# Patient Record
Sex: MALE | Race: WHITE | NOT HISPANIC OR LATINO | Employment: OTHER | URBAN - NONMETROPOLITAN AREA
[De-identification: names, ages, dates, MRNs, and addresses within clinical notes are randomized per-mention and may not be internally consistent; named-entity substitution may affect disease eponyms.]

---

## 2017-01-06 ENCOUNTER — APPOINTMENT (OUTPATIENT)
Dept: WOUND CARE | Facility: HOSPITAL | Age: 62
End: 2017-01-06
Attending: PODIATRIST

## 2017-01-09 ENCOUNTER — APPOINTMENT (OUTPATIENT)
Dept: WOUND CARE | Facility: HOSPITAL | Age: 62
End: 2017-01-09
Attending: PODIATRIST

## 2017-01-16 ENCOUNTER — APPOINTMENT (OUTPATIENT)
Dept: WOUND CARE | Facility: HOSPITAL | Age: 62
End: 2017-01-16
Attending: PODIATRIST

## 2017-01-23 ENCOUNTER — LAB REQUISITION (OUTPATIENT)
Dept: LAB | Facility: HOSPITAL | Age: 62
End: 2017-01-23

## 2017-01-23 ENCOUNTER — APPOINTMENT (OUTPATIENT)
Dept: WOUND CARE | Facility: HOSPITAL | Age: 62
End: 2017-01-23
Attending: PODIATRIST

## 2017-01-23 DIAGNOSIS — E11.621 TYPE 2 DIABETES MELLITUS WITH FOOT ULCER (CODE) (HCC): ICD-10-CM

## 2017-01-23 PROCEDURE — 87070 CULTURE OTHR SPECIMN AEROBIC: CPT | Performed by: SURGERY

## 2017-01-23 PROCEDURE — 87147 CULTURE TYPE IMMUNOLOGIC: CPT | Performed by: SURGERY

## 2017-01-23 PROCEDURE — 87176 TISSUE HOMOGENIZATION CULTR: CPT | Performed by: SURGERY

## 2017-01-23 PROCEDURE — 87076 CULTURE ANAEROBE IDENT EACH: CPT | Performed by: SURGERY

## 2017-01-23 PROCEDURE — 87075 CULTR BACTERIA EXCEPT BLOOD: CPT | Performed by: SURGERY

## 2017-01-23 PROCEDURE — 87205 SMEAR GRAM STAIN: CPT | Performed by: SURGERY

## 2017-01-27 LAB
BACTERIA SPEC AEROBE CULT: ABNORMAL
GRAM STN SPEC: ABNORMAL
GRAM STN SPEC: ABNORMAL

## 2017-01-29 LAB — BACTERIA SPEC ANAEROBE CULT: ABNORMAL

## 2017-01-30 ENCOUNTER — APPOINTMENT (OUTPATIENT)
Dept: WOUND CARE | Facility: HOSPITAL | Age: 62
End: 2017-01-30
Attending: PODIATRIST

## 2017-02-06 ENCOUNTER — APPOINTMENT (OUTPATIENT)
Dept: WOUND CARE | Facility: HOSPITAL | Age: 62
End: 2017-02-06
Attending: PODIATRIST

## 2017-02-07 ENCOUNTER — APPOINTMENT (OUTPATIENT)
Dept: WOUND CARE | Facility: HOSPITAL | Age: 62
End: 2017-02-07
Attending: PODIATRIST

## 2017-02-14 ENCOUNTER — APPOINTMENT (OUTPATIENT)
Dept: WOUND CARE | Facility: HOSPITAL | Age: 62
End: 2017-02-14
Attending: PODIATRIST

## 2017-02-17 ENCOUNTER — OFFICE VISIT (OUTPATIENT)
Dept: PODIATRY | Facility: CLINIC | Age: 62
End: 2017-02-17

## 2017-02-17 VITALS
SYSTOLIC BLOOD PRESSURE: 138 MMHG | HEART RATE: 58 BPM | WEIGHT: 241 LBS | DIASTOLIC BLOOD PRESSURE: 64 MMHG | BODY MASS INDEX: 32.64 KG/M2 | HEIGHT: 72 IN

## 2017-02-17 DIAGNOSIS — B35.1 ONYCHOMYCOSIS: ICD-10-CM

## 2017-02-17 DIAGNOSIS — L97.509 FOOT ULCER, UNSPECIFIED LATERALITY, WITH UNSPECIFIED SEVERITY (HCC): ICD-10-CM

## 2017-02-17 DIAGNOSIS — E11.49 DIABETES MELLITUS TYPE 2 WITH NEUROLOGICAL MANIFESTATIONS (HCC): Primary | ICD-10-CM

## 2017-02-17 DIAGNOSIS — I87.2 VENOUS INSUFFICIENCY: ICD-10-CM

## 2017-02-17 PROCEDURE — 99213 OFFICE O/P EST LOW 20 MIN: CPT | Performed by: PODIATRIST

## 2017-02-17 PROCEDURE — 11721 DEBRIDE NAIL 6 OR MORE: CPT | Performed by: PODIATRIST

## 2017-02-17 NOTE — PROGRESS NOTES
"    [facility] - PODIATRY    Today's Date: 02/17/2017    Patient Name: Leonora Shi  MRN: [id number]  CSN: [id number]  PCP: Jose Angel Mullen MD  Referring Provider: Theo Mercado DO    SUBJECTIVE     Chief Complaint   Patient presents with   • Follow-up     3 month f/u/diabetic foot care     HPI: Leonora Shi, a 61 y.o.male, comes to clinic as a(n) established patient complaining of painful toenails. Patient is NIDDM with last stated BG level of 105mg/dl. Denies pain and admits numbness and tingling. Relates previous treatment(s) including nail debridements. Wounds are being treatment at [facility] [place] [facility]. Denies any constitutional symptoms. No other pedal complaints at this time.    Past Medical History   Diagnosis Date   • CAD (coronary artery disease)    • History of endocarditis    • Myocardial infarction    • Neuropathy    • Osteomyelitis      bilat lower legs \"ulcers\" and bottom of left foot   • Spinal stenosis    • Venous stasis    • Wound, open      sole of left foot, and left side of left lower calf     Past Surgical History   Procedure Laterality Date   • Knee surgery       bilat knees   • Gastric bypass     • Cardiac catheterization       x 2   • Cataract extraction Right    • Varicose vein surgery Left 11/14/2016     Procedure: SAPHENOUS VEIN RADIO FREQUENCY ABLATION of Left Lower extremity;  Surgeon: Theo Mercado DO;  Location:  [facility] OR;  Service:    • Varicose vein surgery Right 12/5/2016     Procedure: RIGHT-SAPHENOUS VEIN RADIO FREQUENCY ABLATION;  Surgeon: Theo Mercado DO;  Location:  [facility] OR;  Service:      Family History   Problem Relation Age of Onset   • Diabetes Other    • Hypertension Other    • Heart disease Other    • Heart disease Mother    • Diabetes Mother    • Heart disease Father    • Lung disease Father    • No Known Problems Brother    • No Known Problems Brother      Social History     Social History   • Marital status: "      Spouse name: N/A   • Number of children: N/A   • Years of education: N/A     Occupational History   • Not on file.     Social History Main Topics   • Smoking status: Never Smoker   • Smokeless tobacco: Never Used   • Alcohol use Yes      Comment: rare    • Drug use: No   • Sexual activity: Defer     Other Topics Concern   • Not on file     Social History Narrative     Allergies   Allergen Reactions   • Adhesive Tape      Tears skin     Current Outpatient Prescriptions   Medication Sig Dispense Refill   • HYDROcodone-acetaminophen (NORCO)  MG per tablet Take  tablets by mouth Every 8 (Eight) Hours.     • temazepam (RESTORIL) 30 MG capsule Take 30 mg by mouth.     • tiZANidine (ZANAFLEX) 4 MG tablet Take 4 mg by mouth Every 8 (Eight) Hours As Needed.       No current facility-administered medications for this visit.      Review of Systems   Constitutional: Negative for chills and fever.   Respiratory: Negative for shortness of breath.    Cardiovascular: Positive for leg swelling. Negative for chest pain.   Gastrointestinal: Negative for constipation, diarrhea, nausea and vomiting.   Skin: Positive for wound.   Neurological: Positive for numbness.       OBJECTIVE     Vitals:    02/17/17 0914   BP: 138/64   Pulse: 58       PHYSICAL EXAM  GEN:   A&Ox3, NAD. Pt presents to clinic ambulating without assistance and wearing Diabetic Shoes.      NEURO:   Protective sensation intact to 0/10 sites Right foot, 0/10 site Left foot using [place]-Alena monofilament  Light touch sensation diminished  No Tinel's or Villeux sign.    VASC:  Skin temperature Warm to Warm proximal to distal janelle  DP pulses 2/4 Right, 2/4 Left  PT pulses 2/4 Right, 2/4 Left  CFT <3 sec janelle  1+ edema noted janelle  Varicosities absent janelle    MUSC/SKEL:  Muscle Strength Right foot Dorsiflexors 5/5, Plantarflexors 5/5, Evertors 5/5, Invertors 5/5  Muscle Strength Left foot Dorsiflexors 5/5, Plantarflexors 5/5, Evertors 5/5, Invertors  5/5  ROM of the 1st MTP is full without pain or crepitus  ROM of the MTJ is full without pain or crepitus    ROM of the STJ is full without pain or crepitus    ROM of the ankle joint is full without pain or crepitus    No POP with exam   Planus foot type   Gait pattern: Normal    DERM:  Pedal nails x10 are thickened, elongated and discolored with presence of subunugual debris  Webspaces are Clean, Dry, and Intact  Ulcerations noted to bilateral feet and legs (bandaged)       RADIOLOGY/NUCLEAR:  No results found.    LABORATORY/CULTURE RESULTS:      PATHOLOGY RESULTS:       ASSESSMENT/PLAN     Leonora was seen today for follow-up.    Diagnoses and all orders for this visit:    Diabetes mellitus type 2 with neurological manifestations    Onychomycosis    Venous insufficiency    Foot ulcer, unspecified laterality, with unspecified severity      Comprehensive lower extremity examination and evaluation was performed.  Discussed findings and treatment plan including risks, benefits, and treatment options with patient in detail. Patient agreed with treatment plan.  After verbal consent obtained, nail(s) x10 debrided of length and thickness with nail nipper without incidence, Patient may maintain nails and calluses at home utilizing [place] board of pumice stone between visits as needed, Reviewed at home diabetic foot care   An After Visit Summary was printed and given to the patient at discharge, including (if requested) any available informative/educational handouts regarding diagnosis, treatment, or medications. All questions were answered to patient/family satisfaction. Should symptoms fail to improve or worsen they agree to call or return to clinic or to go to the Emergency Department. Discussed the importance of following up with any needed screening tests/labs/specialist appointments and any requested follow-up recommended by me today. Importance of maintaining follow-up discussed and patient accepts that missed  appointments can delay diagnosis and potentially lead to worsening of conditions.  Return in about 3 months (around 5/17/2017)., or sooner if acute issues arise.        This document has been electronically signed by Arnel Carroll DPM on February 17, 2017 9:52 AM     Please note that portions of this note may have been completed with a voice recognition program. Efforts were made to edit the dictations, but occasionally words are mistranscribed.

## 2017-02-17 NOTE — PATIENT INSTRUCTIONS
Diabetes and Foot Care  Diabetes may cause you to have problems because of poor blood supply (circulation) to your feet and legs. This may cause the skin on your feet to become thinner, break easier, and heal more slowly. Your skin may become dry, and the skin may peel and crack. You may also have nerve damage in your legs and feet causing decreased feeling in them. You may not notice minor injuries to your feet that could lead to infections or more serious problems. Taking care of your feet is one of the most important things you can do for yourself.  HOME CARE INSTRUCTIONS  · Wear shoes at all times, even in the house. Do not go barefoot. Bare feet are easily injured.  · Check your feet daily for blisters, cuts, and redness. If you cannot see the bottom of your feet, use a mirror or ask someone for help.  · Wash your feet with warm water (do not use hot water) and mild soap. Then pat your feet and the areas between your toes until they are completely dry. Do not soak your feet as this can dry your skin.  · Apply a moisturizing lotion or petroleum jelly (that does not contain alcohol and is unscented) to the skin on your feet and to dry, brittle toenails. Do not apply lotion between your toes.  · Trim your toenails straight across. Do not dig under them or around the cuticle. File the edges of your nails with an emery board or nail file.  · Do not cut corns or calluses or try to remove them with medicine.  · Wear clean socks or stockings every day. Make sure they are not too tight. Do not wear knee-high stockings since they may decrease blood flow to your legs.  · Wear shoes that fit properly and have enough cushioning. To break in new shoes, wear them for just a few hours a day. This prevents you from injuring your feet. Always look in your shoes before you put them on to be sure there are no objects inside.  · Do not cross your legs. This may decrease the blood flow to your feet.  · If you find a minor scrape,  cut, or break in the skin on your feet, keep it and the skin around it clean and dry. These areas may be cleansed with mild soap and water. Do not cleanse the area with peroxide, alcohol, or iodine.  · When you remove an adhesive bandage, be sure not to damage the skin around it.  · If you have a wound, look at it several times a day to make sure it is healing.  · Do not use heating pads or hot water bottles. They may burn your skin. If you have lost feeling in your feet or legs, you may not know it is happening until it is too late.  · Make sure your health care provider performs a complete foot exam at least annually or more often if you have foot problems. Report any cuts, sores, or bruises to your health care provider immediately.  SEEK MEDICAL CARE IF:   · You have an injury that is not healing.  · You have cuts or breaks in the skin.  · You have an ingrown nail.  · You notice redness on your legs or feet.  · You feel burning or tingling in your legs or feet.  · You have pain or cramps in your legs and feet.  · Your legs or feet are numb.  · Your feet always feel cold.  SEEK IMMEDIATE MEDICAL CARE IF:   · There is increasing redness, swelling, or pain in or around a wound.  · There is a red line that goes up your leg.  · Pus is coming from a wound.  · You develop a fever or as directed by your health care provider.  · You notice a bad smell coming from an ulcer or wound.     This information is not intended to replace advice given to you by your health care provider. Make sure you discuss any questions you have with your health care provider.     Document Released: 12/15/2001 Document Revised: 11/28/2016 Document Reviewed: 05/27/2014  [employer] Interactive Patient Education ©2016 [employer]

## 2017-02-21 ENCOUNTER — APPOINTMENT (OUTPATIENT)
Dept: WOUND CARE | Facility: HOSPITAL | Age: 62
End: 2017-02-21
Attending: PODIATRIST

## 2017-02-21 ENCOUNTER — LAB REQUISITION (OUTPATIENT)
Dept: LAB | Facility: HOSPITAL | Age: 62
End: 2017-02-21

## 2017-02-21 ENCOUNTER — HOSPITAL ENCOUNTER (OUTPATIENT)
Dept: GENERAL RADIOLOGY | Facility: HOSPITAL | Age: 62
Discharge: HOME | End: 2017-02-21
Admitting: SURGERY

## 2017-02-21 ENCOUNTER — TRANSCRIBE ORDERS (OUTPATIENT)
Dept: ADMINISTRATIVE | Facility: HOSPITAL | Age: 62
End: 2017-02-21

## 2017-02-21 DIAGNOSIS — S81.802A OPEN WOUND, LOWER LEG, LEFT, INITIAL ENCOUNTER: Primary | ICD-10-CM

## 2017-02-21 DIAGNOSIS — L97.812 NON-PRESSURE CHRONIC ULCER OF OTHER PART OF RIGHT LOWER LEG WITH FAT LAYER EXPOSED (HCC): ICD-10-CM

## 2017-02-21 PROCEDURE — 87070 CULTURE OTHR SPECIMN AEROBIC: CPT | Performed by: SURGERY

## 2017-02-21 PROCEDURE — 87205 SMEAR GRAM STAIN: CPT | Performed by: SURGERY

## 2017-02-21 PROCEDURE — 87075 CULTR BACTERIA EXCEPT BLOOD: CPT | Performed by: SURGERY

## 2017-02-21 PROCEDURE — 87176 TISSUE HOMOGENIZATION CULTR: CPT | Performed by: SURGERY

## 2017-02-21 PROCEDURE — 73590 X-RAY EXAM OF LOWER LEG: CPT

## 2017-02-21 PROCEDURE — 88304 TISSUE EXAM BY PATHOLOGIST: CPT | Performed by: SURGERY

## 2017-02-22 ENCOUNTER — LAB REQUISITION (OUTPATIENT)
Dept: LAB | Facility: HOSPITAL | Age: 62
End: 2017-02-22

## 2017-02-22 DIAGNOSIS — L97.812 NON-PRESSURE CHRONIC ULCER OF OTHER PART OF RIGHT LOWER LEG WITH FAT LAYER EXPOSED (HCC): ICD-10-CM

## 2017-02-23 LAB
CYTO UR: NORMAL
LAB AP CASE REPORT: NORMAL
LAB AP CLINICAL INFORMATION: NORMAL
Lab: NORMAL
PATH REPORT.FINAL DX SPEC: NORMAL
PATH REPORT.GROSS SPEC: NORMAL

## 2017-02-26 LAB
BACTERIA SPEC AEROBE CULT: NORMAL
BACTERIA SPEC ANAEROBE CULT: NORMAL
GRAM STN SPEC: NORMAL
GRAM STN SPEC: NORMAL

## 2017-02-28 ENCOUNTER — APPOINTMENT (OUTPATIENT)
Dept: WOUND CARE | Facility: HOSPITAL | Age: 62
End: 2017-02-28
Attending: PODIATRIST

## 2017-03-07 ENCOUNTER — APPOINTMENT (OUTPATIENT)
Dept: WOUND CARE | Facility: HOSPITAL | Age: 62
End: 2017-03-07
Attending: PODIATRIST

## 2017-03-14 ENCOUNTER — APPOINTMENT (OUTPATIENT)
Dept: WOUND CARE | Facility: HOSPITAL | Age: 62
End: 2017-03-14
Attending: PODIATRIST

## 2017-03-21 ENCOUNTER — APPOINTMENT (OUTPATIENT)
Dept: WOUND CARE | Facility: HOSPITAL | Age: 62
End: 2017-03-21
Attending: PODIATRIST

## 2017-03-27 ENCOUNTER — HOSPITAL ENCOUNTER (OUTPATIENT)
Dept: WOUND CARE | Age: 62
Discharge: HOME | End: 2017-03-27
Payer: COMMERCIAL

## 2017-03-27 VITALS
TEMPERATURE: 98.8 F | DIASTOLIC BLOOD PRESSURE: 73 MMHG | HEART RATE: 61 BPM | BODY MASS INDEX: 32.1 KG/M2 | RESPIRATION RATE: 16 BRPM | HEIGHT: 72 IN | WEIGHT: 237 LBS | SYSTOLIC BLOOD PRESSURE: 140 MMHG

## 2017-03-27 DIAGNOSIS — L97.529 DIABETIC ULCER OF LEFT FOOT ASSOCIATED WITH TYPE 2 DIABETES MELLITUS (HCC): ICD-10-CM

## 2017-03-27 DIAGNOSIS — E11.621 DIABETIC ULCER OF LEFT FOOT ASSOCIATED WITH TYPE 2 DIABETES MELLITUS (HCC): ICD-10-CM

## 2017-03-27 PROCEDURE — 99213 OFFICE O/P EST LOW 20 MIN: CPT

## 2017-03-27 PROCEDURE — 99213 OFFICE O/P EST LOW 20 MIN: CPT | Performed by: NURSE PRACTITIONER

## 2017-04-04 ENCOUNTER — HOSPITAL ENCOUNTER (OUTPATIENT)
Dept: WOUND CARE | Age: 62
Discharge: HOME | End: 2017-04-04
Payer: COMMERCIAL

## 2017-04-04 ENCOUNTER — HOSPITAL ENCOUNTER (OUTPATIENT)
Dept: NON INVASIVE DIAGNOSTICS | Age: 62
Discharge: HOME | End: 2017-04-04
Payer: COMMERCIAL

## 2017-04-04 ENCOUNTER — HOSPITAL ENCOUNTER (OUTPATIENT)
Dept: GENERAL RADIOLOGY | Age: 62
Discharge: HOME | End: 2017-04-04
Payer: COMMERCIAL

## 2017-04-04 VITALS
DIASTOLIC BLOOD PRESSURE: 65 MMHG | RESPIRATION RATE: 18 BRPM | WEIGHT: 237 LBS | SYSTOLIC BLOOD PRESSURE: 138 MMHG | TEMPERATURE: 98 F | HEART RATE: 66 BPM | HEIGHT: 72 IN | BODY MASS INDEX: 32.1 KG/M2

## 2017-04-04 DIAGNOSIS — L97.522 SKIN ULCER OF TOE OF LEFT FOOT WITH FAT LAYER EXPOSED (HCC): ICD-10-CM

## 2017-04-04 DIAGNOSIS — L97.529 DIABETIC ULCER OF LEFT FOOT ASSOCIATED WITH TYPE 2 DIABETES MELLITUS (HCC): Primary | Chronic | ICD-10-CM

## 2017-04-04 DIAGNOSIS — L97.529 DIABETIC ULCER OF LEFT FOOT ASSOCIATED WITH TYPE 2 DIABETES MELLITUS (HCC): ICD-10-CM

## 2017-04-04 DIAGNOSIS — E11.621 DIABETIC ULCER OF LEFT FOOT ASSOCIATED WITH TYPE 2 DIABETES MELLITUS (HCC): Primary | Chronic | ICD-10-CM

## 2017-04-04 DIAGNOSIS — E11.621 DIABETIC ULCER OF LEFT FOOT ASSOCIATED WITH TYPE 2 DIABETES MELLITUS (HCC): ICD-10-CM

## 2017-04-04 PROCEDURE — 93923 UPR/LXTR ART STDY 3+ LVLS: CPT

## 2017-04-04 PROCEDURE — 73630 X-RAY EXAM OF FOOT: CPT

## 2017-04-04 PROCEDURE — 97597 DBRDMT OPN WND 1ST 20 CM/<: CPT

## 2017-04-04 PROCEDURE — 97597 DBRDMT OPN WND 1ST 20 CM/<: CPT | Performed by: NURSE PRACTITIONER

## 2017-04-11 ENCOUNTER — HOSPITAL ENCOUNTER (OUTPATIENT)
Dept: WOUND CARE | Age: 62
Discharge: HOME | End: 2017-04-11
Payer: COMMERCIAL

## 2017-04-11 VITALS
RESPIRATION RATE: 16 BRPM | HEIGHT: 72 IN | WEIGHT: 237 LBS | DIASTOLIC BLOOD PRESSURE: 69 MMHG | SYSTOLIC BLOOD PRESSURE: 152 MMHG | BODY MASS INDEX: 32.1 KG/M2 | TEMPERATURE: 97.7 F | HEART RATE: 71 BPM

## 2017-04-11 DIAGNOSIS — L97.529 DIABETIC ULCER OF LEFT FOOT ASSOCIATED WITH TYPE 2 DIABETES MELLITUS (HCC): ICD-10-CM

## 2017-04-11 DIAGNOSIS — E11.621 DIABETIC ULCER OF LEFT FOOT ASSOCIATED WITH TYPE 2 DIABETES MELLITUS (HCC): ICD-10-CM

## 2017-04-11 DIAGNOSIS — L97.522 SKIN ULCER OF TOE OF LEFT FOOT WITH FAT LAYER EXPOSED (HCC): ICD-10-CM

## 2017-04-11 PROCEDURE — 97597 DBRDMT OPN WND 1ST 20 CM/<: CPT

## 2017-04-11 PROCEDURE — 97597 DBRDMT OPN WND 1ST 20 CM/<: CPT | Performed by: SURGERY

## 2017-04-18 ENCOUNTER — HOSPITAL ENCOUNTER (OUTPATIENT)
Dept: WOUND CARE | Age: 62
Discharge: HOME | End: 2017-04-18
Payer: COMMERCIAL

## 2017-04-18 VITALS
HEART RATE: 74 BPM | WEIGHT: 237 LBS | SYSTOLIC BLOOD PRESSURE: 143 MMHG | BODY MASS INDEX: 32.1 KG/M2 | HEIGHT: 72 IN | DIASTOLIC BLOOD PRESSURE: 66 MMHG | TEMPERATURE: 98.6 F | RESPIRATION RATE: 16 BRPM

## 2017-04-18 DIAGNOSIS — L97.522 SKIN ULCER OF TOE OF LEFT FOOT WITH FAT LAYER EXPOSED (HCC): ICD-10-CM

## 2017-04-18 DIAGNOSIS — L97.529 DIABETIC ULCER OF LEFT FOOT ASSOCIATED WITH TYPE 2 DIABETES MELLITUS (HCC): ICD-10-CM

## 2017-04-18 DIAGNOSIS — E11.621 DIABETIC ULCER OF LEFT FOOT ASSOCIATED WITH TYPE 2 DIABETES MELLITUS (HCC): ICD-10-CM

## 2017-04-18 PROCEDURE — 11042 DBRDMT SUBQ TIS 1ST 20SQCM/<: CPT

## 2017-04-18 PROCEDURE — 11042 DBRDMT SUBQ TIS 1ST 20SQCM/<: CPT | Performed by: SURGERY

## 2017-04-25 ENCOUNTER — HOSPITAL ENCOUNTER (OUTPATIENT)
Dept: WOUND CARE | Age: 62
Discharge: HOME | End: 2017-04-25
Payer: COMMERCIAL

## 2017-04-25 VITALS
RESPIRATION RATE: 18 BRPM | BODY MASS INDEX: 32.1 KG/M2 | HEART RATE: 73 BPM | SYSTOLIC BLOOD PRESSURE: 151 MMHG | DIASTOLIC BLOOD PRESSURE: 68 MMHG | HEIGHT: 72 IN | WEIGHT: 237 LBS | TEMPERATURE: 97.8 F

## 2017-04-25 DIAGNOSIS — L97.522 SKIN ULCER OF TOE OF LEFT FOOT WITH FAT LAYER EXPOSED (HCC): ICD-10-CM

## 2017-04-25 DIAGNOSIS — L97.529 DIABETIC ULCER OF LEFT FOOT ASSOCIATED WITH TYPE 2 DIABETES MELLITUS (HCC): ICD-10-CM

## 2017-04-25 DIAGNOSIS — E11.621 DIABETIC ULCER OF LEFT FOOT ASSOCIATED WITH TYPE 2 DIABETES MELLITUS (HCC): ICD-10-CM

## 2017-04-25 PROCEDURE — 97597 DBRDMT OPN WND 1ST 20 CM/<: CPT

## 2017-04-25 PROCEDURE — 11042 DBRDMT SUBQ TIS 1ST 20SQCM/<: CPT

## 2017-04-25 PROCEDURE — 11042 DBRDMT SUBQ TIS 1ST 20SQCM/<: CPT | Performed by: SURGERY

## 2017-04-28 VITALS — WEIGHT: 315 LBS

## 2017-05-02 ENCOUNTER — HOSPITAL ENCOUNTER (OUTPATIENT)
Dept: WOUND CARE | Age: 62
Discharge: HOME | End: 2017-05-02
Payer: COMMERCIAL

## 2017-05-02 VITALS
HEIGHT: 72 IN | HEART RATE: 91 BPM | TEMPERATURE: 98.8 F | RESPIRATION RATE: 16 BRPM | DIASTOLIC BLOOD PRESSURE: 77 MMHG | BODY MASS INDEX: 32.1 KG/M2 | WEIGHT: 237 LBS | SYSTOLIC BLOOD PRESSURE: 145 MMHG

## 2017-05-02 DIAGNOSIS — L97.522 SKIN ULCER OF TOE OF LEFT FOOT WITH FAT LAYER EXPOSED (HCC): ICD-10-CM

## 2017-05-02 DIAGNOSIS — E11.621 DIABETIC ULCER OF LEFT FOOT ASSOCIATED WITH TYPE 2 DIABETES MELLITUS (HCC): ICD-10-CM

## 2017-05-02 DIAGNOSIS — L97.529 DIABETIC ULCER OF LEFT FOOT ASSOCIATED WITH TYPE 2 DIABETES MELLITUS (HCC): ICD-10-CM

## 2017-05-02 PROCEDURE — 11042 DBRDMT SUBQ TIS 1ST 20SQCM/<: CPT

## 2017-05-02 PROCEDURE — 97597 DBRDMT OPN WND 1ST 20 CM/<: CPT | Performed by: SURGERY

## 2017-05-02 PROCEDURE — 97597 DBRDMT OPN WND 1ST 20 CM/<: CPT

## 2017-05-02 PROCEDURE — 11042 DBRDMT SUBQ TIS 1ST 20SQCM/<: CPT | Performed by: SURGERY

## 2017-05-09 ENCOUNTER — HOSPITAL ENCOUNTER (OUTPATIENT)
Dept: WOUND CARE | Age: 62
Discharge: HOME | End: 2017-05-09
Payer: COMMERCIAL

## 2017-05-09 DIAGNOSIS — E11.621 DIABETIC ULCER OF LEFT FOOT ASSOCIATED WITH TYPE 2 DIABETES MELLITUS (HCC): ICD-10-CM

## 2017-05-09 DIAGNOSIS — L97.522 SKIN ULCER OF TOE OF LEFT FOOT WITH FAT LAYER EXPOSED (HCC): ICD-10-CM

## 2017-05-09 DIAGNOSIS — L97.529 DIABETIC ULCER OF LEFT FOOT ASSOCIATED WITH TYPE 2 DIABETES MELLITUS (HCC): ICD-10-CM

## 2017-05-09 PROCEDURE — 11042 DBRDMT SUBQ TIS 1ST 20SQCM/<: CPT

## 2017-05-09 PROCEDURE — 97597 DBRDMT OPN WND 1ST 20 CM/<: CPT | Performed by: SURGERY

## 2017-05-09 PROCEDURE — 97597 DBRDMT OPN WND 1ST 20 CM/<: CPT

## 2017-05-09 PROCEDURE — 11042 DBRDMT SUBQ TIS 1ST 20SQCM/<: CPT | Performed by: SURGERY

## 2017-06-02 ENCOUNTER — HOSPITAL ENCOUNTER (OUTPATIENT)
Dept: WOUND CARE | Age: 62
Discharge: HOME | End: 2017-06-02
Payer: MEDICARE

## 2017-06-02 VITALS
SYSTOLIC BLOOD PRESSURE: 154 MMHG | DIASTOLIC BLOOD PRESSURE: 74 MMHG | TEMPERATURE: 98.3 F | RESPIRATION RATE: 18 BRPM | HEART RATE: 59 BPM | WEIGHT: 237 LBS | BODY MASS INDEX: 32.1 KG/M2 | HEIGHT: 72 IN

## 2017-06-02 DIAGNOSIS — L97.522 SKIN ULCER OF TOE OF LEFT FOOT WITH FAT LAYER EXPOSED (HCC): ICD-10-CM

## 2017-06-02 DIAGNOSIS — E11.621 DIABETIC ULCER OF LEFT FOOT ASSOCIATED WITH TYPE 2 DIABETES MELLITUS (HCC): ICD-10-CM

## 2017-06-02 DIAGNOSIS — L97.529 DIABETIC ULCER OF LEFT FOOT ASSOCIATED WITH TYPE 2 DIABETES MELLITUS (HCC): ICD-10-CM

## 2017-06-02 PROCEDURE — 11042 DBRDMT SUBQ TIS 1ST 20SQCM/<: CPT

## 2017-06-02 PROCEDURE — 11042 DBRDMT SUBQ TIS 1ST 20SQCM/<: CPT | Performed by: SURGERY

## 2017-06-02 PROCEDURE — 97597 DBRDMT OPN WND 1ST 20 CM/<: CPT

## 2017-06-02 PROCEDURE — 97597 DBRDMT OPN WND 1ST 20 CM/<: CPT | Performed by: SURGERY

## 2017-06-02 RX ORDER — TEMAZEPAM 30 MG/1
30 CAPSULE ORAL
COMMUNITY
Start: 2016-09-22 | End: 2017-12-30 | Stop reason: SDUPTHER

## 2017-06-02 RX ORDER — TIZANIDINE 4 MG/1
4 TABLET ORAL EVERY 8 HOURS PRN
COMMUNITY
Start: 2016-10-21 | End: 2017-10-23 | Stop reason: ALTCHOICE

## 2017-06-07 ENCOUNTER — HOSPITAL ENCOUNTER (OUTPATIENT)
Dept: WOUND CARE | Age: 62
Discharge: HOME | End: 2017-06-07
Payer: MEDICARE

## 2017-06-07 VITALS
HEIGHT: 72 IN | BODY MASS INDEX: 32.1 KG/M2 | SYSTOLIC BLOOD PRESSURE: 130 MMHG | TEMPERATURE: 98.6 F | WEIGHT: 237 LBS | DIASTOLIC BLOOD PRESSURE: 69 MMHG | HEART RATE: 59 BPM | RESPIRATION RATE: 16 BRPM

## 2017-06-07 DIAGNOSIS — L97.529 DIABETIC ULCER OF LEFT FOOT ASSOCIATED WITH TYPE 2 DIABETES MELLITUS (HCC): ICD-10-CM

## 2017-06-07 DIAGNOSIS — L97.522 SKIN ULCER OF TOE OF LEFT FOOT WITH FAT LAYER EXPOSED (HCC): ICD-10-CM

## 2017-06-07 DIAGNOSIS — E11.621 DIABETIC ULCER OF LEFT FOOT ASSOCIATED WITH TYPE 2 DIABETES MELLITUS (HCC): ICD-10-CM

## 2017-06-07 PROCEDURE — 11042 DBRDMT SUBQ TIS 1ST 20SQCM/<: CPT | Performed by: SURGERY

## 2017-06-07 PROCEDURE — 97597 DBRDMT OPN WND 1ST 20 CM/<: CPT

## 2017-06-07 PROCEDURE — 11042 DBRDMT SUBQ TIS 1ST 20SQCM/<: CPT

## 2017-06-07 PROCEDURE — 97597 DBRDMT OPN WND 1ST 20 CM/<: CPT | Performed by: SURGERY

## 2017-06-14 ENCOUNTER — HOSPITAL ENCOUNTER (OUTPATIENT)
Dept: WOUND CARE | Age: 62
Discharge: HOME | End: 2017-06-14
Payer: MEDICARE

## 2017-06-14 VITALS
BODY MASS INDEX: 32.1 KG/M2 | TEMPERATURE: 97.8 F | WEIGHT: 237 LBS | HEIGHT: 72 IN | SYSTOLIC BLOOD PRESSURE: 162 MMHG | RESPIRATION RATE: 16 BRPM | HEART RATE: 66 BPM | DIASTOLIC BLOOD PRESSURE: 75 MMHG

## 2017-06-14 DIAGNOSIS — L97.522 SKIN ULCER OF TOE OF LEFT FOOT WITH FAT LAYER EXPOSED (HCC): ICD-10-CM

## 2017-06-14 DIAGNOSIS — E11.621 DIABETIC ULCER OF LEFT FOOT ASSOCIATED WITH TYPE 2 DIABETES MELLITUS (HCC): ICD-10-CM

## 2017-06-14 DIAGNOSIS — L97.529 DIABETIC ULCER OF LEFT FOOT ASSOCIATED WITH TYPE 2 DIABETES MELLITUS (HCC): ICD-10-CM

## 2017-06-14 PROCEDURE — 97597 DBRDMT OPN WND 1ST 20 CM/<: CPT

## 2017-06-14 PROCEDURE — 11042 DBRDMT SUBQ TIS 1ST 20SQCM/<: CPT | Performed by: SURGERY

## 2017-06-14 PROCEDURE — 97597 DBRDMT OPN WND 1ST 20 CM/<: CPT | Performed by: SURGERY

## 2017-06-14 PROCEDURE — 11042 DBRDMT SUBQ TIS 1ST 20SQCM/<: CPT

## 2017-06-21 ENCOUNTER — HOSPITAL ENCOUNTER (OUTPATIENT)
Dept: WOUND CARE | Age: 62
Discharge: HOME | End: 2017-06-21
Payer: MEDICARE

## 2017-06-21 VITALS
WEIGHT: 237 LBS | TEMPERATURE: 98.6 F | SYSTOLIC BLOOD PRESSURE: 135 MMHG | BODY MASS INDEX: 32.1 KG/M2 | HEART RATE: 74 BPM | RESPIRATION RATE: 16 BRPM | DIASTOLIC BLOOD PRESSURE: 60 MMHG | HEIGHT: 72 IN

## 2017-06-21 DIAGNOSIS — L97.522 SKIN ULCER OF TOE OF LEFT FOOT WITH FAT LAYER EXPOSED (HCC): ICD-10-CM

## 2017-06-21 DIAGNOSIS — E11.621 DIABETIC ULCER OF LEFT FOOT ASSOCIATED WITH TYPE 2 DIABETES MELLITUS (HCC): ICD-10-CM

## 2017-06-21 DIAGNOSIS — L97.529 DIABETIC ULCER OF LEFT FOOT ASSOCIATED WITH TYPE 2 DIABETES MELLITUS (HCC): ICD-10-CM

## 2017-06-21 PROCEDURE — 97597 DBRDMT OPN WND 1ST 20 CM/<: CPT | Performed by: SURGERY

## 2017-06-21 PROCEDURE — 97597 DBRDMT OPN WND 1ST 20 CM/<: CPT

## 2017-06-21 PROCEDURE — 11042 DBRDMT SUBQ TIS 1ST 20SQCM/<: CPT | Performed by: SURGERY

## 2017-06-21 PROCEDURE — 11042 DBRDMT SUBQ TIS 1ST 20SQCM/<: CPT

## 2017-06-28 ENCOUNTER — HOSPITAL ENCOUNTER (OUTPATIENT)
Dept: WOUND CARE | Age: 62
Discharge: HOME | End: 2017-06-28
Payer: MEDICARE

## 2017-06-28 VITALS
SYSTOLIC BLOOD PRESSURE: 135 MMHG | RESPIRATION RATE: 18 BRPM | HEART RATE: 66 BPM | TEMPERATURE: 98.5 F | WEIGHT: 237 LBS | DIASTOLIC BLOOD PRESSURE: 69 MMHG | HEIGHT: 72 IN | BODY MASS INDEX: 32.1 KG/M2

## 2017-06-28 DIAGNOSIS — E11.621 DIABETIC ULCER OF LEFT FOOT ASSOCIATED WITH TYPE 2 DIABETES MELLITUS (HCC): ICD-10-CM

## 2017-06-28 DIAGNOSIS — L97.529 DIABETIC ULCER OF LEFT FOOT ASSOCIATED WITH TYPE 2 DIABETES MELLITUS (HCC): ICD-10-CM

## 2017-06-28 DIAGNOSIS — L97.522 SKIN ULCER OF TOE OF LEFT FOOT WITH FAT LAYER EXPOSED (HCC): ICD-10-CM

## 2017-06-28 PROCEDURE — 97597 DBRDMT OPN WND 1ST 20 CM/<: CPT | Performed by: SURGERY

## 2017-06-28 PROCEDURE — 11042 DBRDMT SUBQ TIS 1ST 20SQCM/<: CPT

## 2017-06-28 PROCEDURE — 97597 DBRDMT OPN WND 1ST 20 CM/<: CPT

## 2017-06-28 PROCEDURE — 11042 DBRDMT SUBQ TIS 1ST 20SQCM/<: CPT | Performed by: SURGERY

## 2017-07-05 ENCOUNTER — HOSPITAL ENCOUNTER (OUTPATIENT)
Dept: WOUND CARE | Age: 62
Discharge: HOME | End: 2017-07-05
Payer: MEDICARE

## 2017-07-05 VITALS
TEMPERATURE: 98.4 F | HEART RATE: 67 BPM | BODY MASS INDEX: 32.1 KG/M2 | HEIGHT: 72 IN | WEIGHT: 237 LBS | RESPIRATION RATE: 16 BRPM | DIASTOLIC BLOOD PRESSURE: 70 MMHG | SYSTOLIC BLOOD PRESSURE: 151 MMHG

## 2017-07-05 DIAGNOSIS — L97.522 SKIN ULCER OF TOE OF LEFT FOOT WITH FAT LAYER EXPOSED (HCC): ICD-10-CM

## 2017-07-05 DIAGNOSIS — E11.621 DIABETIC ULCER OF LEFT FOOT ASSOCIATED WITH TYPE 2 DIABETES MELLITUS (HCC): ICD-10-CM

## 2017-07-05 DIAGNOSIS — L97.529 DIABETIC ULCER OF LEFT FOOT ASSOCIATED WITH TYPE 2 DIABETES MELLITUS (HCC): ICD-10-CM

## 2017-07-05 PROCEDURE — 97597 DBRDMT OPN WND 1ST 20 CM/<: CPT

## 2017-07-05 PROCEDURE — 11042 DBRDMT SUBQ TIS 1ST 20SQCM/<: CPT | Performed by: SURGERY

## 2017-07-05 PROCEDURE — 11042 DBRDMT SUBQ TIS 1ST 20SQCM/<: CPT

## 2017-07-05 PROCEDURE — 97597 DBRDMT OPN WND 1ST 20 CM/<: CPT | Performed by: SURGERY

## 2017-07-07 ENCOUNTER — OFFICE VISIT (OUTPATIENT)
Dept: PODIATRY | Facility: CLINIC | Age: 62
End: 2017-07-07

## 2017-07-07 VITALS
SYSTOLIC BLOOD PRESSURE: 132 MMHG | HEIGHT: 72 IN | DIASTOLIC BLOOD PRESSURE: 94 MMHG | WEIGHT: 250 LBS | BODY MASS INDEX: 33.86 KG/M2 | HEART RATE: 68 BPM

## 2017-07-07 DIAGNOSIS — I87.2 VENOUS INSUFFICIENCY: ICD-10-CM

## 2017-07-07 DIAGNOSIS — E11.49 DIABETES MELLITUS TYPE 2 WITH NEUROLOGICAL MANIFESTATIONS (HCC): ICD-10-CM

## 2017-07-07 DIAGNOSIS — B35.1 ONYCHOMYCOSIS: Primary | ICD-10-CM

## 2017-07-07 PROCEDURE — 11721 DEBRIDE NAIL 6 OR MORE: CPT | Performed by: PODIATRIST

## 2017-07-07 PROCEDURE — 99213 OFFICE O/P EST LOW 20 MIN: CPT | Performed by: PODIATRIST

## 2017-07-07 RX ORDER — LISINOPRIL 20 MG/1
20 TABLET ORAL DAILY
COMMUNITY

## 2017-07-07 NOTE — PROGRESS NOTES
"    [facility] - PODIATRY    Today's Date: 07/07/2017    Patient Name: Leonora Shi  MRN: [id number]  CSN: [id number]  PCP: Jose Angel Mullen MD  Referring Provider: No ref. provider found    SUBJECTIVE     Chief Complaint   Patient presents with   • Follow-up     Patient states he is here for a diabetic foot exam. He denies any pain.      HPI: Leonora Shi, a 62 y.o.male, comes to clinic as a(n) established patient presenting for diabetic foot exam and for follow-up treatment of thick, long toenails. Denies change in medical hx since last appt. Relates that he is no longer going to [facility] and now is going to [facility] and being treated by Dr. Newton for wounds on his legs that were wrapped yesterday. Patient is NIDDM with last stated BG level of 91mg/dl. Denies pain. Denies any constitutional symptoms. No other pedal complaints at this time.    Past Medical History:   Diagnosis Date   • CAD (coronary artery disease)    • Diabetes mellitus    • History of endocarditis    • Myocardial infarction    • Neuropathy    • Osteomyelitis     bilat lower legs \"ulcers\" and bottom of left foot   • Spinal stenosis    • Venous stasis    • Wound, open     sole of left foot, and left side of left lower calf     Past Surgical History:   Procedure Laterality Date   • CARDIAC CATHETERIZATION      x 2   • CATARACT EXTRACTION Right    • GASTRIC BYPASS     • KNEE SURGERY      bilat knees   • VARICOSE VEIN SURGERY Left 11/14/2016    Procedure: SAPHENOUS VEIN RADIO FREQUENCY ABLATION of Left Lower extremity;  Surgeon: Theo Mercado DO;  Location:  [facility] OR;  Service:    • VARICOSE VEIN SURGERY Right 12/5/2016    Procedure: RIGHT-SAPHENOUS VEIN RADIO FREQUENCY ABLATION;  Surgeon: Theo Mercado DO;  Location:  [facility] OR;  Service:      Family History   Problem Relation Age of Onset   • Diabetes Other    • Hypertension Other    • Heart disease Other    • Heart disease Mother    • Diabetes " Mother    • Heart disease Father    • Lung disease Father    • No Known Problems Brother    • No Known Problems Brother      Social History     Social History   • Marital status:      Spouse name: N/A   • Number of children: N/A   • Years of education: N/A     Occupational History   • Not on file.     Social History Main Topics   • Smoking status: Never Smoker   • Smokeless tobacco: Never Used   • Alcohol use Yes      Comment: rare    • Drug use: No   • Sexual activity: Defer     Other Topics Concern   • Not on file     Social History Narrative     Allergies   Allergen Reactions   • Adhesive Tape      Tears skin     Current Outpatient Prescriptions   Medication Sig Dispense Refill   • HYDROcodone-acetaminophen (NORCO)  MG per tablet Take  tablets by mouth Every 8 (Eight) Hours.     • lisinopril (PRINIVIL,ZESTRIL) 20 MG tablet Take 20 mg by mouth Daily.     • temazepam (RESTORIL) 30 MG capsule Take 30 mg by mouth.     • tiZANidine (ZANAFLEX) 4 MG tablet Take 4 mg by mouth Every 8 (Eight) Hours As Needed.       No current facility-administered medications for this visit.      Review of Systems   Constitutional: Negative for chills and fever.   Respiratory: Negative for shortness of breath.    Cardiovascular: Positive for leg swelling. Negative for chest pain.   Gastrointestinal: Negative for constipation, diarrhea, nausea and vomiting.   Skin: Positive for wound.   Neurological: Positive for numbness.       OBJECTIVE     Vitals:    07/07/17 1129   BP: 132/94   Pulse: 68       PHYSICAL EXAM  GEN:   A&Ox3, NAD. Pt presents to clinic ambulating without assistance and wearing Diabetic Shoes.       NEURO:   Protective sensation intact to 0/10 sites Right foot, 0/10 site Left foot using [place]-Alena monofilament  Light touch sensation diminished  No Tinel's or Villeux sign.     VASC:  Skin temperature Warm to Warm proximal to distal janelle  DP pulses 2/4 Right, 2/4 Left  PT pulses 2/4 Right, 2/4  Left  CFT <3 sec janelle  1+ edema noted janelle  Varicosities absent janelle     MUSC/SKEL:  Muscle Strength Right foot Dorsiflexors 5/5, Plantarflexors 5/5, Evertors 5/5, Invertors 5/5  Muscle Strength Left foot Dorsiflexors 5/5, Plantarflexors 5/5, Evertors 5/5, Invertors 5/5  ROM of the 1st MTP is full without pain or crepitus  ROM of the MTJ is full without pain or crepitus   ROM of the STJ is full without pain or crepitus   ROM of the ankle joint is full without pain or crepitus   No POP with exam   Planus foot type   Gait pattern: Normal     DERM:  Pedal nails x10 are thickened, elongated and discolored with presence of subunugual debris  Webspaces are Clean, Dry, and Intact  Ulcerations noted to bilateral feet and legs (bandaged)     RADIOLOGY/NUCLEAR:  No results found.    LABORATORY/CULTURE RESULTS:      PATHOLOGY RESULTS:       ASSESSMENT/PLAN     Leonora was seen today for follow-up.    Diagnoses and all orders for this visit:    Onychomycosis    Diabetes mellitus type 2 with neurological manifestations    Venous insufficiency      Comprehensive lower extremity examination and evaluation was performed.  Discussed findings and treatment plan including risks, benefits, and treatment options with patient in detail. Patient agreed with treatment plan.  After verbal consent obtained, nail(s) x10 debrided of length and thickness with nail nipper without incidence, Patient may maintain nails and calluses at home utilizing [place] board of pumice stone between visits as needed, Reviewed at home diabetic foot care   Continue treatment for wounds at [facility]  An After Visit Summary was printed and given to the patient at discharge, including (if requested) any available informative/educational handouts regarding diagnosis, treatment, or medications. All questions were answered to patient/family satisfaction. Should symptoms fail to improve or worsen they agree to call or return to clinic or to go to the Emergency Department.  Discussed the importance of following up with any needed screening tests/labs/specialist appointments and any requested follow-up recommended by me today. Importance of maintaining follow-up discussed and patient accepts that missed appointments can delay diagnosis and potentially lead to worsening of conditions.  Return in about 3 months (around 10/7/2017)., or sooner if acute issues arise.        This document has been electronically signed by Arnel Carroll DPM on July 7, 2017 5:13 PM

## 2017-07-07 NOTE — PATIENT INSTRUCTIONS
Diabetes and Foot Care  Diabetes may cause you to have problems because of poor blood supply (circulation) to your feet and legs. This may cause the skin on your feet to become thinner, break easier, and heal more slowly. Your skin may become dry, and the skin may peel and crack. You may also have nerve damage in your legs and feet causing decreased feeling in them. You may not notice minor injuries to your feet that could lead to infections or more serious problems. Taking care of your feet is one of the most important things you can do for yourself.  HOME CARE INSTRUCTIONS  · Wear shoes at all times, even in the house. Do not go barefoot. Bare feet are easily injured.  · Check your feet daily for blisters, cuts, and redness. If you cannot see the bottom of your feet, use a mirror or ask someone for help.  · Wash your feet with warm water (do not use hot water) and mild soap. Then pat your feet and the areas between your toes until they are completely dry. Do not soak your feet as this can dry your skin.  · Apply a moisturizing lotion or petroleum jelly (that does not contain alcohol and is unscented) to the skin on your feet and to dry, brittle toenails. Do not apply lotion between your toes.  · Trim your toenails straight across. Do not dig under them or around the cuticle. File the edges of your nails with an emery board or nail file.  · Do not cut corns or calluses or try to remove them with medicine.  · Wear clean socks or stockings every day. Make sure they are not too tight. Do not wear knee-high stockings since they may decrease blood flow to your legs.  · Wear shoes that fit properly and have enough cushioning. To break in new shoes, wear them for just a few hours a day. This prevents you from injuring your feet. Always look in your shoes before you put them on to be sure there are no objects inside.  · Do not cross your legs. This may decrease the blood flow to your feet.  · If you find a minor scrape,  cut, or break in the skin on your feet, keep it and the skin around it clean and dry. These areas may be cleansed with mild soap and water. Do not cleanse the area with peroxide, alcohol, or iodine.  · When you remove an adhesive bandage, be sure not to damage the skin around it.  · If you have a wound, look at it several times a day to make sure it is healing.  · Do not use heating pads or hot water bottles. They may burn your skin. If you have lost feeling in your feet or legs, you may not know it is happening until it is too late.  · Make sure your health care provider performs a complete foot exam at least annually or more often if you have foot problems. Report any cuts, sores, or bruises to your health care provider immediately.  SEEK MEDICAL CARE IF:   · You have an injury that is not healing.  · You have cuts or breaks in the skin.  · You have an ingrown nail.  · You notice redness on your legs or feet.  · You feel burning or tingling in your legs or feet.  · You have pain or cramps in your legs and feet.  · Your legs or feet are numb.  · Your feet always feel cold.  SEEK IMMEDIATE MEDICAL CARE IF:   · There is increasing redness, swelling, or pain in or around a wound.  · There is a red line that goes up your leg.  · Pus is coming from a wound.  · You develop a fever or as directed by your health care provider.  · You notice a bad smell coming from an ulcer or wound.     This information is not intended to replace advice given to you by your health care provider. Make sure you discuss any questions you have with your health care provider.     Document Released: 12/15/2001 Document Revised: 04/10/2017 Document Reviewed: 05/27/2014  [employer] Interactive Patient Education ©2017 [employer]

## 2017-07-12 ENCOUNTER — HOSPITAL ENCOUNTER (OUTPATIENT)
Dept: WOUND CARE | Age: 62
Discharge: HOME | End: 2017-07-12
Payer: MEDICARE

## 2017-07-12 VITALS
HEIGHT: 72 IN | RESPIRATION RATE: 16 BRPM | HEART RATE: 79 BPM | SYSTOLIC BLOOD PRESSURE: 133 MMHG | DIASTOLIC BLOOD PRESSURE: 66 MMHG | TEMPERATURE: 98.6 F | BODY MASS INDEX: 32.1 KG/M2 | WEIGHT: 237 LBS

## 2017-07-12 DIAGNOSIS — L97.522 SKIN ULCER OF TOE OF LEFT FOOT WITH FAT LAYER EXPOSED (HCC): ICD-10-CM

## 2017-07-12 DIAGNOSIS — L97.529 DIABETIC ULCER OF LEFT FOOT ASSOCIATED WITH TYPE 2 DIABETES MELLITUS (HCC): ICD-10-CM

## 2017-07-12 DIAGNOSIS — E11.621 DIABETIC ULCER OF LEFT FOOT ASSOCIATED WITH TYPE 2 DIABETES MELLITUS (HCC): ICD-10-CM

## 2017-07-12 PROCEDURE — 11042 DBRDMT SUBQ TIS 1ST 20SQCM/<: CPT

## 2017-07-12 PROCEDURE — 11042 DBRDMT SUBQ TIS 1ST 20SQCM/<: CPT | Performed by: SURGERY

## 2017-07-19 ENCOUNTER — HOSPITAL ENCOUNTER (OUTPATIENT)
Dept: WOUND CARE | Age: 62
Discharge: HOME | End: 2017-07-19
Payer: MEDICARE

## 2017-07-19 VITALS
HEART RATE: 60 BPM | HEIGHT: 72 IN | SYSTOLIC BLOOD PRESSURE: 135 MMHG | RESPIRATION RATE: 16 BRPM | TEMPERATURE: 98 F | DIASTOLIC BLOOD PRESSURE: 78 MMHG | BODY MASS INDEX: 32.1 KG/M2 | WEIGHT: 237 LBS

## 2017-07-19 DIAGNOSIS — L97.529 DIABETIC ULCER OF LEFT FOOT ASSOCIATED WITH TYPE 2 DIABETES MELLITUS (HCC): ICD-10-CM

## 2017-07-19 DIAGNOSIS — E11.621 DIABETIC ULCER OF LEFT FOOT ASSOCIATED WITH TYPE 2 DIABETES MELLITUS (HCC): ICD-10-CM

## 2017-07-19 DIAGNOSIS — L97.522 SKIN ULCER OF TOE OF LEFT FOOT WITH FAT LAYER EXPOSED (HCC): ICD-10-CM

## 2017-07-19 PROCEDURE — 11042 DBRDMT SUBQ TIS 1ST 20SQCM/<: CPT | Performed by: SURGERY

## 2017-07-19 PROCEDURE — 11042 DBRDMT SUBQ TIS 1ST 20SQCM/<: CPT

## 2017-07-25 ENCOUNTER — HOSPITAL ENCOUNTER (OUTPATIENT)
Dept: WOUND CARE | Age: 62
Discharge: HOME | End: 2017-07-25
Payer: MEDICARE

## 2017-07-25 ENCOUNTER — TELEPHONE (OUTPATIENT)
Dept: FAMILY MEDICINE CLINIC | Age: 62
End: 2017-07-25

## 2017-07-25 VITALS
HEART RATE: 56 BPM | WEIGHT: 237 LBS | SYSTOLIC BLOOD PRESSURE: 131 MMHG | HEIGHT: 72 IN | DIASTOLIC BLOOD PRESSURE: 62 MMHG | TEMPERATURE: 97.7 F | RESPIRATION RATE: 16 BRPM | BODY MASS INDEX: 32.1 KG/M2

## 2017-07-25 DIAGNOSIS — E11.621 DIABETIC ULCER OF LEFT FOOT ASSOCIATED WITH TYPE 2 DIABETES MELLITUS (HCC): ICD-10-CM

## 2017-07-25 DIAGNOSIS — L97.529 DIABETIC ULCER OF LEFT FOOT ASSOCIATED WITH TYPE 2 DIABETES MELLITUS (HCC): ICD-10-CM

## 2017-07-25 DIAGNOSIS — L97.522 SKIN ULCER OF TOE OF LEFT FOOT WITH FAT LAYER EXPOSED (HCC): ICD-10-CM

## 2017-07-25 PROCEDURE — 11042 DBRDMT SUBQ TIS 1ST 20SQCM/<: CPT

## 2017-07-25 PROCEDURE — 11042 DBRDMT SUBQ TIS 1ST 20SQCM/<: CPT | Performed by: SURGERY

## 2017-07-25 RX ORDER — HYDROCODONE BITARTRATE AND ACETAMINOPHEN 10; 325 MG/1; MG/1
1 TABLET ORAL EVERY 6 HOURS PRN
Qty: 120 TABLET | Refills: 0 | Status: CANCELLED | OUTPATIENT
Start: 2017-07-25

## 2017-08-01 ENCOUNTER — HOSPITAL ENCOUNTER (OUTPATIENT)
Dept: WOUND CARE | Age: 62
Discharge: HOME | End: 2017-08-01
Payer: MEDICARE

## 2017-08-01 VITALS
BODY MASS INDEX: 32.1 KG/M2 | SYSTOLIC BLOOD PRESSURE: 139 MMHG | TEMPERATURE: 97.9 F | HEIGHT: 72 IN | RESPIRATION RATE: 16 BRPM | WEIGHT: 237 LBS | DIASTOLIC BLOOD PRESSURE: 73 MMHG | HEART RATE: 60 BPM

## 2017-08-01 DIAGNOSIS — L97.529 DIABETIC ULCER OF OTHER PART OF LEFT FOOT ASSOCIATED WITH TYPE 2 DIABETES MELLITUS, UNSPECIFIED ULCER STAGE (HCC): ICD-10-CM

## 2017-08-01 DIAGNOSIS — E11.621 DIABETIC ULCER OF OTHER PART OF LEFT FOOT ASSOCIATED WITH TYPE 2 DIABETES MELLITUS, UNSPECIFIED ULCER STAGE (HCC): ICD-10-CM

## 2017-08-01 DIAGNOSIS — L97.522 SKIN ULCER OF TOE OF LEFT FOOT WITH FAT LAYER EXPOSED (HCC): ICD-10-CM

## 2017-08-01 PROCEDURE — 97597 DBRDMT OPN WND 1ST 20 CM/<: CPT

## 2017-08-01 RX ORDER — HYDROCODONE BITARTRATE AND ACETAMINOPHEN 10; 325 MG/1; MG/1
1 TABLET ORAL EVERY 8 HOURS PRN
Qty: 120 TABLET | Refills: 0 | Status: SHIPPED | OUTPATIENT
Start: 2017-08-01 | End: 2017-08-28 | Stop reason: SDUPTHER

## 2017-08-08 ENCOUNTER — HOSPITAL ENCOUNTER (OUTPATIENT)
Dept: WOUND CARE | Age: 62
Discharge: HOME | End: 2017-08-08
Payer: MEDICARE

## 2017-08-08 ENCOUNTER — HOSPITAL ENCOUNTER (OUTPATIENT)
Dept: GENERAL RADIOLOGY | Age: 62
Discharge: HOME | End: 2017-08-08
Payer: MEDICARE

## 2017-08-08 VITALS
RESPIRATION RATE: 16 BRPM | BODY MASS INDEX: 32.1 KG/M2 | TEMPERATURE: 98 F | HEART RATE: 66 BPM | WEIGHT: 237 LBS | SYSTOLIC BLOOD PRESSURE: 132 MMHG | DIASTOLIC BLOOD PRESSURE: 61 MMHG | HEIGHT: 72 IN

## 2017-08-08 DIAGNOSIS — E11.621 DIABETIC ULCER OF OTHER PART OF LEFT FOOT ASSOCIATED WITH TYPE 2 DIABETES MELLITUS, UNSPECIFIED ULCER STAGE (HCC): ICD-10-CM

## 2017-08-08 DIAGNOSIS — L97.529 DIABETIC ULCER OF OTHER PART OF LEFT FOOT ASSOCIATED WITH TYPE 2 DIABETES MELLITUS, UNSPECIFIED ULCER STAGE (HCC): ICD-10-CM

## 2017-08-08 DIAGNOSIS — L97.522 SKIN ULCER OF TOE OF LEFT FOOT WITH FAT LAYER EXPOSED (HCC): ICD-10-CM

## 2017-08-08 DIAGNOSIS — L97.504: ICD-10-CM

## 2017-08-08 PROCEDURE — 73630 X-RAY EXAM OF FOOT: CPT

## 2017-08-08 PROCEDURE — 97597 DBRDMT OPN WND 1ST 20 CM/<: CPT | Performed by: SURGERY

## 2017-08-08 PROCEDURE — 97597 DBRDMT OPN WND 1ST 20 CM/<: CPT

## 2017-08-15 ENCOUNTER — HOSPITAL ENCOUNTER (OUTPATIENT)
Dept: WOUND CARE | Age: 62
Discharge: HOME | End: 2017-08-15
Payer: MEDICARE

## 2017-08-15 DIAGNOSIS — L97.422 DIABETIC ULCER OF LEFT MIDFOOT ASSOCIATED WITH TYPE 2 DIABETES MELLITUS, WITH FAT LAYER EXPOSED (HCC): ICD-10-CM

## 2017-08-15 DIAGNOSIS — L97.522 SKIN ULCER OF TOE OF LEFT FOOT WITH FAT LAYER EXPOSED (HCC): ICD-10-CM

## 2017-08-15 DIAGNOSIS — E11.621 DIABETIC ULCER OF LEFT MIDFOOT ASSOCIATED WITH TYPE 2 DIABETES MELLITUS, WITH FAT LAYER EXPOSED (HCC): ICD-10-CM

## 2017-08-15 PROCEDURE — 11042 DBRDMT SUBQ TIS 1ST 20SQCM/<: CPT | Performed by: SURGERY

## 2017-08-15 PROCEDURE — 11042 DBRDMT SUBQ TIS 1ST 20SQCM/<: CPT

## 2017-08-23 ENCOUNTER — HOSPITAL ENCOUNTER (OUTPATIENT)
Dept: WOUND CARE | Age: 62
Discharge: HOME | End: 2017-08-23
Payer: MEDICARE

## 2017-08-23 VITALS
RESPIRATION RATE: 16 BRPM | HEART RATE: 56 BPM | BODY MASS INDEX: 32.1 KG/M2 | HEIGHT: 72 IN | TEMPERATURE: 98.3 F | DIASTOLIC BLOOD PRESSURE: 61 MMHG | WEIGHT: 237 LBS | SYSTOLIC BLOOD PRESSURE: 132 MMHG

## 2017-08-23 DIAGNOSIS — L97.522 SKIN ULCER OF TOE OF LEFT FOOT WITH FAT LAYER EXPOSED (HCC): ICD-10-CM

## 2017-08-23 DIAGNOSIS — L97.422 DIABETIC ULCER OF LEFT MIDFOOT ASSOCIATED WITH TYPE 2 DIABETES MELLITUS, WITH FAT LAYER EXPOSED (HCC): ICD-10-CM

## 2017-08-23 DIAGNOSIS — E11.621 DIABETIC ULCER OF LEFT MIDFOOT ASSOCIATED WITH TYPE 2 DIABETES MELLITUS, WITH FAT LAYER EXPOSED (HCC): ICD-10-CM

## 2017-08-23 PROCEDURE — 11042 DBRDMT SUBQ TIS 1ST 20SQCM/<: CPT

## 2017-08-23 PROCEDURE — 11042 DBRDMT SUBQ TIS 1ST 20SQCM/<: CPT | Performed by: SURGERY

## 2017-08-28 RX ORDER — HYDROCODONE BITARTRATE AND ACETAMINOPHEN 10; 325 MG/1; MG/1
1 TABLET ORAL EVERY 8 HOURS PRN
Qty: 120 TABLET | Refills: 0 | Status: SHIPPED | OUTPATIENT
Start: 2017-08-28 | End: 2017-09-05 | Stop reason: SDUPTHER

## 2017-08-30 ENCOUNTER — HOSPITAL ENCOUNTER (OUTPATIENT)
Dept: WOUND CARE | Age: 62
Discharge: HOME | End: 2017-08-30
Payer: MEDICARE

## 2017-08-30 VITALS — WEIGHT: 237 LBS | BODY MASS INDEX: 32.1 KG/M2 | HEIGHT: 72 IN

## 2017-08-30 DIAGNOSIS — L97.522 SKIN ULCER OF TOE OF LEFT FOOT WITH FAT LAYER EXPOSED (HCC): ICD-10-CM

## 2017-08-30 DIAGNOSIS — E11.621 DIABETIC ULCER OF LEFT MIDFOOT ASSOCIATED WITH TYPE 2 DIABETES MELLITUS, WITH FAT LAYER EXPOSED (HCC): ICD-10-CM

## 2017-08-30 DIAGNOSIS — L97.422 DIABETIC ULCER OF LEFT MIDFOOT ASSOCIATED WITH TYPE 2 DIABETES MELLITUS, WITH FAT LAYER EXPOSED (HCC): ICD-10-CM

## 2017-08-30 PROCEDURE — 97597 DBRDMT OPN WND 1ST 20 CM/<: CPT

## 2017-08-30 PROCEDURE — 97597 DBRDMT OPN WND 1ST 20 CM/<: CPT | Performed by: SURGERY

## 2017-09-05 ENCOUNTER — HOSPITAL ENCOUNTER (OUTPATIENT)
Dept: WOUND CARE | Age: 62
Discharge: HOME | End: 2017-09-05
Payer: MEDICARE

## 2017-09-05 VITALS
HEART RATE: 62 BPM | WEIGHT: 237 LBS | RESPIRATION RATE: 16 BRPM | SYSTOLIC BLOOD PRESSURE: 119 MMHG | BODY MASS INDEX: 32.1 KG/M2 | TEMPERATURE: 98.6 F | DIASTOLIC BLOOD PRESSURE: 72 MMHG | HEIGHT: 72 IN

## 2017-09-05 DIAGNOSIS — E11.621 DIABETIC ULCER OF LEFT MIDFOOT ASSOCIATED WITH TYPE 2 DIABETES MELLITUS, WITH FAT LAYER EXPOSED (HCC): ICD-10-CM

## 2017-09-05 DIAGNOSIS — L97.422 DIABETIC ULCER OF LEFT MIDFOOT ASSOCIATED WITH TYPE 2 DIABETES MELLITUS, WITH FAT LAYER EXPOSED (HCC): ICD-10-CM

## 2017-09-05 DIAGNOSIS — E11.620 TYPE 2 DIABETES MELLITUS WITH DIABETIC DERMATITIS, WITHOUT LONG-TERM CURRENT USE OF INSULIN (HCC): Chronic | ICD-10-CM

## 2017-09-05 DIAGNOSIS — L97.522 SKIN ULCER OF TOE OF LEFT FOOT WITH FAT LAYER EXPOSED (HCC): ICD-10-CM

## 2017-09-05 DIAGNOSIS — I87.2 VENOUS INSUFFICIENCY OF BOTH LOWER EXTREMITIES: Primary | Chronic | ICD-10-CM

## 2017-09-05 DIAGNOSIS — L97.929 IDIOPATHIC CHRONIC VENOUS HYPERTENSION OF LEFT LEG WITH ULCER (HCC): Chronic | ICD-10-CM

## 2017-09-05 DIAGNOSIS — I87.312 IDIOPATHIC CHRONIC VENOUS HYPERTENSION OF LEFT LEG WITH ULCER (HCC): Chronic | ICD-10-CM

## 2017-09-05 PROCEDURE — 87205 SMEAR GRAM STAIN: CPT

## 2017-09-05 PROCEDURE — 11042 DBRDMT SUBQ TIS 1ST 20SQCM/<: CPT | Performed by: SURGERY

## 2017-09-05 PROCEDURE — 87075 CULTR BACTERIA EXCEPT BLOOD: CPT

## 2017-09-05 PROCEDURE — 87185 SC STD ENZYME DETCJ PER NZM: CPT

## 2017-09-05 PROCEDURE — 11042 DBRDMT SUBQ TIS 1ST 20SQCM/<: CPT

## 2017-09-05 PROCEDURE — 86403 PARTICLE AGGLUT ANTBDY SCRN: CPT

## 2017-09-05 PROCEDURE — 87070 CULTURE OTHR SPECIMN AEROBIC: CPT

## 2017-09-05 RX ORDER — SODIUM HYPOCHLORITE 1.25 MG/ML
SOLUTION TOPICAL
Qty: 1 BOTTLE | Refills: 2 | Status: SHIPPED | OUTPATIENT
Start: 2017-09-05 | End: 2017-09-29 | Stop reason: ALTCHOICE

## 2017-09-05 RX ORDER — LISINOPRIL 20 MG/1
20 TABLET ORAL NIGHTLY
Status: ON HOLD | COMMUNITY
End: 2018-06-13 | Stop reason: HOSPADM

## 2017-09-05 RX ORDER — AMOXICILLIN AND CLAVULANATE POTASSIUM 875; 125 MG/1; MG/1
1 TABLET, FILM COATED ORAL 2 TIMES DAILY
Qty: 20 TABLET | Refills: 0 | Status: SHIPPED | OUTPATIENT
Start: 2017-09-05 | End: 2017-09-15

## 2017-09-05 RX ORDER — HYDROCODONE BITARTRATE AND ACETAMINOPHEN 10; 325 MG/1; MG/1
1 TABLET ORAL EVERY 6 HOURS PRN
Qty: 120 TABLET | Refills: 0 | Status: SHIPPED | OUTPATIENT
Start: 2017-09-05 | End: 2017-10-02 | Stop reason: SDUPTHER

## 2017-09-10 LAB
ANAEROBIC CULTURE: ABNORMAL
GRAM STAIN RESULT: ABNORMAL
ORGANISM: ABNORMAL
WOUND/ABSCESS: ABNORMAL
WOUND/ABSCESS: ABNORMAL

## 2017-09-12 ENCOUNTER — TELEPHONE (OUTPATIENT)
Dept: FAMILY MEDICINE CLINIC | Age: 62
End: 2017-09-12

## 2017-09-12 DIAGNOSIS — Z12.5 SPECIAL SCREENING FOR MALIGNANT NEOPLASM OF PROSTATE: ICD-10-CM

## 2017-09-12 DIAGNOSIS — Z86.39 HISTORY OF TYPE 2 DIABETES MELLITUS: Primary | ICD-10-CM

## 2017-09-13 ENCOUNTER — HOSPITAL ENCOUNTER (OUTPATIENT)
Dept: GENERAL RADIOLOGY | Age: 62
Discharge: HOME | End: 2017-09-13
Payer: MEDICARE

## 2017-09-13 ENCOUNTER — HOSPITAL ENCOUNTER (OUTPATIENT)
Dept: WOUND CARE | Age: 62
Discharge: HOME | End: 2017-09-13
Payer: MEDICARE

## 2017-09-13 VITALS
DIASTOLIC BLOOD PRESSURE: 84 MMHG | BODY MASS INDEX: 32.1 KG/M2 | HEART RATE: 73 BPM | HEIGHT: 72 IN | SYSTOLIC BLOOD PRESSURE: 139 MMHG | RESPIRATION RATE: 18 BRPM | TEMPERATURE: 99.2 F | WEIGHT: 237 LBS

## 2017-09-13 DIAGNOSIS — L97.922 CHRONIC ULCER OF LEFT LEG, WITH FAT LAYER EXPOSED (HCC): ICD-10-CM

## 2017-09-13 DIAGNOSIS — L97.421 DIABETIC ULCER OF LEFT MIDFOOT ASSOCIATED WITH TYPE 2 DIABETES MELLITUS, LIMITED TO BREAKDOWN OF SKIN (HCC): Chronic | ICD-10-CM

## 2017-09-13 DIAGNOSIS — E11.621 DIABETIC ULCER OF LEFT MIDFOOT ASSOCIATED WITH TYPE 2 DIABETES MELLITUS, LIMITED TO BREAKDOWN OF SKIN (HCC): Chronic | ICD-10-CM

## 2017-09-13 DIAGNOSIS — E11.621 DIABETIC ULCER OF LEFT MIDFOOT ASSOCIATED WITH TYPE 2 DIABETES MELLITUS, WITH FAT LAYER EXPOSED (HCC): ICD-10-CM

## 2017-09-13 DIAGNOSIS — L97.422 DIABETIC ULCER OF LEFT MIDFOOT ASSOCIATED WITH TYPE 2 DIABETES MELLITUS, WITH FAT LAYER EXPOSED (HCC): ICD-10-CM

## 2017-09-13 DIAGNOSIS — L97.922 CHRONIC ULCER OF LEFT LEG, WITH FAT LAYER EXPOSED (HCC): Primary | ICD-10-CM

## 2017-09-13 DIAGNOSIS — L97.522 SKIN ULCER OF TOE OF LEFT FOOT WITH FAT LAYER EXPOSED (HCC): ICD-10-CM

## 2017-09-13 PROCEDURE — 97597 DBRDMT OPN WND 1ST 20 CM/<: CPT | Performed by: SURGERY

## 2017-09-13 PROCEDURE — 73590 X-RAY EXAM OF LOWER LEG: CPT

## 2017-09-13 PROCEDURE — 97597 DBRDMT OPN WND 1ST 20 CM/<: CPT

## 2017-09-13 PROCEDURE — 99213 OFFICE O/P EST LOW 20 MIN: CPT | Performed by: SURGERY

## 2017-09-20 ENCOUNTER — HOSPITAL ENCOUNTER (OUTPATIENT)
Dept: WOUND CARE | Age: 62
Discharge: HOME | End: 2017-09-20
Payer: MEDICARE

## 2017-09-20 ENCOUNTER — HOSPITAL ENCOUNTER (EMERGENCY)
Age: 62
Discharge: HOME | End: 2017-09-20
Payer: MEDICARE

## 2017-09-20 ENCOUNTER — APPOINTMENT (OUTPATIENT)
Dept: GENERAL RADIOLOGY | Age: 62
End: 2017-09-20
Payer: MEDICARE

## 2017-09-20 VITALS
BODY MASS INDEX: 32.1 KG/M2 | DIASTOLIC BLOOD PRESSURE: 69 MMHG | HEART RATE: 80 BPM | TEMPERATURE: 97.8 F | RESPIRATION RATE: 20 BRPM | SYSTOLIC BLOOD PRESSURE: 133 MMHG | WEIGHT: 237 LBS | HEIGHT: 72 IN

## 2017-09-20 VITALS
HEART RATE: 64 BPM | RESPIRATION RATE: 12 BRPM | DIASTOLIC BLOOD PRESSURE: 61 MMHG | HEIGHT: 72 IN | WEIGHT: 250 LBS | OXYGEN SATURATION: 94 % | BODY MASS INDEX: 33.86 KG/M2 | TEMPERATURE: 98.2 F | SYSTOLIC BLOOD PRESSURE: 97 MMHG

## 2017-09-20 DIAGNOSIS — L97.522 SKIN ULCER OF TOE OF LEFT FOOT WITH FAT LAYER EXPOSED (HCC): ICD-10-CM

## 2017-09-20 DIAGNOSIS — S22.41XA CLOSED FRACTURE OF MULTIPLE RIBS OF RIGHT SIDE, INITIAL ENCOUNTER: Primary | ICD-10-CM

## 2017-09-20 LAB
BILIRUBIN URINE: NEGATIVE
BLOOD, URINE: NEGATIVE
CLARITY: CLEAR
COLOR: YELLOW
GLUCOSE URINE: >=1000 MG/DL
KETONES, URINE: NEGATIVE MG/DL
LEUKOCYTE ESTERASE, URINE: NEGATIVE
NITRITE, URINE: NEGATIVE
PH UA: 6
PROTEIN UA: ABNORMAL MG/DL
SPECIFIC GRAVITY UA: 1.02
UROBILINOGEN, URINE: 1 E.U./DL

## 2017-09-20 PROCEDURE — 99284 EMERGENCY DEPT VISIT MOD MDM: CPT

## 2017-09-20 PROCEDURE — 97597 DBRDMT OPN WND 1ST 20 CM/<: CPT | Performed by: SURGERY

## 2017-09-20 PROCEDURE — 99282 EMERGENCY DEPT VISIT SF MDM: CPT | Performed by: NURSE PRACTITIONER

## 2017-09-20 PROCEDURE — 71101 X-RAY EXAM UNILAT RIBS/CHEST: CPT

## 2017-09-20 PROCEDURE — 97597 DBRDMT OPN WND 1ST 20 CM/<: CPT

## 2017-09-20 PROCEDURE — 81003 URINALYSIS AUTO W/O SCOPE: CPT

## 2017-09-20 ASSESSMENT — ENCOUNTER SYMPTOMS
VOMITING: 0
ABDOMINAL PAIN: 0
DIARRHEA: 0
SORE THROAT: 0
TROUBLE SWALLOWING: 0
NAUSEA: 0
COUGH: 0
SHORTNESS OF BREATH: 0
RHINORRHEA: 0
CONSTIPATION: 0

## 2017-10-02 ENCOUNTER — OFFICE VISIT (OUTPATIENT)
Dept: FAMILY MEDICINE CLINIC | Age: 62
End: 2017-10-02
Payer: MEDICARE

## 2017-10-02 VITALS
WEIGHT: 246 LBS | HEIGHT: 72 IN | DIASTOLIC BLOOD PRESSURE: 72 MMHG | HEART RATE: 73 BPM | SYSTOLIC BLOOD PRESSURE: 130 MMHG | BODY MASS INDEX: 33.32 KG/M2 | TEMPERATURE: 97.8 F | OXYGEN SATURATION: 97 % | RESPIRATION RATE: 18 BRPM

## 2017-10-02 DIAGNOSIS — G47.33 OBSTRUCTIVE SLEEP APNEA SYNDROME: ICD-10-CM

## 2017-10-02 DIAGNOSIS — S22.41XD CLOSED FRACTURE OF MULTIPLE RIBS OF RIGHT SIDE WITH ROUTINE HEALING, SUBSEQUENT ENCOUNTER: Primary | ICD-10-CM

## 2017-10-02 PROCEDURE — 1036F TOBACCO NON-USER: CPT | Performed by: FAMILY MEDICINE

## 2017-10-02 PROCEDURE — G8484 FLU IMMUNIZE NO ADMIN: HCPCS | Performed by: FAMILY MEDICINE

## 2017-10-02 PROCEDURE — G8417 CALC BMI ABV UP PARAM F/U: HCPCS | Performed by: FAMILY MEDICINE

## 2017-10-02 PROCEDURE — 99213 OFFICE O/P EST LOW 20 MIN: CPT | Performed by: FAMILY MEDICINE

## 2017-10-02 PROCEDURE — G8427 DOCREV CUR MEDS BY ELIG CLIN: HCPCS | Performed by: FAMILY MEDICINE

## 2017-10-02 PROCEDURE — 3017F COLORECTAL CA SCREEN DOC REV: CPT | Performed by: FAMILY MEDICINE

## 2017-10-02 RX ORDER — HYDROCODONE BITARTRATE AND ACETAMINOPHEN 10; 325 MG/1; MG/1
1 TABLET ORAL EVERY 6 HOURS PRN
Qty: 120 TABLET | Refills: 0 | Status: SHIPPED | OUTPATIENT
Start: 2017-10-02 | End: 2017-10-23 | Stop reason: SDUPTHER

## 2017-10-02 NOTE — PROGRESS NOTES
Delmy Lopez [address]  [facility] [address] [address]  Dept: [phone]  Dept Fax: [phone]  Loc: [phone]    Abhishek Law is a 63 y.o. male who presents today for his medical conditions/complaints as noted below. Abhishek Law is here for 3 Month Follow-Up and Follow-Up from Hospital ([facility] 9/20)        HPI:   CC: Here today to discuss the following:    Last saw me on June 22 for routine follow-up. Since then he was in the emergency department on September 20 after sustaining a fall. He was brought to the emergency department on a spine board. He states he accidentally tripped at home landing on the TV console. He was found to have a mildly displaced fracture of the posterior 8th and 9th ribs with a suspected fracture of the 7th rib as well. No evidence of pneumothorax. Continues to have discomfort along the side of the rib fractures. Is having no shortness of breath fever or chills. No coughing. Continues to take hydrocodone for the discomfort. Pain is adequately controlled. Still unable to tolerate CPAP for obstructive sleep apnea.         HPI    Past Medical History:   Diagnosis Date    Asthma     Broken ribs     CAD (coronary artery disease)     CHF (congestive heart failure) (HCC)     Diabetes mellitus ([address]     Hypertension     Morbid obesity ([address] 6/22/15    Skin ulcer of toe of left foot with fat layer exposed ([address] 4/4/2017    Sleep apnea in adult 6/22/15    Venous insufficiency of both lower extremities 6/22/15      Past Surgical History:   Procedure Laterality Date    CARDIAC CATHETERIZATION      DILATATION, ESOPHAGUS      GASTRIC BYPASS SURGERY N/A 12/2015    TOTAL KNEE ARTHROPLASTY Bilateral        Family History   Problem Relation Age of Onset    Diabetes Mother     Heart Disease Mother     High Blood Pressure Mother     Heart Disease Father     Other Father        Social History   Substance Use Topics    Smoking status: Never Smoker    Smokeless tobacco: Never Used    Alcohol use Yes      Comment: Pt states he had \"a few drinks tonight\"      Current Outpatient Prescriptions   Medication Sig Dispense Refill    HYDROcodone-acetaminophen (NORCO)  MG per tablet Take 1 tablet by mouth every 6 hours as needed for Pain . 120 tablet 0    lisinopril (PRINIVIL;ZESTRIL) 20 MG tablet Take 20 mg by mouth daily      temazepam (RESTORIL) 30 MG capsule Take 30 mg by mouth      tiZANidine (ZANAFLEX) 4 MG tablet Take 4 mg by mouth every 8 hours as needed        No current facility-administered medications for this visit. Allergies   Allergen Reactions    Adhesive Tape      Tears skin       Health Maintenance   Topic Date Due    Hepatitis C screen  1955    Diabetic foot exam  05/07/1967    Diabetic retinal exam  05/07/1967    HIV screen  05/07/1972    Diabetic microalbuminuria test  05/07/1975    DTaP/Tdap/Td vaccine (1 - Tdap) 05/07/1976    Pneumococcal med risk (1 of 1 - PPSV23) 05/07/1976    Colon cancer screen colonoscopy  05/07/2007    Lipid screen  03/08/2012    Diabetic hemoglobin A1C test  10/14/2015    Zostavax vaccine  05/07/2017    Flu vaccine (1) 09/01/2017       Subjective:      Review of Systems    Review of Systems   Constitutional: Negative for chills and fever. HENT: Negative for congestion. Respiratory: Negative for cough, chest tightness and shortness of breath. Cardiovascular: Negative for chest pain, palpitations and leg swelling. Gastrointestinal: Negative for abdominal pain, anal bleeding, constipation, diarrhea and nausea. Genitourinary: Negative for difficulty urinating. Psychiatric/Behavioral: Negative. See HPI for visit specific review of symptoms.   All others negative      Objective:   /72  Pulse 73  Temp 97.8 °F (36.6 °C)  Resp 18  Ht 6' (1.829 m)  Wt 246 lb (111.6 kg)  SpO2 97%  BMI 33.36 kg/m2  Physical Exam  Physical Exam Constitutional: appears well-developed. does not appear ill. Eyes: Pupils are equal, round, and reactive to light. Neck: Normal range of motion. Neck supple. Cardiovascular: Normal rate and regular rhythm. Exam reveals no friction rub. No murmur heard. Pulmonary/Chest: Effort normal and breath sounds normal. No respiratory distress. He has no wheezes. no rales. Abdominal: Soft. Bowel sounds are normal. He exhibits no distension. There is no tenderness. There is no rebound and no guarding. Musculoskeletal: exhibits no edema. Neurological: alert. Psychiatric: normal mood and affect. behavior is normal.     Results   LC - Comp.  Metabolic Panel (14)   15 Mar 2017 12:00 AM  -   Glucose, Serum: 96   Reference Range: 65-99  -   BUN: 21   Reference Range: 6-24  -   Creatinine, Serum: 1.06   Reference Range: 0.76-1.27  -   BUN/Creatinine Ratio: 20   Reference Range: 9-20  -   Sodium, Serum: 142   Reference Range: 134-144  -   Potassium, Serum: 4.4   Reference Range: 3.5-5.2  -   Chloride, Serum: 101   Reference Range:   -   Carbon Dioxide, Total: 26   Reference Range: 18-29  -   Calcium, Serum: 9.1   Reference Range: 8.7-10.2  -   Protein, Total, Serum: 6.6   Reference Range: 6.0-8.5  -   Albumin, Serum: 3.8   Reference Range: 3.5-5.5  -   Globulin, Total: 2.8   Reference Range: 1.5-4.5  -   A/G Ratio: 1.4   Reference Range: 1.2-2.2  -   Bilirubin, Total: 0.9   Reference Range: 0.0-1.2  -   Alkaline Phosphatase, Serum: 55   Reference Range:   -   AST (SGOT): 19   Reference Range: 0-40  -   ALT (SGPT): 12   Reference Range: 0-44  -   eGFR If NonAfrican Am: 76  Reference Range: >59  -   eGFR If Africn Am: 88  Reference Range: >59  LC - LIPID PANEL   15 Mar 2017 12:00 AM  -   Cholesterol, Total: 162   Reference Range: 100-199  -   Triglycerides: 70   Reference Range: 0-149  -   HDL Cholesterol: 80   Reference Range: >39  -   VLDL Cholesterol Leon: 14   Reference Range: 5-40  -   LDL Cholesterol Ca   Reference Range: 0-99  -   Comment[de-identified]   LC - Hemoglobin A1C   15 Mar 2017 12:00 AM  -   Hemoglobin A1c: 4.6   Reference Range: 4.8-5.6  Flag: L  LC - VENIPUNCTURE   15 Mar 2017 12:00 AM  -   Drawing Fee: Performed. Adult Depression Screening   22 Mar 2017 10:56 AM  -   PHQ-2: Negative Depression  -   PHQ-9: n/a. Recent Results (from the past 672 hour(s))   Urinalysis    Collection Time: 17 10:08 PM   Result Value Ref Range    Color, UA YELLOW Straw/Yellow    Clarity, UA Clear Clear    Glucose, Ur >=1000 (H) Negative mg/dL    Bilirubin Urine Negative Negative    Ketones, Urine Negative Negative mg/dL    Specific Gravity, UA 1.021 1.005 - 1.030    Blood, Urine Negative Negative    pH, UA 6.0 5.0 - 8.0    Protein, UA TRACE (A) Negative mg/dL    Urobilinogen, Urine 1.0 <2.0 E.U./dL    Nitrite, Urine Negative Negative    Leukocyte Esterase, Urine Negative Negative       Xr Ribs Right Include Chest (min 3 Views)    Result Date: 2017  XR RIBS RIGHT STANDARD EXTENDED VW INCLUDE CHEST 2017 8:15 PM HISTORY: Fall with right-sided chest pain COMPARISON: None FINDINGS: 4 views of the right ribs are obtained. Mildly displaced fractures of the posterior eighth and ninth ribs are identified. A mildly displaced posterior lateral fracture of the seventh rib is also suspected. No visualized pneumothorax. 1. Mildly displaced fractures of the posterior eighth and ninth ribs with suspected fracture of the seventh rib as well. No visualized pneumothorax. . Signed by Dr Vinny Moreira on 2017 9:51 PM    Xr Tibia Fibula Left Standard    Result Date: 2017  Examination. XR TIBIA FIBULA LEFT STANDARD History: Chronic ulcer of the left leg with bilaterally are exposed. The patient has a type 2 diabetes mellitus. The frontal and atrophy of the tibia and fibula are obtained. There is no previous study for comparison. There is no evidence of an acute bony abnormality.  There is an area of skin defect/laceration along the lateral border of the mid lower leg. There is extensive cutaneous/subcutaneous calcification involving the lower half of the left leg. The ankle mortise appear normal. A moderate size osteophytes seen arising from the plantar aspect of the calcaneus. Moderate chronic osteoarthritis of the knee joint, more severe to the patellofemoral joint is noted. No acute bony abnormality. Calcinosis cutis which may be due to previous inflammatory process? . This may be clinically correlated. Signed by Dr Elzbieta Richardson on 9/13/2017 12:40 PM            Assessment & Plan: The following diagnoses and conditions are stable with no further orders unless indicated:  1. Closed fracture of multiple ribs of right side with routine healing, subsequent encounter  Pain is adequately controlled. Advised to return to clinic if he develops worsening shortness of breath coughing or fever.  - Home Sleep Study; Future  - HYDROcodone-acetaminophen (NORCO)  MG per tablet; Take 1 tablet by mouth every 6 hours as needed for Pain . Dispense: 120 tablet; Refill: 0    2. Obstructive sleep apnea syndrome  History of obstructive sleep apnea. We'll repeat home sleep study as he has lost significant weight since his last study.  - Home Sleep Study; Future          Return in about 2 weeks (around 10/16/2017) for AWV. Discussed use, benefit, and side effects of prescribed medications. All patient questions answered. Pt voiced understanding. Reviewed health maintenance. Instructed to continue current medications, diet and exercise. Patient agreed with treatment plan. Follow up as directed.

## 2017-10-04 ENCOUNTER — HOSPITAL ENCOUNTER (OUTPATIENT)
Dept: WOUND CARE | Age: 62
Discharge: HOME | End: 2017-10-04
Payer: MEDICARE

## 2017-10-04 VITALS
HEART RATE: 70 BPM | RESPIRATION RATE: 18 BRPM | BODY MASS INDEX: 33.32 KG/M2 | WEIGHT: 246 LBS | TEMPERATURE: 98.8 F | SYSTOLIC BLOOD PRESSURE: 129 MMHG | HEIGHT: 72 IN | DIASTOLIC BLOOD PRESSURE: 71 MMHG

## 2017-10-04 DIAGNOSIS — L97.522 SKIN ULCER OF TOE OF LEFT FOOT WITH FAT LAYER EXPOSED (HCC): ICD-10-CM

## 2017-10-04 PROCEDURE — 97597 DBRDMT OPN WND 1ST 20 CM/<: CPT

## 2017-10-04 PROCEDURE — 97597 DBRDMT OPN WND 1ST 20 CM/<: CPT | Performed by: SURGERY

## 2017-10-04 ASSESSMENT — PAIN DESCRIPTION - DESCRIPTORS: DESCRIPTORS: CONSTANT;ACHING

## 2017-10-04 ASSESSMENT — PAIN DESCRIPTION - PROGRESSION: CLINICAL_PROGRESSION: NOT CHANGED

## 2017-10-04 ASSESSMENT — PAIN DESCRIPTION - LOCATION: LOCATION: ABDOMEN

## 2017-10-04 ASSESSMENT — PAIN DESCRIPTION - FREQUENCY: FREQUENCY: CONTINUOUS

## 2017-10-04 ASSESSMENT — PAIN DESCRIPTION - ORIENTATION: ORIENTATION: LEFT

## 2017-10-04 ASSESSMENT — PAIN SCALES - GENERAL: PAINLEVEL_OUTOF10: 7

## 2017-10-04 ASSESSMENT — PAIN DESCRIPTION - ONSET: ONSET: ON-GOING

## 2017-10-04 ASSESSMENT — PAIN DESCRIPTION - PAIN TYPE: TYPE: ACUTE PAIN

## 2017-10-04 NOTE — PLAN OF CARE
10/04/17 1345   Right Leg Edema Point of Measurement   Great toe to forefoot 10 cm   Heel to ankle 10 cm   Heel to calf 30   Leg circumference 41.2 cm   Ankle circumference 27.9 cm   Foot circumference 27.9 cm   Left Leg Edema Point of Measurement   Great toe to forefoot 10 cm   Heel to ankle 10 cm   Heel to calf 30   Leg circumference 41.1 cm   Ankle circumference 26.9 cm   Foot circumference 26.9 cm   Peripheral Vascular   Peripheral Vascular (WDL) X   Sensation RLE No sensation   Sensation LLE No sensation   Edema None   RLE Edema None   LLE Edema None   RLE Neurovascular Assessment   Capillary Refill Less than/equal to 3 seconds   Color (hyperpigmented)   Temperature Warm   R Popliteal Pulse Doppler   R Post Tibial Pulse Doppler   R Pedal Pulse Doppler   R Calf Tenderness  Negative   Claudication Assessment None   LLE Neurovascular Assessment   Capillary Refill Less than/equal to 3 seconds   Color (hyperpigmented)   Temperature Warm   L Popliteal Pulse Doppler   L Post Tibial Pulse Doppler   L Pedal Pulse Doppler   L Calf Tenderness Negative   Claudication Assessment None   Wound 03/27/17 Venous ulcer Leg Left;Lateral;Proximal VENOUS LEG ULCER #31 LATERAL PROXIMAL LEFT   Date First Assessed/Time First Assessed: 03/27/17 1546   Wound Type: Venous ulcer  Wound Event: Gradually Appeared  Location: Leg  Wound Location Orientation: Left;Lateral;Proximal  Wound Description (Comments): VENOUS LEG ULCER #31 LATERAL PROXIMAL L. .. Wound Type Wound   Wound Venous   Dressing Status Old drainage   Wound Cleansed Rinsed/Irrigated with saline   Wound Length (cm) 3.2 cm   Wound Width (cm) 1.8 cm   Wound Depth (cm)  0.6   Calculated Wound Size (cm^2) (l*w) 5.76 cm^2   Change in Wound Size % (l*w) -238.82   Wound Assessment Red;Slough; Yellow   Margins Attached edges   Lurdes-wound Assessment Dry; Intact   Non-staged Wound Description Full thickness   Pink%Wound Bed 25   Red%Wound Bed 25   Yellow%Wound Bed 50   Drainage Amount Moderate   Drainage Description Serosanguinous   Odor None

## 2017-10-09 ENCOUNTER — OFFICE VISIT (OUTPATIENT)
Dept: PODIATRY | Facility: CLINIC | Age: 62
End: 2017-10-09

## 2017-10-09 VITALS
HEART RATE: 75 BPM | OXYGEN SATURATION: 98 % | WEIGHT: 247 LBS | SYSTOLIC BLOOD PRESSURE: 118 MMHG | BODY MASS INDEX: 33.46 KG/M2 | DIASTOLIC BLOOD PRESSURE: 78 MMHG | HEIGHT: 72 IN

## 2017-10-09 DIAGNOSIS — I87.2 VENOUS INSUFFICIENCY: ICD-10-CM

## 2017-10-09 DIAGNOSIS — L97.512 NON-PRESSURE CHRONIC ULCER OF OTHER PART OF RIGHT FOOT WITH FAT LAYER EXPOSED (HCC): ICD-10-CM

## 2017-10-09 DIAGNOSIS — B35.1 ONYCHOMYCOSIS: Primary | ICD-10-CM

## 2017-10-09 DIAGNOSIS — E11.49 DIABETES MELLITUS TYPE 2 WITH NEUROLOGICAL MANIFESTATIONS (HCC): ICD-10-CM

## 2017-10-09 DIAGNOSIS — L84 FOOT CALLUS: ICD-10-CM

## 2017-10-09 PROCEDURE — 11721 DEBRIDE NAIL 6 OR MORE: CPT | Performed by: PODIATRIST

## 2017-10-09 PROCEDURE — 11042 DBRDMT SUBQ TIS 1ST 20SQCM/<: CPT | Performed by: PODIATRIST

## 2017-10-09 PROCEDURE — 99213 OFFICE O/P EST LOW 20 MIN: CPT | Performed by: PODIATRIST

## 2017-10-09 PROCEDURE — 11055 PARING/CUTG B9 HYPRKER LES 1: CPT | Performed by: PODIATRIST

## 2017-10-09 NOTE — PROGRESS NOTES
"    [facility] - PODIATRY    Today's Date: 10/09/17    Patient Name: Leonora Shi  MRN: [id number]  CSN: [id number]  PCP: Jose Angel Mullen MD  Referring Provider: No ref. provider found    SUBJECTIVE     Chief Complaint   Patient presents with   • Right Foot - Pain, Numbness     Patient is complaining of mild foot pain. 2/10 on a pain scale. He describes the pain as dull when he puts his shoes on.    • Left Foot - Numbness   • Follow-up     Patient states he is here for nail care and diabetic foot exam. PCP 10/02/2017   • Diabetes     He states he does not check his BG.      HPI: Leonora Shi, a 62 y.o.male, comes to clinic as a(n) established patient presenting for diabetic foot exam and for follow-up treatment of thick, long toenails. 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 Relates that he recently broke 3 ribs. Relates that he is no longer going to [facility] and now is going to [facility] and being treated by Dr. Newton and Dr. Sosa for wounds on his legs. Dr. Sosa is considering met head resection of left 2nd met. Relates that he needs his nails trimmed because they are thick, long, and he is unable to trim them. Patient is NIDDM and unsure of last BG level. Denies pain. Admits numbness of his feet. Denies any constitutional symptoms. No other pedal complaints at this time.    Past Medical History:   Diagnosis Date   • CAD (coronary artery disease)    • Diabetes mellitus    • History of endocarditis    • Myocardial infarction    • Neuropathy    • Osteomyelitis     bilat lower legs \"ulcers\" and bottom of left foot   • Right rib fracture 09/20/2017    x3   • Spinal stenosis    • Venous stasis    • Wound, open     sole of left foot, and left side of left lower calf     Past Surgical History:   Procedure Laterality Date   • CARDIAC CATHETERIZATION      x 2   • CATARACT EXTRACTION Right    • GASTRIC BYPASS     • KNEE SURGERY "      bilat knees   • VARICOSE VEIN SURGERY Left 11/14/2016    Procedure: SAPHENOUS VEIN RADIO FREQUENCY ABLATION of Left Lower extremity;  Surgeon: Theo Mercado DO;  Location:  [facility] OR;  Service:    • VARICOSE VEIN SURGERY Right 12/5/2016    Procedure: RIGHT-SAPHENOUS VEIN RADIO FREQUENCY ABLATION;  Surgeon: Theo Mercado DO;  Location:  [facility] OR;  Service:      Family History   Problem Relation Age of Onset   • Diabetes Other    • Hypertension Other    • Heart disease Other    • Heart disease Mother    • Diabetes Mother    • Heart disease Father    • Lung disease Father    • No Known Problems Brother    • No Known Problems Brother      Social History     Social History   • Marital status:      Spouse name: N/A   • Number of children: N/A   • Years of education: N/A     Occupational History   • Not on file.     Social History Main Topics   • Smoking status: Never Smoker   • Smokeless tobacco: Never Used   • Alcohol use Yes      Comment: rare    • Drug use: No   • Sexual activity: Defer     Other Topics Concern   • Not on file     Social History Narrative     Allergies   Allergen Reactions   • Adhesive Tape      Tears skin     Current Outpatient Prescriptions   Medication Sig Dispense Refill   • HYDROcodone-acetaminophen (NORCO)  MG per tablet Take  tablets by mouth Every 6 (Six) Hours.     • lisinopril (PRINIVIL,ZESTRIL) 20 MG tablet Take 20 mg by mouth Daily.     • temazepam (RESTORIL) 30 MG capsule Take 30 mg by mouth.     • tiZANidine (ZANAFLEX) 4 MG tablet Take 4 mg by mouth Every 8 (Eight) Hours As Needed.       No current facility-administered medications for this visit.      Review of Systems   Constitutional: Negative for chills and fever.   Respiratory: Negative for shortness of breath.    Cardiovascular: Positive for leg swelling. Negative for chest pain.   Gastrointestinal: Negative for constipation, diarrhea, nausea and vomiting.   Skin: Negative for wound.   Neurological:  Positive for numbness.       OBJECTIVE     Vitals:    10/09/17 1258   BP: 118/78   Pulse: 75   SpO2: 98%       PHYSICAL EXAM  GEN:   A&Ox3, NAD. Pt presents to clinic ambulating without assistance and wearing Diabetic Shoes.       NEURO:   Protective sensation intact to 0/10 sites Right foot, 0/10 site Left foot using [place] monofilament  Light touch sensation diminished  No Tinel's or Villeux sign.     VASC:  Skin temperature Warm to Warm proximal to distal janelle  DP pulses 2/4 Right, 2/4 Left  PT pulses 2/4 Right, 2/4 Left  CFT <3 sec janelle  1+ edema noted janelle  Varicosities absent janelle     MUSC/SKEL:  Muscle Strength Right foot Dorsiflexors 5/5, Plantarflexors 5/5, Evertors 5/5, Invertors 5/5  Muscle Strength Left foot Dorsiflexors 5/5, Plantarflexors 5/5, Evertors 5/5, Invertors 5/5  ROM of the 1st MTP is full without pain or crepitus  ROM of the MTJ is full without pain or crepitus   ROM of the STJ is full without pain or crepitus   ROM of the ankle joint is full without pain or crepitus   No POP with exam   Planus foot type   Gait pattern: Normal  Large calcium deposits within LE dermal tissue     DERM:  Pedal nails x10 are thickened, elongated and discolored with presence of subunugual debris  Webspaces are Clean, Dry, and Intact  Ulcerations noted to lateral proximal lateral leg, bandaged  Large HPK noted to Right foot sub 5th met head. Upon debridement of lesion, ulceration noted measuring 0.8cm x 0.5cm x 0.1cm. Macerated margins and fully granular base. No probing, tracking or purulence noted.  Large HPK noted to Right foot sub 1st Met head. Upon debridement of lesion, ulceration noted measuring0.5cm x 0.3cm x 0.1cm. Macerated margins and fully granular base. No probing, tracking or purulence noted.  HPK left foot sub 2nd met head. upon debridement, no break in integument      RADIOLOGY/NUCLEAR:  No results found.    LABORATORY/CULTURE RESULTS:      PATHOLOGY RESULTS:       ASSESSMENT/PLAN     Malckom  was seen today for follow-up, diabetes, pain, numbness and numbness.    Diagnoses and all orders for this visit:    Onychomycosis    Diabetes mellitus type 2 with neurological manifestations    Venous insufficiency    Non-pressure chronic ulcer of other part of right foot with fat layer exposed    Foot callus      Comprehensive lower extremity examination and evaluation was performed.  Discussed findings and treatment plan including risks, benefits, and treatment options with patient in detail. Patient agreed with treatment plan.  After verbal consent obtained, nail(s) x10 debrided of length and thickness with nail nipper without incidence  After verbal consent obtained, calluses x1 pared utilizing dermal curette and/or scalpel without incidence  Patient may maintain nails and calluses at home utilizing emery board or pumice stone between visits as needed  Reviewed at home diabetic foot care including daily foot checks  After verbal consent obtained, excisional debridement performed to remove skin, slough, non-viable, and subQ tissue down to level of healthy bleeding tissue with dermal curette and/or sharp instrumentation. Hemostasis achieved with compression. Applied bandage of adaptic, medihoney, gauze, coban.  Continue treatment for wounds at [facility]  An After Visit Summary was printed and given to the patient at discharge, including (if requested) any available informative/educational handouts regarding diagnosis, treatment, or medications. All questions were answered to patient/family satisfaction. Should symptoms fail to improve or worsen they agree to call or return to clinic or to go to the Emergency Department. Discussed the importance of following up with any needed screening tests/labs/specialist appointments and any requested follow-up recommended by me today. Importance of maintaining follow-up discussed and patient accepts that missed appointments can delay diagnosis and potentially lead to worsening of  conditions.  Return in about 3 months (around 1/9/2018)., or sooner if acute issues arise.        This document has been electronically signed by Arnel Carroll DPM on October 9, 2017 1:38 PM

## 2017-10-11 NOTE — PROGRESS NOTES
[facility]   Progress Note and Procedure Note      Gil TANIA Guillen  AGE: 63 y.o. GENDER: male  : 1955  TODAY'S DATE:  10/4/2017    Subjective:   CC- foot wounds, calf wounds     HISTORY of PRESENT ILLNESS HPI   Jane Nguyen is a 63 y.o. male who presents today for wound evaluation.     Wound Type:venous, diabetic and pressure  Wound Location:left leg(s): lateral  Modifying factors:edema, venous stasis, diabetes, chronic pressure, decreased mobility, shear force and calcinosis    Patient Active Problem List   Diagnosis Code    Venous insufficiency of both lower extremities I87.2    Morbid obesity ([facility] E66.01    Type 2 diabetes mellitus with diabetic dermatitis ([facility]) E11.620    Venous ulcer of right leg ([facility]) I83.019    Idiopathic chronic venous hypertension of left leg with ulcer ([facility] I87.312    Diabetic ulcer of left foot associated with type 2 diabetes mellitus ([facility]) E11.621, L97.529    Skin ulcer of toe of left foot with fat layer exposed ([facility]) L97.522       ALLERGIES    Allergies   Allergen Reactions    Adhesive Tape      Tears skin           Objective:      /71   Pulse 70   Temp 98.8 °F (37.1 °C) (Temporal)   Resp 18   Ht 6' (1.829 m)   Wt 246 lb (111.6 kg)   BMI 33.36 kg/m²     Post Debridement Measurements and Assessment:  Wound 17 Venous ulcer Leg Left;Lateral;Proximal VENOUS LEG ULCER #31 LATERAL PROXIMAL LEFT (Active)   Wound Type Wound 10/4/2017  1:45 PM   Wound Venous 10/4/2017  1:45 PM   Dressing Status Old drainage 10/4/2017  1:45 PM   Dressing Changed Changed/New 10/4/2017  2:32 PM   Dressing/Treatment Moist to dry 10/4/2017  2:32 PM   Wound Cleansed Rinsed/Irrigated with saline 10/4/2017  1:45 PM   Wound Length (cm) 3.2 cm 10/4/2017  2:32 PM   Wound Width (cm) 1.8 cm 10/4/2017  2:32 PM   Wound Depth (cm)  0.6 10/4/2017  2:32 PM   Calculated Wound Size (cm^2) (l*w) 5.76 cm^2 10/4/2017  2:32 PM   Change in Wound Size % (l*w) -238.82 10/4/2017  2:32 PM Distance Tunneling (cm) 0 cm 9/20/2017  1:52 PM   Tunneling Position ___ O'Clock 0 9/20/2017  1:52 PM   Undermining Starts ___ O'Clock 0 9/20/2017  1:52 PM   Undermining Ends___ O'Clock 0 9/20/2017  1:52 PM   Undermining Maxium Distance (cm) 0 9/20/2017  1:52 PM   Wound Assessment Red;Slough; Yellow 10/4/2017  1:45 PM   Margins Attached edges 10/4/2017  1:45 PM   Lurdes-wound Assessment Dry; Intact 10/4/2017  1:45 PM   Non-staged Wound Description Full thickness 10/4/2017  1:45 PM   [place]%Wound Bed 25 10/4/2017  1:45 PM   Red%Wound Bed 25 10/4/2017  1:45 PM   Yellow%Wound Bed 50 10/4/2017  1:45 PM   Black%Wound Bed 0 9/20/2017  1:52 PM   Purple%Wound Bed 0 9/20/2017  1:52 PM   Other%Wound Bed 0 9/20/2017  1:52 PM   Drainage Amount Moderate 10/4/2017  1:45 PM   Drainage Description Serosanguinous 10/4/2017  1:45 PM   Odor None 10/4/2017  1:45 PM   Culture Taken No 9/13/2017  9:56 AM   Debridement per physician Partial thickness 10/4/2017  2:32 PM   Time out Yes 10/4/2017  2:32 PM   Procedural Pain 0 10/4/2017  2:32 PM   Post procedural Pain 0 10/4/2017  2:32 PM   Number of days: 197       Diabetic Ulcer 03/27/17 Foot Left;Distal DIABETIC FOOT ULCER # 33R LEFT/ OLVERA 1-wound reopened 5-9, previously wound 33 (Active)   Lurdes-wound Assessment Calloused 7/12/2017  8:15 AM   Lurdes-Wound Texture Callus 7/12/2017  8:15 AM   Lurdes-Wound Moisture Dry 7/12/2017  8:15 AM   Lurdes-Wound Color Pink 7/12/2017  8:15 AM   Diabetic Wound - Francetta Quale 1 7/12/2017  8:15 AM   Non-staged Wound Description Full thickness 6/28/2017  8:14 AM   Wound Assessment Intact 7/12/2017  8:15 AM   Shape Oval 6/7/2017  8:39 AM   Wound Length (cm) 0 cm 7/12/2017  8:15 AM   Wound Width (cm) 0 cm 7/12/2017  8:15 AM   Wound Depth (cm)  0 7/12/2017  8:15 AM   Calculated Wound Size (cm^2) (l*w) 0 cm^2 7/12/2017  8:15 AM   Change in Wound Size % (l*w) 100 7/12/2017  8:15 AM   Dressing Status Old drainage 7/12/2017  8:15 AM   Dressing Changed Changed/New the off loading. I brought up the possiblity of corrective foot surgery, (if Dr. Jana Negron felt there were options). He is considering this. I think there is little chance of healing left calf wound do to exposed calcium deposition. Assessment:      Patient Active Problem List   Diagnosis    Venous insufficiency of both lower extremities    Morbid obesity ([address]    Type 2 diabetes mellitus with diabetic dermatitis ([address]    Venous ulcer of right leg ([address]    Idiopathic chronic venous hypertension of left leg with ulcer ([address]    Diabetic ulcer of left foot associated with type 2 diabetes mellitus ([address]    Skin ulcer of toe of left foot with fat layer exposed ([address]          Plan:          Plan for wound - Dress per physician order  Treatment:     Compression : No   Offloading : Yes   Dressing : see avs   Additional Therapy :      1. Discussed appropriate home care of this wound. Wound redressed. 2. Patient instructions were given. 3. Follow up: 3 week(s). Discharge Instructions       Visit Discharge/Physician Orders    Discharge condition: Stable    Discharge to: Home    Left via:Private automobile    Accompanied by:  self    ECF/HHA: none      Dressing Orders:  Left Lower Ext and Foot Ulcer  Soap and water wash, (no soaking in a tub)  Dakins Moist Gauze tucked Loosely into Wound, Dry AMD Gauze, Roll Gauze, Medipore Tape, Twice Daily      Treatment Orders: Protein rich diet (unless restricted by your physician); Multivitamin daily; Elevate legs when sitting, avoid standing for long periods of time. Limit time on foot as much as possible  Off load diabetic foot ulcer with horse shoe shaped metatarsal pad     [facility] followup visit: ______3 weeks_____________________________________      (Please note your next appointment above and if you are unable to keep, kindly give a 24 hour notice.  Thank you.)          If you experience any of the following, please call the [facility] [address] during

## 2017-10-18 ENCOUNTER — HOSPITAL ENCOUNTER (OUTPATIENT)
Dept: WOUND CARE | Age: 62
Discharge: HOME | End: 2017-10-18
Payer: MEDICARE

## 2017-10-18 VITALS
HEIGHT: 72 IN | DIASTOLIC BLOOD PRESSURE: 72 MMHG | RESPIRATION RATE: 16 BRPM | TEMPERATURE: 97 F | HEART RATE: 80 BPM | WEIGHT: 246 LBS | BODY MASS INDEX: 33.32 KG/M2 | SYSTOLIC BLOOD PRESSURE: 136 MMHG

## 2017-10-18 DIAGNOSIS — L97.512 RIGHT FOOT ULCER, WITH FAT LAYER EXPOSED (HCC): Primary | ICD-10-CM

## 2017-10-18 DIAGNOSIS — L97.919 VENOUS ULCER OF RIGHT LEG (HCC): Chronic | ICD-10-CM

## 2017-10-18 DIAGNOSIS — I87.2 VENOUS INSUFFICIENCY OF BOTH LOWER EXTREMITIES: Chronic | ICD-10-CM

## 2017-10-18 DIAGNOSIS — L97.522 SKIN ULCER OF TOE OF LEFT FOOT WITH FAT LAYER EXPOSED (HCC): ICD-10-CM

## 2017-10-18 DIAGNOSIS — I83.019 VENOUS ULCER OF RIGHT LEG (HCC): Chronic | ICD-10-CM

## 2017-10-18 PROCEDURE — 97597 DBRDMT OPN WND 1ST 20 CM/<: CPT

## 2017-10-18 PROCEDURE — 97597 DBRDMT OPN WND 1ST 20 CM/<: CPT | Performed by: SURGERY

## 2017-10-18 ASSESSMENT — PAIN SCALES - GENERAL: PAINLEVEL_OUTOF10: 0

## 2017-10-18 NOTE — PLAN OF CARE
Problem: Wound:  Goal: Will show signs of wound healing; wound closure and no evidence of infection  Will show signs of wound healing; wound closure and no evidence of infection   Outcome: Ongoing      Problem: Venous:  Goal: Signs of wound healing will improve  Signs of wound healing will improve   Outcome: Ongoing      Problem: Compression therapy:  Goal: Will be free from complications associated with compression therapy  Will be free from complications associated with compression therapy   Outcome: Ongoing      Problem: Blood Glucose:  Goal: Ability to maintain appropriate glucose levels will improve  Ability to maintain appropriate glucose levels will improve  Outcome: Ongoing

## 2017-10-23 ENCOUNTER — OFFICE VISIT (OUTPATIENT)
Dept: FAMILY MEDICINE CLINIC | Age: 62
End: 2017-10-23
Payer: MEDICARE

## 2017-10-23 VITALS
DIASTOLIC BLOOD PRESSURE: 76 MMHG | WEIGHT: 253.5 LBS | SYSTOLIC BLOOD PRESSURE: 134 MMHG | BODY MASS INDEX: 34.34 KG/M2 | HEART RATE: 73 BPM | OXYGEN SATURATION: 94 % | RESPIRATION RATE: 18 BRPM | HEIGHT: 72 IN | TEMPERATURE: 99.4 F

## 2017-10-23 DIAGNOSIS — Z00.00 ROUTINE GENERAL MEDICAL EXAMINATION AT A HEALTH CARE FACILITY: Primary | ICD-10-CM

## 2017-10-23 DIAGNOSIS — E11.620 TYPE 2 DIABETES MELLITUS WITH DIABETIC DERMATITIS, WITHOUT LONG-TERM CURRENT USE OF INSULIN (HCC): Chronic | ICD-10-CM

## 2017-10-23 DIAGNOSIS — S22.41XD CLOSED FRACTURE OF MULTIPLE RIBS OF RIGHT SIDE WITH ROUTINE HEALING, SUBSEQUENT ENCOUNTER: ICD-10-CM

## 2017-10-23 PROCEDURE — G8427 DOCREV CUR MEDS BY ELIG CLIN: HCPCS | Performed by: FAMILY MEDICINE

## 2017-10-23 PROCEDURE — 3046F HEMOGLOBIN A1C LEVEL >9.0%: CPT | Performed by: FAMILY MEDICINE

## 2017-10-23 PROCEDURE — G8417 CALC BMI ABV UP PARAM F/U: HCPCS | Performed by: FAMILY MEDICINE

## 2017-10-23 PROCEDURE — G8484 FLU IMMUNIZE NO ADMIN: HCPCS | Performed by: FAMILY MEDICINE

## 2017-10-23 PROCEDURE — 99213 OFFICE O/P EST LOW 20 MIN: CPT | Performed by: FAMILY MEDICINE

## 2017-10-23 PROCEDURE — 3017F COLORECTAL CA SCREEN DOC REV: CPT | Performed by: FAMILY MEDICINE

## 2017-10-23 PROCEDURE — 1036F TOBACCO NON-USER: CPT | Performed by: FAMILY MEDICINE

## 2017-10-23 RX ORDER — TIZANIDINE 4 MG/1
4 TABLET ORAL
COMMUNITY
Start: 2016-10-21 | End: 2017-11-01 | Stop reason: SDUPTHER

## 2017-10-23 RX ORDER — HYDROCODONE BITARTRATE AND ACETAMINOPHEN 10; 325 MG/1; MG/1
1 TABLET ORAL EVERY 6 HOURS PRN
Qty: 120 TABLET | Refills: 0 | Status: SHIPPED | OUTPATIENT
Start: 2017-10-23 | End: 2017-11-28 | Stop reason: SDUPTHER

## 2017-10-23 RX ORDER — HYDROCODONE BITARTRATE AND ACETAMINOPHEN 10; 325 MG/1; MG/1
10-325 TABLET ORAL
COMMUNITY
End: 2017-10-23 | Stop reason: ALTCHOICE

## 2017-10-23 RX ORDER — LISINOPRIL 20 MG/1
20 TABLET ORAL
COMMUNITY
End: 2017-10-23 | Stop reason: ALTCHOICE

## 2017-10-23 ASSESSMENT — ENCOUNTER SYMPTOMS
ANAL BLEEDING: 0
BACK PAIN: 1
COUGH: 0
CONSTIPATION: 0
DIARRHEA: 0
ABDOMINAL PAIN: 0
NAUSEA: 0
SHORTNESS OF BREATH: 0
CHEST TIGHTNESS: 0

## 2017-10-23 ASSESSMENT — LIFESTYLE VARIABLES: HOW OFTEN DO YOU HAVE A DRINK CONTAINING ALCOHOL: 0

## 2017-10-23 ASSESSMENT — PATIENT HEALTH QUESTIONNAIRE - PHQ9: SUM OF ALL RESPONSES TO PHQ QUESTIONS 1-9: 0

## 2017-10-23 ASSESSMENT — ANXIETY QUESTIONNAIRES: GAD7 TOTAL SCORE: 0

## 2017-10-23 NOTE — PROGRESS NOTES
Medicare Annual Wellness Visit  Name: Devante Garrett Date: 10/23/2017   MRN: [id number] Sex: Male   Age: 63 y.o. Ethnicity: Non-/Non    : 1955 Race: White      Gil Argueta is here for Medicare AWV and Other (broke ribs )    Screenings for behavioral, psychosocial and functional/safety risks, and cognitive dysfunction are all negative except as indicated below. These results, as well as other patient data from the [address] form, are documented in Flowsheets linked to this Encounter. Allergies   Allergen Reactions    Adhesive Tape      Tears skin     Prior to Visit Medications    Medication Sig Taking? Authorizing Provider   tiZANidine (ZANAFLEX) 4 MG tablet Take 4 mg by mouth Yes Historical Provider, MD   HYDROcodone-acetaminophen (NORCO)  MG per tablet Take 1 tablet by mouth every 6 hours as needed for Pain .  Yes Nic Granger MD   lisinopril (PRINIVIL;ZESTRIL) 20 MG tablet Take 20 mg by mouth daily Yes Historical Provider, MD   temazepam (RESTORIL) 30 MG capsule Take 30 mg by mouth Yes Historical Provider, MD     Past Medical History:   Diagnosis Date    Asthma     Broken ribs     CAD (coronary artery disease)     CHF (congestive heart failure) ([address]     Diabetes mellitus ([address]     Hypertension     Morbid obesity ([address] 6/22/15    Skin ulcer of toe of left foot with fat layer exposed ([facility] 2017    Sleep apnea in adult 6/22/15    Venous insufficiency of both lower extremities 6/22/15     Past Surgical History:   Procedure Laterality Date    CARDIAC CATHETERIZATION      DILATATION, ESOPHAGUS      GASTRIC BYPASS SURGERY N/A 2015    TOTAL KNEE ARTHROPLASTY Bilateral      Family History   Problem Relation Age of Onset    Diabetes Mother     Heart Disease Mother     High Blood Pressure Mother     Heart Disease Father     Other Father        CareTeam (Including outside providers/suppliers regularly involved in providing care): Patient Care Team:  Marina Traylor MD as PCP - General (Family Medicine)    Wt Readings from Last 3 Encounters:   10/23/17 253 lb 8 oz (115 kg)   10/18/17 246 lb (111.6 kg)   10/04/17 246 lb (111.6 kg)     Vitals:    10/23/17 1453   BP: 134/76   Pulse: 73   Resp: 18   Temp: 99.4 °F (37.4 °C)   TempSrc: Temporal   SpO2: 94%   Weight: 253 lb 8 oz (115 kg)   Height: 6' (1.829 m)           The following problems were reviewed today and where indicated follow up appointments were made and/or referrals ordered. Risk Factor Screenings with Interventions:   Fall Risk:  2 or more falls in past year?: no  Fall with injury in past year?: no  Fall Risk Interventions:    · None indicated    Depression:  PHQ-2 Score: 0  Depression Interventions:  · None indicated    Anxiety:  Anxiety Score: 0  Anxiety Interventions:  · None indicated    Cognitive:   Words recalled: 3  Clock Drawing Test (CDT) Score: Normal  Cognitive Impairment Interventions:  · None indicated    Substance Abuse:  Social History     Social History Main Topics    Smoking status: Never Smoker    Smokeless tobacco: Never Used    Alcohol use Yes      Comment: Drinks a beer occ     Drug use: No    Sexual activity: Not on file     Audit Questionnaire: Screen for Alcohol Misuse  How often do you have a drink containing alcohol?: Never  Substance Abuse Interventions:  · None indicated    Health Risk Assessment:   General  In general, how would you say your health is?: Good  In the past 7 days, have you experienced any of the following?: None of These  Do you get the social and emotional support that you need?: Yes  Do you have a Living Will?: (!) No  General Health Risk Interventions:  · None indicated  · No Living Will: additional information provided    Health Habits/Nutrition  Do you exercise for at least 20 minutes 2-3 times per week?: (!) No  Have you lost any weight without trying in the past 3 months?: No  Do you eat fewer than 2 meals per day?: (!) Yes  Have you seen a dentist within the past year?: (!) No  Body mass index is 34.38 kg/m². Health Habits/Nutrition Interventions:  · Inadequate physical activity:  patient agrees to exercise for at least 150 minutes/week  · Nutritional issues:  educational materials for healthy, well-balanced diet provided  · Dental exam overdue:  patient encouraged to make appointment with his/her dentist    Hearing/Vision  Do you or your family notice any trouble with your hearing?: No  Do you have difficulty driving, watching TV, or doing any of your daily activities because of your eyesight?: No  Have you had an eye exam within the past year?: (!) No  Hearing/Vision Interventions:  · Vision concerns:  patient encouraged to make appointment with his/her eye specialist    Safety  Do you have working smoke detectors?: Yes  Have all throw rugs been removed or fastened?: (!) No  Do you have non-slip mats in all bathtubs?: Yes  Do all of your stairways have a railing or banister?: Yes  Are your doorways, halls and stairs free of clutter?: Yes  Do you always fasten your seatbelt when you are in a car?: Yes  Safety Interventions:  · None indicated    ADLs  In the past 7 days, did you need help from others to perform any of the following everyday activities?: None  In the past 7 days, did you need help from others to take care of any of the following?: None  ADL Interventions:  · None indicated    Personalized Preventive Plan   Current Health Maintenance Status    There is no immunization history on file for this patient.      Health Maintenance   Topic Date Due    Hepatitis C screen  1955    Diabetic foot exam  05/07/1967    Diabetic retinal exam  05/07/1967    HIV screen  05/07/1972    Diabetic microalbuminuria test  05/07/1975    DTaP/Tdap/Td vaccine (1 - Tdap) 05/07/1976    Pneumococcal med risk (1 of 1 - PPSV23) 05/07/1976    Colon cancer screen colonoscopy  05/07/2007    Lipid screen  03/08/2012    Diabetic

## 2017-10-23 NOTE — PATIENT INSTRUCTIONS
Personalized Preventive Plan for Merl Rather - 10/23/2017  Medicare offers a range of preventive health benefits. Some of the tests and screenings are paid in full while other may be subject to a deductible, co-insurance, and/or copay. Some of these benefits include a comprehensive review of your medical history including lifestyle, illnesses that may run in your family, and various assessments and screenings as appropriate. After reviewing your medical record and screening and assessments performed today your provider may have ordered immunizations, labs, imaging, and/or referrals for you. A list of these orders (if applicable) as well as your Preventive Care list are included within your After Visit Summary for your review. Other Preventive Recommendations:    · A preventive eye exam performed by an eye specialist is recommended every 1-2 years to screen for glaucoma; cataracts, macular degeneration, and other eye disorders. · A preventive dental visit is recommended every 6 months. · Try to get at least 150 minutes of exercise per week or 10,000 steps per day on a pedometer . · Order or download the FREE \"Exercise & Physical Activity: Your Everyday Guide\" from [employer]. Call 1-300.308.3590 or search [employer] online. · You need 6391-8515 mg of calcium and 3564-7531 IU of vitamin D per day. It is possible to meet your calcium requirement with diet alone, but a vitamin D supplement is usually necessary to meet this goal.  · When exposed to the sun, use a sunscreen that protects against both UVA and UVB radiation with an SPF of 30 or greater. Reapply every 2 to 3 hours or after sweating, drying off with a towel, or swimming. · Always wear a seat belt when traveling in a car. Always wear a helmet when riding a bicycle or motorcycle.        Advance Care Planning: Care Instructions  Your Care Instructions  It can be hard to live with an illness that cannot be cured. But if your health is getting worse, you may want to make decisions about end-of-life care. Planning for the end of your life does not mean that you are giving up. It is a way to make sure that your wishes are met. Clearly stating your wishes can make it easier for your loved ones. Making plans while you are still able may also ease your mind and make your final days less stressful and more meaningful. Follow-up care is a key part of your treatment and safety. Be sure to make and go to all appointments, and call your doctor if you are having problems. It's also a good idea to know your test results and keep a list of the medicines you take. What can you do to plan for the end of life? You can bring these issues up with your doctor. You do not need to wait until your doctor starts the conversation. You might start with \"I would not be willing to live with . Arvell Gregorio Arvell Gregorio Arvell Gregorio \" When you complete this sentence it helps your doctor understand your wishes. Talk openly and honestly with your doctor. This is the best way to understand the decisions you will need to make as your health changes. Know that you can always change your mind. Ask your doctor about commonly used life-support measures. These include tube feedings, breathing machines, and fluids given through a vein (IV). Understanding these treatments will help you decide whether you want them. You may choose to have these life-supporting treatments for a limited time. This allows a trial period to see whether they will help you. You may also decide that you want your doctor to take only certain measures to keep you alive. It is important to spell out these conditions so that your doctor and family understand them. Talk to your doctor about how long you are likely to live. He or she may be able to give you an idea of what usually happens with your specific illness. Think about preparing papers that state your wishes.  This way there will not be any confusion about what you want. You can change your instructions at any time. Which papers should you prepare? Advance directives are legal papers that tell doctors how you want to be cared for at the end of your life. You do not need a  to write these papers. Ask your doctor or your state health department for information on how to write your advance directives. They may have the forms for each of these types of papers. Make sure your doctor has a copy of these on file, and give a copy to a family member or close friend. Consider a do-not-resuscitate order (DNR). This order asks that no extra treatments be done if your heart stops or you stop breathing. Extra treatments may include cardiopulmonary resuscitation (CPR), electrical shock to restart your heart, or a machine to breathe for you. If you decide to have a DNR order, ask your doctor to explain and write it. Place the order in your home where everyone can easily see it. Consider a living will. A living will explains your wishes about life support and other treatments at the end of your life if you become unable to speak for yourself. Living fregoso tell doctors to use or not use treatments that would keep you alive. You must have one or two witnesses or a notary present when you sign this form. Consider a durable power of  for health care. This allows you to name a person to make decisions about your care if you are not able to. Most people ask a close friend or family member. Talk to this person about the kinds of treatments you want and those that you do not want. Make sure this person understands your wishes. These legal papers are simple to change. Tell your doctor what you want to change, and ask him or her to make a note in your medical file. Give your family updated copies of the papers. Where can you learn more? Go to [email or web address] and sign in to your [employer] account.  Enter [id number] in the [address]

## 2017-10-23 NOTE — PROGRESS NOTES
Diabetes Mother     Heart Disease Mother     High Blood Pressure Mother     Heart Disease Father     Other Father        Social History   Substance Use Topics    Smoking status: Never Smoker    Smokeless tobacco: Never Used    Alcohol use Yes      Comment: Drinks a beer occ       Current Outpatient Prescriptions   Medication Sig Dispense Refill    tiZANidine (ZANAFLEX) 4 MG tablet Take 4 mg by mouth      HYDROcodone-acetaminophen (NORCO)  MG per tablet Take 1 tablet by mouth every 6 hours as needed for Pain  To be filled on or after 11/2/2017. 120 tablet 0    lisinopril (PRINIVIL;ZESTRIL) 20 MG tablet Take 20 mg by mouth daily      temazepam (RESTORIL) 30 MG capsule Take 30 mg by mouth       No current facility-administered medications for this visit. Allergies   Allergen Reactions    Adhesive Tape      Tears skin       Health Maintenance   Topic Date Due    Hepatitis C screen  1955    Diabetic foot exam  05/07/1967    Diabetic retinal exam  05/07/1967    HIV screen  05/07/1972    Diabetic microalbuminuria test  05/07/1975    DTaP/Tdap/Td vaccine (1 - Tdap) 05/07/1976    Pneumococcal med risk (1 of 1 - PPSV23) 05/07/1976    Colon cancer screen colonoscopy  05/07/2007    Lipid screen  03/08/2012    Diabetic hemoglobin A1C test  10/14/2015    Zostavax vaccine  05/07/2017    Flu vaccine (1) 10/23/2018 (Originally 9/1/2017)       Subjective:      Review of Systems   Constitutional: Negative for chills and fever. HENT: Negative for congestion. Respiratory: Negative for cough, chest tightness and shortness of breath. Cardiovascular: Negative for chest pain, palpitations and leg swelling. Gastrointestinal: Negative for abdominal pain, anal bleeding, constipation, diarrhea and nausea. Genitourinary: Negative for difficulty urinating. Musculoskeletal: Positive for back pain. Psychiatric/Behavioral: Negative. See HPI for visit specific review of symptoms.   All

## 2017-10-25 ENCOUNTER — HOSPITAL ENCOUNTER (OUTPATIENT)
Dept: WOUND CARE | Age: 62
Discharge: HOME | End: 2017-10-25
Payer: MEDICARE

## 2017-10-25 DIAGNOSIS — L97.522 SKIN ULCER OF TOE OF LEFT FOOT WITH FAT LAYER EXPOSED (HCC): ICD-10-CM

## 2017-10-25 PROCEDURE — 99212 OFFICE O/P EST SF 10 MIN: CPT

## 2017-10-25 PROCEDURE — 99213 OFFICE O/P EST LOW 20 MIN: CPT

## 2017-11-01 ENCOUNTER — HOSPITAL ENCOUNTER (OUTPATIENT)
Dept: WOUND CARE | Age: 62
Discharge: HOME | End: 2017-11-01
Payer: MEDICARE

## 2017-11-01 VITALS
DIASTOLIC BLOOD PRESSURE: 70 MMHG | BODY MASS INDEX: 34.27 KG/M2 | HEART RATE: 71 BPM | WEIGHT: 253 LBS | HEIGHT: 72 IN | SYSTOLIC BLOOD PRESSURE: 117 MMHG | RESPIRATION RATE: 18 BRPM | TEMPERATURE: 98.9 F

## 2017-11-01 DIAGNOSIS — I87.2 VENOUS INSUFFICIENCY OF BOTH LOWER EXTREMITIES: Chronic | ICD-10-CM

## 2017-11-01 DIAGNOSIS — L97.522 SKIN ULCER OF TOE OF LEFT FOOT WITH FAT LAYER EXPOSED (HCC): ICD-10-CM

## 2017-11-01 DIAGNOSIS — I83.019 VENOUS ULCER OF RIGHT LEG (HCC): Primary | Chronic | ICD-10-CM

## 2017-11-01 DIAGNOSIS — L97.919 VENOUS ULCER OF RIGHT LEG (HCC): Primary | Chronic | ICD-10-CM

## 2017-11-01 PROCEDURE — 11042 DBRDMT SUBQ TIS 1ST 20SQCM/<: CPT

## 2017-11-01 PROCEDURE — 11042 DBRDMT SUBQ TIS 1ST 20SQCM/<: CPT | Performed by: SURGERY

## 2017-11-01 RX ORDER — M-VIT,TX,IRON,MINS/CALC/FOLIC 27MG-0.4MG
1 TABLET ORAL NIGHTLY
COMMUNITY
End: 2018-02-11 | Stop reason: ALTCHOICE

## 2017-11-01 ASSESSMENT — PAIN DESCRIPTION - PAIN TYPE: TYPE: ACUTE PAIN

## 2017-11-01 ASSESSMENT — PAIN SCALES - GENERAL: PAINLEVEL_OUTOF10: 0

## 2017-11-02 RX ORDER — TIZANIDINE 4 MG/1
TABLET ORAL
Qty: 90 TABLET | Refills: 2 | Status: SHIPPED | OUTPATIENT
Start: 2017-11-02 | End: 2018-02-11 | Stop reason: ALTCHOICE

## 2017-11-08 ENCOUNTER — HOSPITAL ENCOUNTER (OUTPATIENT)
Dept: WOUND CARE | Age: 62
Discharge: HOME | End: 2017-11-08
Payer: MEDICARE

## 2017-11-08 VITALS
WEIGHT: 253 LBS | HEART RATE: 64 BPM | BODY MASS INDEX: 34.27 KG/M2 | HEIGHT: 72 IN | SYSTOLIC BLOOD PRESSURE: 140 MMHG | DIASTOLIC BLOOD PRESSURE: 67 MMHG | TEMPERATURE: 97.9 F | RESPIRATION RATE: 20 BRPM

## 2017-11-08 DIAGNOSIS — L97.522 SKIN ULCER OF TOE OF LEFT FOOT WITH FAT LAYER EXPOSED (HCC): ICD-10-CM

## 2017-11-08 PROCEDURE — 99212 OFFICE O/P EST SF 10 MIN: CPT

## 2017-11-15 ENCOUNTER — HOSPITAL ENCOUNTER (OUTPATIENT)
Dept: WOUND CARE | Age: 62
Discharge: HOME | End: 2017-11-15
Payer: MEDICARE

## 2017-11-15 VITALS
DIASTOLIC BLOOD PRESSURE: 72 MMHG | HEIGHT: 72 IN | SYSTOLIC BLOOD PRESSURE: 138 MMHG | RESPIRATION RATE: 20 BRPM | WEIGHT: 253 LBS | TEMPERATURE: 97.5 F | BODY MASS INDEX: 34.27 KG/M2

## 2017-11-15 DIAGNOSIS — E11.69 TYPE 2 DIABETES MELLITUS WITH OTHER SPECIFIED COMPLICATION, WITH LONG-TERM CURRENT USE OF INSULIN (HCC): Chronic | ICD-10-CM

## 2017-11-15 DIAGNOSIS — L97.522 SKIN ULCER OF TOE OF LEFT FOOT WITH FAT LAYER EXPOSED (HCC): ICD-10-CM

## 2017-11-15 DIAGNOSIS — Z79.4 TYPE 2 DIABETES MELLITUS WITH OTHER SPECIFIED COMPLICATION, WITH LONG-TERM CURRENT USE OF INSULIN (HCC): Chronic | ICD-10-CM

## 2017-11-15 PROCEDURE — 97597 DBRDMT OPN WND 1ST 20 CM/<: CPT

## 2017-11-15 PROCEDURE — 97597 DBRDMT OPN WND 1ST 20 CM/<: CPT | Performed by: SURGERY

## 2017-11-15 ASSESSMENT — PAIN SCALES - GENERAL: PAINLEVEL_OUTOF10: 0

## 2017-11-15 ASSESSMENT — PAIN DESCRIPTION - PROGRESSION: CLINICAL_PROGRESSION: NOT CHANGED

## 2017-11-15 NOTE — PROGRESS NOTES
Calculated Wound Size (cm^2) (l*w) 1.95 cm^2 11/15/2017 10:46 AM   Change in Wound Size % (l*w) -14.71 11/15/2017 10:46 AM   Distance Tunneling (cm) 0 cm 11/15/2017 10:11 AM   Tunneling Position ___ O'Clock 0 11/15/2017 10:11 AM   Undermining Starts ___ O'Clock 0 11/15/2017 10:11 AM   Undermining Ends___ O'Clock 0 11/15/2017 10:11 AM   Undermining Maxium Distance (cm) 0 10/18/2017 12:47 PM   Wound Assessment Granulation tissue;Pink;Slough; Yellow 11/15/2017 10:11 AM   Drainage Amount Moderate 11/15/2017 10:11 AM   Drainage Description Serosanguinous 11/15/2017 10:11 AM   Odor None 11/15/2017 10:11 AM   Margins Attached edges 11/15/2017 10:11 AM   Lurdes-wound Assessment Dry; Intact 11/15/2017 10:11 AM   Non-staged Wound Description Full thickness 11/15/2017 10:11 AM   [place]%Wound Bed 25 11/15/2017 10:11 AM   Red%Wound Bed 25 11/15/2017 10:11 AM   Yellow%Wound Bed 50 11/15/2017 10:11 AM   Black%Wound Bed 0 11/15/2017 10:11 AM   Purple%Wound Bed 0 11/15/2017 10:11 AM   Other%Wound Bed 0 11/15/2017 10:11 AM   Culture Taken No 11/15/2017 10:11 AM   Debridement per physician Partial thickness 11/15/2017 10:46 AM   Time out Yes 11/15/2017 10:46 AM   Procedural Pain 0 11/15/2017 10:46 AM   Post procedural Pain 0 11/15/2017 10:46 AM   Number of days: 232       Wound 10/18/17 Foot Plantar;Right;Medial WOUND 34; RIGHT FOOT PLANTAR MEDIAL ( UNDER GREAT TOE) (Active)   Wound Type Wound 11/1/2017 11:17 AM   Wound Diabetic Lemon 1 11/1/2017 11:17 AM   Dressing Status Intact;Dry 11/1/2017 11:17 AM   Dressing Changed Changed/New 11/1/2017 11:17 AM   Wound Cleansed Rinsed/Irrigated with saline 11/1/2017 11:17 AM   Wound Length (cm) 0 cm 11/1/2017 11:17 AM   Wound Width (cm) 0 cm 11/1/2017 11:17 AM   Wound Depth (cm)  0 11/1/2017 11:17 AM   Calculated Wound Size (cm^2) (l*w) 0 cm^2 11/1/2017 11:17 AM   Change in Wound Size % (l*w) 100 11/1/2017 11:17 AM   Distance Tunneling (cm) 0 cm 10/18/2017 12:47 PM   Tunneling Position ___ O'Clock 0 10/18/2017 12:47 PM   Undermining Starts ___ O'Clock 0 10/18/2017 12:47 PM   Undermining Ends___ O'Clock 0 10/18/2017 12:47 PM   Undermining Maxium Distance (cm) 0 10/18/2017 12:47 PM   Wound Assessment Pink 11/1/2017 11:17 AM   Drainage Amount None 11/1/2017 11:17 AM   Drainage Description Sanguinous 10/18/2017 12:47 PM   Odor None 11/1/2017 11:17 AM   Margins Attached edges 10/25/2017  4:57 PM   Lurdes-wound Assessment Calloused 11/1/2017 11:17 AM   Non-staged Wound Description Not applicable 25/7/7188 17:37 AM   [place]%Wound Bed 100 11/1/2017 11:17 AM   Red%Wound Bed 0 11/1/2017 11:17 AM   Yellow%Wound Bed 0 11/1/2017 11:17 AM   Black%Wound Bed 0 10/18/2017 12:47 PM   Purple%Wound Bed 0 10/18/2017 12:47 PM   Other%Wound Bed 0 10/18/2017 12:47 PM   Culture Taken No 10/18/2017 12:59 PM   Debridement per physician None 11/1/2017 12:56 PM   Time out N/A 11/1/2017 12:56 PM   Procedural Pain 0 10/18/2017 12:59 PM   Post procedural Pain 0 10/18/2017 12:59 PM   Number of days: 27       Diabetic Ulcer 03/27/17 Foot Left;Distal DIABETIC FOOT ULCER # 33R LEFT/ OLVERA 1-wound reopened 5-9, previously wound 33 (Active)   Lurdes-wound Assessment Calloused 7/12/2017  8:15 AM   Lurdes-Wound Texture Callus 7/12/2017  8:15 AM   Lurdes-Wound Moisture Dry 7/12/2017  8:15 AM   Lurdes-Wound Color Pink 7/12/2017  8:15 AM   Diabetic Wound - Brandy Cee 1 7/12/2017  8:15 AM   Non-staged Wound Description Full thickness 6/28/2017  8:14 AM   Wound Assessment Intact 7/12/2017  8:15 AM   Shape Oval 6/7/2017  8:39 AM   Wound Length (cm) 0 cm 7/12/2017  8:15 AM   Wound Width (cm) 0 cm 7/12/2017  8:15 AM   Wound Depth (cm)  0 7/12/2017  8:15 AM   Calculated Wound Size (cm^2) (l*w) 0 cm^2 7/12/2017  8:15 AM   Change in Wound Size % (l*w) 100 7/12/2017  8:15 AM   Dressing Status Old drainage 7/12/2017  8:15 AM   Dressing Changed Changed/New 7/5/2017  9:01 AM   Dressing/Treatment Open to air 7/12/2017  8:43 AM   Wound Cleansed Other (Comment) 6/28/2017 8:14 AM   Necrotic Type Black Eschar 6/2/2017  8:50 AM   Necrotic Amount Large: % 6/2/2017  8:50 AM   Granulation Quality Pink 7/12/2017  8:15 AM   Drainage Amount Small 7/12/2017  8:15 AM   Drainage Description Serosanguinous 7/12/2017  8:15 AM   Odor None 7/12/2017  8:15 AM   Epithelialization None present 6/2/2017  8:50 AM   Distance Tunneling (cm) 0 cm 7/12/2017  8:15 AM   Tunneling Position ___ O'Clock 0 7/12/2017  8:15 AM   Tunneling Maxium Distance (cm) 0 7/12/2017  8:15 AM   Undermining Starts ___ O'Clock 0 7/12/2017  8:15 AM   Undermining Ends___ O'Clock 0 7/12/2017  8:15 AM   Undermining Maxium Distance (cm) 0 7/12/2017  8:15 AM   [place]%Wound Bed 0 7/12/2017  8:15 AM   Red%Wound Bed 0 7/12/2017  8:15 AM   Yellow%Wound Bed 0 7/12/2017  8:15 AM   Black%Wound Bed 0 7/12/2017  8:15 AM   Margins Attached edges 6/21/2017  8:29 AM   Debridement per physician Partial thickness 7/5/2017  9:01 AM   Time out Yes 7/5/2017  9:01 AM   Procedural Pain 0 7/5/2017  9:01 AM   Post procedural Pain 0 7/5/2017  9:01 AM   Number of days: 232       Diabetic Ulcer 10/18/17 Foot Right;Plantar;Lateral WOUND 35; rIGHT FOOT PLANTAR LATERAL ( UNDER 5TH TOE) (Active)   Lurdes-wound Assessment Calloused 11/1/2017 11:17 AM   Lurdes-Wound Texture Callus 11/1/2017 11:17 AM   Lurdes-Wound Moisture Dry; Intact 11/1/2017 11:17 AM   Lurdes-Wound Color Pink 11/1/2017 11:17 AM   Diabetic Wound - Juan Joanna 1 11/1/2017 11:17 AM   Non-staged Wound Description Not applicable 15/2/3351 90:85 AM   Wound Assessment Pink 11/1/2017 11:17 AM   Shape round 11/1/2017 11:17 AM   Wound Length (cm) 0 cm 11/1/2017 11:17 AM   Wound Width (cm) 0 cm 11/1/2017 11:17 AM   Wound Depth (cm)  0 11/1/2017 11:17 AM   Calculated Wound Size (cm^2) (l*w) 0 cm^2 11/1/2017 11:17 AM   Change in Wound Size % (l*w) 100 11/1/2017 11:17 AM   Culture Taken No 10/25/2017  4:57 PM   Dressing Status Intact;Dry 11/1/2017 11:17 AM   Dressing Changed Changed/New 11/1/2017 11:17 AM Wound Cleansed Rinsed/Irrigated with saline 11/1/2017 11:17 AM   Necrotic Type Yellow Fibrin/Slough 10/25/2017  4:57 PM   Necrotic Amount None present 11/1/2017 11:17 AM   Granulation Quality N/A 11/1/2017 11:17 AM   Drainage Amount None 11/1/2017 11:17 AM   Drainage Description Serosanguinous 10/25/2017  4:57 PM   Odor None 11/1/2017 11:17 AM   Distance Tunneling (cm) 0 cm 10/18/2017 12:47 PM   Tunneling Position ___ O'Clock 0 10/18/2017 12:47 PM   Tunneling Maxium Distance (cm) 0 10/18/2017 12:47 PM   Undermining Starts ___ O'Clock 0 10/18/2017 12:47 PM   Undermining Ends___ O'Clock 0 10/18/2017 12:47 PM   Undermining Maxium Distance (cm) 0 10/18/2017 12:47 PM   [place]%Wound Bed 100 11/1/2017 11:17 AM   Red%Wound Bed 2 10/18/2017 12:47 PM   Yellow%Wound Bed 3 10/18/2017 12:47 PM   Black%Wound Bed 0 10/18/2017 12:47 PM   Purple%Wound Bed 0 10/18/2017 12:47 PM   Margins Other (Comment) 11/1/2017 11:17 AM   Debridement per physician None 11/1/2017 12:56 PM   Time out N/A 11/1/2017 12:56 PM   Procedural Pain 0 10/18/2017 12:59 PM   Post procedural Pain 0 10/18/2017 12:59 PM   Number of days: 27      The patients pain isPain Level: 0  . Wound is has slightly improved. Please refer to nursing measurements and assessment regarding wound pre and post debridement. Procedure Note:    Wound #: 31     Debridement: Non-excisional Debridement    Anesthetic: Anesthetic: 2% Lidocaine Gel Topical  Using curette the wound was sharply debrided    down through and including the removal of epidermis, dermis and subcutaneous tissue. Devitalized Tissue Debrided:  fibrin, biofilm and slough    Percent of Wound Debrided: 100%      Bleeding: Minimal    Hemostasis:   by pressure      Response to treatment:  Well tolerated by patient.       Assessment:      Patient Active Problem List   Diagnosis    Venous insufficiency of both lower extremities    Morbid obesity ([address]    Type 2 diabetes mellitus with diabetic dermatitis ([address]    Venous ulcer of right leg ([address]    Idiopathic chronic venous hypertension of left leg with ulcer ([address]    Diabetic ulcer of left foot associated with type 2 diabetes mellitus ([address]    Skin ulcer of toe of left foot with fat layer exposed ([address]    Type 2 diabetes mellitus with other specified complication ([address]      All of his foot wounds have healed. I have referred him to Dr. Kenyatta Pimentel in [address] [place] [address] to see if anything can be done in the way of \"corrective/prevenative\" surgery of his feet. His arterial supply to his feet seems good, he has non-compressible vessels, however, waveforms and toe pressures are good. He does have SEVERE calcinosis of both of his calfs. Plan:          Plan for wound - Dress per physician order  Treatment:     Compression : No   Offloading : Yes   Dressing : see avs   Additional Therapy : referral to Dr. Kenyatta Pimentel, Will need new off loading shoes after any surgery to correct his \"deformities\"     1. Discussed appropriate home care of this wound. Wound redressed. 2. Patient instructions were given. 3. Follow up: 2 week(s). Discharge Instructions       Visit Discharge/Physician Orders    Discharge condition: Stable    Discharge to: Home    Left via:Private automobile    Accompanied by:  self    ECF/HHA: none  Dressing Orders: Left Lower Ext and Foot UlcerSoap and water wash, (no soaking in a tub), rinse well, Dakins Solution Moist Gauze tucked Loosely into Wound, Dry AMD Gauze, Roll Gauze, Medipore Tape, Twice Daily. Moisturize feet with Eucerin Cream, Aquaphor or Cocoa Butter and protect wounds of right foot that healed. Wear your diabetic shoes. Watch your feet closely for any rubbing or redness. Inspect them twice daily with a mirror. Treatment Orders: Protein rich diet (unless restricted by your physician); Multivitamin daily; Elevate legs when sitting, avoid standing for long periods of time.     See Dr. Kenyatta Pimentel in [address] [place] [address] for possible corrective surgery, possible toe straightening right foot at 9:00 am 11/22/17.  ; [facility], medical office entrance . ... between main hospital and ER for x-ray, this is on the first floor,  then proceed to Dr. Author Rubinstein office on the 4th floor for 10:00 am appointment, same building.     Jaskaran Chairez DPM  Address: [address] [place] [address]  Hours: Open today · 8:30AM-5PM  Phone: [phone]        Diabetic Foot Care Instructions  1) Wash and dry well between your toes. Do not soak your feet unless instructed to do so by a physician. Moisturize your feet with a good thick cream like Eucerin Cream, Aquaphor or Cocoa Butter (in a jar) at least twice daily. Do not apply moisturizer between toes. 2)  Protect your feet and never go barefoot. Wear slippers around the house. Treat   even minor wounds and skin cracks as an urgent matter when you have diabetes. Remember early treatment is essential to avoid more advanced problems. 3) Check your feet daily (and in between your toes) or have someone else check them if your eyesight is limited. If you live alone try using a handheld mirror. Watch for a red spot that persists or callus formation. 4)  Look and feel inside your shoes before putting them on. Inspect the soles for nails or screws. 5)  If you form callus or thickened skin on your feet use Flexitol Heel Balm once daily alternating with your regular moisturizer. Luz Mulliganing is available at [employer] ($12) and other pharmacies. 6) If you form callus this is  a sign that this area is getting too much pressure or rubbing in your shoe. You may use a pumice stone gently to any callus after a shower 3 times weekly. Be very careful as you will not feel it if you rub too hard. 7) Try wearing two pairs of white cotton socks. Wear a thin pair inside out so the seam is not on your toes. Wear the other pair over the thin pair.   This will sometimes stop the friction and rubbing. 8) If you get a callus you will need your inserts or your shoe adjusted by the orthotist where your shoes were made. Take them back immediately, do not wait. It is best to get your shoes from a company that specializes in [address]. These companies have an Orthotist on staff who can work on your shoes when needed. Just because they are Diabetic shoes does not mean they will be perfect when you get them. They may need to be stretched or need the inserts reworked and you need a place that offers this after the sale of your shoe. 9) Callus is never a good thing on a diabetic or neuropathic foot. This is where wounds come from. 10) Diabetics should have new shoes yearly. (This prescription can be obtained from your primary care physician who does your foot exams)  You will also get 4 pairs of inserts with your shoes. As your feet change through the course of the year you may need to get your insoles or even your shoe adjusted by the orthotist.  Please take advantage of their expertise and keep your footwear in good shape. Break in new shoes slowly, just a few hours a day. Watch for red spots, areas that rub or callus. 11) As a diabetic you also need to see a Podiatrist every two months for a toenail trim. Your insurance should pay for this. Avoid trimming your toenails yourself. If you must, they should be trimmed straight across. 12)  Keeping your blood sugar in control will help prevent future ulcerations. If you smoke, STOP. Diabetes and smoking are a bad combination that increases your risk of complications from Diabetes. [facility] followup visit ______2 weeks_______________________  (Please note your next appointment above and if you are unable to keep, kindly give a 24 hour notice.  Thank you.)          If you experience any of the following, please call the [address] during business hours:    * Increase in Pain  * Temperature over 101  * Increase in drainage from your wound  * Drainage with a foul odor  * Bleeding  * Increase in swelling  * Need for compression bandage changes due to slippage, breakthrough drainage. If you need medical attention outside of the business hours of the [address] [place] [address] Road please contact your PCP or go to the nearest emergency room.              Electronically signed by Armando Martin MD on 11/15/2017 at 11:14 AM

## 2017-11-26 PROBLEM — E11.49 TYPE 2 DIABETES MELLITUS WITH NEUROLOGIC COMPLICATION (HCC): Chronic | Status: ACTIVE | Noted: 2017-11-15

## 2017-11-26 PROBLEM — L97.512 ULCERATED, FOOT, RIGHT, WITH FAT LAYER EXPOSED (HCC): Chronic | Status: ACTIVE | Noted: 2017-04-04

## 2017-11-26 NOTE — PROGRESS NOTES
Wound Care Center   Progress Note and Procedure Note      Gil Guillen  AGE: 63 y.o. GENDER: male  : 1955  TODAY'S DATE:  10/18/2017    Subjective:      CC- left calf wound, bilateral plantar foot wounds. HISTORY of PRESENT ILLNESS SUE Rivas is a 63 y.o. male who presents today for wound evaluation. Wound Type:venous (calf), Diabetic (foot)  Wound Location:left calf, bilateral plantar feet  Modifying factors:venous stasis, lymphedema, diabetes, chronic pressure, decreased mobility, shear force and obesity    Patient Active Problem List   Diagnosis Code    Venous insufficiency of both lower extremities I87.2    Morbid obesity ([facility] E66.01    Type 2 diabetes mellitus with diabetic dermatitis ([facility]) E11.620    Venous ulcer of right leg ([facility]) I83.019    Idiopathic chronic venous hypertension of left leg with ulcer ([facility] I87.312    Diabetic ulcer of left foot associated with type 2 diabetes mellitus ([facility]) E11.621, L97.529    Skin ulcer of toe of left foot with fat layer exposed ([facility] L97.522    Type 2 diabetes mellitus with other specified complication ([facility]) D89.19    Venous hypertension, chronic, with ulcer, left ([facility]) I87.312    Chronic ulcer of left leg, with fat layer exposed ([facility]) Q78.261       ALLERGIES    Allergies   Allergen Reactions    Adhesive Tape      Tears skin           Objective:      /72   Pulse 80   Temp 97 °F (36.1 °C) (Temporal)   Resp 16   Ht 6' (1.829 m)   Wt 246 lb (111.6 kg)   BMI 33.36 kg/m²     The patients pain isPain Level: 0  . Wound is left lateral calf-no significant change  Right foot under first and fifth metatarsal head-new, full thickness surrounded by callus. .    Please refer to nursing measurements and assessment regarding wound pre and post debridement.     Procedure Note:    Wound #: 31     Debridement: Non-excisional Debridement    Anesthetic: Anesthetic: 2% Xylocaine Gel  Using curette the wound was sharply debrided    down

## 2017-11-26 NOTE — PROGRESS NOTES
Wound Care Center   Progress Note and Procedure Note      Gil Guillen  AGE: 63 y.o. GENDER: male  : 1955  TODAY'S DATE:  2017    Subjective:      CC- Left calf wound, right foot wounds    HISTORY of PRESENT ILLNESS SUE Gustafson is a 63 y.o. male who presents today for wound evaluation. Wound Type:venous and diabetic  Wound Location:left lateral calf, right plantar foot  Modifying factors:edema, venous stasis, lymphedema, diabetes, poor glucose control, chronic pressure, decreased mobility, shear force, obesity and severe calcinosis bilateral calves    Patient Active Problem List   Diagnosis Code    Venous insufficiency of both lower extremities I87.2    Morbid obesity ([facility] E66.01    Type 2 diabetes mellitus with diabetic dermatitis ([facility]) E11.620    Venous ulcer of right leg ([facility]) I83.019    Idiopathic chronic venous hypertension of left leg with ulcer ([facility] I87.312    Diabetic ulcer of left foot associated with type 2 diabetes mellitus ([facility]) E11.621, L97.529    Ulcerated, foot, right, with fat layer exposed ([facility] L97.512    Type 2 diabetes mellitus with neurologic complication ([facility]) C64.25    Venous hypertension, chronic, with ulcer, left ([facility]) I87.312    Chronic ulcer of left leg, with fat layer exposed ([facility]) M11.956       ALLERGIES    Allergies   Allergen Reactions    Adhesive Tape      Tears skin           Objective:      /70   Pulse 71   Temp 98.9 °F (37.2 °C) (Temporal)   Resp 18   Ht 6' (1.829 m)   Wt 253 lb (114.8 kg)   BMI 34.31 kg/m²     The patients pain isPain Level: 0 Pain Type: Acute pain. Wound is right foot wounds have healed. Left calf wound seems improved. .    Please refer to nursing measurements and assessment regarding wound pre and post debridement.     Procedure Note:    Wound #: 31     Debridement: Excisional Debridement    Anesthetic: Anesthetic: 2% Xylocaine Gel  Using curette, #15 blade scalpel and forceps the wound was sharply Orders: Protein rich diet (unless restricted by your physician); Multivitamin daily; Elevate legs when sitting, avoid standing for long periods of time. We will contact you with an appointment to see Dr. [facility] for possible corrective Right foot surgery. [address] [place] [address],[address] followup visit __1 week nurse visit for wound assessment and dressing change,_2 weeks with Dr. Vora_________________________  (Please note your next appointment above and if you are unable to keep, kindly give a 24 hour notice. Thank you.)          If you experience any of the following, please call the [facility] during business hours:    * Increase in Pain  * Temperature over 101  * Increase in drainage from your wound  * Drainage with a foul odor  * Bleeding  * Increase in swelling  * Need for compression bandage changes due to slippage, breakthrough drainage. If you need medical attention outside of the business hours of the [address] please contact your PCP or go to the nearest emergency room.              Electronically signed by Angel Andujar MD on 11/26/2017 at 3:00 PM

## 2017-11-28 DIAGNOSIS — S22.41XD CLOSED FRACTURE OF MULTIPLE RIBS OF RIGHT SIDE WITH ROUTINE HEALING, SUBSEQUENT ENCOUNTER: ICD-10-CM

## 2017-11-28 RX ORDER — HYDROCODONE BITARTRATE AND ACETAMINOPHEN 10; 325 MG/1; MG/1
1 TABLET ORAL EVERY 6 HOURS PRN
Qty: 120 TABLET | Refills: 0 | Status: SHIPPED | OUTPATIENT
Start: 2017-11-28 | End: 2017-12-29 | Stop reason: SDUPTHER

## 2017-11-28 NOTE — TELEPHONE ENCOUNTER
Refilled medication and e-scribed to pharmacy. Confirm prescription was due. Make sure PABLO and urine drug screen is up to date.

## 2017-11-29 ENCOUNTER — HOSPITAL ENCOUNTER (OUTPATIENT)
Dept: WOUND CARE | Age: 62
Discharge: HOME | End: 2017-11-29
Payer: MEDICARE

## 2017-11-29 VITALS
HEART RATE: 64 BPM | BODY MASS INDEX: 34.27 KG/M2 | WEIGHT: 253 LBS | SYSTOLIC BLOOD PRESSURE: 138 MMHG | DIASTOLIC BLOOD PRESSURE: 78 MMHG | TEMPERATURE: 98.9 F | HEIGHT: 72 IN | RESPIRATION RATE: 18 BRPM

## 2017-11-29 DIAGNOSIS — L97.512 ULCERATED, FOOT, RIGHT, WITH FAT LAYER EXPOSED (HCC): ICD-10-CM

## 2017-11-29 PROCEDURE — 97597 DBRDMT OPN WND 1ST 20 CM/<: CPT

## 2017-11-29 PROCEDURE — 97597 DBRDMT OPN WND 1ST 20 CM/<: CPT | Performed by: SURGERY

## 2017-11-29 ASSESSMENT — PAIN DESCRIPTION - FREQUENCY: FREQUENCY: INTERMITTENT

## 2017-11-29 ASSESSMENT — PAIN DESCRIPTION - ONSET: ONSET: ON-GOING

## 2017-11-29 ASSESSMENT — PAIN DESCRIPTION - PAIN TYPE: TYPE: ACUTE PAIN

## 2017-11-29 ASSESSMENT — PAIN DESCRIPTION - DESCRIPTORS: DESCRIPTORS: ACHING

## 2017-11-29 ASSESSMENT — PAIN DESCRIPTION - PROGRESSION: CLINICAL_PROGRESSION: NOT CHANGED

## 2017-11-29 ASSESSMENT — PAIN DESCRIPTION - LOCATION: LOCATION: ABDOMEN;LEG

## 2017-11-29 ASSESSMENT — PAIN DESCRIPTION - ORIENTATION: ORIENTATION: LEFT

## 2017-11-29 ASSESSMENT — PAIN SCALES - GENERAL: PAINLEVEL_OUTOF10: 0

## 2017-11-29 NOTE — PLAN OF CARE
Problem: Wound:  Goal: Will show signs of wound healing; wound closure and no evidence of infection  Will show signs of wound healing; wound closure and no evidence of infection   Outcome: Ongoing      Problem: Venous:  Goal: Signs of wound healing will improve  Signs of wound healing will improve   Outcome: Ongoing      Problem: Blood Glucose:  Goal: Ability to maintain appropriate glucose levels will improve  Ability to maintain appropriate glucose levels will improve  Outcome: Ongoing

## 2017-11-30 ENCOUNTER — TELEPHONE (OUTPATIENT)
Dept: FAMILY MEDICINE CLINIC | Age: 62
End: 2017-11-30

## 2017-11-30 NOTE — TELEPHONE ENCOUNTER
Dr Luz Elena Chairez's office calling. Pt is going to be having surgery next week. They just wanted to inform us that Dr Jana Negron will be giving the patient a 1 time prescription of percocet. 60 tablets. Pt will resume his regular pain regimen after the one time script.  They just wanted to let us know

## 2017-12-10 NOTE — PROGRESS NOTES
(cm) 1.3 cm 11/29/2017 12:25 PM   Wound Depth (cm)  0.4 11/29/2017 12:25 PM   Calculated Wound Size (cm^2) (l*w) 1.3 cm^2 11/29/2017 12:25 PM   Change in Wound Size % (l*w) 23.53 11/29/2017 12:25 PM   Distance Tunneling (cm) 0 cm 11/29/2017 10:46 AM   Tunneling Position ___ O'Clock 0 11/29/2017 10:46 AM   Undermining Starts ___ O'Clock 0 11/29/2017 10:46 AM   Undermining Ends___ O'Clock 0 11/29/2017 10:46 AM   Undermining Maxium Distance (cm) 0 11/29/2017 10:46 AM   Wound Assessment Granulation tissue;Pink;Slough; Yellow 11/29/2017 10:46 AM   Drainage Amount Moderate 11/29/2017 10:46 AM   Drainage Description Serosanguinous 11/29/2017 10:46 AM   Odor None 11/29/2017 10:46 AM   Margins Attached edges 11/29/2017 10:46 AM   Lurdes-wound Assessment Dry; Intact 11/29/2017 10:46 AM   Non-staged Wound Description Full thickness 11/29/2017 10:46 AM   [place]%Wound Bed 25 11/29/2017 10:46 AM   Red%Wound Bed 25 11/29/2017 10:46 AM   Yellow%Wound Bed 50 11/29/2017 10:46 AM   Black%Wound Bed 0 11/29/2017 10:46 AM   Purple%Wound Bed 0 11/29/2017 10:46 AM   Other%Wound Bed 0 11/29/2017 10:46 AM   Culture Taken No 11/29/2017 10:46 AM   Debridement per physician Partial thickness 11/29/2017 12:25 PM   Time out Yes 11/29/2017 12:25 PM   Procedural Pain 0 11/29/2017 12:25 PM   Post procedural Pain 0 11/29/2017 12:25 PM   Number of days: 257       Wound 10/18/17 Foot Plantar;Right;Medial WOUND 34; RIGHT FOOT PLANTAR MEDIAL ( UNDER GREAT TOE) (Active)   Wound Type Wound 11/1/2017 11:17 AM   Wound Diabetic Lemon 1 11/1/2017 11:17 AM   Dressing Status Intact;Dry 11/1/2017 11:17 AM   Dressing Changed Changed/New 11/1/2017 11:17 AM   Wound Cleansed Rinsed/Irrigated with saline 11/1/2017 11:17 AM   Wound Length (cm) 0 cm 11/1/2017 11:17 AM   Wound Width (cm) 0 cm 11/1/2017 11:17 AM   Wound Depth (cm)  0 11/1/2017 11:17 AM   Calculated Wound Size (cm^2) (l*w) 0 cm^2 11/1/2017 11:17 AM   Change in Wound Size % (l*w) 100 11/1/2017 11:17 AM Distance Tunneling (cm) 0 cm 10/18/2017 12:47 PM   Tunneling Position ___ O'Clock 0 10/18/2017 12:47 PM   Undermining Starts ___ O'Clock 0 10/18/2017 12:47 PM   Undermining Ends___ O'Clock 0 10/18/2017 12:47 PM   Undermining Maxium Distance (cm) 0 10/18/2017 12:47 PM   Wound Assessment Pink 11/1/2017 11:17 AM   Drainage Amount None 11/1/2017 11:17 AM   Drainage Description Sanguinous 10/18/2017 12:47 PM   Odor None 11/1/2017 11:17 AM   Margins Attached edges 10/25/2017  4:57 PM   Lurdes-wound Assessment Calloused 11/1/2017 11:17 AM   Non-staged Wound Description Not applicable 59/0/1782 66:43 AM   [place]%Wound Bed 100 11/1/2017 11:17 AM   Red%Wound Bed 0 11/1/2017 11:17 AM   Yellow%Wound Bed 0 11/1/2017 11:17 AM   Black%Wound Bed 0 10/18/2017 12:47 PM   Purple%Wound Bed 0 10/18/2017 12:47 PM   Other%Wound Bed 0 10/18/2017 12:47 PM   Culture Taken No 10/18/2017 12:59 PM   Debridement per physician None 11/1/2017 12:56 PM   Time out N/A 11/1/2017 12:56 PM   Procedural Pain 0 10/18/2017 12:59 PM   Post procedural Pain 0 10/18/2017 12:59 PM   Number of days: 52       Diabetic Ulcer 03/27/17 Foot Left;Distal DIABETIC FOOT ULCER # 33R LEFT/ OLVERA 1-wound reopened 5-9, previously wound 33 (Active)   Lurdes-wound Assessment Calloused 7/12/2017  8:15 AM   Lurdes-Wound Texture Callus 7/12/2017  8:15 AM   Lurdes-Wound Moisture Dry 7/12/2017  8:15 AM   Lurdes-Wound Color Pink 7/12/2017  8:15 AM   Diabetic Wound - Jenni Cohen 1 7/12/2017  8:15 AM   Non-staged Wound Description Full thickness 6/28/2017  8:14 AM   Wound Assessment Intact 7/12/2017  8:15 AM   Shape Oval 6/7/2017  8:39 AM   Wound Length (cm) 0 cm 7/12/2017  8:15 AM   Wound Width (cm) 0 cm 7/12/2017  8:15 AM   Wound Depth (cm)  0 7/12/2017  8:15 AM   Calculated Wound Size (cm^2) (l*w) 0 cm^2 7/12/2017  8:15 AM   Change in Wound Size % (l*w) 100 7/12/2017  8:15 AM   Dressing Status Old drainage 7/12/2017  8:15 AM   Dressing Changed Changed/New 7/5/2017  9:01 AM Dressing/Treatment Open to air 7/12/2017  8:43 AM   Wound Cleansed Other (Comment) 6/28/2017  8:14 AM   Necrotic Type Black Eschar 6/2/2017  8:50 AM   Necrotic Amount Large: % 6/2/2017  8:50 AM   Granulation Quality Pink 7/12/2017  8:15 AM   Drainage Amount Small 7/12/2017  8:15 AM   Drainage Description Serosanguinous 7/12/2017  8:15 AM   Odor None 7/12/2017  8:15 AM   Epithelialization None present 6/2/2017  8:50 AM   Distance Tunneling (cm) 0 cm 7/12/2017  8:15 AM   Tunneling Position ___ O'Clock 0 7/12/2017  8:15 AM   Tunneling Maxium Distance (cm) 0 7/12/2017  8:15 AM   Undermining Starts ___ O'Clock 0 7/12/2017  8:15 AM   Undermining Ends___ O'Clock 0 7/12/2017  8:15 AM   Undermining Maxium Distance (cm) 0 7/12/2017  8:15 AM   [place]%Wound Bed 0 7/12/2017  8:15 AM   Red%Wound Bed 0 7/12/2017  8:15 AM   Yellow%Wound Bed 0 7/12/2017  8:15 AM   Black%Wound Bed 0 7/12/2017  8:15 AM   Margins Attached edges 6/21/2017  8:29 AM   Debridement per physician Partial thickness 7/5/2017  9:01 AM   Time out Yes 7/5/2017  9:01 AM   Procedural Pain 0 7/5/2017  9:01 AM   Post procedural Pain 0 7/5/2017  9:01 AM   Number of days: 534       Diabetic Ulcer 10/18/17 Foot Right;Plantar;Lateral WOUND 35; rIGHT FOOT PLANTAR LATERAL ( UNDER 5TH TOE) (Active)   Lurdes-wound Assessment Calloused 11/1/2017 11:17 AM   Lurdes-Wound Texture Callus 11/1/2017 11:17 AM   Lurdes-Wound Moisture Dry; Intact 11/1/2017 11:17 AM   Lurdes-Wound Color Pink 11/1/2017 11:17 AM   Diabetic Wound - Lorren Moros 1 11/1/2017 11:17 AM   Non-staged Wound Description Not applicable 96/2/0642 40:26 AM   Wound Assessment Pink 11/1/2017 11:17 AM   Shape round 11/1/2017 11:17 AM   Wound Length (cm) 0 cm 11/1/2017 11:17 AM   Wound Width (cm) 0 cm 11/1/2017 11:17 AM   Wound Depth (cm)  0 11/1/2017 11:17 AM   Calculated Wound Size (cm^2) (l*w) 0 cm^2 11/1/2017 11:17 AM   Change in Wound Size % (l*w) 100 11/1/2017 11:17 AM   Culture Taken No 10/25/2017  4:57 PM appointment above and if you are unable to keep, kindly give a 24 hour notice. Thank you.)          If you experience any of the following, please call the [facility] during business hours:    * Increase in Pain  * Temperature over 101  * Increase in drainage from your wound  * Drainage with a foul odor  * Bleeding  * Increase in swelling  * Need for compression bandage changes due to slippage, breakthrough drainage. If you need medical attention outside of the business hours of the [facility] please contact your PCP or go to the nearest emergency room.              Electronically signed by Abe Benavides MD on 12/10/2017 at 11:31 AM

## 2017-12-13 ENCOUNTER — TELEPHONE (OUTPATIENT)
Dept: FAMILY MEDICINE CLINIC | Age: 62
End: 2017-12-13

## 2017-12-15 ENCOUNTER — OFFICE VISIT (OUTPATIENT)
Dept: NEUROLOGY | Age: 62
End: 2017-12-15
Payer: MEDICARE

## 2017-12-15 VITALS
OXYGEN SATURATION: 98 % | WEIGHT: 267 LBS | DIASTOLIC BLOOD PRESSURE: 59 MMHG | HEART RATE: 48 BPM | BODY MASS INDEX: 36.16 KG/M2 | HEIGHT: 72 IN | SYSTOLIC BLOOD PRESSURE: 130 MMHG

## 2017-12-15 DIAGNOSIS — G47.09 OTHER INSOMNIA: ICD-10-CM

## 2017-12-15 DIAGNOSIS — R40.0 SOMNOLENCE, DAYTIME: ICD-10-CM

## 2017-12-15 DIAGNOSIS — R06.83 SNORING: ICD-10-CM

## 2017-12-15 DIAGNOSIS — R06.81 WITNESSED APNEIC SPELLS: ICD-10-CM

## 2017-12-15 DIAGNOSIS — G47.33 OBSTRUCTIVE SLEEP APNEA: Primary | ICD-10-CM

## 2017-12-15 PROCEDURE — 99203 OFFICE O/P NEW LOW 30 MIN: CPT | Performed by: PHYSICIAN ASSISTANT

## 2017-12-15 PROCEDURE — G8484 FLU IMMUNIZE NO ADMIN: HCPCS | Performed by: PHYSICIAN ASSISTANT

## 2017-12-15 PROCEDURE — 3017F COLORECTAL CA SCREEN DOC REV: CPT | Performed by: PHYSICIAN ASSISTANT

## 2017-12-15 PROCEDURE — G8427 DOCREV CUR MEDS BY ELIG CLIN: HCPCS | Performed by: PHYSICIAN ASSISTANT

## 2017-12-15 PROCEDURE — G8417 CALC BMI ABV UP PARAM F/U: HCPCS | Performed by: PHYSICIAN ASSISTANT

## 2017-12-15 PROCEDURE — 1036F TOBACCO NON-USER: CPT | Performed by: PHYSICIAN ASSISTANT

## 2017-12-15 RX ORDER — OXYCODONE HYDROCHLORIDE AND ACETAMINOPHEN 5; 325 MG/1; MG/1
1 TABLET ORAL EVERY 6 HOURS
COMMUNITY
Start: 2017-12-07 | End: 2018-01-22 | Stop reason: ALTCHOICE

## 2017-12-15 NOTE — PROGRESS NOTES
REVIEW OF SYSTEMS    Constitutional: []Fever []Sweats []Chills [x] Recent Injury   [] Denies all unless marked  HENT:[]Headache  [] Head Injury  [] Sore Throat  [] Ear Pain  [] Dizziness [] Hearing Loss   [x] Denies all unless marked  Spine:  [] Neck pain  [x] Back pain  [] Sciaticia  [] Denies all unless marked  Cardiovascular:[]Chest Pain []Palpitations [] Heart Disease  [x] Denies all unless marked  Pulmonary: []Shortness of Breath []Cough   [x] Denies all unless marked  Gastrointestinal:  []Abdominal Pain  []Blood in Stool  []Diarrhea []Constipation []Nausea  []Vomiting  [x] Denies all unless marked  Genitourinary:  [] Dysuria [] Frequency  [] Incontinence [] Urgency   [x] Denies all unless marked  Musculoskeletal: [x] Arthralgia  [x] Myalgias [] Muscle cramps  [] Muscle twitches   [] Denies all unless marked   Extremities:   [x] Pain   [x] Swelling   [] Denies all unless marked  Skin:[] Rash  [x] Color Change  [] Denies all unless marked  Neurological:[] Visual Disturbance [] Double Vision [] Slurred Speech [] Trouble swallowing  [] Vertigo [] Tingling [] Numbness [] Weakness [] Loss of Balance   [] Loss of Consciousness [] Memory Loss  [x] Denies all unless marked  Psychiatric/Behavioral:[] Depression [] Anxiety  [x] Denies all unless marked  Sleep: []  Insomnia [] Sleep Disturbance [] Snoring [] Restless Legs [] Daytime Sleepiness [x] Sleep Apnea  [x] Denies all unless marked

## 2017-12-15 NOTE — PATIENT INSTRUCTIONS
or F20 (Full face mask). They conform to your face, thus decreasing the potential for mask leakage. You might like the [employer] (full face mask). It has a \"memory foam\" like cushion. You might also like to try a nasal mask called a Dreamwear nasal mask or the Dreamwear nasal pillow. One other suggestion is the [facility], it is a minimal contact full face mask. The Cole Rist incredible under the nose design makes it the only full face mask that won't cause red marks on the bridge of your nose when compared to other Full Face Masks        Organizations  American Sleep Apnea Association  Provides information about sleep apnea to the public, publishes a newsletter, and serves as an advocate for people with the disorder. Louise, [address]   Donell@[employer] org   AdminParking.ScoreStreak. org   Tel: [phone]   Fax: [facility] organization that works to [employer] and safety by promoting public understanding of sleep and sleep disorders. Supports sleep-related education, research, and advocacy; produces and distributes educational materials to the public and healthcare professionals; and offers postdoctoral fellowships and grants for sleep researchers. [address]   Aleah@[email or web address]. org   SurferLive.Vanatec. org   Tel: [phone]   Fax: [phone]       Sleep Studies: About This Test  What is it? Sleep studies are tests that watch what happens to your body during sleep. These studies usually are done in a sleep lab. Sleep labs are often located in hospitals. But sleep studies also can be done with portable equipment that you use at home. Why is this test done? Sleep studies are done to find sleep problems, including:  · Sleep apnea or excessive snoring. · Narcolepsy. · Nocturnal seizures. · REM behavior disorder (RBD).   · Repeated muscle home. This may be a choice for people who have problems sleeping in a sleep lab. But home sleep studies may not give the same results as a sleep lab. How long does the test take? · You will stay in the sleep lab overnight. For some tests, you will also stay part of the next day. What happens after the test?  · You will be able to go home right away. · You can go back to your usual activities right away. Follow-up care is a key part of your treatment and safety. Be sure to make and go to all appointments, and call your doctor if you are having problems. It's also a good idea to keep a list of the medicines you take. Ask your doctor when you can expect to have your test results. Where can you learn more? Go to https://intelloCut.Getting-in. org and sign in to your RenovoRx account. Enter F477 in the MamboCar box to learn more about \"Sleep Studies: About This Test.\"     If you do not have an account, please click on the \"Sign Up Now\" link. Current as of: May 12, 2017  Content Version: 11.4  © 0557-8766 [employer] Care instructions adapted under license by [facility] ([facility]). If you have questions about a medical condition or this instruction, always ask your healthcare professional. [address] any warranty or liability for your use of this information. Learning About CPAP for Sleep Apnea  What is CPAP? CPAP is a small machine that you use at home every night while you sleep. It increases air pressure in your throat to keep your airway open. When you have sleep apnea, this can help you sleep better so you feel much better. CPAP stands for \"continuous positive airway pressure. \"  The CPAP machine will have one of the following:  · A mask that covers your nose and mouth  · Prongs that fit into your nose  · A mask that covers your nose only, the most common type. This type is called NCPAP. The N stands for \"nasal.\"  Why is it done?   CPAP is usually the best treatment for obstructive sleep apnea. It is the first treatment choice and the most widely used. Your doctor may suggest CPAP if you have:  · Moderate to severe sleep apnea. · Sleep apnea and coronary artery disease (CAD) or heart failure. How does it help? · CPAP can help you have more normal sleep, so you feel less sleepy and more alert during the daytime. · CPAP may help keep heart failure or other heart problems from getting worse. · CPAP may help lower your blood pressure. · If you use CPAP, your bed partner may also sleep better because you are not snoring or restless. What are the side effects? Some people who use CPAP have:  · A dry or stuffy nose and a sore throat. · Irritated skin on the face. · Sore eyes. · Bloating. If you have any of these problems, work with your doctor to fix them. Here are some things you can try:  · Be sure the mask or nasal prongs fit well. · See if your doctor can adjust the pressure of your CPAP. · If your nose is dry, try a humidifier. · If your nose is runny or stuffy, try decongestant medicine or a steroid nasal spray. Be safe with medicines. Read and follow all instructions on the label. Do not use the medicine longer than the label says. If these things do not help, you might try a different type of machine. Some machines have air pressure that adjusts on its own. Others have air pressures that are different when you breathe in than when you breathe out. This may reduce discomfort caused by too much pressure in your nose. Where can you learn more? Go to https://International [email or web address].BarBird. org and sign in to your Twigmore account. Enter V462 in the Giggem box to learn more about \"Learning About CPAP for Sleep Apnea. \"     If you do not have an account, please click on the \"Sign Up Now\" link. Current as of: May 12, 2017  Content Version: 11.4  © 8987-0563 Healthwise[employer] Incorporated.  Care instructions adapted under license by [facility]

## 2017-12-15 NOTE — PROGRESS NOTES
[facility] SLEEP    Patient: Gianluca Gustafson  :  1955  Age:  63 y.o. MRN:  [id number]  Today:    12/15/17    Provider:  Cristal Leal PA-C    Chief Complaint:  Chief Complaint   Patient presents with    New Patient     HST scheduled for 17    Sleep Apnea     Patient diagnosed in  has not used CPAP since  when he moved        History Source: History obtained from chart review and the patient. PCP: Bernadette Haywood MD     Referring Provider: Bernadette Haywood MD    HISTORY OF PRESENT ILLNESS:   Gianluca Gustafson is a 63y.o. year old male with a history of ROMEO, CAD, CHF, diabetes, bradycardia, and hypertension who was referred for a pre-HST evaluation. He is scheduled for an HST, 2017. Today he reports that he was diagnosed with ROMEO,  in [place]. He was prescribed PAP therapy but stopped using it in . He had to return it because he moved to [place] and his device had not been purchased in that time frame. He reports that he used supplemental O2 through the PAP. He used it nightly but did not have resolution in the daytime somnolence. He didn't have a routine sleep schedule due to his job as a  at that time. Location or symptom:  ROMEO  Onset:  PS in [place]  Timing:  q hs  Severity:  ?  Associated:  Snoring, witnessed apneas, and excessive daytime somnolence  Alleviated:  PAP therapy    Patient complains of snoring, complains of excessive daytime somnolence, denies restless legs, denies sleep disturbance, complains of witnessed apneas, denies gasping, complains of insomnia. He does have difficulty initiating sleep. He does not have difficulty maintaining sleep. He sleeps 5 hours per night. The sleep is not restorative. He does use sleep aides. He does fall asleep when sedentary. He does awaken with a dry mouth. He does not awaken with a morning headache. He  does not have nocturia.     PAST MEDICAL HITORY:    Medical History:  Past Medical History:   Diagnosis Date    Asthma     Broken ribs     CAD (coronary artery disease)     [facility]     Diabetes mellitus [facility]     Hypertension     Morbid obesity ([facility] 6/22/15    Skin ulcer of toe of left foot with fat layer exposed ([facility] 4/4/2017    Sleep apnea in adult 6/22/15    Venous insufficiency of both lower extremities 6/22/15       Surgical History:  Past Surgical History:   Procedure Laterality Date    CARDIAC CATHETERIZATION      DILATATION, ESOPHAGUS      GASTRIC BYPASS SURGERY N/A 12/2015    TOTAL KNEE ARTHROPLASTY Bilateral        Current Medications:  Current Outpatient Prescriptions   Medication Sig Dispense Refill    oxyCODONE-acetaminophen (PERCOCET) 5-325 MG per tablet Take 1 tablet by mouth every 6 hours .  Sodium Hypochlorite 0.0125 % SOLN Apply topically      HYDROcodone-acetaminophen (NORCO)  MG per tablet Take 1 tablet by mouth every 6 hours as needed for Pain  To be filled on or after 11/2/2017. 120 tablet 0    tiZANidine (ZANAFLEX) 4 MG tablet TAKE ONE TABLET BY MOUTH EVERY 8 HOURS AS NEEDED 90 tablet 2    Multiple Vitamins-Minerals (THERAPEUTIC MULTIVITAMIN-MINERALS) tablet Take 1 tablet by mouth daily      lisinopril (PRINIVIL;ZESTRIL) 20 MG tablet Take 20 mg by mouth daily      temazepam (RESTORIL) 30 MG capsule Take 30 mg by mouth       No current facility-administered medications for this visit.         Allergies:  Adhesive tape    SOCIAL HISTORY:   History   Alcohol Use    Yes     Comment: Drinks a beer occ      History   Drug Use No     History   Smoking Status    Never Smoker   Smokeless Tobacco    Never Used       FAMILY HISTORY:   Family History   Problem Relation Age of Onset    Diabetes Mother     Heart Disease Mother     High Blood Pressure Mother     Heart Disease Father     Other Father        REVIEW OF SYSTEMS     Constitutional: []Fever []Sweats []Chills [x] Recent Injury   [] Denies all unless marked  HENT:[]Headache  [] Head Injury  [] Sore Throat  [] Ear Pain  [] Dizziness [] Hearing Loss   [x] Denies all unless marked  Spine:  [] Neck pain  [x] Back pain  [] Sciaticia  [] Denies all unless marked  Cardiovascular:[]Chest Pain []Palpitations [] Heart Disease  [x] Denies all unless marked  Pulmonary: []Shortness of Breath []Cough   [x] Denies all unless marked  Gastrointestinal:  []Abdominal Pain  []Blood in Stool  []Diarrhea []Constipation []Nausea  []Vomiting  [x] Denies all unless marked  Genitourinary:  [] Dysuria [] Frequency  [] Incontinence [] Urgency   [x] Denies all unless marked  Musculoskeletal: [x] Arthralgia  [x] Myalgias [] Muscle cramps  [] Muscle twitches   [] Denies all unless marked   Extremities:   [x] Pain   [x] Swelling   [] Denies all unless marked  Skin:[] Rash  [x] Color Change  [] Denies all unless marked  Neurological:[] Visual Disturbance [] Double Vision [] Slurred Speech [] Trouble swallowing  [] Vertigo [] Tingling [] Numbness [] Weakness [] Loss of Balance   [] Loss of Consciousness [] Memory Loss  [x] Denies all unless marked  Psychiatric/Behavioral:[] Depression [] Anxiety  [x] Denies all unless marked  Sleep: [x]  Insomnia [] Sleep Disturbance [x] Snoring [] Restless Legs [x] Daytime Sleepiness [x] Sleep Apnea  [x] Denies all unless marked    PHYSICAL EXAMINATION:  Vitals: BP (!) 130/59   Pulse (!) 48 Comment: irregular  Ht 6' (1.829 m)   Wt 267 lb (121.1 kg)   SpO2 98%   BMI 36.21 kg/m²     Neck circumference:  17 inches  Mallampati score: Type 2  General appearance: alert, appears stated age and cooperative  Skin: Skin color, texture, turgor normal. No rashes or lesions  HEENT: Head: Normal, normocephalic, atraumatic.   Neck: no adenopathy, no carotid bruit, no JVD, supple, symmetrical, trachea midline and thyroid not enlarged, symmetric, no tenderness/mass/nodules  Lungs: clear to auscultation bilaterally  Heart: regular rate and rhythm, S1, S2 normal, no murmur, click, rub or gallop  Extremities: extremities normal, atraumatic, no cyanosis or edema  Neurologic: Mental status: Alert, oriented, thought content appropriate    NEUROLOGIC EXAMINATION:  Extraocular movements are intact without nystagmus. Visual fields are full to confrontation. Facial movements are symmetrical and normal.  Speech is precise. Extremity strength is normal in both uppers and lowers. Deep tendon reflexes are intact and symmetrical.  Rapid alternating movements are unimpaired. Finger-to-nose testing is performed well, without dysmetria. Gait is normal.    I reviewed the following studies:    []  :  Clinical laboratory test results    []  :  Radiology reports    []  :  Review and summarization of medical records and/or obtain medical records     []  :  Previous/recent polysomnogram report(s)    [x]  :  [place] Sleepiness Scale:  11    IMPRESSION:    ICD-10-CM ICD-9-CM    1. Obstructive sleep apnea G47.33 327.23    2. Somnolence, daytime R40.0 780.54    3. Snoring R06.83 786.09    4. Witnessed apneic spells R06.81 786.03    5. Other insomnia G47.09 780.52         PLAN:  1. No orders of the defined types were placed in this encounter. 2.  Patient advised of the etiology,  pathophysiology, diagnosis, treatment options, and risks of untreated ROMEO. Risks may include, but are not limited to  hypertension, coronary artery disease, diabetes, stroke, weight gain, impaired cognition, daytime somnolence,  and motor vehicle accidents. Advised to abstain from driving or operating heavy machinery when drowsy and the use of respiratory suppressants. 3.  We had a discussion about the clinical issues and went over the various important aspects to consider. Treatment plan discussed. All questions were answered. Pt voiced understanding and agrees with treatment plan.   4. The following educational material has been included in this visit after visit summary for your review:  ROMEO/PAP guidelines/sleep studies/CPAP-discussed with pt.  5.  Proceed

## 2017-12-26 ENCOUNTER — HOSPITAL ENCOUNTER (OUTPATIENT)
Dept: SLEEP CENTER | Age: 62
Discharge: HOME | End: 2017-12-26
Payer: MEDICARE

## 2017-12-26 PROCEDURE — G0399 HOME SLEEP TEST/TYPE 3 PORTA: HCPCS

## 2017-12-29 DIAGNOSIS — S22.41XD CLOSED FRACTURE OF MULTIPLE RIBS OF RIGHT SIDE WITH ROUTINE HEALING, SUBSEQUENT ENCOUNTER: ICD-10-CM

## 2017-12-30 RX ORDER — HYDROCODONE BITARTRATE AND ACETAMINOPHEN 10; 325 MG/1; MG/1
1 TABLET ORAL EVERY 6 HOURS PRN
Qty: 120 TABLET | Refills: 0 | Status: SHIPPED | OUTPATIENT
Start: 2017-12-30 | End: 2018-01-29

## 2018-01-03 ENCOUNTER — HOSPITAL ENCOUNTER (OUTPATIENT)
Dept: WOUND CARE | Age: 63
Discharge: HOME | End: 2018-01-03
Payer: MEDICARE

## 2018-01-03 VITALS
WEIGHT: 267 LBS | DIASTOLIC BLOOD PRESSURE: 82 MMHG | RESPIRATION RATE: 16 BRPM | HEIGHT: 72 IN | TEMPERATURE: 98.7 F | SYSTOLIC BLOOD PRESSURE: 157 MMHG | BODY MASS INDEX: 36.16 KG/M2 | HEART RATE: 68 BPM

## 2018-01-03 DIAGNOSIS — L97.521 DIABETIC ULCER OF OTHER PART OF LEFT FOOT ASSOCIATED WITH TYPE 2 DIABETES MELLITUS, LIMITED TO BREAKDOWN OF SKIN (HCC): ICD-10-CM

## 2018-01-03 DIAGNOSIS — L97.512 ULCERATED, FOOT, RIGHT, WITH FAT LAYER EXPOSED (HCC): ICD-10-CM

## 2018-01-03 DIAGNOSIS — E11.621 DIABETIC ULCER OF OTHER PART OF LEFT FOOT ASSOCIATED WITH TYPE 2 DIABETES MELLITUS, LIMITED TO BREAKDOWN OF SKIN (HCC): ICD-10-CM

## 2018-01-03 PROBLEM — E11.49 TYPE 2 DIABETES MELLITUS WITH NEUROLOGIC COMPLICATION (HCC): Status: ACTIVE | Noted: 2017-11-15

## 2018-01-03 PROCEDURE — 11042 DBRDMT SUBQ TIS 1ST 20SQCM/<: CPT | Performed by: SURGERY

## 2018-01-03 PROCEDURE — 11042 DBRDMT SUBQ TIS 1ST 20SQCM/<: CPT

## 2018-01-03 ASSESSMENT — PAIN DESCRIPTION - FREQUENCY: FREQUENCY: INTERMITTENT

## 2018-01-03 ASSESSMENT — PAIN DESCRIPTION - PAIN TYPE: TYPE: CHRONIC PAIN

## 2018-01-03 ASSESSMENT — PAIN DESCRIPTION - DESCRIPTORS: DESCRIPTORS: BURNING

## 2018-01-03 ASSESSMENT — PAIN DESCRIPTION - ORIENTATION: ORIENTATION: LEFT

## 2018-01-03 ASSESSMENT — PAIN SCALES - GENERAL: PAINLEVEL_OUTOF10: 0

## 2018-01-03 ASSESSMENT — PAIN DESCRIPTION - ONSET: ONSET: ON-GOING

## 2018-01-03 ASSESSMENT — PAIN DESCRIPTION - LOCATION: LOCATION: LEG

## 2018-01-03 ASSESSMENT — PAIN DESCRIPTION - PROGRESSION: CLINICAL_PROGRESSION: NOT CHANGED

## 2018-01-03 NOTE — PROGRESS NOTES
[address]   Progress Note and Procedure Note      [address] RECORD NUMBER:  [id number]  AGE: 63 y.o. GENDER: male  : 1955  EPISODE DATE:  1/3/2018    Subjective:     Chief Complaint   Patient presents with    Wound Check     Lower Ext Wound         HISTORY of PRESENT ILLNESS SUE Higgins is a 63 y.o. male who presents today for wound/ulcer evaluation.    History of Wound Context: Pt with LLE calcinosis with wound here for eval/treat  Wound/Ulcer Pain Timing/Severity: none  Quality of pain: N/A  Severity:  0 / 10   Modifying Factors: None  Associated Signs/Symptoms: edema    Ulcer Identification:  Ulcer Type: venous  Contributing Factors: edema and venous stasis    Wound: venous        PAST MEDICAL HISTORY        Diagnosis Date    Asthma     Broken ribs     CAD (coronary artery disease)     CHF (congestive heart failure) (HCC)     Diabetes mellitus ([address]     Hypertension     Morbid obesity ([address] 6/22/15    Skin ulcer of toe of left foot with fat layer exposed [facility] 2017    Sleep apnea in adult 6/22/15    Venous insufficiency of both lower extremities 6/22/15       PAST SURGICAL HISTORY    Past Surgical History:   Procedure Laterality Date    CARDIAC CATHETERIZATION      DILATATION, ESOPHAGUS      GASTRIC BYPASS SURGERY N/A 2015    TOTAL KNEE ARTHROPLASTY Bilateral        FAMILY HISTORY    Family History   Problem Relation Age of Onset    Diabetes Mother     Heart Disease Mother     High Blood Pressure Mother     Heart Disease Father     Other Father        SOCIAL HISTORY    Social History   Substance Use Topics    Smoking status: Never Smoker    Smokeless tobacco: Never Used    Alcohol use Yes      Comment: Drinks a beer occ        ALLERGIES    Allergies   Allergen Reactions    Adhesive Tape      Tears skin       MEDICATIONS    Current Outpatient Prescriptions on File Prior to Encounter   Medication Sig Dispense Refill    AM   Lurdes-Wound Moisture Dry 7/12/2017  8:15 AM   Lurdes-Wound Color Pink 7/12/2017  8:15 AM   Diabetic Wound - Roanna Durna 1 7/12/2017  8:15 AM   Non-staged Wound Description Full thickness 6/28/2017  8:14 AM   Wound Assessment Intact 7/12/2017  8:15 AM   Shape Oval 6/7/2017  8:39 AM   Wound Length (cm) 0 cm 7/12/2017  8:15 AM   Wound Width (cm) 0 cm 7/12/2017  8:15 AM   Wound Depth (cm)  0 7/12/2017  8:15 AM   Calculated Wound Size (cm^2) (l*w) 0 cm^2 7/12/2017  8:15 AM   Change in Wound Size % (l*w) 100 7/12/2017  8:15 AM   Dressing Status Old drainage 7/12/2017  8:15 AM   Dressing Changed Changed/New 7/5/2017  9:01 AM   Dressing/Treatment Open to air 7/12/2017  8:43 AM   Wound Cleansed Other (Comment) 6/28/2017  8:14 AM   Necrotic Type Black Eschar 6/2/2017  8:50 AM   Necrotic Amount Large: % 6/2/2017  8:50 AM   Granulation Quality Pink 7/12/2017  8:15 AM   Drainage Amount Small 7/12/2017  8:15 AM   Drainage Description Serosanguinous 7/12/2017  8:15 AM   Odor None 7/12/2017  8:15 AM   Epithelialization None present 6/2/2017  8:50 AM   Distance Tunneling (cm) 0 cm 7/12/2017  8:15 AM   Tunneling Position ___ O'Clock 0 7/12/2017  8:15 AM   Tunneling Maxium Distance (cm) 0 7/12/2017  8:15 AM   Undermining Starts ___ O'Clock 0 7/12/2017  8:15 AM   Undermining Ends___ O'Clock 0 7/12/2017  8:15 AM   Undermining Maxium Distance (cm) 0 7/12/2017  8:15 AM   [place]%Wound Bed 0 7/12/2017  8:15 AM   Red%Wound Bed 0 7/12/2017  8:15 AM   Yellow%Wound Bed 0 7/12/2017  8:15 AM   Black%Wound Bed 0 7/12/2017  8:15 AM   Margins Attached edges 6/21/2017  8:29 AM   Debridement per physician Partial thickness 7/5/2017  9:01 AM   Time out Yes 7/5/2017  9:01 AM   Procedural Pain 0 7/5/2017  9:01 AM   Post procedural Pain 0 7/5/2017  9:01 AM   Number of days: 281       Diabetic Ulcer 10/18/17 Foot Right;Plantar;Lateral WOUND 35; rIGHT FOOT PLANTAR LATERAL ( UNDER 5TH TOE) (Active)   Lurdes-wound Assessment Calloused 11/1/2017 11:17 AM

## 2018-01-09 ENCOUNTER — HOSPITAL ENCOUNTER (OUTPATIENT)
Dept: WOUND CARE | Age: 63
Discharge: HOME | End: 2018-01-09
Payer: MEDICARE

## 2018-01-09 VITALS
WEIGHT: 267 LBS | BODY MASS INDEX: 36.16 KG/M2 | RESPIRATION RATE: 16 BRPM | HEIGHT: 72 IN | TEMPERATURE: 98 F | DIASTOLIC BLOOD PRESSURE: 70 MMHG | SYSTOLIC BLOOD PRESSURE: 140 MMHG | HEART RATE: 71 BPM

## 2018-01-09 DIAGNOSIS — L97.512 ULCERATED, FOOT, RIGHT, WITH FAT LAYER EXPOSED (HCC): ICD-10-CM

## 2018-01-09 DIAGNOSIS — L97.421 DIABETIC ULCER OF LEFT MIDFOOT ASSOCIATED WITH TYPE 2 DIABETES MELLITUS, LIMITED TO BREAKDOWN OF SKIN (HCC): ICD-10-CM

## 2018-01-09 DIAGNOSIS — E11.621 DIABETIC ULCER OF LEFT MIDFOOT ASSOCIATED WITH TYPE 2 DIABETES MELLITUS, LIMITED TO BREAKDOWN OF SKIN (HCC): ICD-10-CM

## 2018-01-09 PROCEDURE — 11042 DBRDMT SUBQ TIS 1ST 20SQCM/<: CPT

## 2018-01-09 PROCEDURE — 11042 DBRDMT SUBQ TIS 1ST 20SQCM/<: CPT | Performed by: SURGERY

## 2018-01-09 ASSESSMENT — PAIN SCALES - GENERAL: PAINLEVEL_OUTOF10: 0

## 2018-01-09 NOTE — PROGRESS NOTES
Calloused 7/12/2017  8:15 AM   Lurdes-Wound Texture Callus 7/12/2017  8:15 AM   Lurdes-Wound Moisture Dry 7/12/2017  8:15 AM   Lurdes-Wound Color Pink 7/12/2017  8:15 AM   Diabetic Wound - Patti Ditch 1 7/12/2017  8:15 AM   Non-staged Wound Description Full thickness 6/28/2017  8:14 AM   Wound Assessment Intact 7/12/2017  8:15 AM   Shape Oval 6/7/2017  8:39 AM   Wound Length (cm) 0 cm 7/12/2017  8:15 AM   Wound Width (cm) 0 cm 7/12/2017  8:15 AM   Wound Depth (cm)  0 7/12/2017  8:15 AM   Calculated Wound Size (cm^2) (l*w) 0 cm^2 7/12/2017  8:15 AM   Change in Wound Size % (l*w) 100 7/12/2017  8:15 AM   Dressing Status Old drainage 7/12/2017  8:15 AM   Dressing Changed Changed/New 7/5/2017  9:01 AM   Dressing/Treatment Open to air 7/12/2017  8:43 AM   Wound Cleansed Other (Comment) 6/28/2017  8:14 AM   Necrotic Type Black Eschar 6/2/2017  8:50 AM   Necrotic Amount Large: % 6/2/2017  8:50 AM   Granulation Quality Pink 7/12/2017  8:15 AM   Drainage Amount Small 7/12/2017  8:15 AM   Drainage Description Serosanguinous 7/12/2017  8:15 AM   Odor None 7/12/2017  8:15 AM   Epithelialization None present 6/2/2017  8:50 AM   Distance Tunneling (cm) 0 cm 7/12/2017  8:15 AM   Tunneling Position ___ O'Clock 0 7/12/2017  8:15 AM   Tunneling Maxium Distance (cm) 0 7/12/2017  8:15 AM   Undermining Starts ___ O'Clock 0 7/12/2017  8:15 AM   Undermining Ends___ O'Clock 0 7/12/2017  8:15 AM   Undermining Maxium Distance (cm) 0 7/12/2017  8:15 AM   [place]%Wound Bed 0 7/12/2017  8:15 AM   Red%Wound Bed 0 7/12/2017  8:15 AM   Yellow%Wound Bed 0 7/12/2017  8:15 AM   Black%Wound Bed 0 7/12/2017  8:15 AM   Margins Attached edges 6/21/2017  8:29 AM   Debridement per physician Partial thickness 7/5/2017  9:01 AM   Time out Yes 7/5/2017  9:01 AM   Procedural Pain 0 7/5/2017  9:01 AM   Post procedural Pain 0 7/5/2017  9:01 AM   Number of days: 287       Diabetic Ulcer 10/18/17 Foot Right;Plantar;Lateral WOUND 35; rIGHT FOOT PLANTAR LATERAL (

## 2018-01-12 PROCEDURE — 95806 SLEEP STUDY UNATT&RESP EFFT: CPT | Performed by: PSYCHIATRY & NEUROLOGY

## 2018-01-16 DIAGNOSIS — G47.33 OBSTRUCTIVE SLEEP APNEA: Primary | ICD-10-CM

## 2018-01-22 ENCOUNTER — HOSPITAL ENCOUNTER (OUTPATIENT)
Dept: WOUND CARE | Age: 63
Discharge: HOME | End: 2018-01-22
Payer: MEDICARE

## 2018-01-22 VITALS
SYSTOLIC BLOOD PRESSURE: 130 MMHG | HEIGHT: 72 IN | DIASTOLIC BLOOD PRESSURE: 64 MMHG | TEMPERATURE: 98.7 F | WEIGHT: 271 LBS | BODY MASS INDEX: 36.7 KG/M2 | HEART RATE: 71 BPM

## 2018-01-22 DIAGNOSIS — E11.621 DIABETIC ULCER OF LEFT MIDFOOT ASSOCIATED WITH TYPE 2 DIABETES MELLITUS, LIMITED TO BREAKDOWN OF SKIN (HCC): ICD-10-CM

## 2018-01-22 DIAGNOSIS — L97.512 ULCERATED, FOOT, RIGHT, WITH FAT LAYER EXPOSED (HCC): ICD-10-CM

## 2018-01-22 DIAGNOSIS — L97.421 DIABETIC ULCER OF LEFT MIDFOOT ASSOCIATED WITH TYPE 2 DIABETES MELLITUS, LIMITED TO BREAKDOWN OF SKIN (HCC): ICD-10-CM

## 2018-01-22 PROCEDURE — 97597 DBRDMT OPN WND 1ST 20 CM/<: CPT | Performed by: SURGERY

## 2018-01-22 PROCEDURE — 11042 DBRDMT SUBQ TIS 1ST 20SQCM/<: CPT

## 2018-01-22 PROCEDURE — 97597 DBRDMT OPN WND 1ST 20 CM/<: CPT

## 2018-01-22 PROCEDURE — 11042 DBRDMT SUBQ TIS 1ST 20SQCM/<: CPT | Performed by: SURGERY

## 2018-01-22 ASSESSMENT — PAIN SCALES - GENERAL: PAINLEVEL_OUTOF10: 4

## 2018-01-22 ASSESSMENT — PAIN DESCRIPTION - ONSET: ONSET: ON-GOING

## 2018-01-22 ASSESSMENT — PAIN DESCRIPTION - FREQUENCY: FREQUENCY: INTERMITTENT

## 2018-01-22 ASSESSMENT — PAIN DESCRIPTION - LOCATION: LOCATION: LEG

## 2018-01-22 ASSESSMENT — PAIN DESCRIPTION - PROGRESSION: CLINICAL_PROGRESSION: NOT CHANGED

## 2018-01-22 ASSESSMENT — PAIN DESCRIPTION - PAIN TYPE: TYPE: ACUTE PAIN

## 2018-01-22 ASSESSMENT — PAIN DESCRIPTION - ORIENTATION: ORIENTATION: LEFT

## 2018-01-22 ASSESSMENT — PAIN DESCRIPTION - DESCRIPTORS: DESCRIPTORS: SHARP

## 2018-01-22 NOTE — PROGRESS NOTES
Encounter   Medication Sig Dispense Refill    temazepam (RESTORIL) 30 MG capsule TAKE ONE CAPSULE BY MOUTH AT BEDTIME 30 capsule 5    HYDROcodone-acetaminophen (NORCO)  MG per tablet Take 1 tablet by mouth every 6 hours as needed for Pain for up to 30 days To be filled on or after 11/2/2017. 120 tablet 0    tiZANidine (ZANAFLEX) 4 MG tablet TAKE ONE TABLET BY MOUTH EVERY 8 HOURS AS NEEDED 90 tablet 2    Multiple Vitamins-Minerals (THERAPEUTIC MULTIVITAMIN-MINERALS) tablet Take 1 tablet by mouth nightly       lisinopril (PRINIVIL;ZESTRIL) 20 MG tablet Take 20 mg by mouth nightly       Sodium Hypochlorite 0.0125 % SOLN Apply topically       No current facility-administered medications on file prior to encounter. REVIEW OF SYSTEMS    Pertinent items are noted in HPI.     Objective:      /64   Pulse 71   Temp 98.7 °F (37.1 °C) (Temporal)   Ht 6' (1.829 m)   Wt 271 lb (122.9 kg)   BMI 36.75 kg/m²     Wt Readings from Last 3 Encounters:   01/22/18 271 lb (122.9 kg)   01/09/18 267 lb (121.1 kg)   01/03/18 267 lb (121.1 kg)       PHYSICAL EXAM    General Appearance: alert and oriented to person, place and time, well developed and well- nourished, in no acute distress  Skin: warm and dry, no rash or erythema  Head: normocephalic and atraumatic  Eyes: pupils equal, round, and reactive to light, extraocular eye movements intact, conjunctivae normal  ENT: tympanic membrane, external ear and ear canal normal bilaterally, nose without deformity, nasal mucosa and turbinates normal without polyps  Neck: supple and non-tender without mass, no thyromegaly or thyroid nodules, no cervical lymphadenopathy  Pulmonary/Chest: clear to auscultation bilaterally- no wheezes, rales or rhonchi, normal air movement, no respiratory distress  Cardiovascular: normal rate, regular rhythm, normal S1 and S2, no murmurs, rubs, clicks, or gallops, distal pulses intact, no carotid bruits  Abdomen: soft, non-tender, non-distended, normal bowel sounds, no masses or organomegaly  Extremities: no cyanosis, clubbing or edema  Musculoskeletal: normal range of motion, no joint swelling, deformity or tenderness  Neurologic: reflexes normal and symmetric, no cranial nerve deficit, gait, coordination and speech normal      Assessment:      Patient Active Problem List   Diagnosis Code    Venous insufficiency of both lower extremities I87.2    Morbid obesity ([facility]) E66.01    Type 2 diabetes mellitus with diabetic dermatitis ([facility]) E11.620    Venous ulcer of right leg ([facility]) I83.019    Idiopathic chronic venous hypertension of left leg with ulcer ([address] I87.312    Diabetic ulcer of left foot associated with type 2 diabetes mellitus ([facility]) E11.621, L97.529    Ulcerated, foot, right, with fat layer exposed ([address] L97.512    Type 2 diabetes mellitus with neurologic complication ([facility]) N98.67    Venous hypertension, chronic, with ulcer, left ([facility]) I87.312    Chronic ulcer of left leg, with fat layer exposed ([address] L97.922    Obstructive sleep apnea G47.33    Somnolence, daytime R40.0    Snoring R06.83    Witnessed apneic spells R06.81    Other insomnia G47.09        Procedure Note  Indications:  Based on my examination of this patient's wound(s)/ulcer(s) today, debridement is required to promote healing and evaluate the wound base. Performed by: Osito Jones MD    Consent obtained:  Yes    Time out taken:  Yes    Pain Control: Anesthetic  Anesthetic: 2% Lidocaine Gel Topical       Debridement:Excisional Debridement    Using curette the wound(s)/ulcer(s) was/were sharply debrided down through and including the removal of epidermis, dermis and subcutaneous tissue.         Devitalized Tissue Debrided:  fibrin, biofilm, slough and exudate      Pre Debridement Measurements:  Are located in the Wound/Ulcer Documentation Flow Sheet    Wound/Ulcer #: 31    Percent of Wound(s)/Ulcer(s) Debrided: 100%    Total Surface Area Debrided:  4.4 sq cm       Diabetic/Pressure/Non Pressure Ulcers only:  Ulcer: Diabetic ulcer, fat layer exposed      Debridement:Non-excisional Debridement    Using curette the wound(s)/ulcer(s) was/were sharply debrided down through and including the removal of epidermis and dermis.         Devitalized Tissue Debrided:  fibrin, biofilm, slough and exudate    Pre Debridement Measurements:  Are located in the [place]  Documentation Flow Sheet    Wound/Ulcer #: 36,37    Percent of Wound(s)/Ulcer(s) Debrided: 100%    Total Surface Area Debrided:  12.7 sq cm       Diabetic/Pressure/Non Pressure Ulcers only:  Ulcer: Diabetic ulcer, fat layer exposed           Post Debridement Measurements:    Wound/Ulcer Descriptions are Pre Debridement --EXCEPT MEASUREMENTS    Wound 03/27/17 Venous ulcer Leg Left;Lateral;Proximal VENOUS LEG ULCER #31 LATERAL PROXIMAL LEFT (Active)   Wound Image    1/22/2018  2:41 PM   Wound Type Wound 1/22/2018  2:41 PM   Wound Venous 1/22/2018  2:41 PM   Dressing Status Old drainage 1/22/2018  2:41 PM   Dressing Changed Changed/New 1/9/2018  9:27 AM   Dressing/Treatment Moist to dry 1/3/2018  8:11 AM   Wound Cleansed Other (Comment) 1/22/2018  2:41 PM   Wound Length (cm) 2 cm 1/22/2018  3:33 PM   Wound Width (cm) 2.2 cm 1/22/2018  3:33 PM   Wound Depth (cm)  .8 1/22/2018  3:33 PM   Calculated Wound Size (cm^2) (l*w) 4.4 cm^2 1/22/2018  3:33 PM   Change in Wound Size % (l*w) -158.82 1/22/2018  3:33 PM   Distance Tunneling (cm) 0 cm 1/22/2018  2:41 PM   Tunneling Position ___ O'Clock 0 1/22/2018  2:41 PM   Undermining Starts ___ O'Clock 0 1/22/2018  2:41 PM   Undermining Ends___ O'Clock 0 1/22/2018  2:41 PM   Undermining Maxium Distance (cm) 0 1/22/2018  2:41 PM   Wound Assessment White;Red;Pink;Drainage;Bleeding;Granulation tissue;Slough 1/22/2018  2:41 PM   Drainage Amount Large 1/22/2018  2:41 PM   Drainage Description Serosanguinous 1/22/2018  2:41 PM   Odor None 1/22/2018  2:41 PM   Margins Attached edges 1/22/2018  2:41 PM   Exposed structure Bone 1/22/2018  2:41 PM   Lurdes-wound Assessment Red;Hyperpigmented 1/22/2018  2:41 PM   Non-staged Wound Description Full thickness 1/22/2018  2:41 PM   [place]%Wound Bed 10 1/22/2018  2:41 PM   Red%Wound Bed 30 1/22/2018  2:41 PM   Yellow%Wound Bed 0 1/22/2018  2:41 PM   Black%Wound Bed 0 1/22/2018  2:41 PM   Purple%Wound Bed 0 1/22/2018  2:41 PM   Other%Wound Bed 60 1/22/2018  2:41 PM   Culture Taken No 1/9/2018  8:45 AM   Debridement per physician Subcutaneous 1/22/2018  3:33 PM   Time out Yes 1/22/2018  3:33 PM   Procedural Pain 0 1/22/2018  3:33 PM   Post procedural Pain 0 1/22/2018  3:33 PM   Number of days: 301       Wound 10/18/17 Foot Plantar;Right;Medial WOUND 34; RIGHT FOOT PLANTAR MEDIAL ( UNDER GREAT TOE) (Active)   Wound Type Wound 11/1/2017 11:17 AM   Wound Diabetic [place] 1 11/1/2017 11:17 AM   Dressing Status Intact;Dry 11/1/2017 11:17 AM   Dressing Changed Changed/New 11/1/2017 11:17 AM   Wound Cleansed Rinsed/Irrigated with saline 11/1/2017 11:17 AM   Wound Length (cm) 0 cm 11/1/2017 11:17 AM   Wound Width (cm) 0 cm 11/1/2017 11:17 AM   Wound Depth (cm)  0 11/1/2017 11:17 AM   Calculated Wound Size (cm^2) (l*w) 0 cm^2 11/1/2017 11:17 AM   Change in Wound Size % (l*w) 100 11/1/2017 11:17 AM   Distance Tunneling (cm) 0 cm 10/18/2017 12:47 PM   Tunneling Position ___ O'Clock 0 10/18/2017 12:47 PM   Undermining Starts ___ O'Clock 0 10/18/2017 12:47 PM   Undermining Ends___ O'Clock 0 10/18/2017 12:47 PM   Undermining Maxium Distance (cm) 0 10/18/2017 12:47 PM   Wound Assessment Pink 11/1/2017 11:17 AM   Drainage Amount None 11/1/2017 11:17 AM   Drainage Description Sanguinous 10/18/2017 12:47 PM   Odor None 11/1/2017 11:17 AM   Margins Attached edges 10/25/2017  4:57 PM   Lurdes-wound Assessment Calloused 11/1/2017 11:17 AM   Non-staged Wound Description Not applicable 56/4/4771 21:20 AM   [place]%Wound Bed 100 11/1/2017 11:17 AM   Red%Wound Bed 0 11/1/2017 11:17 AM 1/22/2018  3:33 PM   Number of days: 0       Wound 01/22/18 Venous ulcer Leg Left; Lower; Lateral Wound 37, Venous, L. lower most lateral leg (Active)   Wound Image   1/22/2018  2:41 PM   Wound Type Wound 1/22/2018  2:41 PM   Wound Venous 1/22/2018  2:41 PM   Dressing Status Old drainage 1/22/2018  2:41 PM   Wound Length (cm) 0.9 cm 1/22/2018  3:33 PM   Wound Width (cm) 0.7 cm 1/22/2018  3:33 PM   Wound Depth (cm)  . 1 1/22/2018  3:33 PM   Calculated Wound Size (cm^2) (l*w) 0.63 cm^2 1/22/2018  3:33 PM   Change in Wound Size % (l*w) 0 1/22/2018  3:33 PM   Distance Tunneling (cm) 0 cm 1/22/2018  2:41 PM   Tunneling Position ___ O'Clock 0 1/22/2018  2:41 PM   Undermining Starts ___ O'Clock 0 1/22/2018  2:41 PM   Undermining Ends___ O'Clock 0 1/22/2018  2:41 PM   Undermining Maxium Distance (cm) 0 1/22/2018  2:41 PM   Wound Assessment Slough; Yellow;Drainage;Pink 1/22/2018  2:41 PM   Drainage Amount Moderate 1/22/2018  2:41 PM   Drainage Description Serosanguinous 1/22/2018  2:41 PM   Odor None 1/22/2018  2:41 PM   Margins Attached edges 1/22/2018  2:41 PM   Lurdes-wound Assessment Red 1/22/2018  2:41 PM   Non-staged Wound Description Full thickness 1/22/2018  2:41 PM   [place]%Wound Bed 15 1/22/2018  2:41 PM   Red%Wound Bed 0 1/22/2018  2:41 PM   Yellow%Wound Bed 85 1/22/2018  2:41 PM   Black%Wound Bed 0 1/22/2018  2:41 PM   Purple%Wound Bed 0 1/22/2018  2:41 PM   Other%Wound Bed 0 1/22/2018  2:41 PM   Debridement per physician Full thickness 1/22/2018  3:33 PM   Time out Yes 1/22/2018  3:33 PM   Procedural Pain 0 1/22/2018  3:33 PM   Post procedural Pain 0 1/22/2018  3:33 PM   Number of days: 0       Diabetic Ulcer 03/27/17 Foot Left;Distal DIABETIC FOOT ULCER # 33R LEFT/ [place] 1-wound reopened 5-9, previously wound 33 (Active)   Lurdes-wound Assessment Calloused 7/12/2017  8:15 AM   Lurdes-Wound Texture Callus 7/12/2017  8:15 AM   Lurdes-Wound Moisture Dry 7/12/2017  8:15 AM   [place]-Wound Color Pink 7/12/2017  8:15 AM   Diabetic Lurdes-Wound Color Pink 11/1/2017 11:17 AM   Diabetic Wound - Ladell Agusto 1 11/1/2017 11:17 AM   Non-staged Wound Description Not applicable 88/5/3561 12:72 AM   Wound Assessment Pink 11/1/2017 11:17 AM   Shape round 11/1/2017 11:17 AM   Wound Length (cm) 0 cm 11/1/2017 11:17 AM   Wound Width (cm) 0 cm 11/1/2017 11:17 AM   Wound Depth (cm)  0 11/1/2017 11:17 AM   Calculated Wound Size (cm^2) (l*w) 0 cm^2 11/1/2017 11:17 AM   Change in Wound Size % (l*w) 100 11/1/2017 11:17 AM   Culture Taken No 10/25/2017  4:57 PM   Dressing Status Intact;Dry 11/1/2017 11:17 AM   Dressing Changed Changed/New 11/1/2017 11:17 AM   Wound Cleansed Rinsed/Irrigated with saline 11/1/2017 11:17 AM   Necrotic Type Yellow Fibrin/Slough 10/25/2017  4:57 PM   Necrotic Amount None present 11/1/2017 11:17 AM   Granulation Quality N/A 11/1/2017 11:17 AM   Drainage Amount None 11/1/2017 11:17 AM   Drainage Description Serosanguinous 10/25/2017  4:57 PM   Odor None 11/1/2017 11:17 AM   Distance Tunneling (cm) 0 cm 10/18/2017 12:47 PM   Tunneling Position ___ O'Clock 0 10/18/2017 12:47 PM   Tunneling Maxium Distance (cm) 0 10/18/2017 12:47 PM   Undermining Starts ___ O'Clock 0 10/18/2017 12:47 PM   Undermining Ends___ O'Clock 0 10/18/2017 12:47 PM   Undermining Maxium Distance (cm) 0 10/18/2017 12:47 PM   [place]%Wound Bed 100 11/1/2017 11:17 AM   Red%Wound Bed 2 10/18/2017 12:47 PM   Yellow%Wound Bed 3 10/18/2017 12:47 PM   Black%Wound Bed 0 10/18/2017 12:47 PM   Purple%Wound Bed 0 10/18/2017 12:47 PM   Margins Other (Comment) 11/1/2017 11:17 AM   Debridement per physician None 11/1/2017 12:56 PM   Time out N/A 11/1/2017 12:56 PM   Procedural Pain 0 10/18/2017 12:59 PM   Post procedural Pain 0 10/18/2017 12:59 PM   Number of days: 96         Estimated Blood Loss:  Minimal    Hemostasis Achieved:  by pressure    Procedural Pain:  0  / 10     Post Procedural Pain:  0 / 10     Response to treatment:  Well tolerated by patient.          Plan:

## 2018-01-23 ENCOUNTER — OFFICE VISIT (OUTPATIENT)
Dept: FAMILY MEDICINE CLINIC | Age: 63
End: 2018-01-23
Payer: MEDICARE

## 2018-01-23 ENCOUNTER — HOSPITAL ENCOUNTER (OUTPATIENT)
Dept: GENERAL RADIOLOGY | Age: 63
Discharge: HOME | End: 2018-01-23
Payer: MEDICARE

## 2018-01-23 VITALS
RESPIRATION RATE: 18 BRPM | WEIGHT: 273 LBS | BODY MASS INDEX: 37.03 KG/M2 | HEART RATE: 70 BPM | DIASTOLIC BLOOD PRESSURE: 68 MMHG | TEMPERATURE: 98 F | OXYGEN SATURATION: 98 % | SYSTOLIC BLOOD PRESSURE: 122 MMHG

## 2018-01-23 DIAGNOSIS — G89.4 CHRONIC PAIN SYNDROME: ICD-10-CM

## 2018-01-23 DIAGNOSIS — S22.41XD CLOSED FRACTURE OF MULTIPLE RIBS OF RIGHT SIDE WITH ROUTINE HEALING, SUBSEQUENT ENCOUNTER: ICD-10-CM

## 2018-01-23 DIAGNOSIS — M53.3 COCCYDYNIA: Primary | ICD-10-CM

## 2018-01-23 DIAGNOSIS — M53.3 COCCYDYNIA: ICD-10-CM

## 2018-01-23 DIAGNOSIS — E11.620 TYPE 2 DIABETES MELLITUS WITH DIABETIC DERMATITIS, WITHOUT LONG-TERM CURRENT USE OF INSULIN (HCC): ICD-10-CM

## 2018-01-23 DIAGNOSIS — G89.29 CHRONIC MIDLINE LOW BACK PAIN WITHOUT SCIATICA: ICD-10-CM

## 2018-01-23 DIAGNOSIS — M54.50 CHRONIC MIDLINE LOW BACK PAIN WITHOUT SCIATICA: ICD-10-CM

## 2018-01-23 LAB
AMPHETAMINE SCREEN, URINE: NORMAL
BARBITURATE SCREEN, URINE: NORMAL
BENZODIAZEPINE SCREEN, URINE: NORMAL
COCAINE METABOLITE SCREEN URINE: NORMAL
MDMA URINE: NORMAL
METHADONE SCREEN, URINE: NORMAL
METHAMPHETAMINE, URINE: NORMAL
OPIATE SCREEN URINE: NORMAL
OXYCODONE SCREEN URINE: NORMAL
PHENCYCLIDINE SCREEN URINE: NORMAL
PROPOXYPHENE SCREEN, URINE: NORMAL
THC: NORMAL
TRICYCLIC ANTIDEPRESSANTS, UR: NORMAL

## 2018-01-23 PROCEDURE — 99213 OFFICE O/P EST LOW 20 MIN: CPT | Performed by: FAMILY MEDICINE

## 2018-01-23 PROCEDURE — 3046F HEMOGLOBIN A1C LEVEL >9.0%: CPT | Performed by: FAMILY MEDICINE

## 2018-01-23 PROCEDURE — 3017F COLORECTAL CA SCREEN DOC REV: CPT | Performed by: FAMILY MEDICINE

## 2018-01-23 PROCEDURE — 72220 X-RAY EXAM SACRUM TAILBONE: CPT

## 2018-01-23 PROCEDURE — G8417 CALC BMI ABV UP PARAM F/U: HCPCS | Performed by: FAMILY MEDICINE

## 2018-01-23 PROCEDURE — 80305 DRUG TEST PRSMV DIR OPT OBS: CPT | Performed by: FAMILY MEDICINE

## 2018-01-23 PROCEDURE — 1036F TOBACCO NON-USER: CPT | Performed by: FAMILY MEDICINE

## 2018-01-23 PROCEDURE — G8484 FLU IMMUNIZE NO ADMIN: HCPCS | Performed by: FAMILY MEDICINE

## 2018-01-23 PROCEDURE — G8427 DOCREV CUR MEDS BY ELIG CLIN: HCPCS | Performed by: FAMILY MEDICINE

## 2018-01-23 ASSESSMENT — ENCOUNTER SYMPTOMS
CONSTIPATION: 0
COUGH: 0
CHEST TIGHTNESS: 0
NAUSEA: 0
ABDOMINAL PAIN: 0
SHORTNESS OF BREATH: 0
ANAL BLEEDING: 0
DIARRHEA: 0

## 2018-01-23 NOTE — PROGRESS NOTES
Oxycodone Screen, Ur NEG     PCP Scrn, Ur NEG     Propoxyphene Screen, Urine NEG     Tricyclic Antidepressants, Ur NEG     Methamphetamine, Urine NEG                Assessment & Plan: The following diagnoses and conditions are stable with no further orders unless indicated:  1. Coccydynia  Exam is benign. REcommend we get an xray to evaluate for possible coccygeal fracture or SI joint pathology  - XR SACRUM COCCYX (MIN 2 VIEWS); Future    2. Type 2 diabetes mellitus with diabetic dermatitis, without long-term current use of insulin ([facility])  Diet controlled. Glucose is stable. 3. Closed fracture of multiple ribs of right side with routine healing, subsequent encounter  Healing. Pain has resolved. 4. Chronic midline low back pain without sciatica    - POCT Rapid Drug Screen    5. Chronic pain syndrome     - POCT Rapid Drug Screen            Return in about 3 months (around 4/23/2018) for AWV. Discussed use, benefit, and side effects of prescribed medications. All patient questions answered. Pt voiced understanding. Reviewed health maintenance. Instructed to continue current medications, diet and exercise. Patient agreed with treatment plan. Follow up as directed.

## 2018-01-27 ASSESSMENT — ENCOUNTER SYMPTOMS: BACK PAIN: 1

## 2018-01-29 ENCOUNTER — HOSPITAL ENCOUNTER (OUTPATIENT)
Dept: WOUND CARE | Age: 63
Discharge: HOME | End: 2018-01-29
Payer: MEDICARE

## 2018-01-29 VITALS
BODY MASS INDEX: 37.79 KG/M2 | HEIGHT: 72 IN | RESPIRATION RATE: 18 BRPM | SYSTOLIC BLOOD PRESSURE: 120 MMHG | HEART RATE: 72 BPM | WEIGHT: 279 LBS | TEMPERATURE: 97.8 F | DIASTOLIC BLOOD PRESSURE: 70 MMHG

## 2018-01-29 DIAGNOSIS — E11.620 TYPE 2 DIABETES MELLITUS WITH DIABETIC DERMATITIS, WITHOUT LONG-TERM CURRENT USE OF INSULIN (HCC): Chronic | ICD-10-CM

## 2018-01-29 DIAGNOSIS — L97.512 ULCERATED, FOOT, RIGHT, WITH FAT LAYER EXPOSED (HCC): ICD-10-CM

## 2018-01-29 DIAGNOSIS — E11.621 DIABETIC ULCER OF LEFT MIDFOOT ASSOCIATED WITH TYPE 2 DIABETES MELLITUS, LIMITED TO BREAKDOWN OF SKIN (HCC): ICD-10-CM

## 2018-01-29 DIAGNOSIS — I87.2 VENOUS INSUFFICIENCY OF BOTH LOWER EXTREMITIES: Primary | Chronic | ICD-10-CM

## 2018-01-29 DIAGNOSIS — L97.929 IDIOPATHIC CHRONIC VENOUS HYPERTENSION OF LEFT LEG WITH ULCER (HCC): Chronic | ICD-10-CM

## 2018-01-29 DIAGNOSIS — I87.312 IDIOPATHIC CHRONIC VENOUS HYPERTENSION OF LEFT LEG WITH ULCER (HCC): Chronic | ICD-10-CM

## 2018-01-29 DIAGNOSIS — L97.421 DIABETIC ULCER OF LEFT MIDFOOT ASSOCIATED WITH TYPE 2 DIABETES MELLITUS, LIMITED TO BREAKDOWN OF SKIN (HCC): ICD-10-CM

## 2018-01-29 PROCEDURE — 87205 SMEAR GRAM STAIN: CPT

## 2018-01-29 PROCEDURE — 11042 DBRDMT SUBQ TIS 1ST 20SQCM/<: CPT

## 2018-01-29 PROCEDURE — 87070 CULTURE OTHR SPECIMN AEROBIC: CPT

## 2018-01-29 PROCEDURE — 11042 DBRDMT SUBQ TIS 1ST 20SQCM/<: CPT | Performed by: SURGERY

## 2018-01-29 PROCEDURE — 97597 DBRDMT OPN WND 1ST 20 CM/<: CPT

## 2018-01-29 PROCEDURE — 87186 SC STD MICRODIL/AGAR DIL: CPT

## 2018-01-29 PROCEDURE — 87075 CULTR BACTERIA EXCEPT BLOOD: CPT

## 2018-01-29 PROCEDURE — 86403 PARTICLE AGGLUT ANTBDY SCRN: CPT

## 2018-01-29 PROCEDURE — 97597 DBRDMT OPN WND 1ST 20 CM/<: CPT | Performed by: SURGERY

## 2018-01-29 RX ORDER — CIPROFLOXACIN 500 MG/1
500 TABLET, FILM COATED ORAL 2 TIMES DAILY
Qty: 20 TABLET | Refills: 2 | Status: SHIPPED | OUTPATIENT
Start: 2018-01-29 | End: 2018-02-08

## 2018-01-29 ASSESSMENT — PAIN DESCRIPTION - DESCRIPTORS: DESCRIPTORS: ACHING

## 2018-01-29 ASSESSMENT — PAIN DESCRIPTION - PAIN TYPE: TYPE: ACUTE PAIN

## 2018-01-29 ASSESSMENT — PAIN DESCRIPTION - LOCATION: LOCATION: LEG

## 2018-01-29 ASSESSMENT — PAIN DESCRIPTION - ONSET: ONSET: ON-GOING

## 2018-01-29 ASSESSMENT — PAIN DESCRIPTION - PROGRESSION: CLINICAL_PROGRESSION: NOT CHANGED

## 2018-01-29 ASSESSMENT — PAIN DESCRIPTION - ORIENTATION: ORIENTATION: LEFT

## 2018-01-29 ASSESSMENT — PAIN SCALES - GENERAL: PAINLEVEL_OUTOF10: 5

## 2018-01-29 ASSESSMENT — PAIN DESCRIPTION - FREQUENCY: FREQUENCY: INTERMITTENT

## 2018-01-29 NOTE — PROGRESS NOTES
Debrided:  3.36 sq cm       Diabetic/Pressure/Non Pressure Ulcers only:  Ulcer: Diabetic ulcer, fat layer exposed      Debridement:Non-excisional Debridement    Using curette the wound(s)/ulcer(s) was/were sharply debrided down through and including the removal of epidermis and dermis.         Devitalized Tissue Debrided:  fibrin, biofilm, slough and exudate    Pre Debridement Measurements:  Are located in the [place]  Documentation Flow Sheet    Wound/Ulcer #: 36,37    Percent of Wound(s)/Ulcer(s) Debrided: 100%    Total Surface Area Debrided:  19.99 sq cm       Diabetic/Pressure/Non Pressure Ulcers only:  Ulcer: Diabetic ulcer, fat layer exposed           Post Debridement Measurements:    Wound/Ulcer Descriptions are Pre Debridement --EXCEPT MEASUREMENTS    Wound 03/27/17 Venous ulcer Leg Left;Lateral;Proximal VENOUS LEG ULCER #31 LATERAL PROXIMAL LEFT (Active)   Wound Image   1/29/2018 11:38 AM   Wound Type Wound 1/29/2018 11:38 AM   Wound Venous 1/29/2018 11:38 AM   Dressing Status Old drainage 1/29/2018 11:38 AM   Dressing Changed Changed/New 1/22/2018  4:10 PM   Dressing/Treatment Moist to dry 1/3/2018  8:11 AM   Wound Cleansed Other (Comment) 1/29/2018 11:38 AM   Wound Length (cm) 2.1 cm 1/29/2018 12:02 PM   Wound Width (cm) 1.6 cm 1/29/2018 12:02 PM   Wound Depth (cm)  1.1 1/29/2018 12:02 PM   Calculated Wound Size (cm^2) (l*w) 3.36 cm^2 1/29/2018 12:02 PM   Change in Wound Size % (l*w) -97.65 1/29/2018 12:02 PM   Distance Tunneling (cm) 0 cm 1/29/2018 11:45 AM   Tunneling Position ___ O'Clock 0 1/29/2018 11:45 AM   Undermining Starts ___ O'Clock 0 1/29/2018 11:45 AM   Undermining Ends___ O'Clock 0 1/29/2018 11:45 AM   Undermining Maxium Distance (cm) 0 1/29/2018 11:45 AM   Wound Assessment White;Red;Pink;Drainage;Bleeding;Granulation tissue;Slough 1/29/2018 11:45 AM   Drainage Amount Large 1/29/2018 11:45 AM   Drainage Description Serosanguinous 1/29/2018 11:45 AM   Odor Strong 1/29/2018 11:45 AM 0 11/1/2017 11:17 AM   Yellow%Wound Bed 0 11/1/2017 11:17 AM   Black%Wound Bed 0 10/18/2017 12:47 PM   Purple%Wound Bed 0 10/18/2017 12:47 PM   Other%Wound Bed 0 10/18/2017 12:47 PM   Culture Taken No 10/18/2017 12:59 PM   Debridement per physician None 11/1/2017 12:56 PM   Time out N/A 11/1/2017 12:56 PM   Procedural Pain 0 10/18/2017 12:59 PM   Post procedural Pain 0 10/18/2017 12:59 PM   Number of days: 102       Wound 01/22/18 Venous ulcer Leg Left;Lateral Wound 36, Venous, L. lateral Inferior lower leg (Active)   Wound Image   1/29/2018 11:38 AM   Wound Type Wound 1/29/2018 11:38 AM   Wound Venous 1/29/2018 11:38 AM   Dressing Status Old drainage 1/29/2018 11:38 AM   Dressing Changed Changed/New 1/22/2018  4:10 PM   Wound Cleansed Other (Comment) 1/29/2018 11:38 AM   Wound Length (cm) 5 cm 1/29/2018 12:02 PM   Wound Width (cm) 3.8 cm 1/29/2018 12:02 PM   Wound Depth (cm)  . 2 1/29/2018 12:02 PM   Calculated Wound Size (cm^2) (l*w) 19 cm^2 1/29/2018 12:02 PM   Change in Wound Size % (l*w) -56.25 1/29/2018 12:02 PM   Distance Tunneling (cm) 0 cm 1/29/2018 11:38 AM   Tunneling Position ___ O'Clock 0 1/29/2018 11:38 AM   Undermining Starts ___ O'Clock 0 1/29/2018 11:38 AM   Undermining Ends___ O'Clock 0 1/29/2018 11:38 AM   Undermining Maxium Distance (cm) 0 1/22/2018  2:41 PM   Wound Assessment Black;Slough; Yellow;Pink;Drainage;Granulation tissue 1/29/2018 11:38 AM   Drainage Amount Large 1/29/2018 11:38 AM   Drainage Description Serosanguinous 1/29/2018 11:38 AM   Odor Strong 1/29/2018 11:38 AM   Margins Attached edges 1/29/2018 11:38 AM   Lurdes-wound Assessment Red 1/29/2018 11:38 AM   Non-staged Wound Description Full thickness 1/29/2018 11:38 AM   [place]%Wound Bed 30 1/29/2018 11:38 AM   Red%Wound Bed 10 1/29/2018 11:38 AM   Yellow%Wound Bed 30 1/29/2018 11:38 AM   Black%Wound Bed 30 1/29/2018 11:38 AM   Purple%Wound Bed 0 1/29/2018 11:38 AM   Other%Wound Bed 0 1/29/2018 11:38 AM   Debridement per physician Full 5-9, previously wound 33 (Active)   Lurdes-wound Assessment Calloused 7/12/2017  8:15 AM   Lurdes-Wound Texture Callus 7/12/2017  8:15 AM   Lurdes-Wound Moisture Dry 7/12/2017  8:15 AM   Lurdes-Wound Color Pink 7/12/2017  8:15 AM   Diabetic Wound - Adrienne Flavin 1 7/12/2017  8:15 AM   Non-staged Wound Description Full thickness 6/28/2017  8:14 AM   Wound Assessment Intact 7/12/2017  8:15 AM   Shape Oval 6/7/2017  8:39 AM   Wound Length (cm) 0 cm 7/12/2017  8:15 AM   Wound Width (cm) 0 cm 7/12/2017  8:15 AM   Wound Depth (cm)  0 7/12/2017  8:15 AM   Calculated Wound Size (cm^2) (l*w) 0 cm^2 7/12/2017  8:15 AM   Change in Wound Size % (l*w) 100 7/12/2017  8:15 AM   Dressing Status Old drainage 7/12/2017  8:15 AM   Dressing Changed Changed/New 7/5/2017  9:01 AM   Dressing/Treatment Open to air 7/12/2017  8:43 AM   Wound Cleansed Other (Comment) 6/28/2017  8:14 AM   Necrotic Type Black Eschar 6/2/2017  8:50 AM   Necrotic Amount Large: % 6/2/2017  8:50 AM   Granulation Quality Pink 7/12/2017  8:15 AM   Drainage Amount Small 7/12/2017  8:15 AM   Drainage Description Serosanguinous 7/12/2017  8:15 AM   Odor None 7/12/2017  8:15 AM   Epithelialization None present 6/2/2017  8:50 AM   Distance Tunneling (cm) 0 cm 7/12/2017  8:15 AM   Tunneling Position ___ O'Clock 0 7/12/2017  8:15 AM   Tunneling Maxium Distance (cm) 0 7/12/2017  8:15 AM   Undermining Starts ___ O'Clock 0 7/12/2017  8:15 AM   Undermining Ends___ O'Clock 0 7/12/2017  8:15 AM   Undermining Maxium Distance (cm) 0 7/12/2017  8:15 AM   [place]%Wound Bed 0 7/12/2017  8:15 AM   Red%Wound Bed 0 7/12/2017  8:15 AM   Yellow%Wound Bed 0 7/12/2017  8:15 AM   Black%Wound Bed 0 7/12/2017  8:15 AM   Margins Attached edges 6/21/2017  8:29 AM   Debridement per physician Partial thickness 7/5/2017  9:01 AM   Time out Yes 7/5/2017  9:01 AM   Procedural Pain 0 7/5/2017  9:01 AM   Post procedural Pain 0 7/5/2017  9:01 AM   Number of days: 307       Diabetic Ulcer 10/18/17 Foot Right;Plantar;Lateral WOUND 35; rIGHT FOOT PLANTAR LATERAL ( UNDER 5TH TOE) (Active)   Lurdes-wound Assessment Calloused 11/1/2017 11:17 AM   Lurdes-Wound Texture Callus 11/1/2017 11:17 AM   Lurdes-Wound Moisture Dry; Intact 11/1/2017 11:17 AM   Lurdes-Wound Color Pink 11/1/2017 11:17 AM   Diabetic Wound - Ivy Christians 1 11/1/2017 11:17 AM   Non-staged Wound Description Not applicable 99/5/6731 62:95 AM   Wound Assessment Pink 11/1/2017 11:17 AM   Shape round 11/1/2017 11:17 AM   Wound Length (cm) 0 cm 11/1/2017 11:17 AM   Wound Width (cm) 0 cm 11/1/2017 11:17 AM   Wound Depth (cm)  0 11/1/2017 11:17 AM   Calculated Wound Size (cm^2) (l*w) 0 cm^2 11/1/2017 11:17 AM   Change in Wound Size % (l*w) 100 11/1/2017 11:17 AM   Culture Taken No 10/25/2017  4:57 PM   Dressing Status Intact;Dry 11/1/2017 11:17 AM   Dressing Changed Changed/New 11/1/2017 11:17 AM   Wound Cleansed Rinsed/Irrigated with saline 11/1/2017 11:17 AM   Necrotic Type Yellow Fibrin/Slough 10/25/2017  4:57 PM   Necrotic Amount None present 11/1/2017 11:17 AM   Granulation Quality N/A 11/1/2017 11:17 AM   Drainage Amount None 11/1/2017 11:17 AM   Drainage Description Serosanguinous 10/25/2017  4:57 PM   Odor None 11/1/2017 11:17 AM   Distance Tunneling (cm) 0 cm 10/18/2017 12:47 PM   Tunneling Position ___ O'Clock 0 10/18/2017 12:47 PM   Tunneling Maxium Distance (cm) 0 10/18/2017 12:47 PM   Undermining Starts ___ O'Clock 0 10/18/2017 12:47 PM   Undermining Ends___ O'Clock 0 10/18/2017 12:47 PM   Undermining Maxium Distance (cm) 0 10/18/2017 12:47 PM   [place]%Wound Bed 100 11/1/2017 11:17 AM   Red%Wound Bed 2 10/18/2017 12:47 PM   Yellow%Wound Bed 3 10/18/2017 12:47 PM   Black%Wound Bed 0 10/18/2017 12:47 PM   Purple%Wound Bed 0 10/18/2017 12:47 PM   Margins Other (Comment) 11/1/2017 11:17 AM   Debridement per physician None 11/1/2017 12:56 PM   Time out N/A 11/1/2017 12:56 PM   Procedural Pain 0 10/18/2017 12:59 PM   Post procedural Pain 0 10/18/2017 12:59 PM please contact your PCP or go to the nearest emergency room.         Electronically signed by Jaden Cantrell MD on 1/29/2018 at 12:11 PM

## 2018-01-31 RX ORDER — HYDROCODONE BITARTRATE AND ACETAMINOPHEN 10; 325 MG/1; MG/1
1 TABLET ORAL EVERY 6 HOURS PRN
Qty: 120 TABLET | Refills: 0 | Status: SHIPPED | OUTPATIENT
Start: 2018-01-31 | End: 2018-02-28 | Stop reason: SDUPTHER

## 2018-02-01 ENCOUNTER — HOSPITAL ENCOUNTER (OUTPATIENT)
Dept: WOUND CARE | Age: 63
Discharge: HOME | End: 2018-02-01
Payer: MEDICARE

## 2018-02-01 VITALS
BODY MASS INDEX: 37.79 KG/M2 | TEMPERATURE: 98.7 F | SYSTOLIC BLOOD PRESSURE: 169 MMHG | RESPIRATION RATE: 18 BRPM | DIASTOLIC BLOOD PRESSURE: 74 MMHG | WEIGHT: 279 LBS | HEIGHT: 72 IN | HEART RATE: 72 BPM

## 2018-02-01 DIAGNOSIS — L97.512 ULCERATED, FOOT, RIGHT, WITH FAT LAYER EXPOSED (HCC): ICD-10-CM

## 2018-02-01 DIAGNOSIS — L97.222 NON-PRESSURE CHRONIC ULCER OF LEFT CALF WITH FAT LAYER EXPOSED (HCC): Primary | ICD-10-CM

## 2018-02-01 DIAGNOSIS — E11.621 DIABETIC ULCER OF LEFT FOOT ASSOCIATED WITH TYPE 2 DIABETES MELLITUS, WITH BONE INVOLVEMENT WITHOUT EVIDENCE OF NECROSIS, UNSPECIFIED PART OF FOOT (HCC): ICD-10-CM

## 2018-02-01 DIAGNOSIS — L97.526 DIABETIC ULCER OF LEFT FOOT ASSOCIATED WITH TYPE 2 DIABETES MELLITUS, WITH BONE INVOLVEMENT WITHOUT EVIDENCE OF NECROSIS, UNSPECIFIED PART OF FOOT (HCC): ICD-10-CM

## 2018-02-01 PROCEDURE — 29581 APPL MULTLAYER CMPRN SYS LEG: CPT

## 2018-02-01 RX ORDER — MINOCYCLINE HYDROCHLORIDE 100 MG/1
100 CAPSULE ORAL 2 TIMES DAILY
Qty: 28 CAPSULE | Refills: 0 | Status: SHIPPED | OUTPATIENT
Start: 2018-02-01 | End: 2018-02-15

## 2018-02-01 ASSESSMENT — PAIN DESCRIPTION - PROGRESSION: CLINICAL_PROGRESSION: NOT CHANGED

## 2018-02-01 ASSESSMENT — PAIN DESCRIPTION - ORIENTATION: ORIENTATION: LEFT

## 2018-02-01 ASSESSMENT — PAIN DESCRIPTION - ONSET: ONSET: ON-GOING

## 2018-02-01 ASSESSMENT — PAIN SCALES - GENERAL: PAINLEVEL_OUTOF10: 5

## 2018-02-01 ASSESSMENT — PAIN DESCRIPTION - DESCRIPTORS: DESCRIPTORS: ACHING

## 2018-02-01 ASSESSMENT — PAIN DESCRIPTION - FREQUENCY: FREQUENCY: INTERMITTENT

## 2018-02-01 ASSESSMENT — PAIN DESCRIPTION - LOCATION: LOCATION: LEG

## 2018-02-01 ASSESSMENT — PAIN DESCRIPTION - PAIN TYPE: TYPE: CHRONIC PAIN

## 2018-02-02 LAB
ANAEROBIC CULTURE: ABNORMAL
GRAM STAIN RESULT: ABNORMAL
ORGANISM: ABNORMAL
WOUND/ABSCESS: ABNORMAL

## 2018-02-05 ENCOUNTER — HOSPITAL ENCOUNTER (OUTPATIENT)
Dept: WOUND CARE | Age: 63
Discharge: HOME | End: 2018-02-05
Payer: MEDICARE

## 2018-02-05 VITALS
RESPIRATION RATE: 16 BRPM | SYSTOLIC BLOOD PRESSURE: 150 MMHG | WEIGHT: 279 LBS | DIASTOLIC BLOOD PRESSURE: 67 MMHG | HEART RATE: 68 BPM | BODY MASS INDEX: 37.79 KG/M2 | TEMPERATURE: 97.9 F | HEIGHT: 72 IN

## 2018-02-05 DIAGNOSIS — E11.621 DIABETIC ULCER OF LEFT FOOT ASSOCIATED WITH TYPE 2 DIABETES MELLITUS, WITH BONE INVOLVEMENT WITHOUT EVIDENCE OF NECROSIS, UNSPECIFIED PART OF FOOT (HCC): ICD-10-CM

## 2018-02-05 DIAGNOSIS — L97.526 DIABETIC ULCER OF LEFT FOOT ASSOCIATED WITH TYPE 2 DIABETES MELLITUS, WITH BONE INVOLVEMENT WITHOUT EVIDENCE OF NECROSIS, UNSPECIFIED PART OF FOOT (HCC): ICD-10-CM

## 2018-02-05 DIAGNOSIS — L97.512 ULCERATED, FOOT, RIGHT, WITH FAT LAYER EXPOSED (HCC): ICD-10-CM

## 2018-02-05 PROCEDURE — 11042 DBRDMT SUBQ TIS 1ST 20SQCM/<: CPT

## 2018-02-05 PROCEDURE — 97597 DBRDMT OPN WND 1ST 20 CM/<: CPT | Performed by: SURGERY

## 2018-02-05 PROCEDURE — 97597 DBRDMT OPN WND 1ST 20 CM/<: CPT

## 2018-02-05 PROCEDURE — 11042 DBRDMT SUBQ TIS 1ST 20SQCM/<: CPT | Performed by: SURGERY

## 2018-02-05 ASSESSMENT — PAIN DESCRIPTION - DESCRIPTORS: DESCRIPTORS: ACHING;BURNING

## 2018-02-05 ASSESSMENT — PAIN DESCRIPTION - PAIN TYPE: TYPE: CHRONIC PAIN

## 2018-02-05 ASSESSMENT — PAIN DESCRIPTION - FREQUENCY: FREQUENCY: INTERMITTENT

## 2018-02-05 ASSESSMENT — PAIN DESCRIPTION - ORIENTATION: ORIENTATION: LEFT

## 2018-02-05 ASSESSMENT — PAIN SCALES - GENERAL: PAINLEVEL_OUTOF10: 5

## 2018-02-05 ASSESSMENT — PAIN DESCRIPTION - PROGRESSION: CLINICAL_PROGRESSION: NOT CHANGED

## 2018-02-05 ASSESSMENT — PAIN DESCRIPTION - LOCATION: LOCATION: LEG

## 2018-02-05 ASSESSMENT — PAIN DESCRIPTION - ONSET: ONSET: ON-GOING

## 2018-02-05 NOTE — PROGRESS NOTES
[facility]   Progress Note and Procedure Note      [address] RECORD NUMBER:  [id number]  AGE: 63 y.o. GENDER: male  : 1955  EPISODE DATE:  2018    Subjective:     Chief Complaint   Patient presents with    Wound Check     Left Leg wound, has pain due to area of wound         HISTORY of PRESENT ILLNESS HPI     Sarah Umaña is a 63 y.o. male who presents today for wound/ulcer evaluation.    History of Wound Context: Pt with LLE venous wounds X3 here for eval/treat  Wound/Ulcer Pain Timing/Severity: none  Quality of pain: N/A  Severity:  0 / 10   Modifying Factors: None  Associated Signs/Symptoms: edema    Ulcer Identification:  Ulcer Type: venous and diabetic  Contributing Factors: edema, venous stasis, diabetes and shear force    Wound: venous        PAST MEDICAL HISTORY        Diagnosis Date    Asthma     Broken ribs     CAD (coronary artery disease)     CHF (congestive heart failure) (HCC)     Diabetes mellitus ([address]     Hypertension     Morbid obesity ([address] 6/22/15    Skin ulcer of toe of left foot with fat layer exposed ([address] 2017    Sleep apnea in adult 6/22/15    Venous insufficiency of both lower extremities 6/22/15       PAST SURGICAL HISTORY    Past Surgical History:   Procedure Laterality Date    CARDIAC CATHETERIZATION      DILATATION, ESOPHAGUS      GASTRIC BYPASS SURGERY N/A 2015    TOTAL KNEE ARTHROPLASTY Bilateral        FAMILY HISTORY    Family History   Problem Relation Age of Onset    Diabetes Mother     Heart Disease Mother     High Blood Pressure Mother     Heart Disease Father     Other Father        SOCIAL HISTORY    Social History   Substance Use Topics    Smoking status: Never Smoker    Smokeless tobacco: Never Used    Alcohol use Yes      Comment: Drinks a beer occ        ALLERGIES    Allergies   Allergen Reactions    Adhesive Tape      Tears skin       MEDICATIONS    Current Outpatient Prescriptions on Sheet    Wound/Ulcer #: 31    Percent of Wound(s)/Ulcer(s) Debrided: 100%    Total Surface Area Debrided:  2.4 sq cm       Diabetic/Pressure/Non Pressure Ulcers only:  Ulcer: Diabetic ulcer, fat layer exposed      Debridement:Non-excisional Debridement    Using curette the wound(s)/ulcer(s) was/were sharply debrided down through and including the removal of epidermis and dermis.         Devitalized Tissue Debrided:  fibrin, biofilm, slough, exudate and calcium deposits    Pre Debridement Measurements:  Are located in the [place]  Documentation Flow Sheet    Wound/Ulcer #: 36,37    Percent of Wound(s)/Ulcer(s) Debrided: 50%    Total Surface Area Debrided:  15 sq cm       Diabetic/Pressure/Non Pressure Ulcers only:  Ulcer: Diabetic ulcer, fat layer exposed           Post Debridement Measurements:    Wound/Ulcer Descriptions are Pre Debridement --EXCEPT MEASUREMENTS    Wound 03/27/17 Venous ulcer Leg Left;Lateral;Proximal VENOUS LEG ULCER #31 LATERAL PROXIMAL LEFT (Active)   Wound Image   2/5/2018 10:26 AM   Wound Type Wound 2/5/2018 10:26 AM   Wound Venous 2/5/2018 10:26 AM   Dressing Status Old drainage 2/5/2018 10:26 AM   Dressing Changed Changed/New 2/1/2018  1:45 PM   Dressing/Treatment Moist to dry 1/3/2018  8:11 AM   Wound Cleansed Rinsed/Irrigated with saline 2/5/2018 10:26 AM   Wound Length (cm) 1.5 cm 2/5/2018 10:46 AM   Wound Width (cm) 1.6 cm 2/5/2018 10:46 AM   Wound Depth (cm)  .9 2/5/2018 10:46 AM   Calculated Wound Size (cm^2) (l*w) 2.4 cm^2 2/5/2018 10:46 AM   Change in Wound Size % (l*w) -41.18 2/5/2018 10:46 AM   Distance Tunneling (cm) 0 cm 2/5/2018 10:26 AM   Tunneling Position ___ O'Clock 0 2/5/2018 10:26 AM   Undermining Starts ___ O'Clock 0 2/5/2018 10:26 AM   Undermining Ends___ O'Clock 0 2/5/2018 10:26 AM   Undermining Maxium Distance (cm) 0 2/5/2018 10:26 AM   Wound Assessment White;Slough;Red;Pink 2/5/2018 10:26 AM   Drainage Amount Large 2/5/2018 10:26 AM   Drainage Description Serosanguinous 2/5/2018 10:26 AM   Odor Strong 2/5/2018 10:26 AM   Margins Attached edges 2/5/2018 10:26 AM   Exposed structure Bone 2/5/2018 10:26 AM   [place]-wound Assessment Red 2/5/2018 10:26 AM   Non-staged Wound Description Full thickness 2/5/2018 10:26 AM   [place]%Wound Bed 25 2/5/2018 10:26 AM   Red%Wound Bed 50 2/5/2018 10:26 AM   Yellow%Wound Bed 25 2/5/2018 10:26 AM   Black%Wound Bed 0 2/5/2018 10:26 AM   Purple%Wound Bed 0 2/5/2018 10:26 AM   Other%Wound Bed 0 2/5/2018 10:26 AM   Culture Taken No 1/9/2018  8:45 AM   Debridement per physician Subcutaneous 2/5/2018 10:46 AM   Time out Yes 2/5/2018 10:46 AM   Procedural Pain 0 2/5/2018 10:46 AM   Post procedural Pain 0 2/5/2018 10:46 AM   Number of days: 314       Wound 10/18/17 Foot Plantar;Right;Medial WOUND 34; RIGHT FOOT PLANTAR MEDIAL ( UNDER GREAT TOE) (Active)   Wound Type Wound 11/1/2017 11:17 AM   Wound Diabetic Lemon 1 11/1/2017 11:17 AM   Dressing Status Intact;Dry 11/1/2017 11:17 AM   Dressing Changed Changed/New 11/1/2017 11:17 AM   Wound Cleansed Rinsed/Irrigated with saline 11/1/2017 11:17 AM   Wound Length (cm) 0 cm 11/1/2017 11:17 AM   Wound Width (cm) 0 cm 11/1/2017 11:17 AM   Wound Depth (cm)  0 11/1/2017 11:17 AM   Calculated Wound Size (cm^2) (l*w) 0 cm^2 11/1/2017 11:17 AM   Change in Wound Size % (l*w) 100 11/1/2017 11:17 AM   Distance Tunneling (cm) 0 cm 10/18/2017 12:47 PM   Tunneling Position ___ O'Clock 0 10/18/2017 12:47 PM   Undermining Starts ___ O'Clock 0 10/18/2017 12:47 PM   Undermining Ends___ O'Clock 0 10/18/2017 12:47 PM   Undermining Maxium Distance (cm) 0 10/18/2017 12:47 PM   Wound Assessment Pink 11/1/2017 11:17 AM   Drainage Amount None 11/1/2017 11:17 AM   Drainage Description Sanguinous 10/18/2017 12:47 PM   Odor None 11/1/2017 11:17 AM   Margins Attached edges 10/25/2017  4:57 PM   Lurdes-wound Assessment Calloused 11/1/2017 11:17 AM   Non-staged Wound Description Not applicable 94/1/0274 02:87 AM   [place]%Wound Bed 100 TOE) (Active)   Lurdes-wound Assessment Calloused 11/1/2017 11:17 AM   Lurdes-Wound Texture Callus 11/1/2017 11:17 AM   Lurdes-Wound Moisture Dry; Intact 11/1/2017 11:17 AM   Lurdes-Wound Color Pink 11/1/2017 11:17 AM   Diabetic Wound - Kathie Blitz 1 11/1/2017 11:17 AM   Non-staged Wound Description Not applicable 97/9/1452 23:85 AM   Wound Assessment Pink 11/1/2017 11:17 AM   Shape round 11/1/2017 11:17 AM   Wound Length (cm) 0 cm 11/1/2017 11:17 AM   Wound Width (cm) 0 cm 11/1/2017 11:17 AM   Wound Depth (cm)  0 11/1/2017 11:17 AM   Calculated Wound Size (cm^2) (l*w) 0 cm^2 11/1/2017 11:17 AM   Change in Wound Size % (l*w) 100 11/1/2017 11:17 AM   Culture Taken No 10/25/2017  4:57 PM   Dressing Status Intact;Dry 11/1/2017 11:17 AM   Dressing Changed Changed/New 11/1/2017 11:17 AM   Wound Cleansed Rinsed/Irrigated with saline 11/1/2017 11:17 AM   Necrotic Type Yellow Fibrin/Slough 10/25/2017  4:57 PM   Necrotic Amount None present 11/1/2017 11:17 AM   Granulation Quality N/A 11/1/2017 11:17 AM   Drainage Amount None 11/1/2017 11:17 AM   Drainage Description Serosanguinous 10/25/2017  4:57 PM   Odor None 11/1/2017 11:17 AM   Distance Tunneling (cm) 0 cm 10/18/2017 12:47 PM   Tunneling Position ___ O'Clock 0 10/18/2017 12:47 PM   Tunneling Maxium Distance (cm) 0 10/18/2017 12:47 PM   Undermining Starts ___ O'Clock 0 10/18/2017 12:47 PM   Undermining Ends___ O'Clock 0 10/18/2017 12:47 PM   Undermining Maxium Distance (cm) 0 10/18/2017 12:47 PM   [place]%Wound Bed 100 11/1/2017 11:17 AM   Red%Wound Bed 2 10/18/2017 12:47 PM   Yellow%Wound Bed 3 10/18/2017 12:47 PM   Black%Wound Bed 0 10/18/2017 12:47 PM   Purple%Wound Bed 0 10/18/2017 12:47 PM   Margins Other (Comment) 11/1/2017 11:17 AM   Debridement per physician None 11/1/2017 12:56 PM   Time out N/A 11/1/2017 12:56 PM   Procedural Pain 0 10/18/2017 12:59 PM   Post procedural Pain 0 10/18/2017 12:59 PM   Number of days: 109         Estimated Blood Loss:  Minimal    Hemostasis

## 2018-02-08 ENCOUNTER — HOSPITAL ENCOUNTER (OUTPATIENT)
Dept: WOUND CARE | Age: 63
Discharge: HOME | End: 2018-02-08
Payer: MEDICARE

## 2018-02-08 VITALS
DIASTOLIC BLOOD PRESSURE: 74 MMHG | RESPIRATION RATE: 18 BRPM | HEIGHT: 72 IN | WEIGHT: 279 LBS | TEMPERATURE: 98.8 F | SYSTOLIC BLOOD PRESSURE: 143 MMHG | BODY MASS INDEX: 37.79 KG/M2 | HEART RATE: 63 BPM

## 2018-02-08 DIAGNOSIS — L97.512 ULCERATED, FOOT, RIGHT, WITH FAT LAYER EXPOSED (HCC): ICD-10-CM

## 2018-02-08 DIAGNOSIS — L97.526 DIABETIC ULCER OF LEFT FOOT ASSOCIATED WITH TYPE 2 DIABETES MELLITUS, WITH BONE INVOLVEMENT WITHOUT EVIDENCE OF NECROSIS, UNSPECIFIED PART OF FOOT (HCC): ICD-10-CM

## 2018-02-08 DIAGNOSIS — E11.621 DIABETIC ULCER OF LEFT FOOT ASSOCIATED WITH TYPE 2 DIABETES MELLITUS, WITH BONE INVOLVEMENT WITHOUT EVIDENCE OF NECROSIS, UNSPECIFIED PART OF FOOT (HCC): ICD-10-CM

## 2018-02-08 PROCEDURE — 29581 APPL MULTLAYER CMPRN SYS LEG: CPT

## 2018-02-11 ENCOUNTER — APPOINTMENT (OUTPATIENT)
Dept: GENERAL RADIOLOGY | Age: 63
End: 2018-02-11
Payer: MEDICARE

## 2018-02-11 ENCOUNTER — HOSPITAL ENCOUNTER (EMERGENCY)
Age: 63
Discharge: ANOTHER HOSPITAL | End: 2018-02-11
Attending: FAMILY MEDICINE
Payer: MEDICARE

## 2018-02-11 ENCOUNTER — APPOINTMENT (OUTPATIENT)
Dept: CT IMAGING | Age: 63
End: 2018-02-11
Payer: MEDICARE

## 2018-02-11 VITALS
OXYGEN SATURATION: 94 % | TEMPERATURE: 98.2 F | HEIGHT: 72 IN | DIASTOLIC BLOOD PRESSURE: 58 MMHG | RESPIRATION RATE: 20 BRPM | BODY MASS INDEX: 36.57 KG/M2 | WEIGHT: 270 LBS | HEART RATE: 74 BPM | SYSTOLIC BLOOD PRESSURE: 124 MMHG

## 2018-02-11 DIAGNOSIS — R07.9 ACUTE CHEST PAIN: ICD-10-CM

## 2018-02-11 DIAGNOSIS — K63.1 BOWEL PERFORATION (HCC): Primary | ICD-10-CM

## 2018-02-11 DIAGNOSIS — R10.9 ACUTE ABDOMINAL PAIN: ICD-10-CM

## 2018-02-11 LAB
ALBUMIN SERPL-MCNC: 3.3 G/DL (ref 3.5–5.2)
ALP BLD-CCNC: 80 U/L (ref 40–130)
ALT SERPL-CCNC: 9 U/L (ref 5–41)
AMYLASE: 45 U/L (ref 28–100)
ANION GAP SERPL CALCULATED.3IONS-SCNC: 14 MMOL/L (ref 7–19)
AST SERPL-CCNC: 21 U/L (ref 5–40)
BASOPHILS ABSOLUTE: 0.1 K/UL (ref 0–0.2)
BASOPHILS RELATIVE PERCENT: 0.9 % (ref 0–1)
BILIRUB SERPL-MCNC: 1 MG/DL (ref 0.2–1.2)
BILIRUBIN URINE: NEGATIVE
BLOOD, URINE: NEGATIVE
BUN BLDV-MCNC: 30 MG/DL (ref 8–23)
CALCIUM SERPL-MCNC: 8.9 MG/DL (ref 8.8–10.2)
CHLORIDE BLD-SCNC: 101 MMOL/L (ref 98–111)
CLARITY: CLEAR
CO2: 23 MMOL/L (ref 22–29)
COLOR: YELLOW
CREAT SERPL-MCNC: 1 MG/DL (ref 0.5–1.2)
EOSINOPHILS ABSOLUTE: 0.2 K/UL (ref 0–0.6)
EOSINOPHILS RELATIVE PERCENT: 2.8 % (ref 0–5)
GFR NON-AFRICAN AMERICAN: >60
GLUCOSE BLD-MCNC: 178 MG/DL (ref 74–109)
GLUCOSE URINE: NEGATIVE MG/DL
HCT VFR BLD CALC: 40.4 % (ref 42–52)
HEMOGLOBIN: 13.4 G/DL (ref 14–18)
KETONES, URINE: NEGATIVE MG/DL
LEUKOCYTE ESTERASE, URINE: NEGATIVE
LIPASE: 23 U/L (ref 13–60)
LYMPHOCYTES ABSOLUTE: 2.5 K/UL (ref 1.1–4.5)
LYMPHOCYTES RELATIVE PERCENT: 29.9 % (ref 20–40)
MCH RBC QN AUTO: 32.8 PG (ref 27–31)
MCHC RBC AUTO-ENTMCNC: 33.2 G/DL (ref 33–37)
MCV RBC AUTO: 98.8 FL (ref 80–94)
MONOCYTES ABSOLUTE: 0.6 K/UL (ref 0–0.9)
MONOCYTES RELATIVE PERCENT: 7.7 % (ref 0–10)
NEUTROPHILS ABSOLUTE: 4.8 K/UL (ref 1.5–7.5)
NEUTROPHILS RELATIVE PERCENT: 58.2 % (ref 50–65)
NITRITE, URINE: NEGATIVE
PDW BLD-RTO: 12.4 % (ref 11.5–14.5)
PERFORMED ON: NORMAL
PERFORMED ON: NORMAL
PH UA: 6.5
PLATELET # BLD: 183 K/UL (ref 130–400)
PMV BLD AUTO: 11.4 FL (ref 9.4–12.4)
POC TROPONIN I: 0 NG/ML (ref 0–0.08)
POC TROPONIN I: 0.01 NG/ML (ref 0–0.08)
POTASSIUM SERPL-SCNC: 4 MMOL/L (ref 3.5–5)
PRO-BNP: 102 PG/ML (ref 0–900)
PROTEIN UA: NEGATIVE MG/DL
RAPID INFLUENZA  B AGN: NEGATIVE
RAPID INFLUENZA A AGN: NEGATIVE
RBC # BLD: 4.09 M/UL (ref 4.7–6.1)
SODIUM BLD-SCNC: 138 MMOL/L (ref 136–145)
SPECIFIC GRAVITY UA: 1.02
TOTAL PROTEIN: 6.9 G/DL (ref 6.6–8.7)
UROBILINOGEN, URINE: 1 E.U./DL
WBC # BLD: 8.2 K/UL (ref 4.8–10.8)

## 2018-02-11 PROCEDURE — 99285 EMERGENCY DEPT VISIT HI MDM: CPT | Performed by: EMERGENCY MEDICINE

## 2018-02-11 PROCEDURE — 6360000004 HC RX CONTRAST MEDICATION: Performed by: EMERGENCY MEDICINE

## 2018-02-11 PROCEDURE — 82150 ASSAY OF AMYLASE: CPT

## 2018-02-11 PROCEDURE — 96375 TX/PRO/DX INJ NEW DRUG ADDON: CPT

## 2018-02-11 PROCEDURE — 85025 COMPLETE CBC W/AUTO DIFF WBC: CPT

## 2018-02-11 PROCEDURE — 2580000003 HC RX 258: Performed by: EMERGENCY MEDICINE

## 2018-02-11 PROCEDURE — 6370000000 HC RX 637 (ALT 250 FOR IP): Performed by: FAMILY MEDICINE

## 2018-02-11 PROCEDURE — 2580000003 HC RX 258: Performed by: FAMILY MEDICINE

## 2018-02-11 PROCEDURE — 36415 COLL VENOUS BLD VENIPUNCTURE: CPT

## 2018-02-11 PROCEDURE — 96367 TX/PROPH/DG ADDL SEQ IV INF: CPT

## 2018-02-11 PROCEDURE — 71045 X-RAY EXAM CHEST 1 VIEW: CPT

## 2018-02-11 PROCEDURE — 80053 COMPREHEN METABOLIC PANEL: CPT

## 2018-02-11 PROCEDURE — 96376 TX/PRO/DX INJ SAME DRUG ADON: CPT

## 2018-02-11 PROCEDURE — 74177 CT ABD & PELVIS W/CONTRAST: CPT

## 2018-02-11 PROCEDURE — 6360000002 HC RX W HCPCS: Performed by: EMERGENCY MEDICINE

## 2018-02-11 PROCEDURE — 99285 EMERGENCY DEPT VISIT HI MDM: CPT

## 2018-02-11 PROCEDURE — 81003 URINALYSIS AUTO W/O SCOPE: CPT

## 2018-02-11 PROCEDURE — 96365 THER/PROPH/DIAG IV INF INIT: CPT

## 2018-02-11 PROCEDURE — 83690 ASSAY OF LIPASE: CPT

## 2018-02-11 PROCEDURE — 93005 ELECTROCARDIOGRAM TRACING: CPT

## 2018-02-11 PROCEDURE — 83880 ASSAY OF NATRIURETIC PEPTIDE: CPT

## 2018-02-11 PROCEDURE — 87804 INFLUENZA ASSAY W/OPTIC: CPT

## 2018-02-11 PROCEDURE — 84484 ASSAY OF TROPONIN QUANT: CPT

## 2018-02-11 PROCEDURE — C9113 INJ PANTOPRAZOLE SODIUM, VIA: HCPCS | Performed by: EMERGENCY MEDICINE

## 2018-02-11 RX ORDER — SODIUM CHLORIDE 9 MG/ML
INJECTION, SOLUTION INTRAVENOUS CONTINUOUS
Status: DISCONTINUED | OUTPATIENT
Start: 2018-02-11 | End: 2018-02-11 | Stop reason: HOSPADM

## 2018-02-11 RX ORDER — PANTOPRAZOLE SODIUM 40 MG/10ML
80 INJECTION, POWDER, LYOPHILIZED, FOR SOLUTION INTRAVENOUS ONCE
Status: COMPLETED | OUTPATIENT
Start: 2018-02-11 | End: 2018-02-11

## 2018-02-11 RX ORDER — METHYLPREDNISOLONE SODIUM SUCCINATE 125 MG/2ML
125 INJECTION, POWDER, LYOPHILIZED, FOR SOLUTION INTRAMUSCULAR; INTRAVENOUS ONCE
Status: DISCONTINUED | OUTPATIENT
Start: 2018-02-11 | End: 2018-02-11

## 2018-02-11 RX ORDER — VANCOMYCIN HYDROCHLORIDE 1 G/200ML
1000 INJECTION, SOLUTION INTRAVENOUS ONCE
Status: COMPLETED | OUTPATIENT
Start: 2018-02-11 | End: 2018-02-11

## 2018-02-11 RX ORDER — 0.9 % SODIUM CHLORIDE 0.9 %
1000 INTRAVENOUS SOLUTION INTRAVENOUS ONCE
Status: COMPLETED | OUTPATIENT
Start: 2018-02-11 | End: 2018-02-11

## 2018-02-11 RX ORDER — NITROGLYCERIN 0.4 MG/1
0.4 TABLET SUBLINGUAL EVERY 5 MIN PRN
Status: DISCONTINUED | OUTPATIENT
Start: 2018-02-11 | End: 2018-02-11 | Stop reason: HOSPADM

## 2018-02-11 RX ORDER — MORPHINE SULFATE 4 MG/ML
4 INJECTION, SOLUTION INTRAMUSCULAR; INTRAVENOUS ONCE
Status: COMPLETED | OUTPATIENT
Start: 2018-02-11 | End: 2018-02-11

## 2018-02-11 RX ADMIN — Medication 30 ML: at 06:50

## 2018-02-11 RX ADMIN — TAZOBACTAM SODIUM AND PIPERACILLIN SODIUM 3.38 G: 375; 3 INJECTION, SOLUTION INTRAVENOUS at 08:36

## 2018-02-11 RX ADMIN — SODIUM CHLORIDE: 9 INJECTION, SOLUTION INTRAVENOUS at 06:51

## 2018-02-11 RX ADMIN — VANCOMYCIN HYDROCHLORIDE 1000 MG: 1 INJECTION, SOLUTION INTRAVENOUS at 09:02

## 2018-02-11 RX ADMIN — PANTOPRAZOLE SODIUM 80 MG: 40 INJECTION, POWDER, FOR SOLUTION INTRAVENOUS at 08:36

## 2018-02-11 RX ADMIN — IOPAMIDOL 90 ML: 755 INJECTION, SOLUTION INTRAVENOUS at 07:51

## 2018-02-11 RX ADMIN — Medication 4 MG: at 09:07

## 2018-02-11 RX ADMIN — Medication 4 MG: at 10:07

## 2018-02-11 RX ADMIN — SODIUM CHLORIDE 1000 ML: 9 INJECTION, SOLUTION INTRAVENOUS at 08:42

## 2018-02-11 RX ADMIN — NITROGLYCERIN 0.4 MG: 0.4 TABLET SUBLINGUAL at 06:50

## 2018-02-11 ASSESSMENT — ENCOUNTER SYMPTOMS
ABDOMINAL PAIN: 0
WHEEZING: 0
PHOTOPHOBIA: 0
BACK PAIN: 0
DIARRHEA: 0
SORE THROAT: 0
NAUSEA: 0
ABDOMINAL DISTENTION: 0
SHORTNESS OF BREATH: 0
CONSTIPATION: 0
COUGH: 0

## 2018-02-11 ASSESSMENT — PAIN SCALES - GENERAL
PAINLEVEL_OUTOF10: 5
PAINLEVEL_OUTOF10: 2
PAINLEVEL_OUTOF10: 10

## 2018-02-11 NOTE — ED PROVIDER NOTES
[facility] EMERGENCY DEPT  eMERGENCY dEPARTMENT eNCOUnter      Pt Name: Kaylyn Mon  MRN: [id number]  [place] 1955  Date of evaluation: 2/11/2018  Provider: Vanessa Espinoza MD    Emergency Department care of this patient was assumed at 0700 from Dr. Justin Cavanaugh. We have discussed the case and the plan of care. I have seen and evaluated patient and reviewed ED course. CHIEF COMPLAINT       Chief Complaint   Patient presents with    Chest Pain     left side x 1 hour intermittently     Chest pain woke at 3 AM 8 a sandwich then woke at 5 AM with some left-sided chest pain, reproducible on exam per Dr. Justin Cavanaugh, has provided nitro GI cocktail no prior cardiac stents supposedly has had numerous normal heart cath's    PHYSICAL EXAM    (up to 7 for level 4, 8 or more for level 5)     ED Triage Vitals [02/11/18 0619]   BP Temp Temp Source Pulse Resp SpO2 Height Weight   138/81 97.9 °F (36.6 °C) Oral 81 18 98 % 6' (1.829 m) 270 lb (122.5 kg)       Physical Exam   Constitutional: He is oriented to person, place, and time. He appears well-developed and well-nourished. No distress. HENT:   Head: Normocephalic and atraumatic. Right Ear: External ear normal.   Left Ear: External ear normal.   Mouth/Throat: Oropharynx is clear and moist.   Eyes: Conjunctivae and EOM are normal.   Neck: Normal range of motion. No tracheal deviation present. Cardiovascular: Normal rate, regular rhythm and normal heart sounds. Pulmonary/Chest: Effort normal and breath sounds normal. He has no wheezes. He has no rales. He exhibits no tenderness. Abdominal: Soft. There is tenderness in the right upper quadrant, epigastric area and left upper quadrant. There is no rebound, no guarding and no CVA tenderness. Musculoskeletal: Normal range of motion. He exhibits no edema. Neurological: He is alert and oriented to person, place, and time. Skin: Skin is warm and dry. He is not diaphoretic. Vitals reviewed. DIAGNOSTIC RESULTS     EKG:  All

## 2018-02-11 NOTE — ED PROVIDER NOTES
[facility] EMERGENCY DEPT  eMERGENCY dEPARTMENT eNCOUnter      Pt Name: Norma Pena  MRN: [id number]  [place] 1955  Date of evaluation: 2/11/2018  Provider: Pako Perera MD    [address] [place] [address]       Chief Complaint   Patient presents with    Chest Pain     left side x 1 hour intermittently         HISTORY OF PRESENT ILLNESS   (Location/Symptom, Timing/Onset, Context/Setting, Quality, Duration, Modifying Factors, Severity)  Note limiting factors. Norma Pena is a 63 y.o. male who presents to the emergency department Complaining of left-sided chest pain that started about 5:00 this morning. Patient relates he got up at 3 AM to take the dog's 2 the bathroom, and the patient then ate a sandwich and then awoke at about 5 with pain in his chest up to the left side. He states it was worse with movement and also worse with deep inspiration. HPI    Nursing Notes were reviewed. REVIEW OF SYSTEMS    (2-9 systems for level 4, 10 or more for level 5)     Review of Systems   Constitutional: Positive for fever (Off-and-on low grade temp for 1 week. ). Negative for activity change, appetite change, chills, diaphoresis and fatigue. HENT: Negative for congestion and sore throat. Eyes: Negative for photophobia and visual disturbance. Respiratory: Negative for cough, shortness of breath and wheezing. Cardiovascular: Positive for chest pain. Negative for palpitations and leg swelling. Gastrointestinal: Negative for abdominal distention, abdominal pain, constipation, diarrhea and nausea. Endocrine: Negative for cold intolerance and heat intolerance. Genitourinary: Negative for difficulty urinating and dysuria. Musculoskeletal: Negative for back pain. Skin: Negative for rash. Neurological: Negative for dizziness, seizures, syncope and weakness. Hematological: Negative for adenopathy. Psychiatric/Behavioral: Negative for agitation, confusion and suicidal ideas.             PAST MEDICAL HISTORY

## 2018-02-13 LAB
EKG P AXIS: 43 DEGREES
EKG P AXIS: NORMAL DEGREES
EKG P-R INTERVAL: 286 MS
EKG P-R INTERVAL: NORMAL MS
EKG Q-T INTERVAL: 410 MS
EKG Q-T INTERVAL: 474 MS
EKG QRS DURATION: 96 MS
EKG QRS DURATION: 98 MS
EKG QTC CALCULATION (BAZETT): 420 MS
EKG QTC CALCULATION (BAZETT): 450 MS
EKG T AXIS: 63 DEGREES
EKG T AXIS: 68 DEGREES

## 2018-02-22 ENCOUNTER — HOSPITAL ENCOUNTER (OUTPATIENT)
Dept: WOUND CARE | Age: 63
Discharge: HOME | End: 2018-02-22
Payer: MEDICARE

## 2018-02-22 VITALS
TEMPERATURE: 98.6 F | SYSTOLIC BLOOD PRESSURE: 121 MMHG | HEIGHT: 72 IN | HEART RATE: 83 BPM | DIASTOLIC BLOOD PRESSURE: 77 MMHG | RESPIRATION RATE: 16 BRPM | BODY MASS INDEX: 36.57 KG/M2 | WEIGHT: 270 LBS

## 2018-02-22 DIAGNOSIS — E11.621 DIABETIC ULCER OF LEFT FOOT ASSOCIATED WITH TYPE 2 DIABETES MELLITUS, WITH BONE INVOLVEMENT WITHOUT EVIDENCE OF NECROSIS, UNSPECIFIED PART OF FOOT (HCC): ICD-10-CM

## 2018-02-22 DIAGNOSIS — L97.526 DIABETIC ULCER OF LEFT FOOT ASSOCIATED WITH TYPE 2 DIABETES MELLITUS, WITH BONE INVOLVEMENT WITHOUT EVIDENCE OF NECROSIS, UNSPECIFIED PART OF FOOT (HCC): ICD-10-CM

## 2018-02-22 DIAGNOSIS — L97.512 ULCERATED, FOOT, RIGHT, WITH FAT LAYER EXPOSED (HCC): ICD-10-CM

## 2018-02-22 PROCEDURE — 11042 DBRDMT SUBQ TIS 1ST 20SQCM/<: CPT

## 2018-02-22 PROCEDURE — 97597 DBRDMT OPN WND 1ST 20 CM/<: CPT

## 2018-02-22 PROCEDURE — 11042 DBRDMT SUBQ TIS 1ST 20SQCM/<: CPT | Performed by: SURGERY

## 2018-02-22 PROCEDURE — 97597 DBRDMT OPN WND 1ST 20 CM/<: CPT | Performed by: SURGERY

## 2018-02-22 ASSESSMENT — PAIN DESCRIPTION - ONSET: ONSET: ON-GOING

## 2018-02-22 ASSESSMENT — PAIN DESCRIPTION - PROGRESSION: CLINICAL_PROGRESSION: NOT CHANGED

## 2018-02-22 ASSESSMENT — PAIN DESCRIPTION - PAIN TYPE: TYPE: CHRONIC PAIN

## 2018-02-22 ASSESSMENT — PAIN DESCRIPTION - FREQUENCY: FREQUENCY: INTERMITTENT

## 2018-02-22 ASSESSMENT — PAIN DESCRIPTION - ORIENTATION: ORIENTATION: LEFT

## 2018-02-22 ASSESSMENT — PAIN SCALES - GENERAL: PAINLEVEL_OUTOF10: 2

## 2018-02-22 ASSESSMENT — PAIN DESCRIPTION - LOCATION: LOCATION: LEG

## 2018-02-22 ASSESSMENT — PAIN DESCRIPTION - DESCRIPTORS: DESCRIPTORS: BURNING

## 2018-02-22 NOTE — PROGRESS NOTES
[address]   Progress Note and Procedure Note      [address] RECORD NUMBER:  [id number]  AGE: 63 y.o. GENDER: male  : 1955  EPISODE DATE:  2018    Subjective:     Chief Complaint   Patient presents with    Wound Check     Lower Ext Wounds         HISTORY of PRESENT ILLNESS HPI     Rivera Infante is a 63 y.o. male who presents today for wound/ulcer evaluation. History of Wound Context: Pt with LLE wounds x 2 here for eval/treat.   Also 2 weeks s/p perf gastric bypass from [facility] Pain Timing/Severity: none  Quality of pain: N/A  Severity:  0 / 10   Modifying Factors: None  Associated Signs/Symptoms: none    Ulcer Identification:  Ulcer Type: venous and diabetic  Contributing Factors: edema, venous stasis and diabetes    Wound: Venous         PAST MEDICAL HISTORY        Diagnosis Date    Asthma     Broken ribs     CAD (coronary artery disease)     CH[facility]congestive heart failure) ([facility])     Diabetes mellitus ([address]     Hypertension     Morbid obesity ([address] 6/22/15    Skin ulcer of toe of left foot with fat layer exposed ([address] 2017    Sleep apnea in adult 6/22/15    Venous insufficiency of both lower extremities 6/22/15       PAST SURGICAL HISTORY    Past Surgical History:   Procedure Laterality Date    CARDIAC CATHETERIZATION      DILATATION, ESOPHAGUS      GASTRIC BYPASS SURGERY N/A 2015    TOTAL KNEE ARTHROPLASTY Bilateral        FAMILY HISTORY    Family History   Problem Relation Age of Onset    Diabetes Mother     Heart Disease Mother     High Blood Pressure Mother     Heart Disease Father     Other Father        SOCIAL HISTORY    Social History   Substance Use Topics    Smoking status: Never Smoker    Smokeless tobacco: Never Used    Alcohol use No       ALLERGIES    Allergies   Allergen Reactions    Adhesive Tape      Tears skin       MEDICATIONS    Current Outpatient Prescriptions on File Prior to Encounter Medication Sig Dispense Refill    HYDROcodone-acetaminophen (NORCO)  MG per tablet Take 1 tablet by mouth every 6 hours as needed for Pain for up to 30 days. 120 tablet 0    lisinopril (PRINIVIL;ZESTRIL) 20 MG tablet Take 20 mg by mouth nightly        No current facility-administered medications on file prior to encounter. REVIEW OF SYSTEMS    Pertinent items are noted in HPI.     Objective:      /77   Pulse 83   Temp 98.6 °F (37 °C) (Temporal)   Resp 16   Ht 6' (1.829 m)   Wt 270 lb (122.5 kg)   BMI 36.62 kg/m²     Wt Readings from Last 3 Encounters:   02/22/18 270 lb (122.5 kg)   02/11/18 270 lb (122.5 kg)   02/08/18 279 lb (126.6 kg)       PHYSICAL EXAM    General Appearance: alert and oriented to person, place and time, well developed and well- nourished, in no acute distress  Skin: warm and dry, no rash or erythema  Head: normocephalic and atraumatic  Eyes: pupils equal, round, and reactive to light, extraocular eye movements intact, conjunctivae normal  ENT: tympanic membrane, external ear and ear canal normal bilaterally, nose without deformity, nasal mucosa and turbinates normal without polyps  Neck: supple and non-tender without mass, no thyromegaly or thyroid nodules, no cervical lymphadenopathy  Pulmonary/Chest: clear to auscultation bilaterally- no wheezes, rales or rhonchi, normal air movement, no respiratory distress  Cardiovascular: normal rate, regular rhythm, normal S1 and S2, no murmurs, rubs, clicks, or gallops, distal pulses intact, no carotid bruits  Abdomen: soft, non-tender, non-distended, normal bowel sounds, no masses or organomegaly  Extremities: no cyanosis, clubbing or edema  Musculoskeletal: normal range of motion, no joint swelling, deformity or tenderness  Neurologic: reflexes normal and symmetric, no cranial nerve deficit, gait, coordination and speech normal      Assessment:      Patient Active Problem List   Diagnosis Code    Venous insufficiency of both lower extremities I87.2    Morbid obesity ([facility]) E66.01    Type 2 diabetes mellitus with diabetic dermatitis ([facility]) E11.620    Venous ulcer of right leg ([facility]) I83.019    Idiopathic chronic venous hypertension of left leg with ulcer ([facility]) I87.312    Diabetic ulcer of left foot associated with type 2 diabetes mellitus ([facility]) E11.621, L97.529    Ulcerated, foot, right, with fat layer exposed ([address] L97.512    Type 2 diabetes mellitus with neurologic complication ([facility]) E17.24    Venous hypertension, chronic, with ulcer, left (H[facility]) I87.312    Chronic ulcer of left leg, with fat layer exposed ([address] L97.922    Obstructive sleep apnea G47.33    Somnolence, daytime R40.0    Snoring R06.83    Witnessed apneic spells R06.81    Other insomnia G47.09        Procedure Note  Indications:  Based on my examination of this patient's wound(s)/ulcer(s) today, debridement is required to promote healing and evaluate the wound base. Performed by: Pato Farrell MD    Consent obtained:  Yes    Time out taken:  Yes    Pain Control: Anesthetic  Anesthetic: 2% Lidocaine Gel Topical       Debridement:Excisional Debridement    Using curette the wound(s)/ulcer(s) was/were sharply debrided down through and including the removal of epidermis, dermis and subcutaneous tissue. Devitalized Tissue Debrided:  fibrin, biofilm, slough and exudate      Pre Debridement Measurements:  Are located in the Wound/Ulcer Documentation Flow Sheet    Wound/Ulcer #: 31    Percent of Wound(s)/Ulcer(s) Debrided: 100%    Total Surface Area Debrided:  2.4 sq cm       Diabetic/Pressure/Non Pressure Ulcers only:  Ulcer: Diabetic ulcer, fat layer exposed      Debridement:Non-excisional Debridement    Using curette the wound(s)/ulcer(s) was/were sharply debrided down through and including the removal of epidermis and dermis.         Devitalized Tissue Debrided:  fibrin, biofilm, slough and exudate    Pre Debridement Measurements:  Are located in the Wound/Ulcer Documentation Flow Sheet    Wound/Ulcer #: 36    Percent of Wound(s)/Ulcer(s) Debrided: 50%    Total Surface Area Debrided:  15 sq cm       Diabetic/Pressure/Non Pressure Ulcers only:  Ulcer: Diabetic ulcer, fat layer exposed           Post Debridement Measurements:    Wound/Ulcer Descriptions are Pre Debridement --EXCEPT MEASUREMENTS    Wound 03/27/17 Venous ulcer Leg Left;Lateral;Proximal VENOUS LEG ULCER #31 LATERAL PROXIMAL LEFT (Active)   Wound Image   2/5/2018 10:26 AM   Wound Type Wound 2/22/2018  9:45 AM   Wound Venous 2/22/2018  9:45 AM   Dressing Status Old drainage 2/22/2018  9:45 AM   Dressing Changed Changed/New 2/1/2018  1:45 PM   Dressing/Treatment Moist to dry 1/3/2018  8:11 AM   Wound Cleansed Rinsed/Irrigated with saline 2/5/2018 10:26 AM   Wound Length (cm) 1.5 cm 2/22/2018 10:20 AM   Wound Width (cm) 1.6 cm 2/22/2018 10:20 AM   Wound Depth (cm)  .7 2/22/2018 10:20 AM   Calculated Wound Size (cm^2) (l*w) 2.4 cm^2 2/22/2018 10:20 AM   Change in Wound Size % (l*w) -41.18 2/22/2018 10:20 AM   Distance Tunneling (cm) 0 cm 2/5/2018 10:26 AM   Tunneling Position ___ O'Clock 0 2/22/2018  9:45 AM   Undermining Starts ___ O'Clock 0 2/22/2018  9:45 AM   Undermining Ends___ O'Clock 0 2/22/2018  9:45 AM   Undermining Maxium Distance (cm) 0 2/22/2018  9:45 AM   Wound Assessment Slough 2/22/2018  9:45 AM   Drainage Amount Moderate 2/22/2018  9:45 AM   Drainage Description Serosanguinous 2/22/2018  9:45 AM   Odor Mild 2/22/2018  9:45 AM   Margins Attached edges 2/22/2018  9:45 AM   Exposed structure Bone 2/22/2018  9:45 AM   Lurdes-wound Assessment Red 2/22/2018  9:45 AM   Non-staged Wound Description Full thickness 2/22/2018  9:45 AM   [place]%Wound Bed 75 2/22/2018  9:45 AM   Red%Wound Bed 50 2/8/2018 11:27 AM   Yellow%Wound Bed 25 2/22/2018  9:45 AM   Black%Wound Bed 0 2/22/2018  9:45 AM   Purple%Wound Bed 0 2/22/2018  9:45 AM   Other%Wound Bed 0 2/22/2018  9:45 AM   Culture Taken No 1/9/2018  8:45 AM Wound Depth (cm)  0 11/1/2017 11:17 AM   Calculated Wound Size (cm^2) (l*w) 0 cm^2 11/1/2017 11:17 AM   Change in Wound Size % (l*w) 100 11/1/2017 11:17 AM   Culture Taken No 10/25/2017  4:57 PM   Dressing Status Intact;Dry 11/1/2017 11:17 AM   Dressing Changed Changed/New 11/1/2017 11:17 AM   Wound Cleansed Rinsed/Irrigated with saline 11/1/2017 11:17 AM   Necrotic Type Yellow Fibrin/Slough 10/25/2017  4:57 PM   Necrotic Amount None present 11/1/2017 11:17 AM   Granulation Quality N/A 11/1/2017 11:17 AM   Drainage Amount None 11/1/2017 11:17 AM   Drainage Description Serosanguinous 10/25/2017  4:57 PM   Odor None 11/1/2017 11:17 AM   Distance Tunneling (cm) 0 cm 10/18/2017 12:47 PM   Tunneling Position ___ O'Clock 0 10/18/2017 12:47 PM   Tunneling Maxium Distance (cm) 0 10/18/2017 12:47 PM   Undermining Starts ___ O'Clock 0 10/18/2017 12:47 PM   Undermining Ends___ O'Clock 0 10/18/2017 12:47 PM   Undermining Maxium Distance (cm) 0 10/18/2017 12:47 PM   [place]%Wound Bed 100 11/1/2017 11:17 AM   Red%Wound Bed 2 10/18/2017 12:47 PM   Yellow%Wound Bed 3 10/18/2017 12:47 PM   Black%Wound Bed 0 10/18/2017 12:47 PM   Purple%Wound Bed 0 10/18/2017 12:47 PM   Margins Other (Comment) 11/1/2017 11:17 AM   Debridement per physician None 11/1/2017 12:56 PM   Time out N/A 11/1/2017 12:56 PM   Procedural Pain 0 10/18/2017 12:59 PM   Post procedural Pain 0 10/18/2017 12:59 PM   Number of days: 126         Estimated Blood Loss:  Minimal    Hemostasis Achieved:  by pressure    Procedural Pain:  0  / 10     Post Procedural Pain:  0 / 10     Response to treatment:  Well tolerated by patient. Plan:     Treatment Note please see attached Discharge Instructions    In my professional opinion this patient would benefit from HBO Therapy: No    Written patient dismissal instructions given to patient and signed by patient or POA.          Discharge Instructions       Visit Discharge/Physician Orders    Discharge condition:

## 2018-03-01 RX ORDER — HYDROCODONE BITARTRATE AND ACETAMINOPHEN 10; 325 MG/1; MG/1
1 TABLET ORAL EVERY 6 HOURS PRN
Qty: 120 TABLET | Refills: 0 | Status: SHIPPED | OUTPATIENT
Start: 2018-03-01 | End: 2018-03-28 | Stop reason: SDUPTHER

## 2018-03-02 ENCOUNTER — HOSPITAL ENCOUNTER (OUTPATIENT)
Dept: WOUND CARE | Age: 63
Discharge: HOME | End: 2018-03-02
Payer: MEDICARE

## 2018-03-02 VITALS
WEIGHT: 270 LBS | HEIGHT: 72 IN | SYSTOLIC BLOOD PRESSURE: 107 MMHG | HEART RATE: 87 BPM | TEMPERATURE: 97.6 F | BODY MASS INDEX: 36.57 KG/M2 | DIASTOLIC BLOOD PRESSURE: 62 MMHG | RESPIRATION RATE: 24 BRPM

## 2018-03-02 DIAGNOSIS — E11.621 DIABETIC ULCER OF LEFT FOOT ASSOCIATED WITH TYPE 2 DIABETES MELLITUS, WITH BONE INVOLVEMENT WITHOUT EVIDENCE OF NECROSIS, UNSPECIFIED PART OF FOOT (HCC): ICD-10-CM

## 2018-03-02 DIAGNOSIS — L97.512 ULCERATED, FOOT, RIGHT, WITH FAT LAYER EXPOSED (HCC): ICD-10-CM

## 2018-03-02 DIAGNOSIS — L97.526 DIABETIC ULCER OF LEFT FOOT ASSOCIATED WITH TYPE 2 DIABETES MELLITUS, WITH BONE INVOLVEMENT WITHOUT EVIDENCE OF NECROSIS, UNSPECIFIED PART OF FOOT (HCC): ICD-10-CM

## 2018-03-02 PROCEDURE — 11042 DBRDMT SUBQ TIS 1ST 20SQCM/<: CPT | Performed by: SURGERY

## 2018-03-02 PROCEDURE — 97597 DBRDMT OPN WND 1ST 20 CM/<: CPT | Performed by: SURGERY

## 2018-03-02 PROCEDURE — 97597 DBRDMT OPN WND 1ST 20 CM/<: CPT

## 2018-03-02 PROCEDURE — 11042 DBRDMT SUBQ TIS 1ST 20SQCM/<: CPT

## 2018-03-02 RX ORDER — TEMAZEPAM 15 MG/1
30 CAPSULE ORAL NIGHTLY
COMMUNITY
End: 2018-03-02 | Stop reason: CLARIF

## 2018-03-02 RX ORDER — TEMAZEPAM 30 MG/1
30 CAPSULE ORAL NIGHTLY
COMMUNITY
End: 2018-06-26 | Stop reason: SDUPTHER

## 2018-03-02 ASSESSMENT — PAIN SCALES - GENERAL: PAINLEVEL_OUTOF10: 0

## 2018-03-02 NOTE — PROGRESS NOTES
List   Diagnosis Code    Venous insufficiency of both lower extremities I87.2    Morbid obesity ([facility] E66.01    Type 2 diabetes mellitus with diabetic dermatitis ([facility] E11.620    Venous ulcer of right leg ([facility]) I83.019    Idiopathic chronic venous hypertension of left leg with ulcer ([facility] I87.312    Diabetic ulcer of left foot associated with type 2 diabetes mellitus ([facility]) E11.621, L97.529    Ulcerated, foot, right, with fat layer exposed ([facility] L97.512    Type 2 diabetes mellitus with neurologic complication ([facility]) P79.31    Venous hypertension, chronic, with ulcer, left ([facility]) I87.312    Chronic ulcer of left leg, with fat layer exposed ([facility] L97.922    Obstructive sleep apnea G47.33    Somnolence, daytime R40.0    Snoring R06.83    Witnessed apneic spells R06.81    Other insomnia G47.09        Procedure Note  Indications:  Based on my examination of this patient's wound(s)/ulcer(s) today, debridement is required to promote healing and evaluate the wound base. Performed by: Roseline Carrel, MD    Consent obtained:  Yes    Time out taken:  Yes    Pain Control: Anesthetic  Anesthetic: 2% Lidocaine Gel Topical       Debridement:Excisional Debridement    Using curette the wound(s)/ulcer(s) was/were sharply debrided down through and including the removal of epidermis, dermis and subcutaneous tissue. Devitalized Tissue Debrided:  fibrin, biofilm, slough and exudate      Pre Debridement Measurements:  Are located in the Wound/Ulcer Documentation Flow Sheet    Wound/Ulcer #: 31    Percent of Wound(s)/Ulcer(s) Debrided: 100%    Total Surface Area Debrided:  3.6 sq cm       Diabetic/Pressure/Non Pressure Ulcers only:  Ulcer: Diabetic ulcer, fat layer exposed      Debridement:Non-excisional Debridement    Using curette the wound(s)/ulcer(s) was/were sharply debrided down through and including the removal of epidermis and dermis.         Devitalized Tissue Debrided:  fibrin, biofilm, slough and 3/2/2018  9:43 AM   Other%Wound Bed 0 3/2/2018  9:43 AM   Culture Taken No 1/9/2018  8:45 AM   Debridement per physician Subcutaneous 3/2/2018 10:00 AM   Time out Yes 3/2/2018 10:00 AM   Procedural Pain 0 3/2/2018 10:00 AM   Post procedural Pain 0 3/2/2018 10:00 AM   Number of days: 339       Wound 10/18/17 Foot Plantar;Right;Medial WOUND 34; RIGHT FOOT PLANTAR MEDIAL ( UNDER GREAT TOE) (Active)   Wound Type Wound 11/1/2017 11:17 AM   Wound Diabetic Lemon 1 11/1/2017 11:17 AM   Dressing Status Intact;Dry 11/1/2017 11:17 AM   Dressing Changed Changed/New 11/1/2017 11:17 AM   Wound Cleansed Rinsed/Irrigated with saline 11/1/2017 11:17 AM   Wound Length (cm) 0 cm 11/1/2017 11:17 AM   Wound Width (cm) 0 cm 11/1/2017 11:17 AM   Wound Depth (cm)  0 11/1/2017 11:17 AM   Calculated Wound Size (cm^2) (l*w) 0 cm^2 11/1/2017 11:17 AM   Change in Wound Size % (l*w) 100 11/1/2017 11:17 AM   Distance Tunneling (cm) 0 cm 10/18/2017 12:47 PM   Tunneling Position ___ O'Clock 0 10/18/2017 12:47 PM   Undermining Starts ___ O'Clock 0 10/18/2017 12:47 PM   Undermining Ends___ O'Clock 0 10/18/2017 12:47 PM   Undermining Maxium Distance (cm) 0 10/18/2017 12:47 PM   Wound Assessment Pink 11/1/2017 11:17 AM   Drainage Amount None 11/1/2017 11:17 AM   Drainage Description Sanguinous 10/18/2017 12:47 PM   Odor None 11/1/2017 11:17 AM   Margins Attached edges 10/25/2017  4:57 PM   Lurdes-wound Assessment Calloused 11/1/2017 11:17 AM   Non-staged Wound Description Not applicable 75/9/9680 71:52 AM   [place]%Wound Bed 100 11/1/2017 11:17 AM   Red%Wound Bed 0 11/1/2017 11:17 AM   Yellow%Wound Bed 0 11/1/2017 11:17 AM   Black%Wound Bed 0 10/18/2017 12:47 PM   Purple%Wound Bed 0 10/18/2017 12:47 PM   Other%Wound Bed 0 10/18/2017 12:47 PM   Culture Taken No 10/18/2017 12:59 PM   Debridement per physician None 11/1/2017 12:56 PM   Time out N/A 11/1/2017 12:56 PM   Procedural Pain 0 10/18/2017 12:59 PM   Post procedural Pain 0 10/18/2017 12:59 PM   Number Texture Callus 7/12/2017  8:15 AM   Lurdes-Wound Moisture Dry 7/12/2017  8:15 AM   Lurdes-Wound Color Pink 7/12/2017  8:15 AM   Diabetic Wound - Helen Villanueva 1 7/12/2017  8:15 AM   Non-staged Wound Description Full thickness 6/28/2017  8:14 AM   Wound Assessment Intact 7/12/2017  8:15 AM   Shape Oval 6/7/2017  8:39 AM   Wound Length (cm) 0 cm 7/12/2017  8:15 AM   Wound Width (cm) 0 cm 7/12/2017  8:15 AM   Wound Depth (cm)  0 7/12/2017  8:15 AM   Calculated Wound Size (cm^2) (l*w) 0 cm^2 7/12/2017  8:15 AM   Change in Wound Size % (l*w) 100 7/12/2017  8:15 AM   Dressing Status Old drainage 7/12/2017  8:15 AM   Dressing Changed Changed/New 7/5/2017  9:01 AM   Dressing/Treatment Open to air 7/12/2017  8:43 AM   Wound Cleansed Other (Comment) 6/28/2017  8:14 AM   Necrotic Type Black Eschar 6/2/2017  8:50 AM   Necrotic Amount Large: % 6/2/2017  8:50 AM   Granulation Quality Pink 7/12/2017  8:15 AM   Drainage Amount Small 7/12/2017  8:15 AM   Drainage Description Serosanguinous 7/12/2017  8:15 AM   Odor None 7/12/2017  8:15 AM   Epithelialization None present 6/2/2017  8:50 AM   Distance Tunneling (cm) 0 cm 7/12/2017  8:15 AM   Tunneling Position ___ O'Clock 0 7/12/2017  8:15 AM   Tunneling Maxium Distance (cm) 0 7/12/2017  8:15 AM   Undermining Starts ___ O'Clock 0 7/12/2017  8:15 AM   Undermining Ends___ O'Clock 0 7/12/2017  8:15 AM   Undermining Maxium Distance (cm) 0 7/12/2017  8:15 AM   [place]%Wound Bed 0 7/12/2017  8:15 AM   Red%Wound Bed 0 7/12/2017  8:15 AM   Yellow%Wound Bed 0 7/12/2017  8:15 AM   Black%Wound Bed 0 7/12/2017  8:15 AM   Margins Attached edges 6/21/2017  8:29 AM   Debridement per physician Partial thickness 7/5/2017  9:01 AM   Time out Yes 7/5/2017  9:01 AM   Procedural Pain 0 7/5/2017  9:01 AM   Post procedural Pain 0 7/5/2017  9:01 AM   Number of days: 339       Diabetic Ulcer 10/18/17 Foot Right;Plantar;Lateral WOUND 35; rIGHT FOOT PLANTAR LATERAL ( UNDER 5TH TOE) (Active)   Lurdes-wound

## 2018-03-09 ENCOUNTER — HOSPITAL ENCOUNTER (OUTPATIENT)
Dept: WOUND CARE | Age: 63
Discharge: HOME | End: 2018-03-09
Payer: MEDICARE

## 2018-03-09 VITALS
DIASTOLIC BLOOD PRESSURE: 64 MMHG | SYSTOLIC BLOOD PRESSURE: 100 MMHG | BODY MASS INDEX: 32.64 KG/M2 | WEIGHT: 241 LBS | HEART RATE: 80 BPM | HEIGHT: 72 IN | TEMPERATURE: 97.7 F | RESPIRATION RATE: 18 BRPM

## 2018-03-09 DIAGNOSIS — L97.512 ULCERATED, FOOT, RIGHT, WITH FAT LAYER EXPOSED (HCC): ICD-10-CM

## 2018-03-09 DIAGNOSIS — L97.526 DIABETIC ULCER OF LEFT FOOT ASSOCIATED WITH TYPE 2 DIABETES MELLITUS, WITH BONE INVOLVEMENT WITHOUT EVIDENCE OF NECROSIS, UNSPECIFIED PART OF FOOT (HCC): ICD-10-CM

## 2018-03-09 DIAGNOSIS — E11.621 DIABETIC ULCER OF LEFT FOOT ASSOCIATED WITH TYPE 2 DIABETES MELLITUS, WITH BONE INVOLVEMENT WITHOUT EVIDENCE OF NECROSIS, UNSPECIFIED PART OF FOOT (HCC): ICD-10-CM

## 2018-03-09 PROCEDURE — 11042 DBRDMT SUBQ TIS 1ST 20SQCM/<: CPT

## 2018-03-09 PROCEDURE — 11042 DBRDMT SUBQ TIS 1ST 20SQCM/<: CPT | Performed by: SURGERY

## 2018-03-09 PROCEDURE — 97597 DBRDMT OPN WND 1ST 20 CM/<: CPT

## 2018-03-09 PROCEDURE — 97597 DBRDMT OPN WND 1ST 20 CM/<: CPT | Performed by: SURGERY

## 2018-03-09 RX ORDER — OMEPRAZOLE 20 MG/1
20 CAPSULE, DELAYED RELEASE ORAL DAILY
Status: ON HOLD | COMMUNITY
End: 2018-06-13 | Stop reason: HOSPADM

## 2018-03-09 ASSESSMENT — PAIN DESCRIPTION - DESCRIPTORS: DESCRIPTORS: OTHER (COMMENT);BURNING

## 2018-03-09 ASSESSMENT — PAIN DESCRIPTION - ORIENTATION: ORIENTATION: RIGHT

## 2018-03-09 ASSESSMENT — PAIN SCALES - GENERAL: PAINLEVEL_OUTOF10: 6

## 2018-03-09 ASSESSMENT — PAIN DESCRIPTION - LOCATION: LOCATION: LEG

## 2018-03-09 ASSESSMENT — PAIN DESCRIPTION - ONSET: ONSET: ON-GOING

## 2018-03-09 ASSESSMENT — PAIN DESCRIPTION - PAIN TYPE: TYPE: ACUTE PAIN

## 2018-03-09 ASSESSMENT — PAIN DESCRIPTION - FREQUENCY: FREQUENCY: INTERMITTENT

## 2018-03-15 ENCOUNTER — HOSPITAL ENCOUNTER (OUTPATIENT)
Dept: WOUND CARE | Age: 63
Discharge: HOME | End: 2018-03-15
Payer: MEDICARE

## 2018-03-15 VITALS
WEIGHT: 241 LBS | BODY MASS INDEX: 32.64 KG/M2 | DIASTOLIC BLOOD PRESSURE: 64 MMHG | TEMPERATURE: 98.6 F | HEART RATE: 81 BPM | SYSTOLIC BLOOD PRESSURE: 100 MMHG | HEIGHT: 72 IN | RESPIRATION RATE: 18 BRPM

## 2018-03-15 DIAGNOSIS — L97.526 DIABETIC ULCER OF LEFT FOOT ASSOCIATED WITH TYPE 2 DIABETES MELLITUS, WITH BONE INVOLVEMENT WITHOUT EVIDENCE OF NECROSIS, UNSPECIFIED PART OF FOOT (HCC): ICD-10-CM

## 2018-03-15 DIAGNOSIS — E11.621 DIABETIC ULCER OF LEFT FOOT ASSOCIATED WITH TYPE 2 DIABETES MELLITUS, WITH BONE INVOLVEMENT WITHOUT EVIDENCE OF NECROSIS, UNSPECIFIED PART OF FOOT (HCC): ICD-10-CM

## 2018-03-15 DIAGNOSIS — L97.512 ULCERATED, FOOT, RIGHT, WITH FAT LAYER EXPOSED (HCC): ICD-10-CM

## 2018-03-15 PROCEDURE — 97605 NEG PRS WND THER DME<=50SQCM: CPT

## 2018-03-15 PROCEDURE — 11042 DBRDMT SUBQ TIS 1ST 20SQCM/<: CPT

## 2018-03-15 PROCEDURE — 11042 DBRDMT SUBQ TIS 1ST 20SQCM/<: CPT | Performed by: SURGERY

## 2018-03-15 ASSESSMENT — PAIN DESCRIPTION - FREQUENCY: FREQUENCY: INTERMITTENT

## 2018-03-15 ASSESSMENT — PAIN DESCRIPTION - ONSET: ONSET: ON-GOING

## 2018-03-15 ASSESSMENT — PAIN DESCRIPTION - ORIENTATION: ORIENTATION: RIGHT

## 2018-03-15 ASSESSMENT — PAIN SCALES - GENERAL: PAINLEVEL_OUTOF10: 4

## 2018-03-15 ASSESSMENT — PAIN DESCRIPTION - PAIN TYPE: TYPE: ACUTE PAIN

## 2018-03-15 ASSESSMENT — PAIN DESCRIPTION - LOCATION: LOCATION: LEG

## 2018-03-15 NOTE — PROGRESS NOTES
[address]   Progress Note and Procedure Note      [address] RECORD NUMBER:  [id number]  AGE: 63 y.o. GENDER: male  : 1955  EPISODE DATE:  3/15/2018    Subjective:     Chief Complaint   Patient presents with    Wound Check     Lower Ext Wound         HISTORY of PRESENT ILLNESS SUE Drew is a 63 y.o. male who presents today for wound/ulcer evaluation.    History of Wound Context: Pt with LLE venous ulcer here for eval/treat  Wound/Ulcer Pain Timing/Severity: intermittent  Quality of pain: sharp  Severity:  1 / 10   Modifying Factors: None  Associated Signs/Symptoms: edema    Ulcer Identification:  Ulcer Type: venous  Contributing Factors: edema and venous stasis    Wound: venous        PAST MEDICAL HISTORY        Diagnosis Date    Asthma     Broken ribs     CAD (coronary artery disease)     [facility]     Diabetes mellitus ([address]     Hypertension     Morbid obesity ([address] 6/22/15    Skin ulcer of toe of left foot with fat layer exposed ([address] 2017    Sleep apnea in adult 6/22/15    Venous insufficiency of both lower extremities 6/22/15       PAST SURGICAL HISTORY    Past Surgical History:   Procedure Laterality Date    CARDIAC CATHETERIZATION      DILATATION, ESOPHAGUS      GASTRIC BYPASS SURGERY N/A 2015    GASTROSTOMY TUBE PLACEMENT  2018    JORDYN-EN-Y GASTRIC BYPASS  2018    repair of gastric bypass    TOTAL KNEE ARTHROPLASTY Bilateral        FAMILY HISTORY    Family History   Problem Relation Age of Onset    Diabetes Mother     Heart Disease Mother     High Blood Pressure Mother     Heart Disease Father     Other Father        SOCIAL HISTORY    Social History   Substance Use Topics    Smoking status: Never Smoker    Smokeless tobacco: Never Used    Alcohol use No       ALLERGIES    Allergies   Allergen Reactions    Adhesive Tape      Tears skin       MEDICATIONS    Current Outpatient Prescriptions on File Prior to Encounter   Medication Sig Dispense Refill    omeprazole (PRILOSEC) 20 MG delayed release capsule Take 20 mg by mouth daily      temazepam (RESTORIL) 30 MG capsule Take 30 mg by mouth nightly.  HYDROcodone-acetaminophen (NORCO)  MG per tablet Take 1 tablet by mouth every 6 hours as needed for Pain for up to 30 days. 120 tablet 0    lisinopril (PRINIVIL;ZESTRIL) 20 MG tablet Take 20 mg by mouth nightly        No current facility-administered medications on file prior to encounter. REVIEW OF SYSTEMS    Pertinent items are noted in HPI.     Objective:      /64   Pulse 81   Temp 98.6 °F (37 °C) (Temporal)   Resp 18   Ht 6' (1.829 m)   Wt 241 lb (109.3 kg)   BMI 32.69 kg/m²     Wt Readings from Last 3 Encounters:   03/15/18 241 lb (109.3 kg)   03/09/18 241 lb (109.3 kg)   03/02/18 270 lb (122.5 kg)       PHYSICAL EXAM    General Appearance: alert and oriented to person, place and time, well developed and well- nourished, in no acute distress  Skin: warm and dry, no rash or erythema  Head: normocephalic and atraumatic  Eyes: pupils equal, round, and reactive to light, extraocular eye movements intact, conjunctivae normal  ENT: tympanic membrane, external ear and ear canal normal bilaterally, nose without deformity, nasal mucosa and turbinates normal without polyps  Neck: supple and non-tender without mass, no thyromegaly or thyroid nodules, no cervical lymphadenopathy  Pulmonary/Chest: clear to auscultation bilaterally- no wheezes, rales or rhonchi, normal air movement, no respiratory distress  Cardiovascular: normal rate, regular rhythm, normal S1 and S2, no murmurs, rubs, clicks, or gallops, distal pulses intact, no carotid bruits  Abdomen: soft, non-tender, non-distended, normal bowel sounds, no masses or organomegaly  Extremities: no cyanosis, clubbing or edema  Musculoskeletal: normal range of motion, no joint swelling, deformity or 11/1/2017 11:17 AM   Non-staged Wound Description Not applicable 11/0/4999 98:01 AM   Wound Assessment Pink 11/1/2017 11:17 AM   Shape round 11/1/2017 11:17 AM   Wound Length (cm) 0 cm 11/1/2017 11:17 AM   Wound Width (cm) 0 cm 11/1/2017 11:17 AM   Wound Depth (cm)  0 11/1/2017 11:17 AM   Calculated Wound Size (cm^2) (l*w) 0 cm^2 11/1/2017 11:17 AM   Change in Wound Size % (l*w) 100 11/1/2017 11:17 AM   Culture Taken No 10/25/2017  4:57 PM   Dressing Status Intact;Dry 11/1/2017 11:17 AM   Dressing Changed Changed/New 11/1/2017 11:17 AM   Wound Cleansed Rinsed/Irrigated with saline 11/1/2017 11:17 AM   Necrotic Type Yellow Fibrin/Slough 10/25/2017  4:57 PM   Necrotic Amount None present 11/1/2017 11:17 AM   Granulation Quality N/A 11/1/2017 11:17 AM   Drainage Amount None 11/1/2017 11:17 AM   Drainage Description Serosanguinous 10/25/2017  4:57 PM   Odor None 11/1/2017 11:17 AM   Distance Tunneling (cm) 0 cm 10/18/2017 12:47 PM   Tunneling Position ___ O'Clock 0 10/18/2017 12:47 PM   Tunneling Maxium Distance (cm) 0 10/18/2017 12:47 PM   Undermining Starts ___ O'Clock 0 10/18/2017 12:47 PM   Undermining Ends___ O'Clock 0 10/18/2017 12:47 PM   Undermining Maxium Distance (cm) 0 10/18/2017 12:47 PM   [place]%Wound Bed 100 11/1/2017 11:17 AM   Red%Wound Bed 2 10/18/2017 12:47 PM   Yellow%Wound Bed 3 10/18/2017 12:47 PM   Black%Wound Bed 0 10/18/2017 12:47 PM   Purple%Wound Bed 0 10/18/2017 12:47 PM   Margins Other (Comment) 11/1/2017 11:17 AM   Debridement per physician None 11/1/2017 12:56 PM   Time out N/A 11/1/2017 12:56 PM   Procedural Pain 0 10/18/2017 12:59 PM   Post procedural Pain 0 10/18/2017 12:59 PM   Number of days: 148         Estimated Blood Loss:  Minimal    Hemostasis Achieved:  by pressure    Procedural Pain:  1  / 10     Post Procedural Pain:  0 / 10     Response to treatment:  Well tolerated by patient.          Plan:     Applied snap vac dressing today without difficulty    Treatment Note please see

## 2018-03-19 ENCOUNTER — HOSPITAL ENCOUNTER (OUTPATIENT)
Dept: WOUND CARE | Age: 63
Discharge: HOME | End: 2018-03-19
Payer: MEDICARE

## 2018-03-19 VITALS
HEIGHT: 72 IN | RESPIRATION RATE: 16 BRPM | HEART RATE: 72 BPM | SYSTOLIC BLOOD PRESSURE: 118 MMHG | BODY MASS INDEX: 32.64 KG/M2 | DIASTOLIC BLOOD PRESSURE: 67 MMHG | TEMPERATURE: 97.1 F | WEIGHT: 241 LBS

## 2018-03-19 DIAGNOSIS — L97.526 DIABETIC ULCER OF LEFT FOOT ASSOCIATED WITH TYPE 2 DIABETES MELLITUS, WITH BONE INVOLVEMENT WITHOUT EVIDENCE OF NECROSIS, UNSPECIFIED PART OF FOOT (HCC): ICD-10-CM

## 2018-03-19 DIAGNOSIS — L97.512 ULCERATED, FOOT, RIGHT, WITH FAT LAYER EXPOSED (HCC): ICD-10-CM

## 2018-03-19 DIAGNOSIS — E11.621 DIABETIC ULCER OF LEFT FOOT ASSOCIATED WITH TYPE 2 DIABETES MELLITUS, WITH BONE INVOLVEMENT WITHOUT EVIDENCE OF NECROSIS, UNSPECIFIED PART OF FOOT (HCC): ICD-10-CM

## 2018-03-19 PROCEDURE — 97597 DBRDMT OPN WND 1ST 20 CM/<: CPT | Performed by: SURGERY

## 2018-03-19 PROCEDURE — 97597 DBRDMT OPN WND 1ST 20 CM/<: CPT

## 2018-03-19 NOTE — PROGRESS NOTES
[address]   Progress Note and Procedure Note      [address] RECORD NUMBER:  [id number]  AGE: 63 y.o. GENDER: male  : 1955  EPISODE DATE:  3/19/2018    Subjective:     Chief Complaint   Patient presents with    Wound Check     left lateral leg wound, snap vac was full within 36 hours of putting it on. HISTORY of PRESENT ILLNESS HPI     Sarah Umaña is a 63 y.o. male who presents today for wound/ulcer evaluation.    History of Wound Context: Pt with LLE wound started on snap vac and doing well except has too much drainage for snap vac will change to [employer] standard vac and get HH to see 3days /wk  Wound/Ulcer Pain Timing/Severity: intermittent  Quality of pain: tender  Severity:  2 / 10   Modifying Factors: None  Associated Signs/Symptoms: edema    Ulcer Identification:  Ulcer Type: venous  Contributing Factors: edema, venous stasis and lymphedema    Wound: venous        PAST MEDICAL HISTORY        Diagnosis Date    Asthma     Broken ribs     CAD (coronary artery disease)     CHF (congestive heart failure) (HCC)     Diabetes mellitus ([address]     Hypertension     Morbid obesity ([address] 6/22/15    Skin ulcer of toe of left foot with fat layer exposed ([address] 2017    Sleep apnea in adult 6/22/15    Venous insufficiency of both lower extremities 6/22/15       PAST SURGICAL HISTORY    Past Surgical History:   Procedure Laterality Date    CARDIAC CATHETERIZATION      DILATATION, ESOPHAGUS      GASTRIC BYPASS SURGERY N/A 2015    GASTROSTOMY TUBE PLACEMENT  2018    JORDYN-EN-Y GASTRIC BYPASS  2018    repair of gastric bypass    TOTAL KNEE ARTHROPLASTY Bilateral        FAMILY HISTORY    Family History   Problem Relation Age of Onset    Diabetes Mother     Heart Disease Mother     High Blood Pressure Mother     Heart Disease Father     Other Father        SOCIAL HISTORY    Social History   Substance Use Topics    Smoking status: Never Smoker    Smokeless tobacco: Never Used    Alcohol use No       ALLERGIES    Allergies   Allergen Reactions    Adhesive Tape      Tears skin       MEDICATIONS    Current Outpatient Prescriptions on File Prior to Encounter   Medication Sig Dispense Refill    omeprazole (PRILOSEC) 20 MG delayed release capsule Take 20 mg by mouth daily      temazepam (RESTORIL) 30 MG capsule Take 30 mg by mouth nightly.  HYDROcodone-acetaminophen (NORCO)  MG per tablet Take 1 tablet by mouth every 6 hours as needed for Pain for up to 30 days. 120 tablet 0    lisinopril (PRINIVIL;ZESTRIL) 20 MG tablet Take 20 mg by mouth nightly        No current facility-administered medications on file prior to encounter. REVIEW OF SYSTEMS    Pertinent items are noted in HPI.     Objective:      /67   Pulse 72   Temp 97.1 °F (36.2 °C) (Temporal)   Resp 16   Ht 6' (1.829 m)   Wt 241 lb (109.3 kg)   BMI 32.69 kg/m²     Wt Readings from Last 3 Encounters:   03/19/18 241 lb (109.3 kg)   03/15/18 241 lb (109.3 kg)   03/09/18 241 lb (109.3 kg)       PHYSICAL EXAM    General Appearance: alert and oriented to person, place and time, well developed and well- nourished, in no acute distress  Skin: warm and dry, no rash or erythema  Head: normocephalic and atraumatic  Eyes: pupils equal, round, and reactive to light, extraocular eye movements intact, conjunctivae normal  ENT: tympanic membrane, external ear and ear canal normal bilaterally, nose without deformity, nasal mucosa and turbinates normal without polyps  Neck: supple and non-tender without mass, no thyromegaly or thyroid nodules, no cervical lymphadenopathy  Pulmonary/Chest: clear to auscultation bilaterally- no wheezes, rales or rhonchi, normal air movement, no respiratory distress  Cardiovascular: normal rate, regular rhythm, normal S1 and S2, no murmurs, rubs, clicks, or gallops, distal pulses intact, no carotid bruits  Abdomen: soft, non-tender, non-distended, normal bowel sounds, no masses or organomegaly  Extremities: no cyanosis, clubbing or edema  Musculoskeletal: normal range of motion, no joint swelling, deformity or tenderness  Neurologic: reflexes normal and symmetric, no cranial nerve deficit, gait, coordination and speech normal      Assessment:      Patient Active Problem List   Diagnosis Code    Venous insufficiency of both lower extremities I87.2    Morbid obesity ([facility]) E66.01    Type 2 diabetes mellitus with diabetic dermatitis ([facility]) E11.620    Venous ulcer of right leg ([facility]) I83.019    Idiopathic chronic venous hypertension of left leg with ulcer ([address] I87.312    Diabetic ulcer of left foot associated with type 2 diabetes mellitus ([facility]) E11.621, L97.529    Ulcerated, foot, right, with fat layer exposed ([address] L97.512    Type 2 diabetes mellitus with neurologic complication ([facility]) Z79.11    Venous hypertension, chronic, with ulcer, left ([facility]) I87.312    Chronic ulcer of left leg, with fat layer exposed ([address] L97.922    Obstructive sleep apnea G47.33    Somnolence, daytime R40.0    Snoring R06.83    Witnessed apneic spells R06.81    Other insomnia G47.09        Procedure Note  Indications:  Based on my examination of this patient's wound(s)/ulcer(s) today, debridement is required to promote healing and evaluate the wound base. Performed by: Yumiko Hopper MD    Consent obtained:  Yes    Time out taken:  Yes    Pain Control: Anesthetic  Anesthetic: 2% Lidocaine Gel Topical       Debridement:Non-excisional Debridement    Using curette the wound(s)/ulcer(s) was/were sharply debrided down through and including the removal of epidermis.         Devitalized Tissue Debrided:  fibrin, biofilm, slough and exudate      Pre Debridement Measurements:  Are located in the Wound/Ulcer Documentation Flow Sheet    Wound/Ulcer #: 36    Percent of Wound(s)/Ulcer(s) Debrided: 30%    Total Surface Area Debrided:  14 sq cm Procedural Pain:  0 / 10     Response to treatment:  Well tolerated by patient. Plan:     Treatment Note please see attached Discharge Instructions    In my professional opinion this patient would benefit from HBO Therapy: No    Written patient dismissal instructions given to patient and signed by patient or POA. Discharge Instructions       Visit Discharge/Physician Orders    Discharge condition: Stable    Discharge to: Home    Left via:Private automobile    Accompanied by:  Spouse    [facility]    Dressing Orders: Left Lower Leg Wound  Aquacel Ag   Snap Vac  6 inch Ace Toes to knee  Multi Vitamin, High Protein diet as tolerated  Until Vac Arrives, Then Black Foam to wound, Track Disk off in needed  125 Continuous  Change Monday, Wednesday and Friday unless Patient has an Appointment that Day    Treatment Orders: Protein rich diet (unless restricted by your physician); Multivitamin daily; Elevate legs when sitting, avoid standing for long periods of time      [address] [place] [address],[address] followup visit _________ Dr Visit 1 Week_Nurse Visit Thursday________________  (Please note your next appointment above and if you are unable to keep, kindly give a 24 hour notice. Thank you.)      If you experience any of the following, please call the [facility] during business hours:    * Increase in Pain  * Temperature over 101  * Increase in drainage from your wound  * Drainage with a foul odor  * Bleeding  * Increase in swelling  * Need for compression bandage changes due to slippage, breakthrough drainage. If you need medical attention outside of the business hours of the [facility] please contact your PCP or go to the nearest emergency room.         Electronically signed by Osito Jones MD on 3/19/2018 at 1:49 PM

## 2018-03-21 ENCOUNTER — HOSPITAL ENCOUNTER (OUTPATIENT)
Dept: WOUND CARE | Age: 63
Discharge: HOME | End: 2018-03-21
Payer: MEDICARE

## 2018-03-21 VITALS
HEIGHT: 72 IN | SYSTOLIC BLOOD PRESSURE: 128 MMHG | BODY MASS INDEX: 32.64 KG/M2 | WEIGHT: 241 LBS | TEMPERATURE: 97.8 F | DIASTOLIC BLOOD PRESSURE: 67 MMHG | HEART RATE: 72 BPM | RESPIRATION RATE: 16 BRPM

## 2018-03-21 DIAGNOSIS — L97.526 DIABETIC ULCER OF LEFT FOOT ASSOCIATED WITH TYPE 2 DIABETES MELLITUS, WITH BONE INVOLVEMENT WITHOUT EVIDENCE OF NECROSIS, UNSPECIFIED PART OF FOOT (HCC): ICD-10-CM

## 2018-03-21 DIAGNOSIS — L97.512 ULCERATED, FOOT, RIGHT, WITH FAT LAYER EXPOSED (HCC): ICD-10-CM

## 2018-03-21 DIAGNOSIS — E11.621 DIABETIC ULCER OF LEFT FOOT ASSOCIATED WITH TYPE 2 DIABETES MELLITUS, WITH BONE INVOLVEMENT WITHOUT EVIDENCE OF NECROSIS, UNSPECIFIED PART OF FOOT (HCC): ICD-10-CM

## 2018-03-21 PROCEDURE — 97607 NEG PRS WND THR NDME<=50SQCM: CPT

## 2018-03-21 PROCEDURE — 99213 OFFICE O/P EST LOW 20 MIN: CPT | Performed by: SURGERY

## 2018-03-21 ASSESSMENT — PAIN SCALES - GENERAL: PAINLEVEL_OUTOF10: 3

## 2018-03-21 ASSESSMENT — PAIN DESCRIPTION - FREQUENCY: FREQUENCY: INTERMITTENT

## 2018-03-21 ASSESSMENT — PAIN DESCRIPTION - DESCRIPTORS: DESCRIPTORS: ACHING

## 2018-03-21 ASSESSMENT — PAIN DESCRIPTION - LOCATION: LOCATION: LEG

## 2018-03-21 ASSESSMENT — PAIN DESCRIPTION - ORIENTATION: ORIENTATION: LEFT

## 2018-03-21 ASSESSMENT — PAIN DESCRIPTION - PAIN TYPE: TYPE: ACUTE PAIN

## 2018-03-21 NOTE — PROGRESS NOTES
[facility]  Progress Note       Gil Guillen  AGE: 63 y.o. GENDER: male  : 1955  TODAY'S DATE:  3/21/2018    Subjective:        HISTORY of PRESENT ILLNESS HPI   Kavon Shipley is a 63 y.o. male who presents today for wound evaluation. Wound Type:venous  Wound Location:left lower leg(s): lower, lateral  Modifying factors:edema and venous stasis    Patient Active Problem List   Diagnosis Code    Venous insufficiency of both lower extremities I87.2    Morbid obesity ([address] E66.01    Type 2 diabetes mellitus with diabetic dermatitis ([facility]) E11.620    Venous ulcer of right leg ([facility]) I83.019    Idiopathic chronic venous hypertension of left leg with ulcer ([address] I87.312    Diabetic ulcer of left foot associated with type 2 diabetes mellitus ([facility]) E11.621, L97.529    Ulcerated, foot, right, with fat layer exposed ([address] L97.512    Type 2 diabetes mellitus with neurologic complication ([facility]) I69.26    Venous hypertension, chronic, with ulcer, left ([facility]) I87.312    Chronic ulcer of left leg, with fat layer exposed ([address] L97.922    Obstructive sleep apnea G47.33    Somnolence, daytime R40.0    Snoring R06.83    Witnessed apneic spells R06.81    Other insomnia G47.09       Mr. Charissa Mccoy has a past medical history of Asthma; Broken ribs; CAD (coronary artery disease); CHF (congestive heart failure) ([address] Diabetes mellitus ([address] Hypertension; Morbid obesity ([address] Skin ulcer of toe of left foot with fat layer exposed ([address] Sleep apnea in adult; and Venous insufficiency of both lower extremities. He has a past surgical history that includes Cardiac catheterization; Total knee arthroplasty (Bilateral); Dilatation, esophagus; Gastric bypass surgery (N/A, 2015); Taylor-en-Y Gastric Bypass (2018); and Gastrostomy tube placement (2018). His family history includes Diabetes in his mother; Heart Disease in his father and mother; High Blood Pressure in his mother;  Other in his Tunneling Position ___ O'Clock 0 10/18/2017 12:47 PM   Undermining Starts ___ O'Clock 0 10/18/2017 12:47 PM   Undermining Ends___ O'Clock 0 10/18/2017 12:47 PM   Undermining Maxium Distance (cm) 0 10/18/2017 12:47 PM   Wound Assessment Pink 11/1/2017 11:17 AM   Drainage Amount None 11/1/2017 11:17 AM   Drainage Description Sanguinous 10/18/2017 12:47 PM   Odor None 11/1/2017 11:17 AM   Margins Attached edges 10/25/2017  4:57 PM   Lurdes-wound Assessment Calloused 11/1/2017 11:17 AM   Non-staged Wound Description Not applicable 50/7/1974 36:41 AM   [place]%Wound Bed 100 11/1/2017 11:17 AM   Red%Wound Bed 0 11/1/2017 11:17 AM   Yellow%Wound Bed 0 11/1/2017 11:17 AM   Black%Wound Bed 0 10/18/2017 12:47 PM   Purple%Wound Bed 0 10/18/2017 12:47 PM   Other%Wound Bed 0 10/18/2017 12:47 PM   Culture Taken No 10/18/2017 12:59 PM   Debridement per physician None 11/1/2017 12:56 PM   Time out N/A 11/1/2017 12:56 PM   Procedural Pain 0 10/18/2017 12:59 PM   Post procedural Pain 0 10/18/2017 12:59 PM   Number of days: 154       Wound 01/22/18 Venous ulcer Leg Left;Lateral Wound 36, Venous, L. lateral Inferior lower leg ( wound 37 merged into wound 36 3/2/18)(wound 31 merged into 36 3/9/18) (Active)   Wound Image   3/19/2018  1:22 PM   Wound Type Wound 3/21/2018  2:30 PM   Wound Venous 3/21/2018  2:30 PM   Dressing Status Old drainage 3/21/2018  2:30 PM   Dressing Changed Changed/New 3/15/2018  3:54 PM   Wound Cleansed Rinsed/Irrigated with saline 3/19/2018  1:22 PM   Wound Length (cm) 9.2 cm 3/21/2018  2:30 PM   Wound Width (cm) 4.5 cm 3/21/2018  2:30 PM   Wound Depth (cm)  .5 3/21/2018  2:30 PM   Calculated Wound Size (cm^2) (l*w) 41.4 cm^2 3/21/2018  2:30 PM   Change in Wound Size % (l*w) -240.46 3/21/2018  2:30 PM   Distance Tunneling (cm) 0 cm 3/9/2018 10:19 AM   Tunneling Position ___ O'Clock 0 3/19/2018  1:22 PM   Undermining Starts ___ O'Clock 0 3/19/2018  1:22 PM   Undermining Ends___ O'Clock 0 3/19/2018  1:22 PM Undermining Maxium Distance (cm) 0 3/19/2018  1:22 PM   Wound Assessment Granulation tissue 3/21/2018  2:30 PM   Drainage Amount Large 3/21/2018  2:30 PM   Drainage Description Serosanguinous 3/21/2018  2:30 PM   Odor None 3/21/2018  2:30 PM   Margins Attached edges 3/21/2018  2:30 PM   Exposed structure Bone 3/21/2018  2:30 PM   Lurdes-wound Assessment Hyperpigmented 3/21/2018  2:30 PM   Non-staged Wound Description Full thickness 3/21/2018  2:30 PM   [place]%Wound Bed 25 3/19/2018  1:22 PM   Red%Wound Bed 50 3/21/2018  2:30 PM   Yellow%Wound Bed 50 3/21/2018  2:30 PM   Black%Wound Bed 0 3/19/2018  1:22 PM   Purple%Wound Bed 0 3/19/2018  1:22 PM   Other%Wound Bed 0 3/9/2018 10:19 AM   Debridement per physician None 3/21/2018  2:30 PM   Time out N/A 3/21/2018  2:30 PM   Procedural Pain 0 3/19/2018  1:42 PM   Post procedural Pain 0 3/19/2018  1:42 PM   Number of days: 57       Diabetic Ulcer 03/27/17 Foot Left;Distal DIABETIC FOOT ULCER # 33R LEFT/ OLVERA 1-wound reopened 5-9, previously wound 33 (Active)   Lurdes-wound Assessment Calloused 7/12/2017  8:15 AM   Lurdes-Wound Texture Callus 7/12/2017  8:15 AM   Lurdes-Wound Moisture Dry 7/12/2017  8:15 AM   Lurdes-Wound Color Pink 7/12/2017  8:15 AM   Diabetic Wound - Patti Ditch 1 7/12/2017  8:15 AM   Non-staged Wound Description Full thickness 6/28/2017  8:14 AM   Wound Assessment Intact 7/12/2017  8:15 AM   Shape Oval 6/7/2017  8:39 AM   Wound Length (cm) 0 cm 7/12/2017  8:15 AM   Wound Width (cm) 0 cm 7/12/2017  8:15 AM   Wound Depth (cm)  0 7/12/2017  8:15 AM   Calculated Wound Size (cm^2) (l*w) 0 cm^2 7/12/2017  8:15 AM   Change in Wound Size % (l*w) 100 7/12/2017  8:15 AM   Dressing Status Old drainage 7/12/2017  8:15 AM   Dressing Changed Changed/New 7/5/2017  9:01 AM   Dressing/Treatment Open to air 7/12/2017  8:43 AM   Wound Cleansed Other (Comment) 6/28/2017  8:14 AM   Necrotic Type Black Eschar 6/2/2017  8:50 AM   Necrotic Amount Large: % 6/2/2017  8:50 AM 4:57 PM   Necrotic Amount None present 11/1/2017 11:17 AM   Granulation Quality N/A 11/1/2017 11:17 AM   Drainage Amount None 11/1/2017 11:17 AM   Drainage Description Serosanguinous 10/25/2017  4:57 PM   Odor None 11/1/2017 11:17 AM   Distance Tunneling (cm) 0 cm 10/18/2017 12:47 PM   Tunneling Position ___ O'Clock 0 10/18/2017 12:47 PM   Tunneling Maxium Distance (cm) 0 10/18/2017 12:47 PM   Undermining Starts ___ O'Clock 0 10/18/2017 12:47 PM   Undermining Ends___ O'Clock 0 10/18/2017 12:47 PM   Undermining Maxium Distance (cm) 0 10/18/2017 12:47 PM   [place]%Wound Bed 100 11/1/2017 11:17 AM   Red%Wound Bed 2 10/18/2017 12:47 PM   Yellow%Wound Bed 3 10/18/2017 12:47 PM   Black%Wound Bed 0 10/18/2017 12:47 PM   Purple%Wound Bed 0 10/18/2017 12:47 PM   Margins Other (Comment) 11/1/2017 11:17 AM   Debridement per physician None 11/1/2017 12:56 PM   Time out N/A 11/1/2017 12:56 PM   Procedural Pain 0 10/18/2017 12:59 PM   Post procedural Pain 0 10/18/2017 12:59 PM   Number of days: 154        Wound is has improved. Please refer to nursing documentation for wound measurements.     Assessment:      Patient Active Problem List   Diagnosis    Venous insufficiency of both lower extremities    Morbid obesity ([address]    Type 2 diabetes mellitus with diabetic dermatitis ([address]    Venous ulcer of right leg ([address]    Idiopathic chronic venous hypertension of left leg with ulcer ([address]    Diabetic ulcer of left foot associated with type 2 diabetes mellitus (HCC)    Ulcerated, foot, right, with fat layer exposed ([address]    Type 2 diabetes mellitus with neurologic complication (HCC)    Venous hypertension, chronic, with ulcer, left ([address]    Chronic ulcer of left leg, with fat layer exposed ([address]    Obstructive sleep apnea    Somnolence, daytime    Snoring    Witnessed apneic spells    Other insomnia      Plan:     Compliance issues: No    Plan for wound - Dress per physician order  Treatment:     Compression : No   Offloading : No   Dressing : See AVS   Additional Therapy : Cont vac dressing     1. Discussed appropriate home care of this wound. Wound redressed. 2. Patient instructions were given. 3. Follow up: 1 week(s). Discharge Instructions       Visit Discharge/Physician Orders    Discharge condition: Stable    Discharge to: Home    Left via:Private automobile    Accompanied by:  Kristel Salinas Apply Vac Friday if in, if not Saline moist dressing daily  Change Wound Vac Monday, Wednesday and Friday unless Patient has an Appointment that Day    Dressing Orders: Left Lower Leg Wound  Promogran to Base of Wound  Black Foam cut to fit, Track Disk off in needed  125 Continuous  Change Monday, Wednesday and Friday unless Patient has an Appointment that Day  6 Inch Ace from Toes to Knee Daily as Tolerated      Treatment Orders: Protein rich diet (unless restricted by your physician); Multivitamin daily; Elevate legs when sitting, avoid standing for long periods of time  6 inch Ace and Snap vac in [facility] today    [facility] followup visit _____________1 week________________  (Please note your next appointment above and if you are unable to keep, kindly give a 24 hour notice. Thank you.)    If you experience any of the following, please call the [address] [place] [address] during business hours:    * Increase in Pain  * Temperature over 101  * Increase in drainage from your wound  * Drainage with a foul odor  * Bleeding  * Increase in swelling  * Need for compression bandage changes due to slippage, breakthrough drainage. If you need medical attention outside of the business hours of the [address] [place] [address] please contact your PCP or go to the nearest emergency room.         Electronically signed by Heavenly Alegre MD on 3/21/2018 at 2:52 PM

## 2018-03-28 ENCOUNTER — HOSPITAL ENCOUNTER (OUTPATIENT)
Dept: WOUND CARE | Age: 63
Discharge: HOME | End: 2018-03-28
Payer: MEDICARE

## 2018-03-28 VITALS
HEART RATE: 68 BPM | WEIGHT: 241 LBS | TEMPERATURE: 98.1 F | DIASTOLIC BLOOD PRESSURE: 67 MMHG | BODY MASS INDEX: 32.64 KG/M2 | SYSTOLIC BLOOD PRESSURE: 127 MMHG | HEIGHT: 72 IN | RESPIRATION RATE: 18 BRPM

## 2018-03-28 DIAGNOSIS — E11.621 DIABETIC ULCER OF LEFT FOOT ASSOCIATED WITH TYPE 2 DIABETES MELLITUS, WITH BONE INVOLVEMENT WITHOUT EVIDENCE OF NECROSIS, UNSPECIFIED PART OF FOOT (HCC): ICD-10-CM

## 2018-03-28 DIAGNOSIS — L97.526 DIABETIC ULCER OF LEFT FOOT ASSOCIATED WITH TYPE 2 DIABETES MELLITUS, WITH BONE INVOLVEMENT WITHOUT EVIDENCE OF NECROSIS, UNSPECIFIED PART OF FOOT (HCC): ICD-10-CM

## 2018-03-28 DIAGNOSIS — L97.512 ULCERATED, FOOT, RIGHT, WITH FAT LAYER EXPOSED (HCC): ICD-10-CM

## 2018-03-28 PROCEDURE — 97597 DBRDMT OPN WND 1ST 20 CM/<: CPT | Performed by: SURGERY

## 2018-03-28 PROCEDURE — 97597 DBRDMT OPN WND 1ST 20 CM/<: CPT

## 2018-03-28 ASSESSMENT — PAIN SCALES - GENERAL: PAINLEVEL_OUTOF10: 3

## 2018-03-28 ASSESSMENT — PAIN DESCRIPTION - DESCRIPTORS: DESCRIPTORS: ACHING

## 2018-03-28 ASSESSMENT — PAIN DESCRIPTION - ORIENTATION: ORIENTATION: LEFT

## 2018-03-28 ASSESSMENT — PAIN DESCRIPTION - LOCATION: LOCATION: LEG

## 2018-03-28 ASSESSMENT — PAIN DESCRIPTION - PAIN TYPE: TYPE: ACUTE PAIN

## 2018-03-28 ASSESSMENT — PAIN DESCRIPTION - FREQUENCY: FREQUENCY: INTERMITTENT

## 2018-03-28 NOTE — PROGRESS NOTES
[address]   Progress Note and Procedure Note      [address] RECORD NUMBER:  [id number]  AGE: 63 y.o. GENDER: male  : 1955  EPISODE DATE:  3/28/2018    Subjective:     Chief Complaint   Patient presents with    Wound Check     Lower Ext Wound         HISTORY of PRESENT ILLNESS HPI     Adrian Blandon is a 63 y.o. male who presents today for wound/ulcer evaluation.    History of Wound Context: Pt with LLE wound here for eval/treat  Wound/Ulcer Pain Timing/Severity: none  Quality of pain: N/A  Severity:  0 / 10   Modifying Factors: None  Associated Signs/Symptoms: edema    Ulcer Identification:  Ulcer Type: venous and pressure  Contributing Factors: edema and venous stasis    Wound: venous        PAST MEDICAL HISTORY        Diagnosis Date    Asthma     Broken ribs     CAD (coronary artery disease)     CHF (congestive heart failure) (HCC)     Diabetes mellitus ([address]     Hypertension     Morbid obesity ([address] 6/22/15    Skin ulcer of toe of left foot with fat layer exposed ([address] 2017    Sleep apnea in adult 6/22/15    Venous insufficiency of both lower extremities 6/22/15       PAST SURGICAL HISTORY    Past Surgical History:   Procedure Laterality Date    CARDIAC CATHETERIZATION      DILATATION, ESOPHAGUS      GASTRIC BYPASS SURGERY N/A 2015    GASTROSTOMY TUBE PLACEMENT  2018    JORDYN-EN-Y GASTRIC BYPASS  2018    repair of gastric bypass    TOTAL KNEE ARTHROPLASTY Bilateral        FAMILY HISTORY    Family History   Problem Relation Age of Onset    Diabetes Mother     Heart Disease Mother     High Blood Pressure Mother     Heart Disease Father     Other Father        SOCIAL HISTORY    Social History   Substance Use Topics    Smoking status: Never Smoker    Smokeless tobacco: Never Used    Alcohol use No       ALLERGIES    Allergies   Allergen Reactions    Adhesive Tape      Tears skin       MEDICATIONS    Current Outpatient Status Old drainage 3/28/2018 11:00 AM   Dressing Changed Changed/New 3/15/2018  3:54 PM   Wound Cleansed Rinsed/Irrigated with saline 3/19/2018  1:22 PM   Wound Length (cm) 8.9 cm 3/28/2018 11:30 AM   Wound Width (cm) 2.5 cm 3/28/2018 11:30 AM   Wound Depth (cm)  .4 3/28/2018 11:30 AM   Calculated Wound Size (cm^2) (l*w) 22.25 cm^2 3/28/2018 11:30 AM   Change in Wound Size % (l*w) -82.98 3/28/2018 11:30 AM   Distance Tunneling (cm) 0 cm 3/9/2018 10:19 AM   Tunneling Position ___ O'Clock 0 3/19/2018  1:22 PM   Undermining Starts ___ O'Clock 0 3/19/2018  1:22 PM   Undermining Ends___ O'Clock 0 3/19/2018  1:22 PM   Undermining Maxium Distance (cm) 0 3/19/2018  1:22 PM   Wound Assessment Granulation tissue 3/28/2018 11:00 AM   Drainage Amount Large 3/28/2018 11:00 AM   Drainage Description Serosanguinous 3/28/2018 11:00 AM   Odor None 3/28/2018 11:00 AM   Margins Attached edges 3/28/2018 11:00 AM   Exposed structure Bone 3/28/2018 11:00 AM   Lurdes-wound Assessment Hyperpigmented 3/28/2018 11:00 AM   Non-staged Wound Description Full thickness 3/28/2018 11:00 AM   [place]%Wound Bed 25 3/19/2018  1:22 PM   Red%Wound Bed 50 3/28/2018 11:00 AM   Yellow%Wound Bed 50 3/28/2018 11:00 AM   Black%Wound Bed 0 3/19/2018  1:22 PM   Purple%Wound Bed 0 3/19/2018  1:22 PM   Other%Wound Bed 0 3/9/2018 10:19 AM   Debridement per physician Full thickness 3/28/2018 11:30 AM   Time out Yes 3/28/2018 11:30 AM   Procedural Pain 0 3/28/2018 11:30 AM   Post procedural Pain 0 3/28/2018 11:30 AM   Number of days: 64       Diabetic Ulcer 03/27/17 Foot Left;Distal DIABETIC FOOT ULCER # 33R LEFT/ OLVERA 1-wound reopened 5-9, previously wound 33 (Active)   Lurdes-wound Assessment Calloused 7/12/2017  8:15 AM   Lurdes-Wound Texture Callus 7/12/2017  8:15 AM   Lurdes-Wound Moisture Dry 7/12/2017  8:15 AM   Lurdes-Wound Color Pink 7/12/2017  8:15 AM   Diabetic Wound - [facility] 1 7/12/2017  8:15 AM   Non-staged Wound Description Full thickness 6/28/2017  8:14

## 2018-03-29 RX ORDER — HYDROCODONE BITARTRATE AND ACETAMINOPHEN 10; 325 MG/1; MG/1
1 TABLET ORAL EVERY 6 HOURS PRN
Qty: 120 TABLET | Refills: 0 | Status: SHIPPED | OUTPATIENT
Start: 2018-03-29 | End: 2018-04-28

## 2018-04-04 ENCOUNTER — HOSPITAL ENCOUNTER (OUTPATIENT)
Dept: WOUND CARE | Age: 63
Discharge: HOME | End: 2018-04-04
Payer: MEDICARE

## 2018-04-04 VITALS
HEIGHT: 72 IN | SYSTOLIC BLOOD PRESSURE: 138 MMHG | WEIGHT: 241 LBS | RESPIRATION RATE: 18 BRPM | HEART RATE: 77 BPM | DIASTOLIC BLOOD PRESSURE: 80 MMHG | TEMPERATURE: 98.9 F | BODY MASS INDEX: 32.64 KG/M2

## 2018-04-04 DIAGNOSIS — E11.621 DIABETIC ULCER OF LEFT FOOT ASSOCIATED WITH TYPE 2 DIABETES MELLITUS, WITH BONE INVOLVEMENT WITHOUT EVIDENCE OF NECROSIS, UNSPECIFIED PART OF FOOT (HCC): ICD-10-CM

## 2018-04-04 DIAGNOSIS — L97.512 ULCERATED, FOOT, RIGHT, WITH FAT LAYER EXPOSED (HCC): ICD-10-CM

## 2018-04-04 DIAGNOSIS — L97.526 DIABETIC ULCER OF LEFT FOOT ASSOCIATED WITH TYPE 2 DIABETES MELLITUS, WITH BONE INVOLVEMENT WITHOUT EVIDENCE OF NECROSIS, UNSPECIFIED PART OF FOOT (HCC): ICD-10-CM

## 2018-04-04 PROCEDURE — 11042 DBRDMT SUBQ TIS 1ST 20SQCM/<: CPT

## 2018-04-04 PROCEDURE — 11042 DBRDMT SUBQ TIS 1ST 20SQCM/<: CPT | Performed by: SURGERY

## 2018-04-04 ASSESSMENT — PAIN DESCRIPTION - LOCATION: LOCATION: LEG

## 2018-04-04 ASSESSMENT — PAIN DESCRIPTION - DESCRIPTORS: DESCRIPTORS: ACHING

## 2018-04-04 ASSESSMENT — PAIN DESCRIPTION - PAIN TYPE: TYPE: ACUTE PAIN

## 2018-04-04 ASSESSMENT — PAIN DESCRIPTION - FREQUENCY: FREQUENCY: INTERMITTENT

## 2018-04-04 ASSESSMENT — PAIN SCALES - GENERAL: PAINLEVEL_OUTOF10: 3

## 2018-04-04 ASSESSMENT — PAIN DESCRIPTION - ORIENTATION: ORIENTATION: LEFT

## 2018-04-11 ENCOUNTER — HOSPITAL ENCOUNTER (OUTPATIENT)
Dept: WOUND CARE | Age: 63
Discharge: HOME | End: 2018-04-11
Payer: MEDICARE

## 2018-04-11 VITALS
HEIGHT: 72 IN | HEART RATE: 63 BPM | TEMPERATURE: 99.1 F | RESPIRATION RATE: 18 BRPM | DIASTOLIC BLOOD PRESSURE: 74 MMHG | SYSTOLIC BLOOD PRESSURE: 140 MMHG | WEIGHT: 241 LBS | BODY MASS INDEX: 32.64 KG/M2

## 2018-04-11 DIAGNOSIS — L97.512 ULCERATED, FOOT, RIGHT, WITH FAT LAYER EXPOSED (HCC): ICD-10-CM

## 2018-04-11 DIAGNOSIS — L97.526 DIABETIC ULCER OF LEFT FOOT ASSOCIATED WITH TYPE 2 DIABETES MELLITUS, WITH BONE INVOLVEMENT WITHOUT EVIDENCE OF NECROSIS, UNSPECIFIED PART OF FOOT (HCC): ICD-10-CM

## 2018-04-11 DIAGNOSIS — E11.621 DIABETIC ULCER OF LEFT FOOT ASSOCIATED WITH TYPE 2 DIABETES MELLITUS, WITH BONE INVOLVEMENT WITHOUT EVIDENCE OF NECROSIS, UNSPECIFIED PART OF FOOT (HCC): ICD-10-CM

## 2018-04-11 PROCEDURE — 97597 DBRDMT OPN WND 1ST 20 CM/<: CPT | Performed by: SURGERY

## 2018-04-11 PROCEDURE — 97597 DBRDMT OPN WND 1ST 20 CM/<: CPT

## 2018-04-11 ASSESSMENT — PAIN DESCRIPTION - LOCATION: LOCATION: LEG

## 2018-04-11 ASSESSMENT — PAIN DESCRIPTION - DESCRIPTORS: DESCRIPTORS: ACHING

## 2018-04-11 ASSESSMENT — PAIN SCALES - GENERAL: PAINLEVEL_OUTOF10: 3

## 2018-04-11 ASSESSMENT — PAIN DESCRIPTION - PAIN TYPE: TYPE: ACUTE PAIN

## 2018-04-11 ASSESSMENT — PAIN DESCRIPTION - ORIENTATION: ORIENTATION: LEFT

## 2018-04-11 ASSESSMENT — PAIN DESCRIPTION - FREQUENCY: FREQUENCY: INTERMITTENT

## 2018-04-18 ENCOUNTER — HOSPITAL ENCOUNTER (OUTPATIENT)
Dept: WOUND CARE | Age: 63
Discharge: HOME | End: 2018-04-18
Payer: MEDICARE

## 2018-04-18 VITALS
BODY MASS INDEX: 32.64 KG/M2 | TEMPERATURE: 99.3 F | RESPIRATION RATE: 16 BRPM | SYSTOLIC BLOOD PRESSURE: 137 MMHG | WEIGHT: 241 LBS | HEART RATE: 73 BPM | DIASTOLIC BLOOD PRESSURE: 70 MMHG | HEIGHT: 72 IN

## 2018-04-18 DIAGNOSIS — L97.526 DIABETIC ULCER OF LEFT FOOT ASSOCIATED WITH TYPE 2 DIABETES MELLITUS, WITH BONE INVOLVEMENT WITHOUT EVIDENCE OF NECROSIS, UNSPECIFIED PART OF FOOT (HCC): ICD-10-CM

## 2018-04-18 DIAGNOSIS — E11.621 DIABETIC ULCER OF LEFT FOOT ASSOCIATED WITH TYPE 2 DIABETES MELLITUS, WITH BONE INVOLVEMENT WITHOUT EVIDENCE OF NECROSIS, UNSPECIFIED PART OF FOOT (HCC): ICD-10-CM

## 2018-04-18 DIAGNOSIS — L97.512 ULCERATED, FOOT, RIGHT, WITH FAT LAYER EXPOSED (HCC): ICD-10-CM

## 2018-04-18 PROCEDURE — 97597 DBRDMT OPN WND 1ST 20 CM/<: CPT

## 2018-04-18 PROCEDURE — 11042 DBRDMT SUBQ TIS 1ST 20SQCM/<: CPT

## 2018-04-18 PROCEDURE — 11042 DBRDMT SUBQ TIS 1ST 20SQCM/<: CPT | Performed by: SURGERY

## 2018-04-18 PROCEDURE — 97597 DBRDMT OPN WND 1ST 20 CM/<: CPT | Performed by: SURGERY

## 2018-04-18 ASSESSMENT — PAIN DESCRIPTION - PAIN TYPE: TYPE: ACUTE PAIN

## 2018-04-18 ASSESSMENT — PAIN SCALES - GENERAL: PAINLEVEL_OUTOF10: 3

## 2018-04-18 ASSESSMENT — PAIN DESCRIPTION - DESCRIPTORS: DESCRIPTORS: ACHING

## 2018-04-18 ASSESSMENT — PAIN DESCRIPTION - ORIENTATION: ORIENTATION: LEFT

## 2018-04-18 ASSESSMENT — PAIN DESCRIPTION - FREQUENCY: FREQUENCY: INTERMITTENT

## 2018-04-18 ASSESSMENT — PAIN DESCRIPTION - LOCATION: LOCATION: LEG

## 2018-04-30 ENCOUNTER — OFFICE VISIT (OUTPATIENT)
Dept: FAMILY MEDICINE CLINIC | Age: 63
End: 2018-04-30
Payer: MEDICARE

## 2018-04-30 VITALS
TEMPERATURE: 98.7 F | SYSTOLIC BLOOD PRESSURE: 112 MMHG | WEIGHT: 246 LBS | OXYGEN SATURATION: 97 % | DIASTOLIC BLOOD PRESSURE: 68 MMHG | BODY MASS INDEX: 33.36 KG/M2 | HEART RATE: 47 BPM

## 2018-04-30 DIAGNOSIS — I87.2 VENOUS INSUFFICIENCY OF BOTH LOWER EXTREMITIES: Chronic | ICD-10-CM

## 2018-04-30 DIAGNOSIS — R53.83 OTHER FATIGUE: ICD-10-CM

## 2018-04-30 DIAGNOSIS — E11.620 TYPE 2 DIABETES MELLITUS WITH DIABETIC DERMATITIS, WITHOUT LONG-TERM CURRENT USE OF INSULIN (HCC): Primary | Chronic | ICD-10-CM

## 2018-04-30 DIAGNOSIS — Z91.81 AT HIGH RISK FOR FALLS: ICD-10-CM

## 2018-04-30 DIAGNOSIS — E78.01 FAMILIAL HYPERCHOLESTEROLEMIA: ICD-10-CM

## 2018-04-30 DIAGNOSIS — Z23 NEED FOR PROPHYLACTIC VACCINATION OR INOCULATION AGAINST DIPHTHERIA AND TETANUS: ICD-10-CM

## 2018-04-30 DIAGNOSIS — M54.50 CHRONIC MIDLINE LOW BACK PAIN WITHOUT SCIATICA: ICD-10-CM

## 2018-04-30 DIAGNOSIS — G89.29 CHRONIC MIDLINE LOW BACK PAIN WITHOUT SCIATICA: ICD-10-CM

## 2018-04-30 DIAGNOSIS — Z00.00 ANNUAL PHYSICAL EXAM: ICD-10-CM

## 2018-04-30 DIAGNOSIS — K21.9 GASTROESOPHAGEAL REFLUX DISEASE WITHOUT ESOPHAGITIS: ICD-10-CM

## 2018-04-30 PROCEDURE — 99214 OFFICE O/P EST MOD 30 MIN: CPT | Performed by: FAMILY MEDICINE

## 2018-04-30 PROCEDURE — G8427 DOCREV CUR MEDS BY ELIG CLIN: HCPCS | Performed by: FAMILY MEDICINE

## 2018-04-30 PROCEDURE — 3044F HG A1C LEVEL LT 7.0%: CPT | Performed by: FAMILY MEDICINE

## 2018-04-30 PROCEDURE — G8417 CALC BMI ABV UP PARAM F/U: HCPCS | Performed by: FAMILY MEDICINE

## 2018-04-30 PROCEDURE — G0439 PPPS, SUBSEQ VISIT: HCPCS | Performed by: FAMILY MEDICINE

## 2018-04-30 PROCEDURE — 2022F DILAT RTA XM EVC RTNOPTHY: CPT | Performed by: FAMILY MEDICINE

## 2018-04-30 PROCEDURE — 1036F TOBACCO NON-USER: CPT | Performed by: FAMILY MEDICINE

## 2018-04-30 PROCEDURE — 3017F COLORECTAL CA SCREEN DOC REV: CPT | Performed by: FAMILY MEDICINE

## 2018-04-30 RX ORDER — TIZANIDINE 4 MG/1
4 TABLET ORAL EVERY 6 HOURS PRN
Status: ON HOLD | COMMUNITY
End: 2018-06-13 | Stop reason: HOSPADM

## 2018-04-30 RX ORDER — HYDROCODONE BITARTRATE AND ACETAMINOPHEN 10; 325 MG/1; MG/1
1 TABLET ORAL EVERY 6 HOURS PRN
COMMUNITY
End: 2018-04-30 | Stop reason: SDUPTHER

## 2018-04-30 RX ORDER — HYDROCODONE BITARTRATE AND ACETAMINOPHEN 10; 325 MG/1; MG/1
1 TABLET ORAL EVERY 6 HOURS PRN
Qty: 120 TABLET | Refills: 0 | Status: ON HOLD | OUTPATIENT
Start: 2018-04-30 | End: 2018-05-29 | Stop reason: SDUPTHER

## 2018-04-30 ASSESSMENT — ANXIETY QUESTIONNAIRES: GAD7 TOTAL SCORE: 0

## 2018-04-30 ASSESSMENT — ENCOUNTER SYMPTOMS
ANAL BLEEDING: 0
COUGH: 0
NAUSEA: 0
ABDOMINAL PAIN: 0
DIARRHEA: 0
SHORTNESS OF BREATH: 0
CHEST TIGHTNESS: 0
CONSTIPATION: 0

## 2018-04-30 ASSESSMENT — LIFESTYLE VARIABLES: HOW OFTEN DO YOU HAVE A DRINK CONTAINING ALCOHOL: 0

## 2018-04-30 ASSESSMENT — PATIENT HEALTH QUESTIONNAIRE - PHQ9: SUM OF ALL RESPONSES TO PHQ QUESTIONS 1-9: 0

## 2018-05-02 ENCOUNTER — HOSPITAL ENCOUNTER (OUTPATIENT)
Dept: WOUND CARE | Age: 63
Discharge: HOME | End: 2018-05-02
Payer: MEDICARE

## 2018-05-02 VITALS
DIASTOLIC BLOOD PRESSURE: 67 MMHG | BODY MASS INDEX: 32.64 KG/M2 | RESPIRATION RATE: 16 BRPM | TEMPERATURE: 98 F | SYSTOLIC BLOOD PRESSURE: 144 MMHG | HEART RATE: 72 BPM | WEIGHT: 241 LBS | HEIGHT: 72 IN

## 2018-05-02 DIAGNOSIS — E11.621 DIABETIC ULCER OF LEFT FOOT ASSOCIATED WITH TYPE 2 DIABETES MELLITUS, WITH BONE INVOLVEMENT WITHOUT EVIDENCE OF NECROSIS, UNSPECIFIED PART OF FOOT (HCC): ICD-10-CM

## 2018-05-02 DIAGNOSIS — L97.512 ULCERATED, FOOT, RIGHT, WITH FAT LAYER EXPOSED (HCC): ICD-10-CM

## 2018-05-02 DIAGNOSIS — L97.526 DIABETIC ULCER OF LEFT FOOT ASSOCIATED WITH TYPE 2 DIABETES MELLITUS, WITH BONE INVOLVEMENT WITHOUT EVIDENCE OF NECROSIS, UNSPECIFIED PART OF FOOT (HCC): ICD-10-CM

## 2018-05-02 PROCEDURE — 99213 OFFICE O/P EST LOW 20 MIN: CPT | Performed by: SURGERY

## 2018-05-02 PROCEDURE — 29581 APPL MULTLAYER CMPRN SYS LEG: CPT

## 2018-05-02 ASSESSMENT — PAIN DESCRIPTION - PAIN TYPE: TYPE: ACUTE PAIN

## 2018-05-02 ASSESSMENT — PAIN DESCRIPTION - FREQUENCY: FREQUENCY: INTERMITTENT

## 2018-05-02 ASSESSMENT — PAIN DESCRIPTION - DESCRIPTORS: DESCRIPTORS: ACHING

## 2018-05-02 ASSESSMENT — PAIN DESCRIPTION - ORIENTATION: ORIENTATION: LEFT

## 2018-05-02 ASSESSMENT — PAIN SCALES - GENERAL: PAINLEVEL_OUTOF10: 3

## 2018-05-02 ASSESSMENT — PAIN DESCRIPTION - LOCATION: LOCATION: LEG

## 2018-05-09 ENCOUNTER — HOSPITAL ENCOUNTER (OUTPATIENT)
Dept: WOUND CARE | Age: 63
Discharge: HOME | End: 2018-05-09
Payer: MEDICARE

## 2018-05-09 VITALS
HEIGHT: 72 IN | HEART RATE: 74 BPM | SYSTOLIC BLOOD PRESSURE: 144 MMHG | WEIGHT: 241 LBS | RESPIRATION RATE: 16 BRPM | BODY MASS INDEX: 32.64 KG/M2 | DIASTOLIC BLOOD PRESSURE: 72 MMHG | TEMPERATURE: 98 F

## 2018-05-09 DIAGNOSIS — L97.512 ULCERATED, FOOT, RIGHT, WITH FAT LAYER EXPOSED (HCC): ICD-10-CM

## 2018-05-09 DIAGNOSIS — E11.621 DIABETIC ULCER OF LEFT FOOT ASSOCIATED WITH TYPE 2 DIABETES MELLITUS, WITH BONE INVOLVEMENT WITHOUT EVIDENCE OF NECROSIS, UNSPECIFIED PART OF FOOT (HCC): ICD-10-CM

## 2018-05-09 DIAGNOSIS — L97.526 DIABETIC ULCER OF LEFT FOOT ASSOCIATED WITH TYPE 2 DIABETES MELLITUS, WITH BONE INVOLVEMENT WITHOUT EVIDENCE OF NECROSIS, UNSPECIFIED PART OF FOOT (HCC): ICD-10-CM

## 2018-05-09 PROCEDURE — 99213 OFFICE O/P EST LOW 20 MIN: CPT | Performed by: SURGERY

## 2018-05-09 PROCEDURE — 99213 OFFICE O/P EST LOW 20 MIN: CPT

## 2018-05-09 ASSESSMENT — PAIN DESCRIPTION - FREQUENCY: FREQUENCY: INTERMITTENT

## 2018-05-09 ASSESSMENT — PAIN DESCRIPTION - ORIENTATION: ORIENTATION: LEFT

## 2018-05-09 ASSESSMENT — PAIN SCALES - GENERAL: PAINLEVEL_OUTOF10: 3

## 2018-05-09 ASSESSMENT — PAIN DESCRIPTION - LOCATION: LOCATION: LEG

## 2018-05-09 ASSESSMENT — PAIN DESCRIPTION - DESCRIPTORS: DESCRIPTORS: ACHING

## 2018-05-09 ASSESSMENT — PAIN DESCRIPTION - PAIN TYPE: TYPE: ACUTE PAIN

## 2018-05-16 ENCOUNTER — HOSPITAL ENCOUNTER (OUTPATIENT)
Dept: WOUND CARE | Age: 63
Discharge: HOME | End: 2018-05-16
Payer: MEDICARE

## 2018-05-16 VITALS
TEMPERATURE: 97.8 F | WEIGHT: 241 LBS | BODY MASS INDEX: 32.64 KG/M2 | DIASTOLIC BLOOD PRESSURE: 77 MMHG | RESPIRATION RATE: 16 BRPM | HEIGHT: 72 IN | HEART RATE: 61 BPM | SYSTOLIC BLOOD PRESSURE: 140 MMHG

## 2018-05-16 DIAGNOSIS — E11.621 DIABETIC ULCER OF LEFT FOOT ASSOCIATED WITH TYPE 2 DIABETES MELLITUS, WITH BONE INVOLVEMENT WITHOUT EVIDENCE OF NECROSIS, UNSPECIFIED PART OF FOOT (HCC): ICD-10-CM

## 2018-05-16 DIAGNOSIS — L97.512 ULCERATED, FOOT, RIGHT, WITH FAT LAYER EXPOSED (HCC): ICD-10-CM

## 2018-05-16 DIAGNOSIS — L97.526 DIABETIC ULCER OF LEFT FOOT ASSOCIATED WITH TYPE 2 DIABETES MELLITUS, WITH BONE INVOLVEMENT WITHOUT EVIDENCE OF NECROSIS, UNSPECIFIED PART OF FOOT (HCC): ICD-10-CM

## 2018-05-16 PROCEDURE — 99213 OFFICE O/P EST LOW 20 MIN: CPT

## 2018-05-16 PROCEDURE — 99213 OFFICE O/P EST LOW 20 MIN: CPT | Performed by: SURGERY

## 2018-05-16 ASSESSMENT — PAIN DESCRIPTION - DESCRIPTORS: DESCRIPTORS: ACHING

## 2018-05-16 ASSESSMENT — PAIN DESCRIPTION - LOCATION: LOCATION: LEG

## 2018-05-16 ASSESSMENT — PAIN DESCRIPTION - ORIENTATION: ORIENTATION: LEFT

## 2018-05-16 ASSESSMENT — PAIN SCALES - GENERAL: PAINLEVEL_OUTOF10: 5

## 2018-05-16 ASSESSMENT — PAIN DESCRIPTION - FREQUENCY: FREQUENCY: INTERMITTENT

## 2018-05-23 ENCOUNTER — HOSPITAL ENCOUNTER (OUTPATIENT)
Dept: WOUND CARE | Age: 63
Discharge: HOME | DRG: 617 | End: 2018-05-23
Payer: MEDICARE

## 2018-05-23 ENCOUNTER — HOSPITAL ENCOUNTER (OUTPATIENT)
Dept: PREADMISSION TESTING | Age: 63
Discharge: HOME | DRG: 617 | End: 2018-05-27
Payer: MEDICARE

## 2018-05-23 VITALS
TEMPERATURE: 98 F | BODY MASS INDEX: 32.64 KG/M2 | DIASTOLIC BLOOD PRESSURE: 72 MMHG | SYSTOLIC BLOOD PRESSURE: 128 MMHG | HEART RATE: 61 BPM | WEIGHT: 241 LBS | HEIGHT: 72 IN | RESPIRATION RATE: 16 BRPM

## 2018-05-23 VITALS — HEIGHT: 72 IN | BODY MASS INDEX: 32.64 KG/M2 | WEIGHT: 241 LBS

## 2018-05-23 DIAGNOSIS — E83.59 CALCINOSIS: Chronic | ICD-10-CM

## 2018-05-23 DIAGNOSIS — L97.526 DIABETIC ULCER OF LEFT FOOT ASSOCIATED WITH TYPE 2 DIABETES MELLITUS, WITH BONE INVOLVEMENT WITHOUT EVIDENCE OF NECROSIS, UNSPECIFIED PART OF FOOT (HCC): ICD-10-CM

## 2018-05-23 DIAGNOSIS — L97.512 ULCERATED, FOOT, RIGHT, WITH FAT LAYER EXPOSED (HCC): ICD-10-CM

## 2018-05-23 DIAGNOSIS — E11.621 DIABETIC ULCER OF LEFT FOOT ASSOCIATED WITH TYPE 2 DIABETES MELLITUS, WITH BONE INVOLVEMENT WITHOUT EVIDENCE OF NECROSIS, UNSPECIFIED PART OF FOOT (HCC): ICD-10-CM

## 2018-05-23 LAB
ABO/RH: NORMAL
ANION GAP SERPL CALCULATED.3IONS-SCNC: 10 MMOL/L (ref 7–19)
ANTIBODY SCREEN: NORMAL
APTT: 31.8 SEC (ref 26–36.2)
BASOPHILS ABSOLUTE: 0 K/UL (ref 0–0.2)
BASOPHILS RELATIVE PERCENT: 0.8 % (ref 0–1)
BUN BLDV-MCNC: 22 MG/DL (ref 8–23)
CALCIUM SERPL-MCNC: 9 MG/DL (ref 8.8–10.2)
CHLORIDE BLD-SCNC: 101 MMOL/L (ref 98–111)
CO2: 27 MMOL/L (ref 22–29)
CREAT SERPL-MCNC: 1.1 MG/DL (ref 0.5–1.2)
EKG P AXIS: 109 DEGREES
EKG P-R INTERVAL: 282 MS
EKG Q-T INTERVAL: 366 MS
EKG QRS DURATION: 90 MS
EKG QTC CALCULATION (BAZETT): 385 MS
EKG T AXIS: 60 DEGREES
EOSINOPHILS ABSOLUTE: 0.2 K/UL (ref 0–0.6)
EOSINOPHILS RELATIVE PERCENT: 3.4 % (ref 0–5)
GFR NON-AFRICAN AMERICAN: >60
GLUCOSE BLD-MCNC: 189 MG/DL (ref 74–109)
HCT VFR BLD CALC: 36.6 % (ref 42–52)
HEMOGLOBIN: 11.5 G/DL (ref 14–18)
INR BLD: 1.13 (ref 0.88–1.18)
LYMPHOCYTES ABSOLUTE: 1 K/UL (ref 1.1–4.5)
LYMPHOCYTES RELATIVE PERCENT: 20.6 % (ref 20–40)
MCH RBC QN AUTO: 30.9 PG (ref 27–31)
MCHC RBC AUTO-ENTMCNC: 31.4 G/DL (ref 33–37)
MCV RBC AUTO: 98.4 FL (ref 80–94)
MONOCYTES ABSOLUTE: 0.4 K/UL (ref 0–0.9)
MONOCYTES RELATIVE PERCENT: 8.4 % (ref 0–10)
NEUTROPHILS ABSOLUTE: 3.2 K/UL (ref 1.5–7.5)
NEUTROPHILS RELATIVE PERCENT: 66.6 % (ref 50–65)
PDW BLD-RTO: 14 % (ref 11.5–14.5)
PLATELET # BLD: 144 K/UL (ref 130–400)
PMV BLD AUTO: 12 FL (ref 9.4–12.4)
POTASSIUM SERPL-SCNC: 4.6 MMOL/L (ref 3.5–5)
PROTHROMBIN TIME: 14.4 SEC (ref 12–14.6)
RBC # BLD: 3.72 M/UL (ref 4.7–6.1)
SODIUM BLD-SCNC: 138 MMOL/L (ref 136–145)
WBC # BLD: 4.8 K/UL (ref 4.8–10.8)

## 2018-05-23 PROCEDURE — 99213 OFFICE O/P EST LOW 20 MIN: CPT | Performed by: SURGERY

## 2018-05-23 PROCEDURE — 86850 RBC ANTIBODY SCREEN: CPT

## 2018-05-23 PROCEDURE — 29581 APPL MULTLAYER CMPRN SYS LEG: CPT

## 2018-05-23 PROCEDURE — 99213 OFFICE O/P EST LOW 20 MIN: CPT

## 2018-05-23 PROCEDURE — 85025 COMPLETE CBC W/AUTO DIFF WBC: CPT

## 2018-05-23 PROCEDURE — 85610 PROTHROMBIN TIME: CPT

## 2018-05-23 PROCEDURE — 80048 BASIC METABOLIC PNL TOTAL CA: CPT

## 2018-05-23 PROCEDURE — 93005 ELECTROCARDIOGRAM TRACING: CPT

## 2018-05-23 PROCEDURE — 85730 THROMBOPLASTIN TIME PARTIAL: CPT

## 2018-05-23 PROCEDURE — 86900 BLOOD TYPING SEROLOGIC ABO: CPT

## 2018-05-23 PROCEDURE — 86901 BLOOD TYPING SEROLOGIC RH(D): CPT

## 2018-05-23 PROCEDURE — 99214 OFFICE O/P EST MOD 30 MIN: CPT | Performed by: SURGERY

## 2018-05-23 ASSESSMENT — PAIN SCALES - GENERAL: PAINLEVEL_OUTOF10: 5

## 2018-05-23 ASSESSMENT — PAIN DESCRIPTION - LOCATION: LOCATION: LEG

## 2018-05-23 ASSESSMENT — PAIN DESCRIPTION - ORIENTATION: ORIENTATION: LEFT

## 2018-05-23 ASSESSMENT — PAIN DESCRIPTION - FREQUENCY: FREQUENCY: INTERMITTENT

## 2018-05-23 ASSESSMENT — PAIN DESCRIPTION - DESCRIPTORS: DESCRIPTORS: ACHING

## 2018-05-24 ENCOUNTER — TELEPHONE (OUTPATIENT)
Dept: FAMILY MEDICINE CLINIC | Age: 63
End: 2018-05-24

## 2018-05-25 ENCOUNTER — ANESTHESIA (OUTPATIENT)
Dept: OPERATING ROOM | Age: 63
DRG: 617 | End: 2018-05-25
Payer: MEDICARE

## 2018-05-25 ENCOUNTER — ANESTHESIA EVENT (OUTPATIENT)
Dept: OPERATING ROOM | Age: 63
DRG: 617 | End: 2018-05-25
Payer: MEDICARE

## 2018-05-25 ENCOUNTER — HOSPITAL ENCOUNTER (INPATIENT)
Age: 63
LOS: 5 days | Discharge: NURSING, REHAB OR LONG-TERM CARE | DRG: 617 | End: 2018-05-30
Attending: SURGERY | Admitting: SURGERY
Payer: MEDICARE

## 2018-05-25 ENCOUNTER — PREP FOR PROCEDURE (OUTPATIENT)
Dept: VASCULAR SURGERY | Age: 63
End: 2018-05-25

## 2018-05-25 VITALS
OXYGEN SATURATION: 98 % | TEMPERATURE: 94.8 F | SYSTOLIC BLOOD PRESSURE: 122 MMHG | DIASTOLIC BLOOD PRESSURE: 82 MMHG | RESPIRATION RATE: 1 BRPM

## 2018-05-25 PROBLEM — Z89.512 HX OF BKA, LEFT (HCC): Status: ACTIVE | Noted: 2018-05-25

## 2018-05-25 PROBLEM — E83.59 CALCINOSIS: Chronic | Status: ACTIVE | Noted: 2018-05-25

## 2018-05-25 LAB
GLUCOSE BLD-MCNC: 135 MG/DL (ref 70–99)
GLUCOSE BLD-MCNC: 258 MG/DL (ref 70–99)
HCT VFR BLD CALC: 32.1 % (ref 42–52)
HEMOGLOBIN: 10.3 G/DL (ref 14–18)
PERFORMED ON: ABNORMAL
PERFORMED ON: ABNORMAL

## 2018-05-25 PROCEDURE — 15100 SPLT AGRFT T/A/L 1ST 100SQCM: CPT | Performed by: SURGERY

## 2018-05-25 PROCEDURE — 0HBHXZZ EXCISION OF RIGHT UPPER LEG SKIN, EXTERNAL APPROACH: ICD-10-PCS | Performed by: SURGERY

## 2018-05-25 PROCEDURE — 15101 SPLT AGRFT T/A/L EA ADDL 100: CPT | Performed by: SURGERY

## 2018-05-25 PROCEDURE — 6360000002 HC RX W HCPCS: Performed by: SURGERY

## 2018-05-25 PROCEDURE — 0Y6J0Z2 DETACHMENT AT LEFT LOWER LEG, MID, OPEN APPROACH: ICD-10-PCS | Performed by: SURGERY

## 2018-05-25 PROCEDURE — 3700000000 HC ANESTHESIA ATTENDED CARE: Performed by: SURGERY

## 2018-05-25 PROCEDURE — 2580000003 HC RX 258: Performed by: SURGERY

## 2018-05-25 PROCEDURE — 3700000001 HC ADD 15 MINUTES (ANESTHESIA): Performed by: SURGERY

## 2018-05-25 PROCEDURE — 6370000000 HC RX 637 (ALT 250 FOR IP): Performed by: SURGERY

## 2018-05-25 PROCEDURE — 2580000003 HC RX 258: Performed by: ANESTHESIOLOGY

## 2018-05-25 PROCEDURE — 88305 TISSUE EXAM BY PATHOLOGIST: CPT

## 2018-05-25 PROCEDURE — 7100000001 HC PACU RECOVERY - ADDTL 15 MIN: Performed by: SURGERY

## 2018-05-25 PROCEDURE — 6360000002 HC RX W HCPCS: Performed by: NURSE ANESTHETIST, CERTIFIED REGISTERED

## 2018-05-25 PROCEDURE — 2500000003 HC RX 250 WO HCPCS: Performed by: NURSE ANESTHETIST, CERTIFIED REGISTERED

## 2018-05-25 PROCEDURE — 85018 HEMOGLOBIN: CPT

## 2018-05-25 PROCEDURE — 3600000004 HC SURGERY LEVEL 4 BASE: Performed by: SURGERY

## 2018-05-25 PROCEDURE — 2500000003 HC RX 250 WO HCPCS: Performed by: SURGERY

## 2018-05-25 PROCEDURE — 2720000001 HC MISC SURG SUPPLY STERILE $51-500: Performed by: SURGERY

## 2018-05-25 PROCEDURE — 7100000000 HC PACU RECOVERY - FIRST 15 MIN: Performed by: SURGERY

## 2018-05-25 PROCEDURE — 27880 AMPUTATION OF LOWER LEG: CPT | Performed by: SURGERY

## 2018-05-25 PROCEDURE — 85014 HEMATOCRIT: CPT

## 2018-05-25 PROCEDURE — 0KXT0ZZ TRANSFER LEFT LOWER LEG MUSCLE, OPEN APPROACH: ICD-10-PCS | Performed by: SURGERY

## 2018-05-25 PROCEDURE — 1210000000 HC MED SURG R&B

## 2018-05-25 PROCEDURE — 82948 REAGENT STRIP/BLOOD GLUCOSE: CPT

## 2018-05-25 PROCEDURE — 3600000014 HC SURGERY LEVEL 4 ADDTL 15MIN: Performed by: SURGERY

## 2018-05-25 PROCEDURE — 0HRLX74 REPLACEMENT OF LEFT LOWER LEG SKIN WITH AUTOLOGOUS TISSUE SUBSTITUTE, PARTIAL THICKNESS, EXTERNAL APPROACH: ICD-10-PCS | Performed by: SURGERY

## 2018-05-25 PROCEDURE — 15738 MUSCLE-SKIN GRAFT LEG: CPT | Performed by: SURGERY

## 2018-05-25 RX ORDER — LIDOCAINE HYDROCHLORIDE 10 MG/ML
1 INJECTION, SOLUTION EPIDURAL; INFILTRATION; INTRACAUDAL; PERINEURAL
Status: DISCONTINUED | OUTPATIENT
Start: 2018-05-25 | End: 2018-05-25 | Stop reason: HOSPADM

## 2018-05-25 RX ORDER — LABETALOL HYDROCHLORIDE 5 MG/ML
5 INJECTION, SOLUTION INTRAVENOUS EVERY 10 MIN PRN
Status: DISCONTINUED | OUTPATIENT
Start: 2018-05-25 | End: 2018-05-25 | Stop reason: HOSPADM

## 2018-05-25 RX ORDER — TIZANIDINE 4 MG/1
4 TABLET ORAL EVERY 6 HOURS PRN
Status: DISCONTINUED | OUTPATIENT
Start: 2018-05-25 | End: 2018-05-30 | Stop reason: HOSPADM

## 2018-05-25 RX ORDER — FENTANYL CITRATE 50 UG/ML
50 INJECTION, SOLUTION INTRAMUSCULAR; INTRAVENOUS
Status: DISCONTINUED | OUTPATIENT
Start: 2018-05-25 | End: 2018-05-25 | Stop reason: HOSPADM

## 2018-05-25 RX ORDER — ROCURONIUM BROMIDE 10 MG/ML
INJECTION, SOLUTION INTRAVENOUS PRN
Status: DISCONTINUED | OUTPATIENT
Start: 2018-05-25 | End: 2018-05-25 | Stop reason: SDUPTHER

## 2018-05-25 RX ORDER — SODIUM CHLORIDE 0.9 % (FLUSH) 0.9 %
10 SYRINGE (ML) INJECTION EVERY 12 HOURS SCHEDULED
Status: DISCONTINUED | OUTPATIENT
Start: 2018-05-25 | End: 2018-05-25 | Stop reason: SDUPTHER

## 2018-05-25 RX ORDER — MULTIVITAMIN
1 TABLET ORAL
COMMUNITY
End: 2022-01-20

## 2018-05-25 RX ORDER — MIDAZOLAM HYDROCHLORIDE 1 MG/ML
2 INJECTION INTRAMUSCULAR; INTRAVENOUS
Status: DISCONTINUED | OUTPATIENT
Start: 2018-05-25 | End: 2018-05-25 | Stop reason: HOSPADM

## 2018-05-25 RX ORDER — KETOROLAC TROMETHAMINE 30 MG/ML
15 INJECTION, SOLUTION INTRAMUSCULAR; INTRAVENOUS EVERY 6 HOURS
Status: COMPLETED | OUTPATIENT
Start: 2018-05-25 | End: 2018-05-27

## 2018-05-25 RX ORDER — EPHEDRINE SULFATE 50 MG/ML
INJECTION, SOLUTION INTRAVENOUS PRN
Status: DISCONTINUED | OUTPATIENT
Start: 2018-05-25 | End: 2018-05-25 | Stop reason: SDUPTHER

## 2018-05-25 RX ORDER — DEXAMETHASONE SODIUM PHOSPHATE 4 MG/ML
INJECTION, SOLUTION INTRA-ARTICULAR; INTRALESIONAL; INTRAMUSCULAR; INTRAVENOUS; SOFT TISSUE PRN
Status: DISCONTINUED | OUTPATIENT
Start: 2018-05-25 | End: 2018-05-25 | Stop reason: SDUPTHER

## 2018-05-25 RX ORDER — HYDROCODONE BITARTRATE AND ACETAMINOPHEN 5; 325 MG/1; MG/1
1 TABLET ORAL EVERY 4 HOURS PRN
Status: DISCONTINUED | OUTPATIENT
Start: 2018-05-25 | End: 2018-05-26 | Stop reason: ALTCHOICE

## 2018-05-25 RX ORDER — GLYCOPYRROLATE 0.2 MG/ML
INJECTION INTRAMUSCULAR; INTRAVENOUS PRN
Status: DISCONTINUED | OUTPATIENT
Start: 2018-05-25 | End: 2018-05-25 | Stop reason: SDUPTHER

## 2018-05-25 RX ORDER — PROMETHAZINE HYDROCHLORIDE 25 MG/ML
6.25 INJECTION, SOLUTION INTRAMUSCULAR; INTRAVENOUS
Status: DISCONTINUED | OUTPATIENT
Start: 2018-05-25 | End: 2018-05-25 | Stop reason: HOSPADM

## 2018-05-25 RX ORDER — SODIUM CHLORIDE 0.9 % (FLUSH) 0.9 %
10 SYRINGE (ML) INJECTION PRN
Status: CANCELLED | OUTPATIENT
Start: 2018-05-25

## 2018-05-25 RX ORDER — METOCLOPRAMIDE HYDROCHLORIDE 5 MG/ML
10 INJECTION INTRAMUSCULAR; INTRAVENOUS
Status: DISCONTINUED | OUTPATIENT
Start: 2018-05-25 | End: 2018-05-25 | Stop reason: HOSPADM

## 2018-05-25 RX ORDER — FENTANYL CITRATE 50 UG/ML
25 INJECTION, SOLUTION INTRAMUSCULAR; INTRAVENOUS
Status: DISCONTINUED | OUTPATIENT
Start: 2018-05-25 | End: 2018-05-25 | Stop reason: HOSPADM

## 2018-05-25 RX ORDER — ONDANSETRON 2 MG/ML
INJECTION INTRAMUSCULAR; INTRAVENOUS PRN
Status: DISCONTINUED | OUTPATIENT
Start: 2018-05-25 | End: 2018-05-25 | Stop reason: SDUPTHER

## 2018-05-25 RX ORDER — SODIUM CHLORIDE, SODIUM LACTATE, POTASSIUM CHLORIDE, CALCIUM CHLORIDE 600; 310; 30; 20 MG/100ML; MG/100ML; MG/100ML; MG/100ML
INJECTION, SOLUTION INTRAVENOUS CONTINUOUS
Status: DISCONTINUED | OUTPATIENT
Start: 2018-05-25 | End: 2018-05-25

## 2018-05-25 RX ORDER — LORAZEPAM 0.5 MG/1
0.5 TABLET ORAL NIGHTLY PRN
Status: DISCONTINUED | OUTPATIENT
Start: 2018-05-25 | End: 2018-05-30 | Stop reason: HOSPADM

## 2018-05-25 RX ORDER — HYDROCODONE BITARTRATE AND ACETAMINOPHEN 5; 325 MG/1; MG/1
2 TABLET ORAL EVERY 4 HOURS PRN
Status: DISCONTINUED | OUTPATIENT
Start: 2018-05-25 | End: 2018-05-26 | Stop reason: ALTCHOICE

## 2018-05-25 RX ORDER — PANTOPRAZOLE SODIUM 40 MG/1
40 TABLET, DELAYED RELEASE ORAL
Status: DISCONTINUED | OUTPATIENT
Start: 2018-05-26 | End: 2018-05-30 | Stop reason: HOSPADM

## 2018-05-25 RX ORDER — SODIUM CHLORIDE 0.9 % (FLUSH) 0.9 %
10 SYRINGE (ML) INJECTION EVERY 12 HOURS SCHEDULED
Status: CANCELLED | OUTPATIENT
Start: 2018-05-25

## 2018-05-25 RX ORDER — SODIUM CHLORIDE 9 MG/ML
INJECTION, SOLUTION INTRAVENOUS CONTINUOUS
Status: DISCONTINUED | OUTPATIENT
Start: 2018-05-25 | End: 2018-05-30 | Stop reason: ALTCHOICE

## 2018-05-25 RX ORDER — HYDRALAZINE HYDROCHLORIDE 20 MG/ML
5 INJECTION INTRAMUSCULAR; INTRAVENOUS EVERY 10 MIN PRN
Status: DISCONTINUED | OUTPATIENT
Start: 2018-05-25 | End: 2018-05-25 | Stop reason: HOSPADM

## 2018-05-25 RX ORDER — LISINOPRIL 20 MG/1
20 TABLET ORAL NIGHTLY
Status: DISCONTINUED | OUTPATIENT
Start: 2018-05-25 | End: 2018-05-30 | Stop reason: HOSPADM

## 2018-05-25 RX ORDER — MORPHINE SULFATE 4 MG/ML
4 INJECTION, SOLUTION INTRAMUSCULAR; INTRAVENOUS EVERY 5 MIN PRN
Status: DISCONTINUED | OUTPATIENT
Start: 2018-05-25 | End: 2018-05-25 | Stop reason: HOSPADM

## 2018-05-25 RX ORDER — MIDAZOLAM HYDROCHLORIDE 1 MG/ML
INJECTION INTRAMUSCULAR; INTRAVENOUS PRN
Status: DISCONTINUED | OUTPATIENT
Start: 2018-05-25 | End: 2018-05-25 | Stop reason: SDUPTHER

## 2018-05-25 RX ORDER — HYDROMORPHONE HCL IN 0.9% NACL 0.5 MG/ML
0.25 SYRINGE (ML) INTRAVENOUS EVERY 5 MIN PRN
Status: DISCONTINUED | OUTPATIENT
Start: 2018-05-25 | End: 2018-05-25 | Stop reason: HOSPADM

## 2018-05-25 RX ORDER — HYDROMORPHONE HCL IN 0.9% NACL 0.5 MG/ML
0.5 SYRINGE (ML) INTRAVENOUS EVERY 5 MIN PRN
Status: DISCONTINUED | OUTPATIENT
Start: 2018-05-25 | End: 2018-05-25 | Stop reason: HOSPADM

## 2018-05-25 RX ORDER — LIDOCAINE HYDROCHLORIDE 10 MG/ML
1 INJECTION, SOLUTION EPIDURAL; INFILTRATION; INTRACAUDAL; PERINEURAL ONCE
Status: DISCONTINUED | OUTPATIENT
Start: 2018-05-25 | End: 2018-05-25 | Stop reason: HOSPADM

## 2018-05-25 RX ORDER — KETOROLAC TROMETHAMINE 30 MG/ML
INJECTION, SOLUTION INTRAMUSCULAR; INTRAVENOUS PRN
Status: DISCONTINUED | OUTPATIENT
Start: 2018-05-25 | End: 2018-05-25 | Stop reason: SDUPTHER

## 2018-05-25 RX ORDER — LIDOCAINE HYDROCHLORIDE 10 MG/ML
INJECTION, SOLUTION INFILTRATION; PERINEURAL PRN
Status: DISCONTINUED | OUTPATIENT
Start: 2018-05-25 | End: 2018-05-25 | Stop reason: SDUPTHER

## 2018-05-25 RX ORDER — MORPHINE SULFATE 4 MG/ML
2 INJECTION, SOLUTION INTRAMUSCULAR; INTRAVENOUS EVERY 5 MIN PRN
Status: DISCONTINUED | OUTPATIENT
Start: 2018-05-25 | End: 2018-05-25 | Stop reason: HOSPADM

## 2018-05-25 RX ORDER — MINERAL OIL
OIL (ML) MISCELLANEOUS PRN
Status: DISCONTINUED | OUTPATIENT
Start: 2018-05-25 | End: 2018-05-25 | Stop reason: HOSPADM

## 2018-05-25 RX ORDER — ACETAMINOPHEN 325 MG/1
650 TABLET ORAL EVERY 4 HOURS PRN
Status: DISCONTINUED | OUTPATIENT
Start: 2018-05-25 | End: 2018-05-30 | Stop reason: HOSPADM

## 2018-05-25 RX ORDER — SODIUM CHLORIDE 0.9 % (FLUSH) 0.9 %
10 SYRINGE (ML) INJECTION PRN
Status: DISCONTINUED | OUTPATIENT
Start: 2018-05-25 | End: 2018-05-25 | Stop reason: SDUPTHER

## 2018-05-25 RX ORDER — PROPOFOL 10 MG/ML
INJECTION, EMULSION INTRAVENOUS PRN
Status: DISCONTINUED | OUTPATIENT
Start: 2018-05-25 | End: 2018-05-25 | Stop reason: SDUPTHER

## 2018-05-25 RX ORDER — ONDANSETRON 2 MG/ML
4 INJECTION INTRAMUSCULAR; INTRAVENOUS EVERY 6 HOURS PRN
Status: DISCONTINUED | OUTPATIENT
Start: 2018-05-25 | End: 2018-05-30 | Stop reason: HOSPADM

## 2018-05-25 RX ORDER — DIPHENHYDRAMINE HYDROCHLORIDE 50 MG/ML
12.5 INJECTION INTRAMUSCULAR; INTRAVENOUS
Status: DISCONTINUED | OUTPATIENT
Start: 2018-05-25 | End: 2018-05-25 | Stop reason: HOSPADM

## 2018-05-25 RX ORDER — HYDROMORPHONE HCL IN 0.9% NACL 0.5 MG/ML
0.5 SYRINGE (ML) INTRAVENOUS
Status: DISCONTINUED | OUTPATIENT
Start: 2018-05-25 | End: 2018-05-30 | Stop reason: HOSPADM

## 2018-05-25 RX ORDER — ACETAMINOPHEN 325 MG/1
650 TABLET ORAL
Status: DISPENSED | OUTPATIENT
Start: 2018-05-25 | End: 2018-05-28

## 2018-05-25 RX ORDER — SODIUM CHLORIDE 0.9 % (FLUSH) 0.9 %
10 SYRINGE (ML) INJECTION PRN
Status: DISCONTINUED | OUTPATIENT
Start: 2018-05-25 | End: 2018-05-25 | Stop reason: HOSPADM

## 2018-05-25 RX ORDER — SODIUM CHLORIDE 0.9 % (FLUSH) 0.9 %
10 SYRINGE (ML) INJECTION EVERY 12 HOURS SCHEDULED
Status: DISCONTINUED | OUTPATIENT
Start: 2018-05-25 | End: 2018-05-25 | Stop reason: HOSPADM

## 2018-05-25 RX ORDER — FENTANYL CITRATE 50 UG/ML
INJECTION, SOLUTION INTRAMUSCULAR; INTRAVENOUS PRN
Status: DISCONTINUED | OUTPATIENT
Start: 2018-05-25 | End: 2018-05-25 | Stop reason: SDUPTHER

## 2018-05-25 RX ORDER — MEPERIDINE HYDROCHLORIDE 50 MG/ML
12.5 INJECTION INTRAMUSCULAR; INTRAVENOUS; SUBCUTANEOUS EVERY 5 MIN PRN
Status: DISCONTINUED | OUTPATIENT
Start: 2018-05-25 | End: 2018-05-25 | Stop reason: HOSPADM

## 2018-05-25 RX ORDER — MORPHINE SULFATE 10 MG/ML
INJECTION, SOLUTION INTRAMUSCULAR; INTRAVENOUS PRN
Status: DISCONTINUED | OUTPATIENT
Start: 2018-05-25 | End: 2018-05-25 | Stop reason: SDUPTHER

## 2018-05-25 RX ADMIN — FENTANYL CITRATE 100 MCG: 50 INJECTION, SOLUTION INTRAMUSCULAR; INTRAVENOUS at 08:11

## 2018-05-25 RX ADMIN — ROCURONIUM BROMIDE 50 MG: 10 INJECTION INTRAVENOUS at 08:12

## 2018-05-25 RX ADMIN — FENTANYL CITRATE 50 MCG: 50 INJECTION, SOLUTION INTRAMUSCULAR; INTRAVENOUS at 09:13

## 2018-05-25 RX ADMIN — ACETAMINOPHEN 650 MG: 325 TABLET ORAL at 15:33

## 2018-05-25 RX ADMIN — MORPHINE SULFATE 4 MG: 4 INJECTION INTRAVENOUS at 12:35

## 2018-05-25 RX ADMIN — VANCOMYCIN HYDROCHLORIDE 1500 MG: 10 INJECTION, POWDER, LYOPHILIZED, FOR SOLUTION INTRAVENOUS at 07:41

## 2018-05-25 RX ADMIN — GLYCOPYRROLATE 0.4 MG: 0.2 INJECTION, SOLUTION INTRAMUSCULAR; INTRAVENOUS at 09:08

## 2018-05-25 RX ADMIN — VANCOMYCIN HYDROCHLORIDE 1500 MG: 5 INJECTION, POWDER, LYOPHILIZED, FOR SOLUTION INTRAVENOUS at 20:37

## 2018-05-25 RX ADMIN — KETOROLAC TROMETHAMINE 15 MG: 30 INJECTION, SOLUTION INTRAMUSCULAR at 15:01

## 2018-05-25 RX ADMIN — MIDAZOLAM HYDROCHLORIDE 1 MG: 1 INJECTION, SOLUTION INTRAMUSCULAR; INTRAVENOUS at 07:56

## 2018-05-25 RX ADMIN — Medication 0.5 MG: at 14:19

## 2018-05-25 RX ADMIN — MIDAZOLAM HYDROCHLORIDE 1 MG: 1 INJECTION, SOLUTION INTRAMUSCULAR; INTRAVENOUS at 08:07

## 2018-05-25 RX ADMIN — KETOROLAC TROMETHAMINE 15 MG: 30 INJECTION, SOLUTION INTRAMUSCULAR at 20:48

## 2018-05-25 RX ADMIN — FENTANYL CITRATE 50 MCG: 50 INJECTION, SOLUTION INTRAMUSCULAR; INTRAVENOUS at 09:05

## 2018-05-25 RX ADMIN — SODIUM CHLORIDE, POTASSIUM CHLORIDE, SODIUM LACTATE AND CALCIUM CHLORIDE: 600; 310; 30; 20 INJECTION, SOLUTION INTRAVENOUS at 06:10

## 2018-05-25 RX ADMIN — MORPHINE SULFATE 4 MG: 10 INJECTION INTRAMUSCULAR; INTRAVENOUS; SUBCUTANEOUS at 10:26

## 2018-05-25 RX ADMIN — DEXAMETHASONE SODIUM PHOSPHATE 4 MG: 4 INJECTION, SOLUTION INTRAMUSCULAR; INTRAVENOUS at 08:20

## 2018-05-25 RX ADMIN — KETOROLAC TROMETHAMINE 15 MG: 30 INJECTION, SOLUTION INTRAMUSCULAR at 10:52

## 2018-05-25 RX ADMIN — LIDOCAINE HYDROCHLORIDE 5 ML: 10 INJECTION, SOLUTION INFILTRATION; PERINEURAL at 08:12

## 2018-05-25 RX ADMIN — MORPHINE SULFATE 5 MG: 10 INJECTION INTRAMUSCULAR; INTRAVENOUS; SUBCUTANEOUS at 11:09

## 2018-05-25 RX ADMIN — MORPHINE SULFATE 2 MG: 10 INJECTION INTRAMUSCULAR; INTRAVENOUS; SUBCUTANEOUS at 10:52

## 2018-05-25 RX ADMIN — MORPHINE SULFATE 4 MG: 10 INJECTION INTRAMUSCULAR; INTRAVENOUS; SUBCUTANEOUS at 10:47

## 2018-05-25 RX ADMIN — SODIUM CHLORIDE: 9 INJECTION, SOLUTION INTRAVENOUS at 14:20

## 2018-05-25 RX ADMIN — HYDROCODONE BITARTRATE AND ACETAMINOPHEN 2 TABLET: 5; 325 TABLET ORAL at 22:35

## 2018-05-25 RX ADMIN — EPHEDRINE SULFATE 5 MG: 50 INJECTION, SOLUTION INTRAMUSCULAR; INTRAVENOUS; SUBCUTANEOUS at 08:30

## 2018-05-25 RX ADMIN — PROPOFOL 140 MG: 10 INJECTION, EMULSION INTRAVENOUS at 08:12

## 2018-05-25 RX ADMIN — ROCURONIUM BROMIDE 10 MG: 10 INJECTION INTRAVENOUS at 08:43

## 2018-05-25 RX ADMIN — HYDROCODONE BITARTRATE AND ACETAMINOPHEN 1 TABLET: 5; 325 TABLET ORAL at 18:25

## 2018-05-25 RX ADMIN — SUGAMMADEX 220 MG: 100 INJECTION, SOLUTION INTRAVENOUS at 11:08

## 2018-05-25 RX ADMIN — ONDANSETRON HYDROCHLORIDE 4 MG: 2 SOLUTION INTRAMUSCULAR; INTRAVENOUS at 11:20

## 2018-05-25 RX ADMIN — LISINOPRIL 20 MG: 20 TABLET ORAL at 20:36

## 2018-05-25 RX ADMIN — MORPHINE SULFATE 5 MG: 10 INJECTION INTRAMUSCULAR; INTRAVENOUS; SUBCUTANEOUS at 11:29

## 2018-05-25 RX ADMIN — FENTANYL CITRATE 50 MCG: 50 INJECTION, SOLUTION INTRAMUSCULAR; INTRAVENOUS at 08:47

## 2018-05-25 RX ADMIN — Medication 2 G: at 08:06

## 2018-05-25 RX ADMIN — ACETAMINOPHEN 650 MG: 325 TABLET ORAL at 20:36

## 2018-05-25 ASSESSMENT — LIFESTYLE VARIABLES: SMOKING_STATUS: 0

## 2018-05-25 ASSESSMENT — PAIN SCALES - GENERAL
PAINLEVEL_OUTOF10: 7
PAINLEVEL_OUTOF10: 7
PAINLEVEL_OUTOF10: 10
PAINLEVEL_OUTOF10: 7
PAINLEVEL_OUTOF10: 7
PAINLEVEL_OUTOF10: 10
PAINLEVEL_OUTOF10: 8
PAINLEVEL_OUTOF10: 6

## 2018-05-25 ASSESSMENT — PAIN DESCRIPTION - DESCRIPTORS: DESCRIPTORS: BURNING

## 2018-05-25 ASSESSMENT — PAIN - FUNCTIONAL ASSESSMENT: PAIN_FUNCTIONAL_ASSESSMENT: 0-10

## 2018-05-25 NOTE — CARE COORDINATION
Patient is current with [facility] for [facility]. Will follow. Please advise when patient discharges. [address] [place] [address] [phone]. [phone].   Electronically signed by Dalila Ponce RN on 5/25/18 at 9:05 AM

## 2018-05-25 NOTE — PROGRESS NOTES
Patient rec'd to room 323 post op. On arrival, awakens easily to voice. Wound vac to left stump surgical site with serosanguineous drainage and 125mm Hg. Left PIV with fluids, site clear and dry. VS as noted. Resp even and unlabored on room air. no

## 2018-05-25 NOTE — H&P (VIEW-ONLY)
 Diabetes mellitus ([facility] [address]      Hypertension      Morbid obesity ([address] 6/22/15    Skin ulcer of toe of left foot with fat layer exposed ([facility] [address] 4/4/2017    Sleep apnea in adult 6/22/15    Venous insufficiency of both lower extremities 6/22/15            Past Surgical History         Past Surgical History:   Procedure Laterality Date    CARDIAC CATHETERIZATION        DILATATION, ESOPHAGUS        GASTRIC BYPASS SURGERY N/A 12/2015    GASTROSTOMY TUBE PLACEMENT   02/11/2018    JORDYN-EN-Y GASTRIC BYPASS   02/11/2018     repair of gastric bypass    TOTAL KNEE ARTHROPLASTY Bilateral           Family History   Family History   Problem Relation Age of Onset    Diabetes Mother      Heart Disease Mother      High Blood Pressure Mother      Heart Disease Father      Other Father                Social History   Substance Use Topics    Smoking status: Never Smoker    Smokeless tobacco: Never Used    Alcohol use No            Review of Systems     Constitutional  no significant activity change, appetite change, or unexpected weight change. No fever or chills. No diaphoresis. +significant fatigue. HENT  no significant rhinorrhea or epistaxis. No tinnitus or significant hearing loss. Eyes  no sudden vision change or amaurosis. Respiratory  no significant shortness of breath, wheezing, or stridor. No apnea, cough, or chest tightness associated with shortness of breath. Cardiovascular  no chest pain, syncope, or significant dizziness. No palpitations. Chronicsignificant leg swelling. Patient reports no claudication. Gastrointestinal  no abdominal swelling or pain. No blood in stool. No severe constipation, diarrhea, nausea, or vomiting. Genitourinary  No difficulty urinating, dysuria, frequency, or urgency. No flank pain or hematuria. Musculoskeletal  no back pain, gait disturbance, or myalgia. Skin  no color change, rash, pallor, lateral calf  wound.   Neurologic  no dizziness, facial Unit Type KCI 5/2/2018 11:15 AM   Dressing Type Black foam 5/2/2018 11:15 AM   Cycle Continuous 5/2/2018 11:15 AM   Target Pressure (mmHg) 125 5/2/2018 11:15 AM   Drainage Amount Small 5/2/2018 11:15 AM   Drainage Description Serosanguinous 5/2/2018 11:15 AM   Number of days: 55       Wound 10/18/17 Foot Plantar;Right;Medial WOUND 34; RIGHT FOOT PLANTAR MEDIAL ( UNDER GREAT TOE) (Active)   Number of days: 216       Wound 01/22/18 Venous ulcer Leg Left;Lateral Wound 36, Venous, L. lateral Inferior lower leg ( wound 37 merged into wound 36 3/2/18)(wound 31 merged into 36 3/9/18) (Active)   Wound Type Wound 5/23/2018 10:30 AM   Wound Venous 5/23/2018 10:30 AM   Dressing Status Old drainage 5/23/2018 10:30 AM   Dressing Changed Changed/New 5/23/2018 10:30 AM   Wound Length (cm) 6.5 cm 5/23/2018 10:30 AM   Wound Width (cm) 2.8 cm 5/23/2018 10:30 AM   Wound Depth (cm)  .4 5/23/2018 10:30 AM   Calculated Wound Size (cm^2) (l*w) 18.2 cm^2 5/23/2018 10:30 AM   Change in Wound Size % (l*w) -49.67 5/23/2018 10:30 AM   Drainage Amount Moderate 5/23/2018 10:30 AM   Drainage Description Serosanguinous 5/23/2018 10:30 AM   Odor Mild 5/23/2018 10:30 AM   Margins Attached edges 5/23/2018 10:30 AM   Lurdes-wound Assessment Hyperpigmented 5/23/2018 10:30 AM   Non-staged Wound Description Full thickness 5/16/2018 10:43 AM   [place]%Wound Bed 25 5/23/2018 10:30 AM   Red%Wound Bed 25 5/23/2018 10:30 AM   Yellow%Wound Bed 50 5/23/2018 10:30 AM   Debridement per physician None 5/23/2018 10:30 AM   Time out N/A 5/23/2018 10:30 AM   Number of days: 120       Wound 04/18/18 Venous ulcer Leg Lateral 36A Left Lateral Wound Seperation  (Active)   Wound Type Wound 5/23/2018 10:30 AM   Wound Venous 5/23/2018 10:30 AM   Dressing Status Old drainage 5/23/2018 10:30 AM   Dressing Changed Changed/New 5/23/2018 10:30 AM   Wound Length (cm) 2.5 cm 5/23/2018 10:30 AM   Wound Width (cm) 2.8 cm 5/23/2018 10:30 AM   Wound Depth (cm)  .2 5/23/2018 10:30 AM Calculated Wound Size (cm^2) (l*w) 7 cm^2 5/23/2018 10:30 AM   Change in Wound Size % (l*w) 0 5/23/2018 10:30 AM   Drainage Amount Moderate 5/23/2018 10:30 AM   Drainage Description Serosanguinous 5/23/2018 10:30 AM   Odor Mild 5/23/2018 10:30 AM   Margins Attached edges 5/23/2018 10:30 AM   Lurdes-wound Assessment Hyperpigmented 5/23/2018 10:30 AM   Non-staged Wound Description Full thickness 5/23/2018 10:30 AM   Debridement per physician None 5/23/2018 10:30 AM   Time out N/A 5/23/2018 10:30 AM   Number of days: 34       Diabetic Ulcer 03/27/17 Foot Left;Distal DIABETIC FOOT ULCER # 33R LEFT/ OLVERA 1-wound reopened 5-9, previously wound 33 (Active)   Number of days: 421       Diabetic Ulcer 10/18/17 Foot Right;Plantar;Lateral WOUND 35; rIGHT FOOT PLANTAR LATERAL ( UNDER 5TH TOE) (Active)   Number of days: 216      The patients pain is   . Wound is has worsened slightly. Please refer to nursing measurements and assessment regarding wound       XRay left tibia fibula 9-13-18   Impression   No acute bony abnormality. Calcinosis cutis which may be due to previous inflammatory process? .   This may be clinically correlated. Signed by Dr Joanie Zaman on 9/13/2017 12:40 PM          Impression JAYASHREE 4-4-17        Based on ankle-brachial indices and doppler waveforms, the patient has    relatively normal flow to the right lower extremity arterial system at   [facility]        Left leg    The patient has noncompressible left tibial arteries. This would be    consistent with a history of diabetes and calcified tibial vessels.    However, there is good doppler waveforms from the bilateral femoral all    the way to the tibial arteries.  Great toe pressures are good bilaterally.   Diana Primus, there is no critical stenosis in the left lower extremities.        Signature        ----------------------------------------------------------------    Electronically signed by Marcos Galicia MD(Interpreting    physician) on 04/04/2017 11:19 AM    ----------------------------------------------------------------            Risk factors for atherosclerosis of all vascular beds have been reviewed with the patient including:  Family history, tobacco abuse in all forms, elevated cholesterol, hyperlipidemia, and diabetes.        Mr Rodrigo Barrett has requested an amputation of the left leg. With the wound and the calcinosis he (and Dr. Reuben Hugo and I do not feel there is any chance of healing)  He has had this wound for years. He feels that the bacterial load associated with the wound are contributing to his fatigue. He may or not be correct on this.     I went over with him that my biggest concern is healing a below the knee amputation. It is \"iffy\" if he has enough skin and subcutaneous tissue not involved with the calcinosis cutis. I went over that a stsk may be needed to cover a portion of the wound. I also went over with him that if the BKA fails, he may well need further surgery and possible an AKA. I went over with him the rehab potential of the BKA and the much more difficult rehab potential of the AKS.    Hisw wife was present during the conversation and also participated. Of Note, I spoke with patient and his wife on 5-16-18, They have had a week to \"think this over\". They (patient and wife) still want to proceed with amputation. The procedure has been scheduled for 5-25-18. I also went over with patient expected hospital stay and pain considerations. Risks discussed. Risks of surgery have been reviewed with the patient including but not limited to MI, death, CVA, bleeding, nerve injury, infection, need for further surgery, pain, and extended recovery time. I specifically went over chance of failure to heal and need to convert to AKA. And possible need for STSG for coverage. Assessment  Left leg with chronic venous, diabetic wounds with severe calcinosis cutis.    Plan  Left BKA 5-25-18  Plan for wound - Dress per physician order

## 2018-05-26 PROBLEM — I10 ESSENTIAL HYPERTENSION, BENIGN: Chronic | Status: ACTIVE | Noted: 2018-05-26

## 2018-05-26 LAB
ANION GAP SERPL CALCULATED.3IONS-SCNC: 11 MMOL/L (ref 7–19)
BUN BLDV-MCNC: 23 MG/DL (ref 8–23)
CALCIUM SERPL-MCNC: 8.2 MG/DL (ref 8.8–10.2)
CHLORIDE BLD-SCNC: 101 MMOL/L (ref 98–111)
CO2: 24 MMOL/L (ref 22–29)
CREAT SERPL-MCNC: 1 MG/DL (ref 0.5–1.2)
GFR NON-AFRICAN AMERICAN: >60
GLUCOSE BLD-MCNC: 125 MG/DL (ref 74–109)
GLUCOSE BLD-MCNC: 139 MG/DL (ref 70–99)
GLUCOSE BLD-MCNC: 151 MG/DL (ref 70–99)
GLUCOSE BLD-MCNC: 156 MG/DL (ref 70–99)
GLUCOSE BLD-MCNC: 165 MG/DL (ref 70–99)
HCT VFR BLD CALC: 26 % (ref 42–52)
HEMOGLOBIN: 8.4 G/DL (ref 14–18)
MCH RBC QN AUTO: 31.2 PG (ref 27–31)
MCHC RBC AUTO-ENTMCNC: 32.3 G/DL (ref 33–37)
MCV RBC AUTO: 96.7 FL (ref 80–94)
PDW BLD-RTO: 13.8 % (ref 11.5–14.5)
PERFORMED ON: ABNORMAL
PLATELET # BLD: 148 K/UL (ref 130–400)
PMV BLD AUTO: 11.8 FL (ref 9.4–12.4)
POTASSIUM REFLEX MAGNESIUM: 5.2 MMOL/L (ref 3.5–5)
RBC # BLD: 2.69 M/UL (ref 4.7–6.1)
SODIUM BLD-SCNC: 136 MMOL/L (ref 136–145)
WBC # BLD: 11.6 K/UL (ref 4.8–10.8)

## 2018-05-26 PROCEDURE — 6370000000 HC RX 637 (ALT 250 FOR IP): Performed by: SURGERY

## 2018-05-26 PROCEDURE — 82948 REAGENT STRIP/BLOOD GLUCOSE: CPT

## 2018-05-26 PROCEDURE — 36415 COLL VENOUS BLD VENIPUNCTURE: CPT

## 2018-05-26 PROCEDURE — 99024 POSTOP FOLLOW-UP VISIT: CPT | Performed by: SURGERY

## 2018-05-26 PROCEDURE — 1210000000 HC MED SURG R&B

## 2018-05-26 PROCEDURE — 6360000002 HC RX W HCPCS: Performed by: SURGERY

## 2018-05-26 PROCEDURE — 80048 BASIC METABOLIC PNL TOTAL CA: CPT

## 2018-05-26 PROCEDURE — 85027 COMPLETE CBC AUTOMATED: CPT

## 2018-05-26 PROCEDURE — 2580000003 HC RX 258: Performed by: SURGERY

## 2018-05-26 RX ADMIN — ACETAMINOPHEN 325 MG: 325 TABLET ORAL at 19:17

## 2018-05-26 RX ADMIN — KETOROLAC TROMETHAMINE 15 MG: 30 INJECTION, SOLUTION INTRAMUSCULAR at 15:19

## 2018-05-26 RX ADMIN — ACETAMINOPHEN 650 MG: 325 TABLET ORAL at 12:03

## 2018-05-26 RX ADMIN — HYDROCODONE BITARTRATE AND ACETAMINOPHEN 2 TABLET: 5; 325 TABLET ORAL at 03:44

## 2018-05-26 RX ADMIN — SODIUM CHLORIDE: 9 INJECTION, SOLUTION INTRAVENOUS at 05:45

## 2018-05-26 RX ADMIN — KETOROLAC TROMETHAMINE 15 MG: 30 INJECTION, SOLUTION INTRAMUSCULAR at 02:31

## 2018-05-26 RX ADMIN — Medication 0.5 MG: at 19:18

## 2018-05-26 RX ADMIN — Medication 0.5 MG: at 15:25

## 2018-05-26 RX ADMIN — KETOROLAC TROMETHAMINE 15 MG: 30 INJECTION, SOLUTION INTRAMUSCULAR at 21:10

## 2018-05-26 RX ADMIN — ACETAMINOPHEN 650 MG: 325 TABLET ORAL at 15:19

## 2018-05-26 RX ADMIN — SODIUM CHLORIDE: 9 INJECTION, SOLUTION INTRAVENOUS at 19:13

## 2018-05-26 RX ADMIN — ENOXAPARIN SODIUM 40 MG: 40 INJECTION SUBCUTANEOUS at 08:21

## 2018-05-26 RX ADMIN — LISINOPRIL 20 MG: 20 TABLET ORAL at 21:09

## 2018-05-26 RX ADMIN — Medication 0.5 MG: at 12:11

## 2018-05-26 RX ADMIN — KETOROLAC TROMETHAMINE 15 MG: 30 INJECTION, SOLUTION INTRAMUSCULAR at 08:21

## 2018-05-26 RX ADMIN — HYDROCODONE BITARTRATE AND ACETAMINOPHEN 2 TABLET: 5; 325 TABLET ORAL at 08:30

## 2018-05-26 RX ADMIN — PANTOPRAZOLE SODIUM 40 MG: 40 TABLET, DELAYED RELEASE ORAL at 05:44

## 2018-05-26 ASSESSMENT — PAIN SCALES - GENERAL
PAINLEVEL_OUTOF10: 8
PAINLEVEL_OUTOF10: 5
PAINLEVEL_OUTOF10: 6
PAINLEVEL_OUTOF10: 8
PAINLEVEL_OUTOF10: 0
PAINLEVEL_OUTOF10: 5
PAINLEVEL_OUTOF10: 8
PAINLEVEL_OUTOF10: 7
PAINLEVEL_OUTOF10: 0
PAINLEVEL_OUTOF10: 8
PAINLEVEL_OUTOF10: 0

## 2018-05-26 NOTE — PROGRESS NOTES
prophylaxis due to:   []  bilateral amputee  []  bilateral lower extremity trauma        []  patient refusal        []  continuing IV heparin         No pharmacologic prophylaxis due to active bleeding        []  bleeding risk        []  GI bleed        []  hemorrhage        []  patient refusal        []  risk of bleeding         []  thrombocytopenia        []  supratherapeutic INR    Beta Blocker:  not indicated    [facility] Catheter:  N/A

## 2018-05-26 NOTE — PLAN OF CARE
Problem: Risk for Impaired Skin Integrity  Goal: Tissue integrity - skin and mucous membranes  Structural intactness and normal physiological function of skin and  mucous membranes.    Outcome: Ongoing      Problem: Pain:  Goal: Pain level will decrease  Pain level will decrease   Outcome: Ongoing    Goal: Control of acute pain  Control of acute pain   Outcome: Ongoing    Goal: Control of chronic pain  Control of chronic pain   Outcome: Ongoing      Problem: Falls - Risk of:  Goal: Will remain free from falls  Will remain free from falls    Outcome: Ongoing    Goal: Absence of physical injury  Absence of physical injury    Outcome: Ongoing

## 2018-05-27 LAB
GLUCOSE BLD-MCNC: 120 MG/DL (ref 70–99)
GLUCOSE BLD-MCNC: 147 MG/DL (ref 70–99)
GLUCOSE BLD-MCNC: 157 MG/DL (ref 70–99)
GLUCOSE BLD-MCNC: 165 MG/DL (ref 70–99)
PERFORMED ON: ABNORMAL

## 2018-05-27 PROCEDURE — 6360000002 HC RX W HCPCS: Performed by: SURGERY

## 2018-05-27 PROCEDURE — 1210000000 HC MED SURG R&B

## 2018-05-27 PROCEDURE — 6370000000 HC RX 637 (ALT 250 FOR IP): Performed by: SURGERY

## 2018-05-27 PROCEDURE — 99024 POSTOP FOLLOW-UP VISIT: CPT | Performed by: SURGERY

## 2018-05-27 PROCEDURE — 82948 REAGENT STRIP/BLOOD GLUCOSE: CPT

## 2018-05-27 PROCEDURE — 2580000003 HC RX 258: Performed by: SURGERY

## 2018-05-27 RX ADMIN — SODIUM CHLORIDE: 9 INJECTION, SOLUTION INTRAVENOUS at 22:12

## 2018-05-27 RX ADMIN — ACETAMINOPHEN 650 MG: 325 TABLET ORAL at 16:29

## 2018-05-27 RX ADMIN — PANTOPRAZOLE SODIUM 40 MG: 40 TABLET, DELAYED RELEASE ORAL at 05:50

## 2018-05-27 RX ADMIN — ACETAMINOPHEN 650 MG: 325 TABLET ORAL at 19:54

## 2018-05-27 RX ADMIN — LORAZEPAM 0.5 MG: 0.5 TABLET ORAL at 22:15

## 2018-05-27 RX ADMIN — ACETAMINOPHEN 650 MG: 325 TABLET ORAL at 08:30

## 2018-05-27 RX ADMIN — ENOXAPARIN SODIUM 40 MG: 40 INJECTION SUBCUTANEOUS at 10:00

## 2018-05-27 RX ADMIN — Medication 0.5 MG: at 16:33

## 2018-05-27 RX ADMIN — LISINOPRIL 20 MG: 20 TABLET ORAL at 20:39

## 2018-05-27 RX ADMIN — KETOROLAC TROMETHAMINE 15 MG: 30 INJECTION, SOLUTION INTRAMUSCULAR at 02:56

## 2018-05-27 RX ADMIN — KETOROLAC TROMETHAMINE 15 MG: 30 INJECTION, SOLUTION INTRAMUSCULAR at 09:59

## 2018-05-27 RX ADMIN — ACETAMINOPHEN 650 MG: 325 TABLET ORAL at 03:54

## 2018-05-27 RX ADMIN — Medication 0.5 MG: at 19:57

## 2018-05-27 RX ADMIN — Medication 0.5 MG: at 22:11

## 2018-05-27 ASSESSMENT — PAIN SCALES - GENERAL
PAINLEVEL_OUTOF10: 0
PAINLEVEL_OUTOF10: 6
PAINLEVEL_OUTOF10: 2
PAINLEVEL_OUTOF10: 5
PAINLEVEL_OUTOF10: 0
PAINLEVEL_OUTOF10: 4
PAINLEVEL_OUTOF10: 6
PAINLEVEL_OUTOF10: 6
PAINLEVEL_OUTOF10: 0
PAINLEVEL_OUTOF10: 6
PAINLEVEL_OUTOF10: 6
PAINLEVEL_OUTOF10: 2
PAINLEVEL_OUTOF10: 6
PAINLEVEL_OUTOF10: 7

## 2018-05-27 NOTE — PLAN OF CARE
Problem: Falls - Risk of:  Goal: Will remain free from falls  Will remain free from falls    Outcome: Ongoing

## 2018-05-27 NOTE — PROGRESS NOTES
Vascular Surgery Rounding Note    5/27/2018  7:57 AM    Post op day - 2 left BKA, with muscle flap and stsg closure    Subjective- No complaints    Objective-   Invalid input(s): 24H    Intake/Output Summary (Last 24 hours) at 05/27/18 0757  Last data filed at 05/27/18 0356   Gross per 24 hour   Intake           1578.4 ml   Output             1050 ml   Net            528.4 ml     No intake/output data recorded. CBC:   Recent Labs      05/25/18   1255  05/26/18   0405   WBC   --   11.6*   RBC   --   2.69*   HGB  10.3*  8.4*   HCT  32.1*  26.0*   MCV   --   96.7*   MCH   --   31.2*   MCHC   --   32.3*   RDW   --   13.8   PLT   --   148   MPV   --   11.8      Last 3 CMP:   Recent Labs      05/26/18   0405   NA  136   K  5.2*   CL  101   CO2  24   BUN  23   CREATININE  1.0   GLUCOSE  125*   CALCIUM  8.2*              Physical Exam:  Awake, alert, appropriate Yes  BP (!) 148/67   Pulse 56   Temp 97.6 °F (36.4 °C)   Resp 18   Ht 6' (1.829 m)   Wt 241 lb (109.3 kg)   SpO2 98%   BMI 32.69 kg/m²   Heart-Normal S1 and S2.  Regular rhythm. No murmurs, gallops, or rubs. Lung-negative  Neck-suppleno adenopathy, no carotid bruit, no JVD, supple, symmetrical, trachea midline and thyroid not enlarged, symmetric, no tenderness/mass/nodules  Abdomen-Abdomen soft, non-tender. BS normal. No masses,  No organomegaly  Extremities-negative  Neurologic- alert, oriented, normal speech, no focal findings or movement disorder noted  Wound surgical incisions-Covered with VAC      Assessment/Plan  1. POD-2 doing well, pain under control  2.  Plan to exam wound and STSG in about 72 hours, continue VAC    Antibiotics continued after surgery due to:N/A  DVT prophylaxis: pharmacologic prophylaxis (with any of the following: enoxaparin (Lovenox) 40mg SQ 2h prior to surgery then QD)           Beta Blocker:  not indicated    [facility] Catheter:  N/A

## 2018-05-28 LAB
GLUCOSE BLD-MCNC: 118 MG/DL (ref 70–99)
GLUCOSE BLD-MCNC: 128 MG/DL (ref 70–99)
GLUCOSE BLD-MCNC: 129 MG/DL (ref 70–99)
GLUCOSE BLD-MCNC: 135 MG/DL (ref 70–99)
PERFORMED ON: ABNORMAL

## 2018-05-28 PROCEDURE — 99024 POSTOP FOLLOW-UP VISIT: CPT | Performed by: SURGERY

## 2018-05-28 PROCEDURE — 82948 REAGENT STRIP/BLOOD GLUCOSE: CPT

## 2018-05-28 PROCEDURE — 6360000002 HC RX W HCPCS: Performed by: SURGERY

## 2018-05-28 PROCEDURE — 1210000000 HC MED SURG R&B

## 2018-05-28 PROCEDURE — 2580000003 HC RX 258: Performed by: SURGERY

## 2018-05-28 PROCEDURE — 6370000000 HC RX 637 (ALT 250 FOR IP): Performed by: SURGERY

## 2018-05-28 RX ADMIN — Medication 0.5 MG: at 01:45

## 2018-05-28 RX ADMIN — ENOXAPARIN SODIUM 40 MG: 40 INJECTION SUBCUTANEOUS at 08:41

## 2018-05-28 RX ADMIN — PANTOPRAZOLE SODIUM 40 MG: 40 TABLET, DELAYED RELEASE ORAL at 05:49

## 2018-05-28 RX ADMIN — ACETAMINOPHEN 650 MG: 325 TABLET ORAL at 08:41

## 2018-05-28 RX ADMIN — ACETAMINOPHEN 650 MG: 325 TABLET ORAL at 00:08

## 2018-05-28 RX ADMIN — SODIUM CHLORIDE: 9 INJECTION, SOLUTION INTRAVENOUS at 13:46

## 2018-05-28 RX ADMIN — ACETAMINOPHEN 650 MG: 325 TABLET ORAL at 12:13

## 2018-05-28 RX ADMIN — Medication 0.5 MG: at 20:35

## 2018-05-28 RX ADMIN — ACETAMINOPHEN 650 MG: 325 TABLET ORAL at 03:58

## 2018-05-28 RX ADMIN — LORAZEPAM 0.5 MG: 0.5 TABLET ORAL at 20:35

## 2018-05-28 RX ADMIN — LISINOPRIL 20 MG: 20 TABLET ORAL at 20:35

## 2018-05-28 ASSESSMENT — PAIN SCALES - GENERAL
PAINLEVEL_OUTOF10: 0
PAINLEVEL_OUTOF10: 7
PAINLEVEL_OUTOF10: 8
PAINLEVEL_OUTOF10: 2
PAINLEVEL_OUTOF10: 4
PAINLEVEL_OUTOF10: 2
PAINLEVEL_OUTOF10: 0
PAINLEVEL_OUTOF10: 3

## 2018-05-28 NOTE — PROGRESS NOTES
Vascular Surgery Rounding Note    5/28/2018  8:37 AM    Post op day - 3 left BKA, with muscle flap and stsg closure    Subjective- No complaints    Objective-   Invalid input(s): 24H    Intake/Output Summary (Last 24 hours) at 05/28/18 0837  Last data filed at 05/28/18 0622   Gross per 24 hour   Intake             4006 ml   Output             2225 ml   Net             1781 ml     No intake/output data recorded. CBC:   Recent Labs      05/25/18   1255  05/26/18   0405   WBC   --   11.6*   RBC   --   2.69*   HGB  10.3*  8.4*   HCT  32.1*  26.0*   MCV   --   96.7*   MCH   --   31.2*   MCHC   --   32.3*   RDW   --   13.8   PLT   --   148   MPV   --   11.8      Last 3 CMP:   Recent Labs      05/26/18   0405   NA  136   K  5.2*   CL  101   CO2  24   BUN  23   CREATININE  1.0   GLUCOSE  125*   CALCIUM  8.2*              Physical Exam:  Awake, alert, appropriate Yes  BP (!) 141/61   Pulse 63   Temp 97 °F (36.1 °C)   Resp 16   Ht 6' (1.829 m)   Wt 241 lb (109.3 kg)   SpO2 97%   BMI 32.69 kg/m²   Heart-Normal S1 and S2.  Regular rhythm. No murmurs, gallops, or rubs. Lung-negative  Neck-suppleno adenopathy, no carotid bruit, no JVD, supple, symmetrical, trachea midline and thyroid not enlarged, symmetric, no tenderness/mass/nodules  Abdomen-Abdomen soft, non-tender. BS normal. No masses,  No organomegaly  Extremities-negative  Neurologic- alert, oriented, normal speech, no focal findings or movement disorder noted  Wound surgical incisions-Covered with VAC      Assessment/Plan  1. POD-3 doing well, pain under control  2.  Plan to exam wound and STSG in about 72 hours, continue VAC    Antibiotics continued after surgery due to:N/A  DVT prophylaxis: pharmacologic prophylaxis (with any of the following: enoxaparin (Lovenox) 40mg SQ 2h prior to surgery then QD)           Beta Blocker:  not indicated    [facility] Catheter:  N/A

## 2018-05-28 NOTE — PLAN OF CARE
Problem: Falls - Risk of:  Goal: Absence of physical injury  Absence of physical injury    Outcome: Ongoing

## 2018-05-29 DIAGNOSIS — G89.29 CHRONIC MIDLINE LOW BACK PAIN WITHOUT SCIATICA: ICD-10-CM

## 2018-05-29 DIAGNOSIS — M54.50 CHRONIC MIDLINE LOW BACK PAIN WITHOUT SCIATICA: ICD-10-CM

## 2018-05-29 LAB
ANION GAP SERPL CALCULATED.3IONS-SCNC: 7 MMOL/L (ref 7–19)
BUN BLDV-MCNC: 12 MG/DL (ref 8–23)
CALCIUM SERPL-MCNC: 8.5 MG/DL (ref 8.8–10.2)
CHLORIDE BLD-SCNC: 104 MMOL/L (ref 98–111)
CO2: 27 MMOL/L (ref 22–29)
CREAT SERPL-MCNC: 0.9 MG/DL (ref 0.5–1.2)
GFR NON-AFRICAN AMERICAN: >60
GLUCOSE BLD-MCNC: 143 MG/DL (ref 74–109)
HCT VFR BLD CALC: 23.9 % (ref 42–52)
HEMOGLOBIN: 7.6 G/DL (ref 14–18)
MCH RBC QN AUTO: 31.1 PG (ref 27–31)
MCHC RBC AUTO-ENTMCNC: 31.8 G/DL (ref 33–37)
MCV RBC AUTO: 98 FL (ref 80–94)
PDW BLD-RTO: 13.8 % (ref 11.5–14.5)
PLATELET # BLD: 110 K/UL (ref 130–400)
PMV BLD AUTO: 11.2 FL (ref 9.4–12.4)
POTASSIUM SERPL-SCNC: 4.2 MMOL/L (ref 3.5–5)
RBC # BLD: 2.44 M/UL (ref 4.7–6.1)
SODIUM BLD-SCNC: 138 MMOL/L (ref 136–145)
WBC # BLD: 3.5 K/UL (ref 4.8–10.8)

## 2018-05-29 PROCEDURE — 6370000000 HC RX 637 (ALT 250 FOR IP): Performed by: SURGERY

## 2018-05-29 PROCEDURE — G8978 MOBILITY CURRENT STATUS: HCPCS

## 2018-05-29 PROCEDURE — 2580000003 HC RX 258: Performed by: SURGERY

## 2018-05-29 PROCEDURE — G8988 SELF CARE GOAL STATUS: HCPCS

## 2018-05-29 PROCEDURE — 6360000002 HC RX W HCPCS: Performed by: SURGERY

## 2018-05-29 PROCEDURE — 36415 COLL VENOUS BLD VENIPUNCTURE: CPT

## 2018-05-29 PROCEDURE — 97162 PT EVAL MOD COMPLEX 30 MIN: CPT

## 2018-05-29 PROCEDURE — 85027 COMPLETE CBC AUTOMATED: CPT

## 2018-05-29 PROCEDURE — 97166 OT EVAL MOD COMPLEX 45 MIN: CPT

## 2018-05-29 PROCEDURE — 80048 BASIC METABOLIC PNL TOTAL CA: CPT

## 2018-05-29 PROCEDURE — G8979 MOBILITY GOAL STATUS: HCPCS

## 2018-05-29 PROCEDURE — 1210000000 HC MED SURG R&B

## 2018-05-29 PROCEDURE — G8987 SELF CARE CURRENT STATUS: HCPCS

## 2018-05-29 PROCEDURE — 99024 POSTOP FOLLOW-UP VISIT: CPT | Performed by: SURGERY

## 2018-05-29 RX ORDER — HYDROCODONE BITARTRATE AND ACETAMINOPHEN 10; 325 MG/1; MG/1
1 TABLET ORAL EVERY 6 HOURS PRN
Qty: 120 TABLET | Refills: 0 | Status: SHIPPED | OUTPATIENT
Start: 2018-05-29 | End: 2018-06-26 | Stop reason: SDUPTHER

## 2018-05-29 RX ORDER — BISMUTH TRIBROMOPH/PETROLATUM 5"X9"
1 BANDAGE TOPICAL PRN
Status: DISCONTINUED | OUTPATIENT
Start: 2018-05-29 | End: 2018-05-30 | Stop reason: HOSPADM

## 2018-05-29 RX ADMIN — ACETAMINOPHEN 650 MG: 325 TABLET ORAL at 12:14

## 2018-05-29 RX ADMIN — SODIUM CHLORIDE: 9 INJECTION, SOLUTION INTRAVENOUS at 14:04

## 2018-05-29 RX ADMIN — ENOXAPARIN SODIUM 40 MG: 40 INJECTION SUBCUTANEOUS at 07:27

## 2018-05-29 RX ADMIN — ACETAMINOPHEN 650 MG: 325 TABLET ORAL at 18:01

## 2018-05-29 RX ADMIN — Medication 0.5 MG: at 20:16

## 2018-05-29 RX ADMIN — SODIUM CHLORIDE: 9 INJECTION, SOLUTION INTRAVENOUS at 01:46

## 2018-05-29 RX ADMIN — PANTOPRAZOLE SODIUM 40 MG: 40 TABLET, DELAYED RELEASE ORAL at 06:23

## 2018-05-29 RX ADMIN — Medication 0.5 MG: at 01:46

## 2018-05-29 RX ADMIN — Medication 0.5 MG: at 23:25

## 2018-05-29 RX ADMIN — LISINOPRIL 20 MG: 20 TABLET ORAL at 20:16

## 2018-05-29 ASSESSMENT — PAIN SCALES - GENERAL
PAINLEVEL_OUTOF10: 4
PAINLEVEL_OUTOF10: 0
PAINLEVEL_OUTOF10: 0
PAINLEVEL_OUTOF10: 6
PAINLEVEL_OUTOF10: 9
PAINLEVEL_OUTOF10: 3
PAINLEVEL_OUTOF10: 6

## 2018-05-29 NOTE — PROGRESS NOTES
Occupational Therapy   Occupational Therapy Initial Assessment  Date: 2018   Patient Name: Nayeli Daniel  MRN: [id number]     : 1955    Date of Service: 2018    Discharge Recommendations:  Patient would benefit from continued therapy after discharge         Patient Diagnosis(es): There were no encounter diagnoses. has a past medical history of Asthma; Bradycardia; Broken ribs; CHF (congestive heart failure) ([address] Diabetes mellitus ([address] Hypertension; Morbid obesity ([address] Skin ulcer of toe of left foot with fat layer exposed ([address] Sleep apnea in adult; and Venous insufficiency of both lower extremities. has a past surgical history that includes Cardiac catheterization; Dilatation, esophagus; Taylor-en-Y Gastric Bypass (2018); Gastrostomy tube placement (2018); Cardiac catheterization; Knee arthroscopy; Abdomen surgery (2018); and pr amputation low leg thru tib/fib (Left, 2018).     Treatment Diagnosis: Left Below Knee Amputation      Restrictions  Restrictions/Precautions  Restrictions/Precautions: Weight Bearing  Required Braces or Orthoses?: Yes  Lower Extremity Weight Bearing Restrictions  Left Lower Extremity Weight Bearing: Non Weight Bearing  Position Activity Restriction  Other position/activity restrictions: Mihir tech for LLE, wound vac to LLE, has skin graft LLE (from Right thigh)    Subjective   General  Chart Reviewed: Yes  Patient assessed for rehabilitation services?: Yes  Family / Caregiver Present: No  Pain Assessment  Patient Currently in Pain: No  Pain Assessment: FLACC  Pain Level: 3  Pain Type: Chronic pain  Pain Location: Knee  Pain Orientation: Left  Pain Descriptors: Burning  Patient's Stated Pain Goal: No pain  Pain Intervention(s): Medication (see eMar)  Response to Pain Intervention: Asleep with RR greater than 10  Multiple Pain Sites: No     Social/Functional History         Objective   Vision: Within Functional Limits (uses readers)  Hearing: Within functional limits          Balance  Sitting Balance: Stand by assistance  Standing Balance: Minimal assistance (with both hands on walker)  Toilet Transfers  Equipment Used: Extra wide bedside commode  Toilet Transfer: Minimal assistance  Toilet Transfers Comments: per clinical observation  ADL  Feeding: Independent  Grooming: Independent  UE Bathing: Independent;Setup  LE Bathing: Minimal assistance  UE Dressing: Independent;Setup  LE Dressing: Minimal assistance; Moderate assistance (for standing level clothing management)  Toileting: Minimal assistance; Moderate assistance (for standing level clothing management)        Bed mobility  Supine to Sit: Modified independent  Transfers  Stand Pivot Transfers: Minimal assistance     Cognition  Overall Cognitive Status: WNL                 LUE PROM (degrees)  LUE PROM: WNL  LUE AROM (degrees)  LUE AROM : WNL  RUE PROM (degrees)  RUE PROM: WNL  RUE AROM (degrees)  RUE AROM : WNL  LUE Strength  Gross LUE Strength: WNL  RUE Strength  Gross RUE Strength: WNL                  Assessment   Performance deficits / Impairments: Decreased functional mobility ; Decreased ADL status; Decreased high-level IADLs  Assessment: Patient has good potential to sigificantly upgrade functional status with skilled therapy intervention  Treatment Diagnosis: Left Below Knee Amputation  History: gastric bypass surgery with complication later resulting in emergency abdominal surgery in Feb 2017 per patient, CHF, DM  Patient Education: fall protocol, initial movement strategies  Barriers to Learning: none  REQUIRES OT FOLLOW UP: Yes  Activity Tolerance  Activity Tolerance: Patient Tolerated treatment well  Safety Devices  Safety Devices in place: Yes  Type of devices: Call light within reach; Left in chair;Nurse notified (nurse notified, unable to locate personal alarm)         Plan   Plan  Times per week: 4-8x weekly  Current Treatment Recommendations: Functional Mobility Training, Patient/Caregiver Education & Training, Equipment Evaluation, Education, & procurement, Self-Care / ADL, Positioning, Home Management Training    G-Code  OT G-codes  Functional Assessment Tool Used: bathe dress toilet  Functional Limitation: Self care  Self Care Current Status (): At least 40 percent but less than 60 percent impaired, limited or restricted  Self Care Goal Status ():  At least 1 percent but less than 20 percent impaired, limited or restricted  OutComes Score                                           AM-PAC Score             Goals  Short term goals  Short term goal 1: Modified independent with transfers  Short term goal 2: Modified independent with dressing and toileting  Short term goal 3: Modified independent with standing to complete one handed task  Short term goal 4: Modified independent with bathing  Short term goal 5: Verbalize DME needs       Therapy Time   Individual Concurrent Group Co-treatment   Time In           Time Out           Minutes                   Devin Howe, OT

## 2018-05-29 NOTE — CARE COORDINATION
The [address] at [facility]  Notification of Admission Decision      [] Patient has been accepted for admit to [facility] on :       Please write discharge orders and summary prior to discharge. [] Patient acceptance to Rehab pending the following :    [x] Eval in progress : awaiting PT/OT evals    [] Patient determined to be ineligible for services at [facility] because : We recommend you consider        Thank you for your referral, we appreciate you.     Electronically Signed by Mitra Grove, Admissions Coordinator 5/29/2018 3:39 PM

## 2018-05-29 NOTE — PROGRESS NOTES
Vascular Surgery Rounding Note    5/29/2018  11:07 AM    Post op day - 4 left BKA, with muscle flap and stsg closure    Subjective- No complaints    Objective-   Invalid input(s): 24H    Intake/Output Summary (Last 24 hours) at 05/29/18 1107  Last data filed at 05/29/18 0904   Gross per 24 hour   Intake             1784 ml   Output             3850 ml   Net            -2066 ml     In: -   Out: 650 [Urine:650]    CBC:   Recent Labs      05/29/18   0811   WBC  3.5*   RBC  2.44*   HGB  7.6*   HCT  23.9*   MCV  98.0*   MCH  31.1*   MCHC  31.8*   RDW  13.8   PLT  110*   MPV  11.2      Last 3 CMP:   Recent Labs      05/29/18   0811   NA  138   K  4.2   CL  104   CO2  27   BUN  12   CREATININE  0.9   GLUCOSE  143*   CALCIUM  8.5*              Physical Exam:  Awake, alert, appropriate Yes  /69   Pulse 68   Temp 98 °F (36.7 °C)   Resp 16   Ht 6' (1.829 m)   Wt 241 lb (109.3 kg)   SpO2 96%   BMI 32.69 kg/m²   Heart-Normal S1 and S2.  Regular rhythm. No murmurs, gallops, or rubs. Lung-negative  Neck-suppleno adenopathy, no carotid bruit, no JVD, supple, symmetrical, trachea midline and thyroid not enlarged, symmetric, no tenderness/mass/nodules  Abdomen-Abdomen soft, non-tender. BS normal. No masses,  No organomegaly  Extremities-negative  Neurologic- alert, oriented, normal speech, no focal findings or movement disorder noted  Wound surgical incisions-Examined without VAC, STSG is virtually a 100% take. Assessment/Plan  1. POD- doing well, pain under control  2. STSG looks good, will replace vac for another 4-5 days, staples removed. 3. PT/OT and rehab referral  4.  Get melissa teck to protect wound    Antibiotics continued after surgery due to:N/A  DVT prophylaxis: pharmacologic prophylaxis (with any of the following: enoxaparin (Lovenox) 40mg SQ 2h prior to surgery then QD)           Beta Blocker:  not indicated    [facility] Catheter:  N/A

## 2018-05-29 NOTE — PROGRESS NOTES
Physical Therapy    Facility/Department: [facility] 3 HOLLIS/VAS/MED  Initial Assessment    NAME: Taty Lino  : 1955  MRN: [id number]    Date of Service: 2018    Discharge Recommendations:  Continue to assess pending progress, Patient would benefit from continued therapy after discharge   PT Equipment Recommendations  Other: ASSESSING    Patient Diagnosis(es): There were no encounter diagnoses. has a past medical history of Asthma; Bradycardia; Broken ribs; CHF (congestive heart failure) ([facility] Diabetes mellitus ([facility] Hypertension; Morbid obesity ([facility] [facility] Skin ulcer of toe of left foot with fat layer exposed ([facility] [facility] Sleep apnea in adult; and Venous insufficiency of both lower extremities. has a past surgical history that includes Cardiac catheterization; Dilatation, esophagus; Taylor-en-Y Gastric Bypass (2018); Gastrostomy tube placement (2018); Cardiac catheterization; Knee arthroscopy; Abdomen surgery (2018); and pr amputation low leg thru tib/fib (Left, 2018). Restrictions  Restrictions/Precautions  Restrictions/Precautions: Weight Bearing  Required Braces or Orthoses?: Yes  Lower Extremity Weight Bearing Restrictions  Left Lower Extremity Weight Bearing: Non Weight Bearing  Position Activity Restriction  Other position/activity restrictions: Davis tech for LLE, wound vac to LLE, has skin graft LLE  Vision/Hearing  Vision: Within Functional Limits  Hearing: Within functional limits     Subjective  General  Patient assessed for rehabilitation services?: Yes  Additional Pertinent Hx: DM, CHF  Diagnosis: L BKA  Follows Commands: Within Functional Limits  General Comment  Comments: WEARING DAVIS TECH  Subjective  Subjective: pt STATES HE HAS HAD LIMITED AMBULATION THE LAST 6 MONTHS DUE TO BEING INSTRUCTED TO LIMIT THE AMOUNT OF TIME HE WAS UP WB ON L LE.  Pt IS READY TO WORK WITH PT AND HOPES TO D/C TO REHAB  Pain Screening  Patient Currently in Pain:  (STATES pAIN IS WELL CONTROLLED)  Vital Signs  Patient Currently in Pain:  (STATES pAIN IS WELL CONTROLLED)       Orientation  Orientation  Overall Orientation Status: Within Normal Limits    Social/Functional History  Social/Functional History  Lives With: Spouse  Type of Home: House  Home Layout: Two level, Performs ADL's on one level  Home Access: Stairs to enter without rails  Entrance Stairs - Number of Steps: 1+ 1  Bathroom Shower/Tub: Tub/Shower unit  Bathroom Toilet: Handicap height  Home Equipment: Rolling walker, Quad cane (from a [employer])  ADL Assistance:  (needed assist with socks or used sock aid)  Ambulation Assistance: Independent  Transfer Assistance: Independent  Additional Comments: he was supposed to walk as little as possible for the last 6 months but did walk around the house and limited ambulation as well  Objective          PROM RLE (degrees)  RLE PROM: WFL  RLE General PROM: TIGHT HS, HOLDS R LE IN FLEX/ER  AROM LLE (degrees)  LLE AROM : WFL  LLE General AROM: AT HIP  Strength RLE  Comment: ABLE TO MOVE AGAINST GRAVITY  Strength LLE  Comment: MOVES HIP AGAINST GRAVITY     Sensation  Overall Sensation Status: Impaired  Bed mobility  Supine to Sit: Modified independent  Transfers  Sit to Stand: Minimal Assistance  Stand to sit: Contact guard assistance;Minimal Assistance  Bed to Chair: Minimal assistance  Ambulation  Ambulation?: No              Assessment   Body structures, Functions, Activity limitations: Decreased functional mobility ; Decreased endurance;Decreased strength  Assessment: pt WORKING WELL WITH PT TO GAIN STRENGTH AND MOBILITY. EXHIBITS GOOD AWARENESS OF SAFETY.  GOOD REHAB CANDIDATE  Treatment Diagnosis: L BKA  Prognosis: Good  Decision Making: Medium Complexity  Patient Education: FALL PREVENTION  Barriers to Learning: NONE NOTED  REQUIRES PT FOLLOW UP: Yes  Activity Tolerance  Activity Tolerance: Patient Tolerated treatment well         Plan   Plan  Times per week: PT AT LEAST ONCE DAILY X 14 DAYS  Current

## 2018-05-30 ENCOUNTER — HOSPITAL ENCOUNTER (INPATIENT)
Age: 63
LOS: 16 days | Discharge: HOME | DRG: 560 | End: 2018-06-15
Attending: PSYCHIATRY & NEUROLOGY | Admitting: PSYCHIATRY & NEUROLOGY
Payer: MEDICARE

## 2018-05-30 VITALS
HEIGHT: 72 IN | HEART RATE: 66 BPM | OXYGEN SATURATION: 96 % | WEIGHT: 241 LBS | SYSTOLIC BLOOD PRESSURE: 141 MMHG | BODY MASS INDEX: 32.64 KG/M2 | TEMPERATURE: 98.4 F | RESPIRATION RATE: 16 BRPM | DIASTOLIC BLOOD PRESSURE: 60 MMHG

## 2018-05-30 DIAGNOSIS — Z89.512 S/P BKA (BELOW KNEE AMPUTATION) UNILATERAL, LEFT (HCC): Primary | ICD-10-CM

## 2018-05-30 LAB
BASOPHILS ABSOLUTE: 0 K/UL (ref 0–0.2)
BASOPHILS RELATIVE PERCENT: 0.6 % (ref 0–1)
BILIRUBIN URINE: NEGATIVE
BLOOD, URINE: NEGATIVE
CLARITY: CLEAR
COLOR: NORMAL
EOSINOPHILS ABSOLUTE: 0.1 K/UL (ref 0–0.6)
EOSINOPHILS RELATIVE PERCENT: 2.5 % (ref 0–5)
GLUCOSE BLD-MCNC: 142 MG/DL (ref 70–99)
GLUCOSE URINE: NEGATIVE MG/DL
HCT VFR BLD CALC: 24.7 % (ref 42–52)
HCT VFR BLD CALC: 26 % (ref 42–52)
HEMOGLOBIN: 8.1 G/DL (ref 14–18)
HEMOGLOBIN: 8.5 G/DL (ref 14–18)
KETONES, URINE: NEGATIVE MG/DL
LEUKOCYTE ESTERASE, URINE: NEGATIVE
LYMPHOCYTES ABSOLUTE: 1 K/UL (ref 1.1–4.5)
LYMPHOCYTES RELATIVE PERCENT: 18.9 % (ref 20–40)
MCH RBC QN AUTO: 31.6 PG (ref 27–31)
MCH RBC QN AUTO: 32 PG (ref 27–31)
MCHC RBC AUTO-ENTMCNC: 32.7 G/DL (ref 33–37)
MCHC RBC AUTO-ENTMCNC: 32.8 G/DL (ref 33–37)
MCV RBC AUTO: 96.7 FL (ref 80–94)
MCV RBC AUTO: 97.6 FL (ref 80–94)
MONOCYTES ABSOLUTE: 0.5 K/UL (ref 0–0.9)
MONOCYTES RELATIVE PERCENT: 8.7 % (ref 0–10)
NEUTROPHILS ABSOLUTE: 3.6 K/UL (ref 1.5–7.5)
NEUTROPHILS RELATIVE PERCENT: 68.9 % (ref 50–65)
NITRITE, URINE: NEGATIVE
PDW BLD-RTO: 14.1 % (ref 11.5–14.5)
PDW BLD-RTO: 14.2 % (ref 11.5–14.5)
PERFORMED ON: ABNORMAL
PH UA: 6.5
PLATELET # BLD: 119 K/UL (ref 130–400)
PLATELET # BLD: 141 K/UL (ref 130–400)
PMV BLD AUTO: 10.8 FL (ref 9.4–12.4)
PMV BLD AUTO: 11.2 FL (ref 9.4–12.4)
PREALBUMIN: 15 MG/DL (ref 20–40)
PROTEIN UA: NEGATIVE MG/DL
RBC # BLD: 2.53 M/UL (ref 4.7–6.1)
RBC # BLD: 2.69 M/UL (ref 4.7–6.1)
SPECIFIC GRAVITY UA: 1.01
URINE REFLEX TO CULTURE: NORMAL
UROBILINOGEN, URINE: 1 E.U./DL
WBC # BLD: 4 K/UL (ref 4.8–10.8)
WBC # BLD: 5.2 K/UL (ref 4.8–10.8)

## 2018-05-30 PROCEDURE — 85025 COMPLETE CBC W/AUTO DIFF WBC: CPT

## 2018-05-30 PROCEDURE — 82948 REAGENT STRIP/BLOOD GLUCOSE: CPT

## 2018-05-30 PROCEDURE — 99024 POSTOP FOLLOW-UP VISIT: CPT | Performed by: SURGERY

## 2018-05-30 PROCEDURE — 85027 COMPLETE CBC AUTOMATED: CPT

## 2018-05-30 PROCEDURE — 99999 PR OFFICE/OUTPT VISIT,PROCEDURE ONLY: CPT | Performed by: SURGERY

## 2018-05-30 PROCEDURE — 6360000002 HC RX W HCPCS: Performed by: SURGERY

## 2018-05-30 PROCEDURE — 6370000000 HC RX 637 (ALT 250 FOR IP): Performed by: PSYCHIATRY & NEUROLOGY

## 2018-05-30 PROCEDURE — 1180000000 HC REHAB R&B

## 2018-05-30 PROCEDURE — 94664 DEMO&/EVAL PT USE INHALER: CPT

## 2018-05-30 PROCEDURE — 36415 COLL VENOUS BLD VENIPUNCTURE: CPT

## 2018-05-30 PROCEDURE — 84134 ASSAY OF PREALBUMIN: CPT

## 2018-05-30 PROCEDURE — 81003 URINALYSIS AUTO W/O SCOPE: CPT

## 2018-05-30 PROCEDURE — 97530 THERAPEUTIC ACTIVITIES: CPT

## 2018-05-30 PROCEDURE — 6370000000 HC RX 637 (ALT 250 FOR IP): Performed by: SURGERY

## 2018-05-30 RX ORDER — LISINOPRIL 20 MG/1
20 TABLET ORAL NIGHTLY
Status: DISCONTINUED | OUTPATIENT
Start: 2018-05-30 | End: 2018-06-15 | Stop reason: HOSPADM

## 2018-05-30 RX ORDER — LISINOPRIL 20 MG/1
20 TABLET ORAL NIGHTLY
Status: CANCELLED | OUTPATIENT
Start: 2018-05-30

## 2018-05-30 RX ORDER — POLYETHYLENE GLYCOL 3350 17 G/17G
17 POWDER, FOR SOLUTION ORAL DAILY
Status: DISCONTINUED | OUTPATIENT
Start: 2018-05-30 | End: 2018-06-15 | Stop reason: HOSPADM

## 2018-05-30 RX ORDER — HYDROCODONE BITARTRATE AND ACETAMINOPHEN 10; 325 MG/1; MG/1
1 TABLET ORAL EVERY 6 HOURS PRN
Status: DISCONTINUED | OUTPATIENT
Start: 2018-05-30 | End: 2018-05-31

## 2018-05-30 RX ORDER — LORAZEPAM 0.5 MG/1
0.5 TABLET ORAL NIGHTLY PRN
Status: CANCELLED | OUTPATIENT
Start: 2018-05-30

## 2018-05-30 RX ORDER — ACETAMINOPHEN 325 MG/1
650 TABLET ORAL EVERY 4 HOURS PRN
Status: DISCONTINUED | OUTPATIENT
Start: 2018-05-30 | End: 2018-06-15 | Stop reason: HOSPADM

## 2018-05-30 RX ORDER — BISMUTH TRIBROMOPH/PETROLATUM 5"X9"
1 BANDAGE TOPICAL PRN
Status: DISCONTINUED | OUTPATIENT
Start: 2018-05-30 | End: 2018-06-14 | Stop reason: ALTCHOICE

## 2018-05-30 RX ORDER — PANTOPRAZOLE SODIUM 40 MG/1
40 TABLET, DELAYED RELEASE ORAL
Status: DISCONTINUED | OUTPATIENT
Start: 2018-05-31 | End: 2018-06-15 | Stop reason: HOSPADM

## 2018-05-30 RX ORDER — DOCUSATE SODIUM 100 MG/1
100 CAPSULE, LIQUID FILLED ORAL 2 TIMES DAILY
Status: DISCONTINUED | OUTPATIENT
Start: 2018-05-30 | End: 2018-06-15 | Stop reason: HOSPADM

## 2018-05-30 RX ORDER — PANTOPRAZOLE SODIUM 40 MG/1
40 TABLET, DELAYED RELEASE ORAL
Status: CANCELLED | OUTPATIENT
Start: 2018-05-31

## 2018-05-30 RX ORDER — BISMUTH TRIBROMOPH/PETROLATUM 5"X9"
1 BANDAGE TOPICAL PRN
Status: CANCELLED | OUTPATIENT
Start: 2018-05-30

## 2018-05-30 RX ORDER — LORAZEPAM 0.5 MG/1
0.5 TABLET ORAL NIGHTLY PRN
Status: DISCONTINUED | OUTPATIENT
Start: 2018-05-30 | End: 2018-06-15 | Stop reason: HOSPADM

## 2018-05-30 RX ORDER — SODIUM PHOSPHATE, DIBASIC AND SODIUM PHOSPHATE, MONOBASIC 7; 19 G/133ML; G/133ML
1 ENEMA RECTAL
Status: ACTIVE | OUTPATIENT
Start: 2018-05-30 | End: 2018-05-30

## 2018-05-30 RX ORDER — ACETAMINOPHEN 325 MG/1
650 TABLET ORAL EVERY 4 HOURS PRN
Status: DISCONTINUED | OUTPATIENT
Start: 2018-05-30 | End: 2018-05-30

## 2018-05-30 RX ORDER — BISACODYL 10 MG
10 SUPPOSITORY, RECTAL RECTAL DAILY PRN
Status: DISCONTINUED | OUTPATIENT
Start: 2018-05-30 | End: 2018-06-15 | Stop reason: HOSPADM

## 2018-05-30 RX ORDER — TIZANIDINE 4 MG/1
4 TABLET ORAL EVERY 6 HOURS PRN
Status: CANCELLED | OUTPATIENT
Start: 2018-05-30

## 2018-05-30 RX ORDER — SODIUM CHLORIDE 9 MG/ML
INJECTION, SOLUTION INTRAVENOUS CONTINUOUS
Status: CANCELLED | OUTPATIENT
Start: 2018-05-30 | Stop reason: ALTCHOICE

## 2018-05-30 RX ORDER — TIZANIDINE 4 MG/1
4 TABLET ORAL EVERY 6 HOURS PRN
Status: DISCONTINUED | OUTPATIENT
Start: 2018-05-30 | End: 2018-06-15 | Stop reason: HOSPADM

## 2018-05-30 RX ORDER — ONDANSETRON 2 MG/ML
4 INJECTION INTRAMUSCULAR; INTRAVENOUS EVERY 6 HOURS PRN
Status: CANCELLED | OUTPATIENT
Start: 2018-05-30

## 2018-05-30 RX ORDER — ONDANSETRON 2 MG/ML
4 INJECTION INTRAMUSCULAR; INTRAVENOUS EVERY 6 HOURS PRN
Status: DISCONTINUED | OUTPATIENT
Start: 2018-05-30 | End: 2018-06-15 | Stop reason: HOSPADM

## 2018-05-30 RX ORDER — ACETAMINOPHEN 325 MG/1
650 TABLET ORAL EVERY 4 HOURS PRN
Status: CANCELLED | OUTPATIENT
Start: 2018-05-30

## 2018-05-30 RX ADMIN — Medication 0.5 MG: at 04:17

## 2018-05-30 RX ADMIN — LORAZEPAM 0.5 MG: 0.5 TABLET ORAL at 23:36

## 2018-05-30 RX ADMIN — ENOXAPARIN SODIUM 40 MG: 40 INJECTION SUBCUTANEOUS at 09:42

## 2018-05-30 RX ADMIN — TIZANIDINE 4 MG: 4 TABLET ORAL at 23:36

## 2018-05-30 RX ADMIN — PANTOPRAZOLE SODIUM 40 MG: 40 TABLET, DELAYED RELEASE ORAL at 06:46

## 2018-05-30 RX ADMIN — Medication 0.5 MG: at 13:57

## 2018-05-30 RX ADMIN — LISINOPRIL 20 MG: 20 TABLET ORAL at 20:47

## 2018-05-30 RX ADMIN — DOCUSATE SODIUM 100 MG: 100 CAPSULE, LIQUID FILLED ORAL at 20:47

## 2018-05-30 RX ADMIN — HYDROCODONE BITARTRATE AND ACETAMINOPHEN 1 TABLET: 10; 325 TABLET ORAL at 23:36

## 2018-05-30 ASSESSMENT — PAIN DESCRIPTION - DESCRIPTORS: DESCRIPTORS: BURNING

## 2018-05-30 ASSESSMENT — PAIN DESCRIPTION - ORIENTATION: ORIENTATION: LEFT

## 2018-05-30 ASSESSMENT — PAIN DESCRIPTION - PAIN TYPE: TYPE: CHRONIC PAIN

## 2018-05-30 ASSESSMENT — PAIN DESCRIPTION - LOCATION: LOCATION: KNEE

## 2018-05-30 ASSESSMENT — PAIN SCALES - GENERAL
PAINLEVEL_OUTOF10: 6
PAINLEVEL_OUTOF10: 6
PAINLEVEL_OUTOF10: 9
PAINLEVEL_OUTOF10: 0

## 2018-05-30 ASSESSMENT — PAIN DESCRIPTION - FREQUENCY: FREQUENCY: INTERMITTENT

## 2018-05-30 NOTE — PROGRESS NOTES
Training, Patient/Caregiver Education & Training, Equipment Evaluation, Education, & procurement, Self-Care / ADL, Positioning, Home Management Training  G-Code     OutComes Score                                           AM-PAC Score             Goals  Short term goals  Short term goal 1: Modified independent with transfers  Short term goal 2: Modified independent with dressing and toileting  Short term goal 3: Modified independent with standing to complete one handed task  Short term goal 4: Modified independent with bathing  Short term goal 5: Verbalize DME needs       Therapy Time   Individual Concurrent Group Co-treatment   Time In           Time Out           Minutes                   ISAI Tuttle/L

## 2018-05-30 NOTE — CARE COORDINATION
The [address] at [facility]  Notification of Admission Decision      [x] Patient has been accepted for admit to [facility] on : 5/30/2018 Room 828      Please write discharge orders and summary prior to discharge. [] Patient acceptance to Rehab pending the following :    [] Eval in progress       [] Patient determined to be ineligible for services at [facility] because : We recommend you consider        Thank you for your referral, we appreciate you.     Electronically Signed by Amari Hyman Admissions Coordinator 5/30/2018 7:36 AM

## 2018-05-31 PROBLEM — D62 ACUTE BLOOD LOSS ANEMIA: Status: ACTIVE | Noted: 2018-05-31

## 2018-05-31 PROBLEM — E11.621 TYPE 2 DIABETES MELLITUS WITH FOOT ULCER (CODE) (HCC): Status: ACTIVE | Noted: 2017-11-15

## 2018-05-31 LAB
ANION GAP SERPL CALCULATED.3IONS-SCNC: 9 MMOL/L (ref 7–19)
BASOPHILS ABSOLUTE: 0 K/UL (ref 0–0.2)
BASOPHILS RELATIVE PERCENT: 0.5 % (ref 0–1)
BUN BLDV-MCNC: 17 MG/DL (ref 8–23)
CALCIUM SERPL-MCNC: 8.5 MG/DL (ref 8.8–10.2)
CHLORIDE BLD-SCNC: 102 MMOL/L (ref 98–111)
CO2: 27 MMOL/L (ref 22–29)
CREAT SERPL-MCNC: 1 MG/DL (ref 0.5–1.2)
EOSINOPHILS ABSOLUTE: 0.3 K/UL (ref 0–0.6)
EOSINOPHILS RELATIVE PERCENT: 4.9 % (ref 0–5)
GFR NON-AFRICAN AMERICAN: >60
GLUCOSE BLD-MCNC: 119 MG/DL (ref 70–99)
GLUCOSE BLD-MCNC: 119 MG/DL (ref 74–109)
GLUCOSE BLD-MCNC: 125 MG/DL (ref 70–99)
GLUCOSE BLD-MCNC: 132 MG/DL (ref 70–99)
GLUCOSE BLD-MCNC: 204 MG/DL (ref 70–99)
HCT VFR BLD CALC: 25.7 % (ref 42–52)
HEMOGLOBIN: 8.1 G/DL (ref 14–18)
LYMPHOCYTES ABSOLUTE: 1.4 K/UL (ref 1.1–4.5)
LYMPHOCYTES RELATIVE PERCENT: 25.8 % (ref 20–40)
MCH RBC QN AUTO: 31 PG (ref 27–31)
MCHC RBC AUTO-ENTMCNC: 31.5 G/DL (ref 33–37)
MCV RBC AUTO: 98.5 FL (ref 80–94)
MONOCYTES ABSOLUTE: 0.7 K/UL (ref 0–0.9)
MONOCYTES RELATIVE PERCENT: 12.4 % (ref 0–10)
NEUTROPHILS ABSOLUTE: 3.1 K/UL (ref 1.5–7.5)
NEUTROPHILS RELATIVE PERCENT: 55.9 % (ref 50–65)
PDW BLD-RTO: 14.2 % (ref 11.5–14.5)
PERFORMED ON: ABNORMAL
PLATELET # BLD: 138 K/UL (ref 130–400)
PMV BLD AUTO: 11.5 FL (ref 9.4–12.4)
POTASSIUM REFLEX MAGNESIUM: 4.3 MMOL/L (ref 3.5–5)
RBC # BLD: 2.61 M/UL (ref 4.7–6.1)
SODIUM BLD-SCNC: 138 MMOL/L (ref 136–145)
WBC # BLD: 5.5 K/UL (ref 4.8–10.8)

## 2018-05-31 PROCEDURE — 6370000000 HC RX 637 (ALT 250 FOR IP): Performed by: PSYCHIATRY & NEUROLOGY

## 2018-05-31 PROCEDURE — 85025 COMPLETE CBC W/AUTO DIFF WBC: CPT

## 2018-05-31 PROCEDURE — 36415 COLL VENOUS BLD VENIPUNCTURE: CPT

## 2018-05-31 PROCEDURE — 6360000002 HC RX W HCPCS: Performed by: SURGERY

## 2018-05-31 PROCEDURE — 1180000000 HC REHAB R&B

## 2018-05-31 PROCEDURE — 80048 BASIC METABOLIC PNL TOTAL CA: CPT

## 2018-05-31 PROCEDURE — 97110 THERAPEUTIC EXERCISES: CPT

## 2018-05-31 PROCEDURE — 97535 SELF CARE MNGMENT TRAINING: CPT

## 2018-05-31 PROCEDURE — 97161 PT EVAL LOW COMPLEX 20 MIN: CPT

## 2018-05-31 PROCEDURE — 97116 GAIT TRAINING THERAPY: CPT

## 2018-05-31 PROCEDURE — 82948 REAGENT STRIP/BLOOD GLUCOSE: CPT

## 2018-05-31 PROCEDURE — 99223 1ST HOSP IP/OBS HIGH 75: CPT | Performed by: PSYCHIATRY & NEUROLOGY

## 2018-05-31 PROCEDURE — 97530 THERAPEUTIC ACTIVITIES: CPT

## 2018-05-31 PROCEDURE — 6370000000 HC RX 637 (ALT 250 FOR IP): Performed by: SURGERY

## 2018-05-31 PROCEDURE — 97166 OT EVAL MOD COMPLEX 45 MIN: CPT

## 2018-05-31 RX ORDER — TEMAZEPAM 15 MG/1
15 CAPSULE ORAL NIGHTLY
Status: DISCONTINUED | OUTPATIENT
Start: 2018-05-31 | End: 2018-06-15 | Stop reason: HOSPADM

## 2018-05-31 RX ORDER — HYDROCODONE BITARTRATE AND ACETAMINOPHEN 10; 325 MG/1; MG/1
1 TABLET ORAL EVERY 6 HOURS PRN
Status: DISCONTINUED | OUTPATIENT
Start: 2018-05-31 | End: 2018-06-15 | Stop reason: HOSPADM

## 2018-05-31 RX ADMIN — DOCUSATE SODIUM 100 MG: 100 CAPSULE, LIQUID FILLED ORAL at 10:08

## 2018-05-31 RX ADMIN — HYDROCODONE BITARTRATE AND ACETAMINOPHEN 1 TABLET: 10; 325 TABLET ORAL at 20:50

## 2018-05-31 RX ADMIN — HYDROCODONE BITARTRATE AND ACETAMINOPHEN 1 TABLET: 10; 325 TABLET ORAL at 10:13

## 2018-05-31 RX ADMIN — PANTOPRAZOLE SODIUM 40 MG: 40 TABLET, DELAYED RELEASE ORAL at 05:56

## 2018-05-31 RX ADMIN — LISINOPRIL 20 MG: 20 TABLET ORAL at 20:50

## 2018-05-31 RX ADMIN — HYDROCODONE BITARTRATE AND ACETAMINOPHEN 1 TABLET: 10; 325 TABLET ORAL at 14:34

## 2018-05-31 RX ADMIN — TEMAZEPAM 15 MG: 15 CAPSULE ORAL at 20:50

## 2018-05-31 RX ADMIN — ENOXAPARIN SODIUM 40 MG: 100 INJECTION SUBCUTANEOUS at 10:08

## 2018-05-31 RX ADMIN — DOCUSATE SODIUM 100 MG: 100 CAPSULE, LIQUID FILLED ORAL at 20:50

## 2018-05-31 ASSESSMENT — PAIN SCALES - GENERAL
PAINLEVEL_OUTOF10: 5
PAINLEVEL_OUTOF10: 8
PAINLEVEL_OUTOF10: 7
PAINLEVEL_OUTOF10: 5
PAINLEVEL_OUTOF10: 4
PAINLEVEL_OUTOF10: 0

## 2018-05-31 ASSESSMENT — PAIN DESCRIPTION - DESCRIPTORS
DESCRIPTORS: ACHING
DESCRIPTORS: ACHING

## 2018-05-31 ASSESSMENT — PAIN DESCRIPTION - PAIN TYPE
TYPE: ACUTE PAIN
TYPE: ACUTE PAIN

## 2018-05-31 ASSESSMENT — PAIN DESCRIPTION - LOCATION
LOCATION: KNEE;SACRUM
LOCATION: KNEE

## 2018-05-31 ASSESSMENT — PAIN DESCRIPTION - FREQUENCY
FREQUENCY: INTERMITTENT
FREQUENCY: INTERMITTENT

## 2018-05-31 ASSESSMENT — PAIN DESCRIPTION - ORIENTATION
ORIENTATION: LEFT
ORIENTATION: LEFT

## 2018-06-01 LAB
GLUCOSE BLD-MCNC: 120 MG/DL (ref 70–99)
GLUCOSE BLD-MCNC: 122 MG/DL (ref 70–99)
GLUCOSE BLD-MCNC: 130 MG/DL (ref 70–99)
GLUCOSE BLD-MCNC: 160 MG/DL (ref 70–99)
PERFORMED ON: ABNORMAL

## 2018-06-01 PROCEDURE — 1180000000 HC REHAB R&B

## 2018-06-01 PROCEDURE — 97530 THERAPEUTIC ACTIVITIES: CPT

## 2018-06-01 PROCEDURE — 6360000002 HC RX W HCPCS: Performed by: SURGERY

## 2018-06-01 PROCEDURE — 97535 SELF CARE MNGMENT TRAINING: CPT

## 2018-06-01 PROCEDURE — 97116 GAIT TRAINING THERAPY: CPT

## 2018-06-01 PROCEDURE — 99232 SBSQ HOSP IP/OBS MODERATE 35: CPT | Performed by: PSYCHIATRY & NEUROLOGY

## 2018-06-01 PROCEDURE — 6370000000 HC RX 637 (ALT 250 FOR IP): Performed by: PSYCHIATRY & NEUROLOGY

## 2018-06-01 PROCEDURE — 6370000000 HC RX 637 (ALT 250 FOR IP): Performed by: SURGERY

## 2018-06-01 PROCEDURE — 82948 REAGENT STRIP/BLOOD GLUCOSE: CPT

## 2018-06-01 PROCEDURE — 97110 THERAPEUTIC EXERCISES: CPT

## 2018-06-01 RX ADMIN — DOCUSATE SODIUM 100 MG: 100 CAPSULE, LIQUID FILLED ORAL at 08:19

## 2018-06-01 RX ADMIN — PANTOPRAZOLE SODIUM 40 MG: 40 TABLET, DELAYED RELEASE ORAL at 08:19

## 2018-06-01 RX ADMIN — ENOXAPARIN SODIUM 40 MG: 100 INJECTION SUBCUTANEOUS at 08:19

## 2018-06-01 RX ADMIN — HYDROCODONE BITARTRATE AND ACETAMINOPHEN 1 TABLET: 10; 325 TABLET ORAL at 08:20

## 2018-06-01 RX ADMIN — HYDROCODONE BITARTRATE AND ACETAMINOPHEN 1 TABLET: 10; 325 TABLET ORAL at 14:57

## 2018-06-01 RX ADMIN — DOCUSATE SODIUM 100 MG: 100 CAPSULE, LIQUID FILLED ORAL at 20:37

## 2018-06-01 RX ADMIN — HYDROCODONE BITARTRATE AND ACETAMINOPHEN 1 TABLET: 10; 325 TABLET ORAL at 21:05

## 2018-06-01 RX ADMIN — TEMAZEPAM 15 MG: 15 CAPSULE ORAL at 20:37

## 2018-06-01 RX ADMIN — LISINOPRIL 20 MG: 20 TABLET ORAL at 20:37

## 2018-06-01 RX ADMIN — TIZANIDINE 4 MG: 4 TABLET ORAL at 21:08

## 2018-06-01 ASSESSMENT — PAIN SCALES - GENERAL
PAINLEVEL_OUTOF10: 0
PAINLEVEL_OUTOF10: 4
PAINLEVEL_OUTOF10: 6
PAINLEVEL_OUTOF10: 5
PAINLEVEL_OUTOF10: 2
PAINLEVEL_OUTOF10: 2
PAINLEVEL_OUTOF10: 5

## 2018-06-01 ASSESSMENT — PAIN DESCRIPTION - PAIN TYPE: TYPE: ACUTE PAIN

## 2018-06-01 ASSESSMENT — PAIN DESCRIPTION - ORIENTATION: ORIENTATION: LEFT

## 2018-06-01 ASSESSMENT — PAIN DESCRIPTION - DESCRIPTORS: DESCRIPTORS: ACHING;DULL

## 2018-06-01 ASSESSMENT — PAIN DESCRIPTION - LOCATION: LOCATION: KNEE

## 2018-06-01 ASSESSMENT — PAIN DESCRIPTION - FREQUENCY: FREQUENCY: CONTINUOUS

## 2018-06-02 LAB
GLUCOSE BLD-MCNC: 107 MG/DL (ref 70–99)
GLUCOSE BLD-MCNC: 132 MG/DL (ref 70–99)
GLUCOSE BLD-MCNC: 143 MG/DL (ref 70–99)
GLUCOSE BLD-MCNC: 149 MG/DL (ref 70–99)
PERFORMED ON: ABNORMAL

## 2018-06-02 PROCEDURE — 6370000000 HC RX 637 (ALT 250 FOR IP): Performed by: PSYCHIATRY & NEUROLOGY

## 2018-06-02 PROCEDURE — 6360000002 HC RX W HCPCS: Performed by: SURGERY

## 2018-06-02 PROCEDURE — 6370000000 HC RX 637 (ALT 250 FOR IP): Performed by: SURGERY

## 2018-06-02 PROCEDURE — 82948 REAGENT STRIP/BLOOD GLUCOSE: CPT

## 2018-06-02 PROCEDURE — 97116 GAIT TRAINING THERAPY: CPT

## 2018-06-02 PROCEDURE — 97530 THERAPEUTIC ACTIVITIES: CPT

## 2018-06-02 PROCEDURE — 1180000000 HC REHAB R&B

## 2018-06-02 PROCEDURE — 97110 THERAPEUTIC EXERCISES: CPT

## 2018-06-02 RX ADMIN — DOCUSATE SODIUM 100 MG: 100 CAPSULE, LIQUID FILLED ORAL at 08:04

## 2018-06-02 RX ADMIN — HYDROCODONE BITARTRATE AND ACETAMINOPHEN 1 TABLET: 10; 325 TABLET ORAL at 11:30

## 2018-06-02 RX ADMIN — LISINOPRIL 20 MG: 20 TABLET ORAL at 21:18

## 2018-06-02 RX ADMIN — TIZANIDINE 4 MG: 4 TABLET ORAL at 21:18

## 2018-06-02 RX ADMIN — ENOXAPARIN SODIUM 40 MG: 100 INJECTION SUBCUTANEOUS at 08:04

## 2018-06-02 RX ADMIN — HYDROCODONE BITARTRATE AND ACETAMINOPHEN 1 TABLET: 10; 325 TABLET ORAL at 05:32

## 2018-06-02 RX ADMIN — DOCUSATE SODIUM 100 MG: 100 CAPSULE, LIQUID FILLED ORAL at 21:20

## 2018-06-02 RX ADMIN — TEMAZEPAM 15 MG: 15 CAPSULE ORAL at 21:18

## 2018-06-02 RX ADMIN — PANTOPRAZOLE SODIUM 40 MG: 40 TABLET, DELAYED RELEASE ORAL at 05:32

## 2018-06-02 RX ADMIN — HYDROCODONE BITARTRATE AND ACETAMINOPHEN 1 TABLET: 10; 325 TABLET ORAL at 21:18

## 2018-06-02 ASSESSMENT — PAIN SCALES - GENERAL
PAINLEVEL_OUTOF10: 6
PAINLEVEL_OUTOF10: 0
PAINLEVEL_OUTOF10: 4
PAINLEVEL_OUTOF10: 0
PAINLEVEL_OUTOF10: 5
PAINLEVEL_OUTOF10: 8

## 2018-06-03 LAB
GLUCOSE BLD-MCNC: 108 MG/DL (ref 70–99)
GLUCOSE BLD-MCNC: 126 MG/DL (ref 70–99)
GLUCOSE BLD-MCNC: 168 MG/DL (ref 70–99)
GLUCOSE BLD-MCNC: 89 MG/DL (ref 70–99)
PERFORMED ON: ABNORMAL
PERFORMED ON: NORMAL

## 2018-06-03 PROCEDURE — 6360000002 HC RX W HCPCS: Performed by: SURGERY

## 2018-06-03 PROCEDURE — 82948 REAGENT STRIP/BLOOD GLUCOSE: CPT

## 2018-06-03 PROCEDURE — 6370000000 HC RX 637 (ALT 250 FOR IP): Performed by: PSYCHIATRY & NEUROLOGY

## 2018-06-03 PROCEDURE — 6370000000 HC RX 637 (ALT 250 FOR IP): Performed by: SURGERY

## 2018-06-03 PROCEDURE — 1180000000 HC REHAB R&B

## 2018-06-03 RX ADMIN — DOCUSATE SODIUM 100 MG: 100 CAPSULE, LIQUID FILLED ORAL at 08:07

## 2018-06-03 RX ADMIN — ENOXAPARIN SODIUM 40 MG: 100 INJECTION SUBCUTANEOUS at 08:07

## 2018-06-03 RX ADMIN — HYDROCODONE BITARTRATE AND ACETAMINOPHEN 1 TABLET: 10; 325 TABLET ORAL at 21:23

## 2018-06-03 RX ADMIN — LISINOPRIL 20 MG: 20 TABLET ORAL at 21:23

## 2018-06-03 RX ADMIN — HYDROCODONE BITARTRATE AND ACETAMINOPHEN 1 TABLET: 10; 325 TABLET ORAL at 08:32

## 2018-06-03 RX ADMIN — TEMAZEPAM 15 MG: 15 CAPSULE ORAL at 21:23

## 2018-06-03 RX ADMIN — PANTOPRAZOLE SODIUM 40 MG: 40 TABLET, DELAYED RELEASE ORAL at 05:41

## 2018-06-03 RX ADMIN — HYDROCODONE BITARTRATE AND ACETAMINOPHEN 1 TABLET: 10; 325 TABLET ORAL at 15:15

## 2018-06-03 RX ADMIN — TIZANIDINE 4 MG: 4 TABLET ORAL at 21:23

## 2018-06-03 RX ADMIN — DOCUSATE SODIUM 100 MG: 100 CAPSULE, LIQUID FILLED ORAL at 21:23

## 2018-06-03 ASSESSMENT — PAIN SCALES - GENERAL
PAINLEVEL_OUTOF10: 5
PAINLEVEL_OUTOF10: 6
PAINLEVEL_OUTOF10: 0
PAINLEVEL_OUTOF10: 6
PAINLEVEL_OUTOF10: 4
PAINLEVEL_OUTOF10: 7

## 2018-06-04 LAB
ANION GAP SERPL CALCULATED.3IONS-SCNC: 11 MMOL/L (ref 7–19)
BASOPHILS ABSOLUTE: 0 K/UL (ref 0–0.2)
BASOPHILS RELATIVE PERCENT: 0.8 % (ref 0–1)
BUN BLDV-MCNC: 31 MG/DL (ref 8–23)
CALCIUM SERPL-MCNC: 8.4 MG/DL (ref 8.8–10.2)
CHLORIDE BLD-SCNC: 106 MMOL/L (ref 98–111)
CO2: 26 MMOL/L (ref 22–29)
CREAT SERPL-MCNC: 1.1 MG/DL (ref 0.5–1.2)
EOSINOPHILS ABSOLUTE: 0.2 K/UL (ref 0–0.6)
EOSINOPHILS RELATIVE PERCENT: 5.9 % (ref 0–5)
GFR NON-AFRICAN AMERICAN: >60
GLUCOSE BLD-MCNC: 104 MG/DL (ref 74–109)
GLUCOSE BLD-MCNC: 107 MG/DL (ref 70–99)
GLUCOSE BLD-MCNC: 111 MG/DL (ref 70–99)
GLUCOSE BLD-MCNC: 115 MG/DL (ref 70–99)
GLUCOSE BLD-MCNC: 137 MG/DL (ref 70–99)
HCT VFR BLD CALC: 24.9 % (ref 42–52)
HEMOGLOBIN: 8 G/DL (ref 14–18)
LYMPHOCYTES ABSOLUTE: 1.2 K/UL (ref 1.1–4.5)
LYMPHOCYTES RELATIVE PERCENT: 30.5 % (ref 20–40)
MCH RBC QN AUTO: 32.3 PG (ref 27–31)
MCHC RBC AUTO-ENTMCNC: 32.1 G/DL (ref 33–37)
MCV RBC AUTO: 100.4 FL (ref 80–94)
MONOCYTES ABSOLUTE: 0.6 K/UL (ref 0–0.9)
MONOCYTES RELATIVE PERCENT: 15 % (ref 0–10)
NEUTROPHILS ABSOLUTE: 1.8 K/UL (ref 1.5–7.5)
NEUTROPHILS RELATIVE PERCENT: 47.3 % (ref 50–65)
PDW BLD-RTO: 14.1 % (ref 11.5–14.5)
PERFORMED ON: ABNORMAL
PLATELET # BLD: 153 K/UL (ref 130–400)
PMV BLD AUTO: 11.4 FL (ref 9.4–12.4)
POTASSIUM SERPL-SCNC: 4.8 MMOL/L (ref 3.5–5)
RBC # BLD: 2.48 M/UL (ref 4.7–6.1)
SODIUM BLD-SCNC: 143 MMOL/L (ref 136–145)
WBC # BLD: 3.9 K/UL (ref 4.8–10.8)

## 2018-06-04 PROCEDURE — 97110 THERAPEUTIC EXERCISES: CPT

## 2018-06-04 PROCEDURE — 1180000000 HC REHAB R&B

## 2018-06-04 PROCEDURE — 97530 THERAPEUTIC ACTIVITIES: CPT

## 2018-06-04 PROCEDURE — 6370000000 HC RX 637 (ALT 250 FOR IP): Performed by: SURGERY

## 2018-06-04 PROCEDURE — 6360000002 HC RX W HCPCS: Performed by: SURGERY

## 2018-06-04 PROCEDURE — 97535 SELF CARE MNGMENT TRAINING: CPT

## 2018-06-04 PROCEDURE — 36415 COLL VENOUS BLD VENIPUNCTURE: CPT

## 2018-06-04 PROCEDURE — 97116 GAIT TRAINING THERAPY: CPT

## 2018-06-04 PROCEDURE — 85025 COMPLETE CBC W/AUTO DIFF WBC: CPT

## 2018-06-04 PROCEDURE — 80048 BASIC METABOLIC PNL TOTAL CA: CPT

## 2018-06-04 PROCEDURE — 6370000000 HC RX 637 (ALT 250 FOR IP): Performed by: PSYCHIATRY & NEUROLOGY

## 2018-06-04 PROCEDURE — 99232 SBSQ HOSP IP/OBS MODERATE 35: CPT | Performed by: PSYCHIATRY & NEUROLOGY

## 2018-06-04 PROCEDURE — 82948 REAGENT STRIP/BLOOD GLUCOSE: CPT

## 2018-06-04 RX ADMIN — TEMAZEPAM 15 MG: 15 CAPSULE ORAL at 20:42

## 2018-06-04 RX ADMIN — HYDROCODONE BITARTRATE AND ACETAMINOPHEN 1 TABLET: 10; 325 TABLET ORAL at 20:42

## 2018-06-04 RX ADMIN — PANTOPRAZOLE SODIUM 40 MG: 40 TABLET, DELAYED RELEASE ORAL at 05:59

## 2018-06-04 RX ADMIN — HYDROCODONE BITARTRATE AND ACETAMINOPHEN 1 TABLET: 10; 325 TABLET ORAL at 12:52

## 2018-06-04 RX ADMIN — DOCUSATE SODIUM 100 MG: 100 CAPSULE, LIQUID FILLED ORAL at 20:42

## 2018-06-04 RX ADMIN — LISINOPRIL 20 MG: 20 TABLET ORAL at 20:42

## 2018-06-04 RX ADMIN — DOCUSATE SODIUM 100 MG: 100 CAPSULE, LIQUID FILLED ORAL at 07:57

## 2018-06-04 RX ADMIN — HYDROCODONE BITARTRATE AND ACETAMINOPHEN 1 TABLET: 10; 325 TABLET ORAL at 06:12

## 2018-06-04 RX ADMIN — TIZANIDINE 4 MG: 4 TABLET ORAL at 20:42

## 2018-06-04 RX ADMIN — ENOXAPARIN SODIUM 40 MG: 100 INJECTION SUBCUTANEOUS at 07:57

## 2018-06-04 ASSESSMENT — PAIN DESCRIPTION - PROGRESSION
CLINICAL_PROGRESSION: NOT CHANGED
CLINICAL_PROGRESSION: GRADUALLY IMPROVING

## 2018-06-04 ASSESSMENT — PAIN DESCRIPTION - DESCRIPTORS
DESCRIPTORS: ACHING
DESCRIPTORS: ACHING

## 2018-06-04 ASSESSMENT — PAIN DESCRIPTION - LOCATION
LOCATION: LEG

## 2018-06-04 ASSESSMENT — PAIN DESCRIPTION - ORIENTATION
ORIENTATION: LEFT

## 2018-06-04 ASSESSMENT — PAIN SCALES - GENERAL
PAINLEVEL_OUTOF10: 5
PAINLEVEL_OUTOF10: 0
PAINLEVEL_OUTOF10: 5
PAINLEVEL_OUTOF10: 6
PAINLEVEL_OUTOF10: 7
PAINLEVEL_OUTOF10: 3
PAINLEVEL_OUTOF10: 4
PAINLEVEL_OUTOF10: 0

## 2018-06-04 ASSESSMENT — PAIN DESCRIPTION - ONSET: ONSET: ON-GOING

## 2018-06-04 ASSESSMENT — PAIN DESCRIPTION - PAIN TYPE
TYPE: ACUTE PAIN

## 2018-06-04 ASSESSMENT — PAIN DESCRIPTION - FREQUENCY
FREQUENCY: CONTINUOUS
FREQUENCY: CONTINUOUS

## 2018-06-05 LAB
GLUCOSE BLD-MCNC: 101 MG/DL (ref 70–99)
GLUCOSE BLD-MCNC: 144 MG/DL (ref 70–99)
GLUCOSE BLD-MCNC: 154 MG/DL (ref 70–99)
GLUCOSE BLD-MCNC: 74 MG/DL (ref 70–99)
PERFORMED ON: ABNORMAL
PERFORMED ON: NORMAL

## 2018-06-05 PROCEDURE — 97530 THERAPEUTIC ACTIVITIES: CPT

## 2018-06-05 PROCEDURE — 82948 REAGENT STRIP/BLOOD GLUCOSE: CPT

## 2018-06-05 PROCEDURE — 6370000000 HC RX 637 (ALT 250 FOR IP): Performed by: PSYCHIATRY & NEUROLOGY

## 2018-06-05 PROCEDURE — 6360000002 HC RX W HCPCS: Performed by: SURGERY

## 2018-06-05 PROCEDURE — 99232 SBSQ HOSP IP/OBS MODERATE 35: CPT | Performed by: PSYCHIATRY & NEUROLOGY

## 2018-06-05 PROCEDURE — 97110 THERAPEUTIC EXERCISES: CPT

## 2018-06-05 PROCEDURE — 6370000000 HC RX 637 (ALT 250 FOR IP): Performed by: SURGERY

## 2018-06-05 PROCEDURE — 1180000000 HC REHAB R&B

## 2018-06-05 PROCEDURE — 97116 GAIT TRAINING THERAPY: CPT

## 2018-06-05 PROCEDURE — 97535 SELF CARE MNGMENT TRAINING: CPT

## 2018-06-05 RX ADMIN — LISINOPRIL 20 MG: 20 TABLET ORAL at 22:29

## 2018-06-05 RX ADMIN — HYDROCODONE BITARTRATE AND ACETAMINOPHEN 1 TABLET: 10; 325 TABLET ORAL at 22:29

## 2018-06-05 RX ADMIN — PANTOPRAZOLE SODIUM 40 MG: 40 TABLET, DELAYED RELEASE ORAL at 06:07

## 2018-06-05 RX ADMIN — DOCUSATE SODIUM 100 MG: 100 CAPSULE, LIQUID FILLED ORAL at 08:06

## 2018-06-05 RX ADMIN — ENOXAPARIN SODIUM 40 MG: 100 INJECTION SUBCUTANEOUS at 08:06

## 2018-06-05 RX ADMIN — HYDROCODONE BITARTRATE AND ACETAMINOPHEN 1 TABLET: 10; 325 TABLET ORAL at 06:07

## 2018-06-05 RX ADMIN — TEMAZEPAM 15 MG: 15 CAPSULE ORAL at 22:29

## 2018-06-05 RX ADMIN — DOCUSATE SODIUM 100 MG: 100 CAPSULE, LIQUID FILLED ORAL at 22:29

## 2018-06-05 RX ADMIN — TIZANIDINE 4 MG: 4 TABLET ORAL at 22:29

## 2018-06-05 ASSESSMENT — PAIN SCALES - GENERAL
PAINLEVEL_OUTOF10: 7
PAINLEVEL_OUTOF10: 5
PAINLEVEL_OUTOF10: 4

## 2018-06-05 ASSESSMENT — PAIN DESCRIPTION - PAIN TYPE: TYPE: SURGICAL PAIN;ACUTE PAIN

## 2018-06-05 ASSESSMENT — PAIN DESCRIPTION - LOCATION: LOCATION: LEG

## 2018-06-05 ASSESSMENT — PAIN DESCRIPTION - ORIENTATION: ORIENTATION: LEFT

## 2018-06-06 LAB
GLUCOSE BLD-MCNC: 110 MG/DL (ref 70–99)
GLUCOSE BLD-MCNC: 120 MG/DL (ref 70–99)
GLUCOSE BLD-MCNC: 147 MG/DL (ref 70–99)
GLUCOSE BLD-MCNC: 148 MG/DL (ref 70–99)
PERFORMED ON: ABNORMAL

## 2018-06-06 PROCEDURE — 97116 GAIT TRAINING THERAPY: CPT

## 2018-06-06 PROCEDURE — 6370000000 HC RX 637 (ALT 250 FOR IP): Performed by: PSYCHIATRY & NEUROLOGY

## 2018-06-06 PROCEDURE — 97530 THERAPEUTIC ACTIVITIES: CPT

## 2018-06-06 PROCEDURE — 97110 THERAPEUTIC EXERCISES: CPT

## 2018-06-06 PROCEDURE — 99233 SBSQ HOSP IP/OBS HIGH 50: CPT | Performed by: PSYCHIATRY & NEUROLOGY

## 2018-06-06 PROCEDURE — 1180000000 HC REHAB R&B

## 2018-06-06 PROCEDURE — 6360000002 HC RX W HCPCS: Performed by: SURGERY

## 2018-06-06 PROCEDURE — 6370000000 HC RX 637 (ALT 250 FOR IP): Performed by: SURGERY

## 2018-06-06 PROCEDURE — 82948 REAGENT STRIP/BLOOD GLUCOSE: CPT

## 2018-06-06 RX ADMIN — POLYETHYLENE GLYCOL 3350 17 G: 17 POWDER, FOR SOLUTION ORAL at 09:24

## 2018-06-06 RX ADMIN — TEMAZEPAM 15 MG: 15 CAPSULE ORAL at 21:48

## 2018-06-06 RX ADMIN — HYDROCODONE BITARTRATE AND ACETAMINOPHEN 1 TABLET: 10; 325 TABLET ORAL at 06:17

## 2018-06-06 RX ADMIN — PANTOPRAZOLE SODIUM 40 MG: 40 TABLET, DELAYED RELEASE ORAL at 06:17

## 2018-06-06 RX ADMIN — TIZANIDINE 4 MG: 4 TABLET ORAL at 21:51

## 2018-06-06 RX ADMIN — HYDROCODONE BITARTRATE AND ACETAMINOPHEN 1 TABLET: 10; 325 TABLET ORAL at 21:48

## 2018-06-06 RX ADMIN — LISINOPRIL 20 MG: 20 TABLET ORAL at 21:48

## 2018-06-06 RX ADMIN — DOCUSATE SODIUM 100 MG: 100 CAPSULE, LIQUID FILLED ORAL at 09:24

## 2018-06-06 RX ADMIN — HYDROCODONE BITARTRATE AND ACETAMINOPHEN 1 TABLET: 10; 325 TABLET ORAL at 12:24

## 2018-06-06 RX ADMIN — ENOXAPARIN SODIUM 40 MG: 100 INJECTION SUBCUTANEOUS at 09:23

## 2018-06-06 RX ADMIN — DOCUSATE SODIUM 100 MG: 100 CAPSULE, LIQUID FILLED ORAL at 21:48

## 2018-06-06 ASSESSMENT — PAIN DESCRIPTION - LOCATION
LOCATION: LEG

## 2018-06-06 ASSESSMENT — PAIN DESCRIPTION - DESCRIPTORS: DESCRIPTORS: ACHING

## 2018-06-06 ASSESSMENT — PAIN DESCRIPTION - ORIENTATION
ORIENTATION: LEFT

## 2018-06-06 ASSESSMENT — PAIN SCALES - GENERAL
PAINLEVEL_OUTOF10: 4
PAINLEVEL_OUTOF10: 3
PAINLEVEL_OUTOF10: 2
PAINLEVEL_OUTOF10: 0
PAINLEVEL_OUTOF10: 7
PAINLEVEL_OUTOF10: 4
PAINLEVEL_OUTOF10: 5
PAINLEVEL_OUTOF10: 6

## 2018-06-06 ASSESSMENT — PAIN DESCRIPTION - PAIN TYPE
TYPE: ACUTE PAIN
TYPE: SURGICAL PAIN;ACUTE PAIN
TYPE: ACUTE PAIN
TYPE: ACUTE PAIN;SURGICAL PAIN

## 2018-06-06 ASSESSMENT — PAIN DESCRIPTION - ONSET: ONSET: ON-GOING

## 2018-06-06 ASSESSMENT — PAIN DESCRIPTION - FREQUENCY: FREQUENCY: CONTINUOUS

## 2018-06-06 ASSESSMENT — PAIN DESCRIPTION - PROGRESSION: CLINICAL_PROGRESSION: GRADUALLY IMPROVING

## 2018-06-07 LAB
ANION GAP SERPL CALCULATED.3IONS-SCNC: 14 MMOL/L (ref 7–19)
BASOPHILS ABSOLUTE: 0 K/UL (ref 0–0.2)
BASOPHILS RELATIVE PERCENT: 0.5 % (ref 0–1)
BUN BLDV-MCNC: 26 MG/DL (ref 8–23)
CALCIUM SERPL-MCNC: 8.3 MG/DL (ref 8.8–10.2)
CHLORIDE BLD-SCNC: 102 MMOL/L (ref 98–111)
CO2: 25 MMOL/L (ref 22–29)
CREAT SERPL-MCNC: 1.1 MG/DL (ref 0.5–1.2)
EOSINOPHILS ABSOLUTE: 0.2 K/UL (ref 0–0.6)
EOSINOPHILS RELATIVE PERCENT: 5 % (ref 0–5)
GFR NON-AFRICAN AMERICAN: >60
GLUCOSE BLD-MCNC: 139 MG/DL (ref 70–99)
GLUCOSE BLD-MCNC: 145 MG/DL (ref 70–99)
GLUCOSE BLD-MCNC: 168 MG/DL (ref 70–99)
GLUCOSE BLD-MCNC: 97 MG/DL (ref 74–109)
GLUCOSE BLD-MCNC: 98 MG/DL (ref 70–99)
HCT VFR BLD CALC: 26.5 % (ref 42–52)
HEMOGLOBIN: 8.3 G/DL (ref 14–18)
LYMPHOCYTES ABSOLUTE: 1.1 K/UL (ref 1.1–4.5)
LYMPHOCYTES RELATIVE PERCENT: 28.5 % (ref 20–40)
MCH RBC QN AUTO: 31.3 PG (ref 27–31)
MCHC RBC AUTO-ENTMCNC: 31.3 G/DL (ref 33–37)
MCV RBC AUTO: 100 FL (ref 80–94)
MONOCYTES ABSOLUTE: 0.4 K/UL (ref 0–0.9)
MONOCYTES RELATIVE PERCENT: 9.8 % (ref 0–10)
NEUTROPHILS ABSOLUTE: 2.2 K/UL (ref 1.5–7.5)
NEUTROPHILS RELATIVE PERCENT: 55.9 % (ref 50–65)
PDW BLD-RTO: 14 % (ref 11.5–14.5)
PERFORMED ON: ABNORMAL
PERFORMED ON: NORMAL
PLATELET # BLD: 159 K/UL (ref 130–400)
PMV BLD AUTO: 11.2 FL (ref 9.4–12.4)
POTASSIUM SERPL-SCNC: 4.3 MMOL/L (ref 3.5–5)
RBC # BLD: 2.65 M/UL (ref 4.7–6.1)
SODIUM BLD-SCNC: 141 MMOL/L (ref 136–145)
WBC # BLD: 4 K/UL (ref 4.8–10.8)

## 2018-06-07 PROCEDURE — 6370000000 HC RX 637 (ALT 250 FOR IP): Performed by: PSYCHIATRY & NEUROLOGY

## 2018-06-07 PROCEDURE — 97116 GAIT TRAINING THERAPY: CPT

## 2018-06-07 PROCEDURE — 6370000000 HC RX 637 (ALT 250 FOR IP): Performed by: SURGERY

## 2018-06-07 PROCEDURE — 80048 BASIC METABOLIC PNL TOTAL CA: CPT

## 2018-06-07 PROCEDURE — 97110 THERAPEUTIC EXERCISES: CPT

## 2018-06-07 PROCEDURE — 85025 COMPLETE CBC W/AUTO DIFF WBC: CPT

## 2018-06-07 PROCEDURE — 1180000000 HC REHAB R&B

## 2018-06-07 PROCEDURE — 6360000002 HC RX W HCPCS: Performed by: SURGERY

## 2018-06-07 PROCEDURE — 82948 REAGENT STRIP/BLOOD GLUCOSE: CPT

## 2018-06-07 PROCEDURE — 36415 COLL VENOUS BLD VENIPUNCTURE: CPT

## 2018-06-07 PROCEDURE — 97530 THERAPEUTIC ACTIVITIES: CPT

## 2018-06-07 PROCEDURE — 97535 SELF CARE MNGMENT TRAINING: CPT

## 2018-06-07 RX ADMIN — DOCUSATE SODIUM 100 MG: 100 CAPSULE, LIQUID FILLED ORAL at 07:39

## 2018-06-07 RX ADMIN — POLYETHYLENE GLYCOL 3350 17 G: 17 POWDER, FOR SOLUTION ORAL at 07:39

## 2018-06-07 RX ADMIN — TIZANIDINE 4 MG: 4 TABLET ORAL at 20:52

## 2018-06-07 RX ADMIN — HYDROCODONE BITARTRATE AND ACETAMINOPHEN 1 TABLET: 10; 325 TABLET ORAL at 03:45

## 2018-06-07 RX ADMIN — HYDROCODONE BITARTRATE AND ACETAMINOPHEN 1 TABLET: 10; 325 TABLET ORAL at 10:33

## 2018-06-07 RX ADMIN — DOCUSATE SODIUM 100 MG: 100 CAPSULE, LIQUID FILLED ORAL at 20:52

## 2018-06-07 RX ADMIN — LISINOPRIL 20 MG: 20 TABLET ORAL at 20:52

## 2018-06-07 RX ADMIN — TEMAZEPAM 15 MG: 15 CAPSULE ORAL at 20:52

## 2018-06-07 RX ADMIN — HYDROCODONE BITARTRATE AND ACETAMINOPHEN 1 TABLET: 10; 325 TABLET ORAL at 20:52

## 2018-06-07 RX ADMIN — ENOXAPARIN SODIUM 40 MG: 100 INJECTION SUBCUTANEOUS at 07:35

## 2018-06-07 RX ADMIN — PANTOPRAZOLE SODIUM 40 MG: 40 TABLET, DELAYED RELEASE ORAL at 06:03

## 2018-06-07 ASSESSMENT — PAIN DESCRIPTION - LOCATION
LOCATION: LEG

## 2018-06-07 ASSESSMENT — PAIN DESCRIPTION - DESCRIPTORS
DESCRIPTORS: ACHING
DESCRIPTORS: SORE
DESCRIPTORS: ACHING

## 2018-06-07 ASSESSMENT — PAIN DESCRIPTION - ORIENTATION
ORIENTATION: LEFT

## 2018-06-07 ASSESSMENT — PAIN SCALES - GENERAL
PAINLEVEL_OUTOF10: 7
PAINLEVEL_OUTOF10: 4
PAINLEVEL_OUTOF10: 0
PAINLEVEL_OUTOF10: 7
PAINLEVEL_OUTOF10: 3
PAINLEVEL_OUTOF10: 2
PAINLEVEL_OUTOF10: 4
PAINLEVEL_OUTOF10: 6
PAINLEVEL_OUTOF10: 3
PAINLEVEL_OUTOF10: 7

## 2018-06-07 ASSESSMENT — PAIN DESCRIPTION - PAIN TYPE
TYPE: ACUTE PAIN
TYPE: ACUTE PAIN;SURGICAL PAIN
TYPE: ACUTE PAIN

## 2018-06-07 ASSESSMENT — PAIN DESCRIPTION - PROGRESSION
CLINICAL_PROGRESSION: GRADUALLY IMPROVING
CLINICAL_PROGRESSION: GRADUALLY WORSENING
CLINICAL_PROGRESSION: GRADUALLY IMPROVING

## 2018-06-07 ASSESSMENT — PAIN DESCRIPTION - ONSET
ONSET: ON-GOING
ONSET: ON-GOING

## 2018-06-07 ASSESSMENT — PAIN DESCRIPTION - FREQUENCY
FREQUENCY: CONTINUOUS
FREQUENCY: CONTINUOUS

## 2018-06-08 LAB
GLUCOSE BLD-MCNC: 104 MG/DL (ref 70–99)
GLUCOSE BLD-MCNC: 113 MG/DL (ref 70–99)
GLUCOSE BLD-MCNC: 170 MG/DL (ref 70–99)
GLUCOSE BLD-MCNC: 183 MG/DL (ref 70–99)
PERFORMED ON: ABNORMAL

## 2018-06-08 PROCEDURE — 99232 SBSQ HOSP IP/OBS MODERATE 35: CPT | Performed by: PSYCHIATRY & NEUROLOGY

## 2018-06-08 PROCEDURE — 97530 THERAPEUTIC ACTIVITIES: CPT

## 2018-06-08 PROCEDURE — 6370000000 HC RX 637 (ALT 250 FOR IP): Performed by: PSYCHIATRY & NEUROLOGY

## 2018-06-08 PROCEDURE — 1180000000 HC REHAB R&B

## 2018-06-08 PROCEDURE — 6370000000 HC RX 637 (ALT 250 FOR IP): Performed by: SURGERY

## 2018-06-08 PROCEDURE — 82948 REAGENT STRIP/BLOOD GLUCOSE: CPT

## 2018-06-08 PROCEDURE — 97110 THERAPEUTIC EXERCISES: CPT

## 2018-06-08 PROCEDURE — 97116 GAIT TRAINING THERAPY: CPT

## 2018-06-08 PROCEDURE — 6360000002 HC RX W HCPCS: Performed by: SURGERY

## 2018-06-08 RX ADMIN — TIZANIDINE 4 MG: 4 TABLET ORAL at 22:29

## 2018-06-08 RX ADMIN — PANTOPRAZOLE SODIUM 40 MG: 40 TABLET, DELAYED RELEASE ORAL at 06:26

## 2018-06-08 RX ADMIN — HYDROCODONE BITARTRATE AND ACETAMINOPHEN 1 TABLET: 10; 325 TABLET ORAL at 22:29

## 2018-06-08 RX ADMIN — ENOXAPARIN SODIUM 40 MG: 100 INJECTION SUBCUTANEOUS at 07:52

## 2018-06-08 RX ADMIN — TEMAZEPAM 15 MG: 15 CAPSULE ORAL at 22:29

## 2018-06-08 RX ADMIN — HYDROCODONE BITARTRATE AND ACETAMINOPHEN 1 TABLET: 10; 325 TABLET ORAL at 09:59

## 2018-06-08 RX ADMIN — LISINOPRIL 20 MG: 20 TABLET ORAL at 20:34

## 2018-06-08 RX ADMIN — HYDROCODONE BITARTRATE AND ACETAMINOPHEN 1 TABLET: 10; 325 TABLET ORAL at 16:07

## 2018-06-08 RX ADMIN — HYDROCODONE BITARTRATE AND ACETAMINOPHEN 1 TABLET: 10; 325 TABLET ORAL at 02:57

## 2018-06-08 RX ADMIN — DOCUSATE SODIUM 100 MG: 100 CAPSULE, LIQUID FILLED ORAL at 20:34

## 2018-06-08 RX ADMIN — DOCUSATE SODIUM 100 MG: 100 CAPSULE, LIQUID FILLED ORAL at 07:52

## 2018-06-08 ASSESSMENT — PAIN SCALES - GENERAL
PAINLEVEL_OUTOF10: 4
PAINLEVEL_OUTOF10: 2
PAINLEVEL_OUTOF10: 2
PAINLEVEL_OUTOF10: 4
PAINLEVEL_OUTOF10: 7
PAINLEVEL_OUTOF10: 8
PAINLEVEL_OUTOF10: 4
PAINLEVEL_OUTOF10: 5
PAINLEVEL_OUTOF10: 6
PAINLEVEL_OUTOF10: 7

## 2018-06-08 ASSESSMENT — PAIN DESCRIPTION - PROGRESSION
CLINICAL_PROGRESSION: NOT CHANGED
CLINICAL_PROGRESSION: GRADUALLY WORSENING

## 2018-06-08 ASSESSMENT — PAIN DESCRIPTION - FREQUENCY
FREQUENCY: CONTINUOUS
FREQUENCY: CONTINUOUS

## 2018-06-08 ASSESSMENT — PAIN DESCRIPTION - ONSET
ONSET: ON-GOING
ONSET: ON-GOING

## 2018-06-08 ASSESSMENT — PAIN DESCRIPTION - ORIENTATION
ORIENTATION: LEFT
ORIENTATION: LEFT

## 2018-06-08 ASSESSMENT — PAIN DESCRIPTION - PAIN TYPE
TYPE: ACUTE PAIN
TYPE: ACUTE PAIN

## 2018-06-08 ASSESSMENT — PAIN DESCRIPTION - LOCATION
LOCATION: LEG
LOCATION: LEG

## 2018-06-08 ASSESSMENT — PAIN DESCRIPTION - DESCRIPTORS
DESCRIPTORS: ACHING
DESCRIPTORS: ACHING

## 2018-06-09 LAB
GLUCOSE BLD-MCNC: 105 MG/DL (ref 70–99)
GLUCOSE BLD-MCNC: 142 MG/DL (ref 70–99)
GLUCOSE BLD-MCNC: 154 MG/DL (ref 70–99)
GLUCOSE BLD-MCNC: 154 MG/DL (ref 70–99)
PERFORMED ON: ABNORMAL

## 2018-06-09 PROCEDURE — 1180000000 HC REHAB R&B

## 2018-06-09 PROCEDURE — 6360000002 HC RX W HCPCS: Performed by: SURGERY

## 2018-06-09 PROCEDURE — 82948 REAGENT STRIP/BLOOD GLUCOSE: CPT

## 2018-06-09 PROCEDURE — 99232 SBSQ HOSP IP/OBS MODERATE 35: CPT | Performed by: PSYCHIATRY & NEUROLOGY

## 2018-06-09 PROCEDURE — 6370000000 HC RX 637 (ALT 250 FOR IP): Performed by: SURGERY

## 2018-06-09 PROCEDURE — 6370000000 HC RX 637 (ALT 250 FOR IP): Performed by: PSYCHIATRY & NEUROLOGY

## 2018-06-09 RX ADMIN — PANTOPRAZOLE SODIUM 40 MG: 40 TABLET, DELAYED RELEASE ORAL at 06:04

## 2018-06-09 RX ADMIN — TEMAZEPAM 15 MG: 15 CAPSULE ORAL at 21:23

## 2018-06-09 RX ADMIN — DOCUSATE SODIUM 100 MG: 100 CAPSULE, LIQUID FILLED ORAL at 21:23

## 2018-06-09 RX ADMIN — TIZANIDINE 4 MG: 4 TABLET ORAL at 21:23

## 2018-06-09 RX ADMIN — LISINOPRIL 20 MG: 20 TABLET ORAL at 21:23

## 2018-06-09 RX ADMIN — ENOXAPARIN SODIUM 40 MG: 100 INJECTION SUBCUTANEOUS at 09:26

## 2018-06-09 RX ADMIN — DOCUSATE SODIUM 100 MG: 100 CAPSULE, LIQUID FILLED ORAL at 09:26

## 2018-06-09 RX ADMIN — HYDROCODONE BITARTRATE AND ACETAMINOPHEN 1 TABLET: 10; 325 TABLET ORAL at 21:23

## 2018-06-09 ASSESSMENT — PAIN SCALES - GENERAL
PAINLEVEL_OUTOF10: 7
PAINLEVEL_OUTOF10: 0
PAINLEVEL_OUTOF10: 4
PAINLEVEL_OUTOF10: 0

## 2018-06-10 LAB
GLUCOSE BLD-MCNC: 106 MG/DL (ref 70–99)
GLUCOSE BLD-MCNC: 112 MG/DL (ref 70–99)
GLUCOSE BLD-MCNC: 232 MG/DL (ref 70–99)
GLUCOSE BLD-MCNC: 97 MG/DL (ref 70–99)
PERFORMED ON: ABNORMAL
PERFORMED ON: NORMAL

## 2018-06-10 PROCEDURE — 1180000000 HC REHAB R&B

## 2018-06-10 PROCEDURE — 6370000000 HC RX 637 (ALT 250 FOR IP): Performed by: SURGERY

## 2018-06-10 PROCEDURE — 82948 REAGENT STRIP/BLOOD GLUCOSE: CPT

## 2018-06-10 PROCEDURE — 6360000002 HC RX W HCPCS: Performed by: SURGERY

## 2018-06-10 PROCEDURE — 6370000000 HC RX 637 (ALT 250 FOR IP): Performed by: PSYCHIATRY & NEUROLOGY

## 2018-06-10 PROCEDURE — 99232 SBSQ HOSP IP/OBS MODERATE 35: CPT | Performed by: PSYCHIATRY & NEUROLOGY

## 2018-06-10 RX ADMIN — ENOXAPARIN SODIUM 40 MG: 100 INJECTION SUBCUTANEOUS at 09:00

## 2018-06-10 RX ADMIN — DOCUSATE SODIUM 100 MG: 100 CAPSULE, LIQUID FILLED ORAL at 20:12

## 2018-06-10 RX ADMIN — TIZANIDINE 4 MG: 4 TABLET ORAL at 20:16

## 2018-06-10 RX ADMIN — LISINOPRIL 20 MG: 20 TABLET ORAL at 20:12

## 2018-06-10 RX ADMIN — PANTOPRAZOLE SODIUM 40 MG: 40 TABLET, DELAYED RELEASE ORAL at 06:06

## 2018-06-10 RX ADMIN — TEMAZEPAM 15 MG: 15 CAPSULE ORAL at 20:12

## 2018-06-10 RX ADMIN — HYDROCODONE BITARTRATE AND ACETAMINOPHEN 1 TABLET: 10; 325 TABLET ORAL at 20:16

## 2018-06-10 ASSESSMENT — PAIN DESCRIPTION - PAIN TYPE: TYPE: ACUTE PAIN;SURGICAL PAIN

## 2018-06-10 ASSESSMENT — PAIN SCALES - GENERAL
PAINLEVEL_OUTOF10: 0
PAINLEVEL_OUTOF10: 6
PAINLEVEL_OUTOF10: 3
PAINLEVEL_OUTOF10: 0

## 2018-06-10 ASSESSMENT — PAIN DESCRIPTION - LOCATION: LOCATION: LEG

## 2018-06-10 ASSESSMENT — PAIN DESCRIPTION - ORIENTATION: ORIENTATION: LEFT

## 2018-06-11 LAB
ANION GAP SERPL CALCULATED.3IONS-SCNC: 11 MMOL/L (ref 7–19)
BASOPHILS ABSOLUTE: 0 K/UL (ref 0–0.2)
BASOPHILS RELATIVE PERCENT: 0.3 % (ref 0–1)
BUN BLDV-MCNC: 16 MG/DL (ref 8–23)
CALCIUM SERPL-MCNC: 8.3 MG/DL (ref 8.8–10.2)
CHLORIDE BLD-SCNC: 104 MMOL/L (ref 98–111)
CO2: 24 MMOL/L (ref 22–29)
CREAT SERPL-MCNC: 0.9 MG/DL (ref 0.5–1.2)
EOSINOPHILS ABSOLUTE: 0.2 K/UL (ref 0–0.6)
EOSINOPHILS RELATIVE PERCENT: 5.2 % (ref 0–5)
GFR NON-AFRICAN AMERICAN: >60
GLUCOSE BLD-MCNC: 115 MG/DL (ref 70–99)
GLUCOSE BLD-MCNC: 176 MG/DL (ref 70–99)
GLUCOSE BLD-MCNC: 187 MG/DL (ref 70–99)
GLUCOSE BLD-MCNC: 92 MG/DL (ref 74–109)
GLUCOSE BLD-MCNC: 95 MG/DL (ref 70–99)
HCT VFR BLD CALC: 26.6 % (ref 42–52)
HEMOGLOBIN: 8.2 G/DL (ref 14–18)
LYMPHOCYTES ABSOLUTE: 1.2 K/UL (ref 1.1–4.5)
LYMPHOCYTES RELATIVE PERCENT: 37.2 % (ref 20–40)
MCH RBC QN AUTO: 31.1 PG (ref 27–31)
MCHC RBC AUTO-ENTMCNC: 30.8 G/DL (ref 33–37)
MCV RBC AUTO: 100.8 FL (ref 80–94)
MONOCYTES ABSOLUTE: 0.3 K/UL (ref 0–0.9)
MONOCYTES RELATIVE PERCENT: 9.2 % (ref 0–10)
NEUTROPHILS ABSOLUTE: 1.5 K/UL (ref 1.5–7.5)
NEUTROPHILS RELATIVE PERCENT: 47.5 % (ref 50–65)
PDW BLD-RTO: 13.4 % (ref 11.5–14.5)
PERFORMED ON: ABNORMAL
PERFORMED ON: NORMAL
PLATELET # BLD: 141 K/UL (ref 130–400)
PMV BLD AUTO: 11.6 FL (ref 9.4–12.4)
POTASSIUM SERPL-SCNC: 4.1 MMOL/L (ref 3.5–5)
RBC # BLD: 2.64 M/UL (ref 4.7–6.1)
SODIUM BLD-SCNC: 139 MMOL/L (ref 136–145)
WBC # BLD: 3.3 K/UL (ref 4.8–10.8)

## 2018-06-11 PROCEDURE — 36415 COLL VENOUS BLD VENIPUNCTURE: CPT

## 2018-06-11 PROCEDURE — 6360000002 HC RX W HCPCS: Performed by: SURGERY

## 2018-06-11 PROCEDURE — 1180000000 HC REHAB R&B

## 2018-06-11 PROCEDURE — 97530 THERAPEUTIC ACTIVITIES: CPT

## 2018-06-11 PROCEDURE — 82948 REAGENT STRIP/BLOOD GLUCOSE: CPT

## 2018-06-11 PROCEDURE — 97116 GAIT TRAINING THERAPY: CPT

## 2018-06-11 PROCEDURE — 6370000000 HC RX 637 (ALT 250 FOR IP): Performed by: SURGERY

## 2018-06-11 PROCEDURE — 97110 THERAPEUTIC EXERCISES: CPT

## 2018-06-11 PROCEDURE — 6370000000 HC RX 637 (ALT 250 FOR IP): Performed by: PSYCHIATRY & NEUROLOGY

## 2018-06-11 PROCEDURE — 85025 COMPLETE CBC W/AUTO DIFF WBC: CPT

## 2018-06-11 PROCEDURE — 80048 BASIC METABOLIC PNL TOTAL CA: CPT

## 2018-06-11 RX ADMIN — DOCUSATE SODIUM 100 MG: 100 CAPSULE, LIQUID FILLED ORAL at 22:56

## 2018-06-11 RX ADMIN — HYDROCODONE BITARTRATE AND ACETAMINOPHEN 1 TABLET: 10; 325 TABLET ORAL at 22:56

## 2018-06-11 RX ADMIN — HYDROCODONE BITARTRATE AND ACETAMINOPHEN 1 TABLET: 10; 325 TABLET ORAL at 06:17

## 2018-06-11 RX ADMIN — DOCUSATE SODIUM 100 MG: 100 CAPSULE, LIQUID FILLED ORAL at 08:05

## 2018-06-11 RX ADMIN — PANTOPRAZOLE SODIUM 40 MG: 40 TABLET, DELAYED RELEASE ORAL at 06:16

## 2018-06-11 RX ADMIN — TEMAZEPAM 15 MG: 15 CAPSULE ORAL at 22:56

## 2018-06-11 RX ADMIN — TIZANIDINE 4 MG: 4 TABLET ORAL at 22:56

## 2018-06-11 RX ADMIN — ENOXAPARIN SODIUM 40 MG: 100 INJECTION SUBCUTANEOUS at 08:05

## 2018-06-11 ASSESSMENT — PAIN SCALES - GENERAL
PAINLEVEL_OUTOF10: 0
PAINLEVEL_OUTOF10: 4
PAINLEVEL_OUTOF10: 3
PAINLEVEL_OUTOF10: 6
PAINLEVEL_OUTOF10: 0
PAINLEVEL_OUTOF10: 7

## 2018-06-11 ASSESSMENT — PAIN DESCRIPTION - ORIENTATION
ORIENTATION: LEFT
ORIENTATION: LEFT

## 2018-06-11 ASSESSMENT — PAIN DESCRIPTION - PAIN TYPE
TYPE: ACUTE PAIN;SURGICAL PAIN
TYPE: SURGICAL PAIN;ACUTE PAIN

## 2018-06-11 ASSESSMENT — PAIN DESCRIPTION - LOCATION
LOCATION: LEG
LOCATION: LEG

## 2018-06-12 LAB
GLUCOSE BLD-MCNC: 126 MG/DL (ref 70–99)
GLUCOSE BLD-MCNC: 134 MG/DL (ref 70–99)
GLUCOSE BLD-MCNC: 159 MG/DL (ref 70–99)
GLUCOSE BLD-MCNC: 87 MG/DL (ref 70–99)
PERFORMED ON: ABNORMAL
PERFORMED ON: NORMAL

## 2018-06-12 PROCEDURE — 6360000002 HC RX W HCPCS: Performed by: SURGERY

## 2018-06-12 PROCEDURE — 97116 GAIT TRAINING THERAPY: CPT

## 2018-06-12 PROCEDURE — 1180000000 HC REHAB R&B

## 2018-06-12 PROCEDURE — 99232 SBSQ HOSP IP/OBS MODERATE 35: CPT | Performed by: PSYCHIATRY & NEUROLOGY

## 2018-06-12 PROCEDURE — 97530 THERAPEUTIC ACTIVITIES: CPT

## 2018-06-12 PROCEDURE — 6370000000 HC RX 637 (ALT 250 FOR IP): Performed by: PSYCHIATRY & NEUROLOGY

## 2018-06-12 PROCEDURE — 97110 THERAPEUTIC EXERCISES: CPT

## 2018-06-12 PROCEDURE — 82948 REAGENT STRIP/BLOOD GLUCOSE: CPT

## 2018-06-12 PROCEDURE — 6370000000 HC RX 637 (ALT 250 FOR IP): Performed by: SURGERY

## 2018-06-12 RX ORDER — DIPHENHYDRAMINE HCL 25 MG
25 TABLET ORAL EVERY 6 HOURS PRN
Status: DISCONTINUED | OUTPATIENT
Start: 2018-06-12 | End: 2018-06-15 | Stop reason: HOSPADM

## 2018-06-12 RX ADMIN — HYDROCODONE BITARTRATE AND ACETAMINOPHEN 1 TABLET: 10; 325 TABLET ORAL at 21:44

## 2018-06-12 RX ADMIN — DIPHENHYDRAMINE HCL 25 MG: 25 TABLET ORAL at 21:44

## 2018-06-12 RX ADMIN — DOCUSATE SODIUM 100 MG: 100 CAPSULE, LIQUID FILLED ORAL at 21:44

## 2018-06-12 RX ADMIN — PANTOPRAZOLE SODIUM 40 MG: 40 TABLET, DELAYED RELEASE ORAL at 06:12

## 2018-06-12 RX ADMIN — LISINOPRIL 20 MG: 20 TABLET ORAL at 21:44

## 2018-06-12 RX ADMIN — HYDROCODONE BITARTRATE AND ACETAMINOPHEN 1 TABLET: 10; 325 TABLET ORAL at 13:52

## 2018-06-12 RX ADMIN — DOCUSATE SODIUM 100 MG: 100 CAPSULE, LIQUID FILLED ORAL at 07:59

## 2018-06-12 RX ADMIN — TIZANIDINE 4 MG: 4 TABLET ORAL at 21:44

## 2018-06-12 RX ADMIN — ENOXAPARIN SODIUM 40 MG: 100 INJECTION SUBCUTANEOUS at 07:59

## 2018-06-12 RX ADMIN — HYDROCODONE BITARTRATE AND ACETAMINOPHEN 1 TABLET: 10; 325 TABLET ORAL at 06:13

## 2018-06-12 RX ADMIN — TEMAZEPAM 15 MG: 15 CAPSULE ORAL at 23:23

## 2018-06-12 ASSESSMENT — PAIN DESCRIPTION - ORIENTATION
ORIENTATION: LEFT

## 2018-06-12 ASSESSMENT — PAIN SCALES - GENERAL
PAINLEVEL_OUTOF10: 6
PAINLEVEL_OUTOF10: 0
PAINLEVEL_OUTOF10: 4
PAINLEVEL_OUTOF10: 4
PAINLEVEL_OUTOF10: 5
PAINLEVEL_OUTOF10: 8
PAINLEVEL_OUTOF10: 7
PAINLEVEL_OUTOF10: 0

## 2018-06-12 ASSESSMENT — PAIN DESCRIPTION - LOCATION
LOCATION: LEG
LOCATION: LEG
LOCATION: LEG;BACK
LOCATION: LEG;BACK

## 2018-06-12 ASSESSMENT — PAIN DESCRIPTION - PROGRESSION
CLINICAL_PROGRESSION: NOT CHANGED
CLINICAL_PROGRESSION: NOT CHANGED

## 2018-06-12 ASSESSMENT — PAIN DESCRIPTION - FREQUENCY
FREQUENCY: CONTINUOUS
FREQUENCY: CONTINUOUS

## 2018-06-12 ASSESSMENT — PAIN DESCRIPTION - PAIN TYPE
TYPE: ACUTE PAIN
TYPE: ACUTE PAIN;SURGICAL PAIN
TYPE: ACUTE PAIN

## 2018-06-12 ASSESSMENT — PAIN DESCRIPTION - DESCRIPTORS
DESCRIPTORS: ACHING
DESCRIPTORS: ACHING

## 2018-06-12 ASSESSMENT — PAIN DESCRIPTION - ONSET
ONSET: ON-GOING
ONSET: ON-GOING

## 2018-06-13 LAB
GLUCOSE BLD-MCNC: 112 MG/DL (ref 70–99)
GLUCOSE BLD-MCNC: 121 MG/DL (ref 70–99)
GLUCOSE BLD-MCNC: 130 MG/DL (ref 70–99)
GLUCOSE BLD-MCNC: 98 MG/DL (ref 70–99)
PERFORMED ON: ABNORMAL
PERFORMED ON: NORMAL

## 2018-06-13 PROCEDURE — 6370000000 HC RX 637 (ALT 250 FOR IP): Performed by: PSYCHIATRY & NEUROLOGY

## 2018-06-13 PROCEDURE — 97110 THERAPEUTIC EXERCISES: CPT

## 2018-06-13 PROCEDURE — 6360000002 HC RX W HCPCS: Performed by: SURGERY

## 2018-06-13 PROCEDURE — 6370000000 HC RX 637 (ALT 250 FOR IP): Performed by: SURGERY

## 2018-06-13 PROCEDURE — 97530 THERAPEUTIC ACTIVITIES: CPT

## 2018-06-13 PROCEDURE — 82948 REAGENT STRIP/BLOOD GLUCOSE: CPT

## 2018-06-13 PROCEDURE — 1180000000 HC REHAB R&B

## 2018-06-13 PROCEDURE — 99233 SBSQ HOSP IP/OBS HIGH 50: CPT | Performed by: PSYCHIATRY & NEUROLOGY

## 2018-06-13 PROCEDURE — 97116 GAIT TRAINING THERAPY: CPT

## 2018-06-13 RX ORDER — LISINOPRIL 20 MG/1
20 TABLET ORAL NIGHTLY
Qty: 30 TABLET | Refills: 3 | Status: SHIPPED | OUTPATIENT
Start: 2018-06-13 | End: 2018-10-10 | Stop reason: SDUPTHER

## 2018-06-13 RX ORDER — BISMUTH TRIBROMOPH/PETROLATUM 5"X9"
1 BANDAGE TOPICAL PRN
Qty: 30 EACH | Refills: 5 | Status: SHIPPED | OUTPATIENT
Start: 2018-06-13 | End: 2018-06-20 | Stop reason: ALTCHOICE

## 2018-06-13 RX ORDER — TIZANIDINE 4 MG/1
4 TABLET ORAL EVERY 6 HOURS PRN
Qty: 30 TABLET | Refills: 0 | Status: SHIPPED | OUTPATIENT
Start: 2018-06-13 | End: 2018-08-03 | Stop reason: SDUPTHER

## 2018-06-13 RX ORDER — PANTOPRAZOLE SODIUM 40 MG/1
40 TABLET, DELAYED RELEASE ORAL
Qty: 30 TABLET | Refills: 3 | Status: SHIPPED | OUTPATIENT
Start: 2018-06-14 | End: 2018-10-24 | Stop reason: SDUPTHER

## 2018-06-13 RX ADMIN — DOCUSATE SODIUM 100 MG: 100 CAPSULE, LIQUID FILLED ORAL at 21:33

## 2018-06-13 RX ADMIN — DOCUSATE SODIUM 100 MG: 100 CAPSULE, LIQUID FILLED ORAL at 08:05

## 2018-06-13 RX ADMIN — HYDROCODONE BITARTRATE AND ACETAMINOPHEN 1 TABLET: 10; 325 TABLET ORAL at 09:28

## 2018-06-13 RX ADMIN — PANTOPRAZOLE SODIUM 40 MG: 40 TABLET, DELAYED RELEASE ORAL at 05:32

## 2018-06-13 RX ADMIN — LISINOPRIL 20 MG: 20 TABLET ORAL at 21:33

## 2018-06-13 RX ADMIN — ENOXAPARIN SODIUM 40 MG: 100 INJECTION SUBCUTANEOUS at 08:05

## 2018-06-13 RX ADMIN — TIZANIDINE 4 MG: 4 TABLET ORAL at 21:33

## 2018-06-13 RX ADMIN — DIPHENHYDRAMINE HCL 25 MG: 25 TABLET ORAL at 21:33

## 2018-06-13 RX ADMIN — HYDROCODONE BITARTRATE AND ACETAMINOPHEN 1 TABLET: 10; 325 TABLET ORAL at 21:33

## 2018-06-13 RX ADMIN — TEMAZEPAM 15 MG: 15 CAPSULE ORAL at 21:33

## 2018-06-13 RX ADMIN — DIPHENHYDRAMINE HCL 25 MG: 25 TABLET ORAL at 05:35

## 2018-06-13 ASSESSMENT — PAIN SCALES - GENERAL
PAINLEVEL_OUTOF10: 8
PAINLEVEL_OUTOF10: 0
PAINLEVEL_OUTOF10: 6
PAINLEVEL_OUTOF10: 5

## 2018-06-13 ASSESSMENT — PAIN DESCRIPTION - FREQUENCY
FREQUENCY: CONTINUOUS
FREQUENCY: CONTINUOUS

## 2018-06-13 ASSESSMENT — PAIN DESCRIPTION - LOCATION
LOCATION: COCCYX
LOCATION: COCCYX

## 2018-06-13 ASSESSMENT — PAIN DESCRIPTION - PAIN TYPE
TYPE: CHRONIC PAIN
TYPE: CHRONIC PAIN

## 2018-06-13 ASSESSMENT — PAIN DESCRIPTION - DESCRIPTORS: DESCRIPTORS: ACHING

## 2018-06-13 ASSESSMENT — PAIN DESCRIPTION - ORIENTATION: ORIENTATION: LEFT

## 2018-06-13 ASSESSMENT — PAIN DESCRIPTION - ONSET: ONSET: ON-GOING

## 2018-06-13 ASSESSMENT — PAIN DESCRIPTION - PROGRESSION: CLINICAL_PROGRESSION: NOT CHANGED

## 2018-06-14 LAB
ANION GAP SERPL CALCULATED.3IONS-SCNC: 9 MMOL/L (ref 7–19)
BASOPHILS ABSOLUTE: 0 K/UL (ref 0–0.2)
BASOPHILS RELATIVE PERCENT: 0.5 % (ref 0–1)
BUN BLDV-MCNC: 21 MG/DL (ref 8–23)
CALCIUM SERPL-MCNC: 8.2 MG/DL (ref 8.8–10.2)
CHLORIDE BLD-SCNC: 102 MMOL/L (ref 98–111)
CO2: 28 MMOL/L (ref 22–29)
CREAT SERPL-MCNC: 1.1 MG/DL (ref 0.5–1.2)
EOSINOPHILS ABSOLUTE: 0.2 K/UL (ref 0–0.6)
EOSINOPHILS RELATIVE PERCENT: 4.9 % (ref 0–5)
GFR NON-AFRICAN AMERICAN: >60
GLUCOSE BLD-MCNC: 106 MG/DL (ref 70–99)
GLUCOSE BLD-MCNC: 109 MG/DL (ref 70–99)
GLUCOSE BLD-MCNC: 118 MG/DL (ref 74–109)
GLUCOSE BLD-MCNC: 128 MG/DL (ref 70–99)
GLUCOSE BLD-MCNC: 75 MG/DL (ref 70–99)
HCT VFR BLD CALC: 27.9 % (ref 42–52)
HEMOGLOBIN: 8.4 G/DL (ref 14–18)
LYMPHOCYTES ABSOLUTE: 1.6 K/UL (ref 1.1–4.5)
LYMPHOCYTES RELATIVE PERCENT: 36.3 % (ref 20–40)
MCH RBC QN AUTO: 30.2 PG (ref 27–31)
MCHC RBC AUTO-ENTMCNC: 30.1 G/DL (ref 33–37)
MCV RBC AUTO: 100.4 FL (ref 80–94)
MONOCYTES ABSOLUTE: 0.4 K/UL (ref 0–0.9)
MONOCYTES RELATIVE PERCENT: 9.3 % (ref 0–10)
NEUTROPHILS ABSOLUTE: 2.1 K/UL (ref 1.5–7.5)
NEUTROPHILS RELATIVE PERCENT: 48.5 % (ref 50–65)
PDW BLD-RTO: 13.3 % (ref 11.5–14.5)
PERFORMED ON: ABNORMAL
PERFORMED ON: NORMAL
PLATELET # BLD: 146 K/UL (ref 130–400)
PMV BLD AUTO: 11.4 FL (ref 9.4–12.4)
POTASSIUM SERPL-SCNC: 4.8 MMOL/L (ref 3.5–5)
RBC # BLD: 2.78 M/UL (ref 4.7–6.1)
SODIUM BLD-SCNC: 139 MMOL/L (ref 136–145)
WBC # BLD: 4.3 K/UL (ref 4.8–10.8)

## 2018-06-14 PROCEDURE — 82948 REAGENT STRIP/BLOOD GLUCOSE: CPT

## 2018-06-14 PROCEDURE — 6360000002 HC RX W HCPCS: Performed by: SURGERY

## 2018-06-14 PROCEDURE — 80048 BASIC METABOLIC PNL TOTAL CA: CPT

## 2018-06-14 PROCEDURE — 97116 GAIT TRAINING THERAPY: CPT

## 2018-06-14 PROCEDURE — 36415 COLL VENOUS BLD VENIPUNCTURE: CPT

## 2018-06-14 PROCEDURE — 1180000000 HC REHAB R&B

## 2018-06-14 PROCEDURE — 99232 SBSQ HOSP IP/OBS MODERATE 35: CPT | Performed by: PSYCHIATRY & NEUROLOGY

## 2018-06-14 PROCEDURE — 97110 THERAPEUTIC EXERCISES: CPT

## 2018-06-14 PROCEDURE — 6370000000 HC RX 637 (ALT 250 FOR IP): Performed by: SURGERY

## 2018-06-14 PROCEDURE — 97530 THERAPEUTIC ACTIVITIES: CPT

## 2018-06-14 PROCEDURE — 85025 COMPLETE CBC W/AUTO DIFF WBC: CPT

## 2018-06-14 PROCEDURE — 6370000000 HC RX 637 (ALT 250 FOR IP): Performed by: PSYCHIATRY & NEUROLOGY

## 2018-06-14 RX ORDER — PETROLATUM,WHITE/LANOLIN
OINTMENT (GRAM) TOPICAL 3 TIMES DAILY
Status: DISCONTINUED | OUTPATIENT
Start: 2018-06-14 | End: 2018-06-15 | Stop reason: HOSPADM

## 2018-06-14 RX ADMIN — LISINOPRIL 20 MG: 20 TABLET ORAL at 21:42

## 2018-06-14 RX ADMIN — TEMAZEPAM 15 MG: 15 CAPSULE ORAL at 21:42

## 2018-06-14 RX ADMIN — DOCUSATE SODIUM 100 MG: 100 CAPSULE, LIQUID FILLED ORAL at 21:42

## 2018-06-14 RX ADMIN — HYDROCODONE BITARTRATE AND ACETAMINOPHEN 1 TABLET: 10; 325 TABLET ORAL at 21:42

## 2018-06-14 RX ADMIN — DOCUSATE SODIUM 100 MG: 100 CAPSULE, LIQUID FILLED ORAL at 09:04

## 2018-06-14 RX ADMIN — PANTOPRAZOLE SODIUM 40 MG: 40 TABLET, DELAYED RELEASE ORAL at 05:48

## 2018-06-14 RX ADMIN — TIZANIDINE 4 MG: 4 TABLET ORAL at 21:42

## 2018-06-14 RX ADMIN — ENOXAPARIN SODIUM 40 MG: 100 INJECTION SUBCUTANEOUS at 09:04

## 2018-06-14 ASSESSMENT — PAIN SCALES - GENERAL
PAINLEVEL_OUTOF10: 0
PAINLEVEL_OUTOF10: 0
PAINLEVEL_OUTOF10: 6

## 2018-06-15 VITALS
OXYGEN SATURATION: 96 % | HEIGHT: 72 IN | DIASTOLIC BLOOD PRESSURE: 60 MMHG | HEART RATE: 56 BPM | RESPIRATION RATE: 16 BRPM | WEIGHT: 245.56 LBS | BODY MASS INDEX: 33.26 KG/M2 | SYSTOLIC BLOOD PRESSURE: 98 MMHG | TEMPERATURE: 97.4 F

## 2018-06-15 LAB
GLUCOSE BLD-MCNC: 110 MG/DL (ref 70–99)
PERFORMED ON: ABNORMAL

## 2018-06-15 PROCEDURE — 6370000000 HC RX 637 (ALT 250 FOR IP): Performed by: PSYCHIATRY & NEUROLOGY

## 2018-06-15 PROCEDURE — 6360000002 HC RX W HCPCS: Performed by: SURGERY

## 2018-06-15 PROCEDURE — 99239 HOSP IP/OBS DSCHRG MGMT >30: CPT | Performed by: PSYCHIATRY & NEUROLOGY

## 2018-06-15 PROCEDURE — 6370000000 HC RX 637 (ALT 250 FOR IP): Performed by: SURGERY

## 2018-06-15 PROCEDURE — 82948 REAGENT STRIP/BLOOD GLUCOSE: CPT

## 2018-06-15 RX ADMIN — HYDROCODONE BITARTRATE AND ACETAMINOPHEN 1 TABLET: 10; 325 TABLET ORAL at 07:38

## 2018-06-15 RX ADMIN — DOCUSATE SODIUM 100 MG: 100 CAPSULE, LIQUID FILLED ORAL at 07:37

## 2018-06-15 RX ADMIN — PANTOPRAZOLE SODIUM 40 MG: 40 TABLET, DELAYED RELEASE ORAL at 05:27

## 2018-06-15 RX ADMIN — ENOXAPARIN SODIUM 40 MG: 100 INJECTION SUBCUTANEOUS at 07:38

## 2018-06-15 ASSESSMENT — PAIN SCALES - GENERAL
PAINLEVEL_OUTOF10: 5
PAINLEVEL_OUTOF10: 6
PAINLEVEL_OUTOF10: 0

## 2018-06-18 ENCOUNTER — TELEPHONE (OUTPATIENT)
Dept: INTERNAL MEDICINE | Age: 63
End: 2018-06-18

## 2018-06-20 ENCOUNTER — HOSPITAL ENCOUNTER (OUTPATIENT)
Dept: WOUND CARE | Age: 63
Discharge: HOME | End: 2018-06-20
Payer: MEDICARE

## 2018-06-20 VITALS
WEIGHT: 245 LBS | SYSTOLIC BLOOD PRESSURE: 125 MMHG | HEART RATE: 67 BPM | DIASTOLIC BLOOD PRESSURE: 70 MMHG | TEMPERATURE: 97.9 F | HEIGHT: 72 IN | RESPIRATION RATE: 18 BRPM | BODY MASS INDEX: 33.18 KG/M2

## 2018-06-20 DIAGNOSIS — L97.522 DIABETIC ULCER OF LEFT FOOT ASSOCIATED WITH TYPE 2 DIABETES MELLITUS, WITH FAT LAYER EXPOSED, UNSPECIFIED PART OF FOOT (HCC): ICD-10-CM

## 2018-06-20 DIAGNOSIS — E11.621 DIABETIC ULCER OF LEFT FOOT ASSOCIATED WITH TYPE 2 DIABETES MELLITUS, WITH FAT LAYER EXPOSED, UNSPECIFIED PART OF FOOT (HCC): ICD-10-CM

## 2018-06-20 DIAGNOSIS — L97.512 ULCERATED, FOOT, RIGHT, WITH FAT LAYER EXPOSED (HCC): ICD-10-CM

## 2018-06-20 PROCEDURE — 97597 DBRDMT OPN WND 1ST 20 CM/<: CPT | Performed by: SURGERY

## 2018-06-20 PROCEDURE — 99213 OFFICE O/P EST LOW 20 MIN: CPT

## 2018-06-20 ASSESSMENT — PAIN DESCRIPTION - PAIN TYPE: TYPE: CHRONIC PAIN

## 2018-06-20 ASSESSMENT — PAIN DESCRIPTION - FREQUENCY: FREQUENCY: CONTINUOUS

## 2018-06-20 ASSESSMENT — PAIN DESCRIPTION - LOCATION: LOCATION: LEG

## 2018-06-20 ASSESSMENT — PAIN DESCRIPTION - PROGRESSION: CLINICAL_PROGRESSION: NOT CHANGED

## 2018-06-20 ASSESSMENT — PAIN DESCRIPTION - ONSET: ONSET: ON-GOING

## 2018-06-20 ASSESSMENT — PAIN SCALES - GENERAL: PAINLEVEL_OUTOF10: 5

## 2018-06-20 ASSESSMENT — PAIN DESCRIPTION - DESCRIPTORS: DESCRIPTORS: ACHING

## 2018-06-20 ASSESSMENT — PAIN DESCRIPTION - ORIENTATION: ORIENTATION: LEFT

## 2018-06-26 ENCOUNTER — OFFICE VISIT (OUTPATIENT)
Dept: FAMILY MEDICINE CLINIC | Age: 63
End: 2018-06-26
Payer: MEDICARE

## 2018-06-26 VITALS
TEMPERATURE: 98.5 F | BODY MASS INDEX: 33.5 KG/M2 | DIASTOLIC BLOOD PRESSURE: 60 MMHG | SYSTOLIC BLOOD PRESSURE: 112 MMHG | HEART RATE: 74 BPM | RESPIRATION RATE: 18 BRPM | OXYGEN SATURATION: 96 % | WEIGHT: 247 LBS

## 2018-06-26 DIAGNOSIS — Z89.512 S/P BKA (BELOW KNEE AMPUTATION) UNILATERAL, LEFT (HCC): ICD-10-CM

## 2018-06-26 DIAGNOSIS — E11.620 TYPE 2 DIABETES MELLITUS WITH DIABETIC DERMATITIS, WITHOUT LONG-TERM CURRENT USE OF INSULIN (HCC): Chronic | ICD-10-CM

## 2018-06-26 DIAGNOSIS — G47.33 OBSTRUCTIVE SLEEP APNEA: ICD-10-CM

## 2018-06-26 DIAGNOSIS — F51.04 CHRONIC INSOMNIA: Primary | ICD-10-CM

## 2018-06-26 DIAGNOSIS — I10 ESSENTIAL HYPERTENSION, BENIGN: Chronic | ICD-10-CM

## 2018-06-26 DIAGNOSIS — E66.01 MORBID OBESITY (HCC): ICD-10-CM

## 2018-06-26 DIAGNOSIS — Z89.512 HX OF BKA, LEFT (HCC): ICD-10-CM

## 2018-06-26 DIAGNOSIS — G89.29 CHRONIC MIDLINE LOW BACK PAIN WITHOUT SCIATICA: ICD-10-CM

## 2018-06-26 DIAGNOSIS — M54.50 CHRONIC MIDLINE LOW BACK PAIN WITHOUT SCIATICA: ICD-10-CM

## 2018-06-26 DIAGNOSIS — E78.01 FAMILIAL HYPERCHOLESTEROLEMIA: ICD-10-CM

## 2018-06-26 DIAGNOSIS — I87.2 VENOUS INSUFFICIENCY OF BOTH LOWER EXTREMITIES: ICD-10-CM

## 2018-06-26 PROCEDURE — 99496 TRANSJ CARE MGMT HIGH F2F 7D: CPT | Performed by: FAMILY MEDICINE

## 2018-06-26 RX ORDER — SYRING-NEEDL,DISP,INSUL,0.3 ML 30 GX5/16"
SYRINGE, EMPTY DISPOSABLE MISCELLANEOUS
Qty: 30 DEVICE | Refills: 11 | Status: SHIPPED | OUTPATIENT
Start: 2018-06-26 | End: 2019-08-09 | Stop reason: ALTCHOICE

## 2018-06-26 RX ORDER — HYDROCODONE BITARTRATE AND ACETAMINOPHEN 10; 325 MG/1; MG/1
1 TABLET ORAL EVERY 6 HOURS PRN
Qty: 120 TABLET | Refills: 0 | Status: SHIPPED | OUTPATIENT
Start: 2018-06-26 | End: 2018-07-24 | Stop reason: SDUPTHER

## 2018-06-26 RX ORDER — GLUCOSAMINE HCL/CHONDROITIN SU 500-400 MG
1 CAPSULE ORAL DAILY
Qty: 30 STRIP | Refills: 11 | Status: SHIPPED | OUTPATIENT
Start: 2018-06-26 | End: 2019-08-09 | Stop reason: ALTCHOICE

## 2018-06-26 RX ORDER — TEMAZEPAM 30 MG/1
30 CAPSULE ORAL NIGHTLY
Qty: 30 CAPSULE | Refills: 5 | Status: SHIPPED | OUTPATIENT
Start: 2018-06-26 | End: 2018-08-27 | Stop reason: SDUPTHER

## 2018-06-27 PROBLEM — R06.81 WITNESSED APNEIC SPELLS: Status: RESOLVED | Noted: 2017-12-15 | Resolved: 2018-06-27

## 2018-06-27 PROBLEM — E11.621 DIABETIC ULCER OF LEFT FOOT ASSOCIATED WITH TYPE 2 DIABETES MELLITUS (HCC): Status: RESOLVED | Noted: 2017-03-27 | Resolved: 2018-06-27

## 2018-06-27 PROBLEM — E11.621 TYPE 2 DIABETES MELLITUS WITH FOOT ULCER (CODE) (HCC): Status: RESOLVED | Noted: 2017-11-15 | Resolved: 2018-06-27

## 2018-06-27 PROBLEM — R06.83 SNORING: Status: RESOLVED | Noted: 2017-12-15 | Resolved: 2018-06-27

## 2018-06-27 PROBLEM — L97.529 DIABETIC ULCER OF LEFT FOOT ASSOCIATED WITH TYPE 2 DIABETES MELLITUS (HCC): Status: RESOLVED | Noted: 2017-03-27 | Resolved: 2018-06-27

## 2018-06-27 PROBLEM — R40.0 SOMNOLENCE, DAYTIME: Status: RESOLVED | Noted: 2017-12-15 | Resolved: 2018-06-27

## 2018-06-27 ASSESSMENT — ENCOUNTER SYMPTOMS
ABDOMINAL PAIN: 0
ANAL BLEEDING: 0
COUGH: 0
DIARRHEA: 0
CHEST TIGHTNESS: 0
CONSTIPATION: 0
SHORTNESS OF BREATH: 0
NAUSEA: 0

## 2018-07-11 ENCOUNTER — HOSPITAL ENCOUNTER (OUTPATIENT)
Dept: WOUND CARE | Age: 63
Discharge: HOME | End: 2018-07-11
Payer: MEDICARE

## 2018-07-11 VITALS
HEIGHT: 72 IN | DIASTOLIC BLOOD PRESSURE: 73 MMHG | HEART RATE: 74 BPM | WEIGHT: 247 LBS | SYSTOLIC BLOOD PRESSURE: 141 MMHG | BODY MASS INDEX: 33.46 KG/M2 | RESPIRATION RATE: 18 BRPM | TEMPERATURE: 99.5 F

## 2018-07-11 DIAGNOSIS — Z89.512 S/P BKA (BELOW KNEE AMPUTATION) UNILATERAL, LEFT (HCC): Primary | ICD-10-CM

## 2018-07-11 DIAGNOSIS — L97.512 ULCERATED, FOOT, RIGHT, WITH FAT LAYER EXPOSED (HCC): ICD-10-CM

## 2018-07-11 PROCEDURE — 99024 POSTOP FOLLOW-UP VISIT: CPT | Performed by: SURGERY

## 2018-07-11 PROCEDURE — 99213 OFFICE O/P EST LOW 20 MIN: CPT

## 2018-07-11 ASSESSMENT — PAIN DESCRIPTION - ONSET: ONSET: ON-GOING

## 2018-07-11 ASSESSMENT — PAIN DESCRIPTION - PROGRESSION: CLINICAL_PROGRESSION: NOT CHANGED

## 2018-07-11 ASSESSMENT — PAIN DESCRIPTION - PAIN TYPE: TYPE: CHRONIC PAIN

## 2018-07-11 ASSESSMENT — PAIN DESCRIPTION - ORIENTATION: ORIENTATION: LEFT

## 2018-07-11 ASSESSMENT — PAIN DESCRIPTION - LOCATION: LOCATION: LEG

## 2018-07-11 ASSESSMENT — PAIN SCALES - GENERAL: PAINLEVEL_OUTOF10: 2

## 2018-07-11 ASSESSMENT — PAIN DESCRIPTION - FREQUENCY: FREQUENCY: CONTINUOUS

## 2018-07-11 ASSESSMENT — PAIN DESCRIPTION - DESCRIPTORS: DESCRIPTORS: ACHING

## 2018-07-11 NOTE — PROGRESS NOTES
Wound Care Center   Progress Note and Procedure Note      Gil Guillen  AGE: 66 y.o. GENDER: male  : 1955  TODAY'S DATE:  2018    Subjective:      CC- Left BKA  HISTORY of PRESENT ILLNESS HPI   Demarcus Cruz is a 66 y.o. male who presents today for wound evaluation. Wound Type:amputation done for calcinosis and venous wounds  Wound Location:Left leg  Modifying factors:edema, venous stasis and diabetes    Patient Active Problem List   Diagnosis Code    Venous insufficiency of both lower extremities I87.2    Morbid obesity ([address] E66.01    Type 2 diabetes mellitus with diabetic dermatitis ([address] E11.620    Venous ulcer of right leg ([facility]) I83.019    Ulcerated, foot, right, with fat layer exposed ([address] L97.512    Obstructive sleep apnea G47.33    Other insomnia G47.09    Calcinosis E83.59    Hx of BKA, left ([facility]) Z89.512    Essential hypertension, benign I10    S/P BKA (below knee amputation) unilateral, left ([facility]) Z89.512    Acute blood loss anemia D62       ALLERGIES    Allergies   Allergen Reactions    Adhesive Tape      Tears skin on leg           Objective:      BP (!) 141/73   Pulse 74   Temp 99.5 °F (37.5 °C) (Temporal)   Resp 18   Ht 6' (1.829 m)   Wt 247 lb (112 kg)   BMI 33.50 kg/m²     Post Debridement Measurements and Assessment:  Negative Pressure Wound Therapy (Active)   Wound Type Surgical 2018 11:44 PM   Dressing Type Black foam 2018 11:44 PM   Cycle Continuous 2018 11:44 PM   Target Pressure (mmHg) 125 2018 11:44 PM   Dressing Status Clean;Dry; Intact 2018 11:44 PM   Drainage Amount Small 2018 11:44 PM   Drainage Description Serosanguinous 2018 11:44 PM   Dressing Change Due 18 11:44 PM   Output (ml) 10 ml 2018  6:03 AM   Odor None 2018 11:44 PM   Number of days: 105       Wound 10/18/17 Foot Plantar;Right;Medial WOUND 34; RIGHT FOOT PLANTAR MEDIAL ( UNDER GREAT TOE) (Active)   Number of days: 265 Wound 06/20/18 Other (Comment) Leg Right WOUND 39 LEFT BKA STSG SURGICAL(GRAFT HEALED7/11/18) (Active)   Wound Image   7/11/2018 11:51 AM   Wound Type Wound 7/11/2018 11:51 AM   Wound Other 7/11/2018 11:51 AM   Dressing Status Old drainage; Intact 7/11/2018 11:51 AM   Dressing Changed Changed/New 6/20/2018  2:15 PM   Wound Cleansed Rinsed/Irrigated with saline 7/11/2018 11:51 AM   Wound Length (cm) 0 cm 7/11/2018 12:50 PM   Wound Width (cm) 0 cm 7/11/2018 12:50 PM   Wound Depth (cm)  0 7/11/2018 12:50 PM   Calculated Wound Size (cm^2) (l*w) 0 cm^2 7/11/2018 12:50 PM   Change in Wound Size % (l*w) 100 7/11/2018 12:50 PM   Wound Assessment Epithelialization 7/11/2018 11:51 AM   Drainage Amount Moderate 7/11/2018 11:51 AM   Drainage Description Serosanguinous 7/11/2018 11:51 AM   Odor None 7/11/2018 11:51 AM   Margins Attached edges 7/11/2018 11:51 AM   Lurdes-wound Assessment Dry; Intact 7/11/2018 11:51 AM   Non-staged Wound Description Partial thickness 7/11/2018 11:51 AM   [place]%Wound Bed 90 7/11/2018 11:51 AM   Yellow%Wound Bed 10 7/11/2018 11:51 AM   Debridement per physician None 7/11/2018 12:50 PM   Time out N/A 7/11/2018 12:50 PM   Number of days: 21       Wound 07/11/18 Venous ulcer Leg Lower;Right WOUND 40 RIGHT LATERAL LOWER LEG VENOUS (Active)   Wound Image   7/11/2018 11:51 AM   Wound Type Wound 7/11/2018 11:51 AM   Wound Venous 7/11/2018 11:51 AM   Dressing Status Old drainage; Intact 7/11/2018 11:51 AM   Wound Cleansed Rinsed/Irrigated with saline 7/11/2018 11:51 AM   Wound Length (cm) 0.2 cm 7/11/2018 11:51 AM   Wound Width (cm) 0.2 cm 7/11/2018 11:51 AM   Wound Depth (cm)  0.1 7/11/2018 11:51 AM   Calculated Wound Size (cm^2) (l*w) 0.04 cm^2 7/11/2018 11:51 AM   Wound Assessment Granulation tissue;Pink;Red;Slough; Yellow 7/11/2018 11:51 AM   Drainage Amount Moderate 7/11/2018 11:51 AM   Drainage Description Serosanguinous 7/11/2018 11:51 AM   Odor None 7/11/2018 11:51 AM   Margins Attached edges 7/11/2018 11:51 AM   Lurdes-wound Assessment Dry; Intact 7/11/2018 11:51 AM   Non-staged Wound Description Full thickness 7/11/2018 11:51 AM   [place]%Wound Bed 90 7/11/2018 11:51 AM   Yellow%Wound Bed 10 7/11/2018 11:51 AM   Debridement per physician None 7/11/2018 11:51 AM   Time out N/A 7/11/2018 11:51 AM   Number of days: 0       Diabetic Ulcer 03/27/17 Foot Left;Distal DIABETIC FOOT ULCER # 33R LEFT/ OLVERA 1-wound reopened 5-9, previously wound 33 (Active)   Number of days: 470       Diabetic Ulcer 10/18/17 Foot Right;Plantar;Lateral WOUND 35; rIGHT FOOT PLANTAR LATERAL ( UNDER 5TH TOE) (Active)   Number of days: 265      The patients pain isPain Level: 2 Pain Type: Chronic pain. Wound is has significantly improved. Please refer to nursing measurements and assessment regarding wound    Procedure Note:    Wound #: [id number]         Assessment:      Patient Active Problem List   Diagnosis    Venous insufficiency of both lower extremities    Morbid obesity ([address]    Type 2 diabetes mellitus with diabetic dermatitis ([address]    Venous ulcer of right leg (HCC)    Ulcerated, foot, right, with fat layer exposed ([address]    Obstructive sleep apnea    Other insomnia    Calcinosis    Hx of BKA, left ([address]    Essential hypertension, benign    S/P BKA (below knee amputation) unilateral, left ([address]    Acute blood loss anemia      BKA and STSG and donor site doing well. OK to start stump  and working towards prosthetic. Anu butter to skin graft and donor site. Plan:          Plan for wound - Dress per physician order  Treatment:     Compression : Yes   Offloading : Yes   Dressing : see avs   Additional Therapy :      1. Discussed appropriate home care of this wound. Wound redressed. 2. Patient instructions were given. 3. Follow up: 6 week(s).                            Electronically signed by Rustam Gomes MD on 7/11/2018 at 12:57 PM

## 2018-07-24 DIAGNOSIS — G89.29 CHRONIC MIDLINE LOW BACK PAIN WITHOUT SCIATICA: ICD-10-CM

## 2018-07-24 DIAGNOSIS — M54.50 CHRONIC MIDLINE LOW BACK PAIN WITHOUT SCIATICA: ICD-10-CM

## 2018-07-24 RX ORDER — HYDROCODONE BITARTRATE AND ACETAMINOPHEN 10; 325 MG/1; MG/1
1 TABLET ORAL EVERY 6 HOURS PRN
Qty: 120 TABLET | Refills: 0 | Status: SHIPPED | OUTPATIENT
Start: 2018-07-24 | End: 2018-08-27 | Stop reason: SDUPTHER

## 2018-08-22 ENCOUNTER — HOSPITAL ENCOUNTER (OUTPATIENT)
Dept: WOUND CARE | Age: 63
Discharge: HOME | End: 2018-08-22
Payer: MEDICARE

## 2018-08-22 VITALS
DIASTOLIC BLOOD PRESSURE: 63 MMHG | SYSTOLIC BLOOD PRESSURE: 150 MMHG | TEMPERATURE: 97.5 F | BODY MASS INDEX: 34.54 KG/M2 | HEART RATE: 68 BPM | HEIGHT: 72 IN | WEIGHT: 255 LBS | RESPIRATION RATE: 16 BRPM

## 2018-08-22 DIAGNOSIS — L97.512 ULCERATED, FOOT, RIGHT, WITH FAT LAYER EXPOSED (HCC): ICD-10-CM

## 2018-08-22 DIAGNOSIS — I73.9 PVD (PERIPHERAL VASCULAR DISEASE) (HCC): Primary | ICD-10-CM

## 2018-08-22 PROCEDURE — 99213 OFFICE O/P EST LOW 20 MIN: CPT

## 2018-08-22 PROCEDURE — 99024 POSTOP FOLLOW-UP VISIT: CPT | Performed by: SURGERY

## 2018-08-22 ASSESSMENT — PAIN SCALES - GENERAL: PAINLEVEL_OUTOF10: 0

## 2018-08-22 NOTE — PROGRESS NOTES
Wound Care Center   Progress Note and Procedure Note      Gil Guillen  AGE: 66 y.o. GENDER: male  : 1955  TODAY'S DATE:  2018    Subjective:      CC- left BKA   HISTORY of PRESENT ILLNESS HPI   Orlin Moreno is a 66 y.o. male who presents today for wound evaluation. Wound Type:amputation done for calcinosis and venous wound  Wound Location:left leg(s): distal  Modifying factors:edema, venous stasis and diabetes    Patient Active Problem List   Diagnosis Code    Venous insufficiency of both lower extremities I87.2    Morbid obesity ([address] E66.01    Type 2 diabetes mellitus with diabetic dermatitis ([address] E11.620    Venous ulcer of right leg ([address] I83.019, L97.919    Ulcerated, foot, right, with fat layer exposed ([address] L97.512    Obstructive sleep apnea G47.33    Other insomnia G47.09    Calcinosis E83.59    Hx of BKA, left ([facility]) Z89.512    Essential hypertension, benign I10    S/P BKA (below knee amputation) unilateral, left (HCC) Z89.512    Acute blood loss anemia D62       ALLERGIES    Allergies   Allergen Reactions    Adhesive Tape      Tears skin on leg           Objective:      BP (!) 150/63   Pulse 68   Temp 97.5 °F (36.4 °C) (Temporal)   Resp 16   Ht 6' (1.829 m)   Wt 255 lb (115.7 kg)   BMI 34.58 kg/m²     Post Debridement Measurements and Assessment:  Negative Pressure Wound Therapy (Active)   Unit Type KCI 2018 11:15 AM   Dressing Type Black foam 2018 11:15 AM   Number of pieces used 1 2018 11:00 AM   Cycle Continuous 2018 11:15 AM   Target Pressure (mmHg) 125 2018 11:15 AM   Canister changed?  Yes 2018 11:45 AM   Drainage Amount Small 2018 11:15 AM   Drainage Description Serosanguinous 2018 11:15 AM   Number of days: 146       Wound 10/18/17 Foot Plantar;Right;Medial WOUND 34; RIGHT FOOT PLANTAR MEDIAL ( UNDER GREAT TOE) (Active)   Wound Type Wound 2017 11:17 AM   Wound Diabetic Lemon 1 2017 11:17 AM   Dressing Status Intact;Dry 11/1/2017 11:17 AM   Dressing Changed Changed/New 11/1/2017 11:17 AM   Wound Cleansed Rinsed/Irrigated with saline 11/1/2017 11:17 AM   Wound Length (cm) 0 cm 11/1/2017 11:17 AM   Wound Width (cm) 0 cm 11/1/2017 11:17 AM   Wound Depth (cm)  0 11/1/2017 11:17 AM   Calculated Wound Size (cm^2) (l*w) 0 cm^2 11/1/2017 11:17 AM   Change in Wound Size % (l*w) 100 11/1/2017 11:17 AM   Distance Tunneling (cm) 0 cm 10/18/2017 12:47 PM   Tunneling Position ___ O'Clock 0 10/18/2017 12:47 PM   Undermining Starts ___ O'Clock 0 10/18/2017 12:47 PM   Undermining Ends___ O'Clock 0 10/18/2017 12:47 PM   Undermining Maxium Distance (cm) 0 10/18/2017 12:47 PM   Wound Assessment Pink 11/1/2017 11:17 AM   Drainage Amount None 11/1/2017 11:17 AM   Drainage Description Sanguinous 10/18/2017 12:47 PM   Odor None 11/1/2017 11:17 AM   Margins Attached edges 10/25/2017  4:57 PM   Lurdes-wound Assessment Calloused 11/1/2017 11:17 AM   Non-staged Wound Description Not applicable 07/1/5597 36:51 AM   [place]%Wound Bed 100 11/1/2017 11:17 AM   Red%Wound Bed 0 11/1/2017 11:17 AM   Yellow%Wound Bed 0 11/1/2017 11:17 AM   Black%Wound Bed 0 10/18/2017 12:47 PM   Purple%Wound Bed 0 10/18/2017 12:47 PM   Other%Wound Bed 0 10/18/2017 12:47 PM   Culture Taken No 10/18/2017 12:59 PM   Debridement per physician None 11/1/2017 12:56 PM   Time out N/A 11/1/2017 12:56 PM   Procedural Pain 0 10/18/2017 12:59 PM   Post procedural Pain 0 10/18/2017 12:59 PM   Number of days: 307       Wound 07/11/18 Venous ulcer Leg Lower;Right WOUND 40 RIGHT LATERAL LOWER LEG VENOUS (Active)   Wound Image   8/22/2018  9:18 AM   Wound Type Wound 7/11/2018 11:51 AM   Wound Venous 7/11/2018 11:51 AM   Dressing Status Old drainage; Intact 7/11/2018 11:51 AM   Wound Cleansed Rinsed/Irrigated with saline 7/11/2018 11:51 AM   Wound Length (cm) 0 cm 8/22/2018  9:18 AM   Wound Width (cm) 0 cm 8/22/2018  9:18 AM   Wound Depth (cm)  0 8/22/2018  9:18 AM   Calculated Wound Size (cm^2) (l*w) 0 cm^2 8/22/2018  9:18 AM   Change in Wound Size % (l*w) 100 8/22/2018  9:18 AM   Wound Assessment Epithelialization 8/22/2018  9:18 AM   Drainage Amount Moderate 7/11/2018 11:51 AM   Drainage Description Serosanguinous 7/11/2018 11:51 AM   Odor None 7/11/2018 11:51 AM   Margins Attached edges 7/11/2018 11:51 AM   Lurdes-wound Assessment Dry; Intact 7/11/2018 11:51 AM   Non-staged Wound Description Full thickness 7/11/2018 11:51 AM   [place]%Wound Bed 90 7/11/2018 11:51 AM   Yellow%Wound Bed 10 7/11/2018 11:51 AM   Debridement per physician None 7/11/2018 11:51 AM   Time out N/A 7/11/2018 11:51 AM   Number of days: 41       Diabetic Ulcer 03/27/17 Foot Left;Distal DIABETIC FOOT ULCER # 33R LEFT/ OLVERA 1-wound reopened 5-9, previously wound 33 (Active)   Lurdes-wound Assessment Calloused 7/12/2017  8:15 AM   Lurdes-Wound Texture Callus 7/12/2017  8:15 AM   Lurdes-Wound Moisture Dry 7/12/2017  8:15 AM   Lurdes-Wound Color Pink 7/12/2017  8:15 AM   Diabetic Wound - Sheliah Keto 1 7/12/2017  8:15 AM   Non-staged Wound Description Full thickness 6/28/2017  8:14 AM   Wound Assessment Intact 7/12/2017  8:15 AM   Shape Oval 6/7/2017  8:39 AM   Wound Length (cm) 0 cm 7/12/2017  8:15 AM   Wound Width (cm) 0 cm 7/12/2017  8:15 AM   Wound Depth (cm)  0 7/12/2017  8:15 AM   Calculated Wound Size (cm^2) (l*w) 0 cm^2 7/12/2017  8:15 AM   Change in Wound Size % (l*w) 100 7/12/2017  8:15 AM   Dressing Status Old drainage 7/12/2017  8:15 AM   Dressing Changed Changed/New 7/5/2017  9:01 AM   Dressing/Treatment Open to air 7/12/2017  8:43 AM   Wound Cleansed Other (Comment) 6/28/2017  8:14 AM   Necrotic Type Black Eschar 6/2/2017  8:50 AM   Necrotic Amount Large: % 6/2/2017  8:50 AM   Granulation Quality Pink 7/12/2017  8:15 AM   Drainage Amount Small 7/12/2017  8:15 AM   Drainage Description Serosanguinous 7/12/2017  8:15 AM   Odor None 7/12/2017  8:15 AM   Epithelialization None present 6/2/2017  8:50 AM   Distance Tunneling (cm) 0 cm 7/12/2017  8:15 AM   Tunneling Position ___ O'Clock 0 7/12/2017  8:15 AM   Tunneling Maxium Distance (cm) 0 7/12/2017  8:15 AM   Undermining Starts ___ O'Clock 0 7/12/2017  8:15 AM   Undermining Ends___ O'Clock 0 7/12/2017  8:15 AM   Undermining Maxium Distance (cm) 0 7/12/2017  8:15 AM   [place]%Wound Bed 0 7/12/2017  8:15 AM   Red%Wound Bed 0 7/12/2017  8:15 AM   Yellow%Wound Bed 0 7/12/2017  8:15 AM   Black%Wound Bed 0 7/12/2017  8:15 AM   Margins Attached edges 6/21/2017  8:29 AM   Debridement per physician Partial thickness 7/5/2017  9:01 AM   Time out Yes 7/5/2017  9:01 AM   Procedural Pain 0 7/5/2017  9:01 AM   Post procedural Pain 0 7/5/2017  9:01 AM   Number of days: 512       Diabetic Ulcer 10/18/17 Foot Right;Plantar;Lateral WOUND 35; rIGHT FOOT PLANTAR LATERAL ( UNDER 5TH TOE) (Active)   Lurdes-wound Assessment Calloused 11/1/2017 11:17 AM   Lurdes-Wound Texture Callus 11/1/2017 11:17 AM   Lurdes-Wound Moisture Dry; Intact 11/1/2017 11:17 AM   Lurdes-Wound Color Pink 11/1/2017 11:17 AM   Diabetic Wound - Tc Saupe 1 11/1/2017 11:17 AM   Non-staged Wound Description Not applicable 97/5/4019 19:52 AM   Wound Assessment Pink 11/1/2017 11:17 AM   Shape round 11/1/2017 11:17 AM   Wound Length (cm) 0 cm 11/1/2017 11:17 AM   Wound Width (cm) 0 cm 11/1/2017 11:17 AM   Wound Depth (cm)  0 11/1/2017 11:17 AM   Calculated Wound Size (cm^2) (l*w) 0 cm^2 11/1/2017 11:17 AM   Change in Wound Size % (l*w) 100 11/1/2017 11:17 AM   Culture Taken No 10/25/2017  4:57 PM   Dressing Status Intact;Dry 11/1/2017 11:17 AM   Dressing Changed Changed/New 11/1/2017 11:17 AM   Wound Cleansed Rinsed/Irrigated with saline 11/1/2017 11:17 AM   Necrotic Type Yellow Fibrin/Slough 10/25/2017  4:57 PM   Necrotic Amount None present 11/1/2017 11:17 AM   Granulation Quality N/A 11/1/2017 11:17 AM   Drainage Amount None 11/1/2017 11:17 AM   Drainage Description Serosanguinous 10/25/2017  4:57 PM   Odor None 11/1/2017 11:17 AM   Distance Tunneling (cm) 0 cm 10/18/2017 12:47 PM   Tunneling Position ___ O'Clock 0 10/18/2017 12:47 PM   Tunneling Maxium Distance (cm) 0 10/18/2017 12:47 PM   Undermining Starts ___ O'Clock 0 10/18/2017 12:47 PM   Undermining Ends___ O'Clock 0 10/18/2017 12:47 PM   Undermining Maxium Distance (cm) 0 10/18/2017 12:47 PM   [place]%Wound Bed 100 11/1/2017 11:17 AM   Red%Wound Bed 2 10/18/2017 12:47 PM   Yellow%Wound Bed 3 10/18/2017 12:47 PM   Black%Wound Bed 0 10/18/2017 12:47 PM   Purple%Wound Bed 0 10/18/2017 12:47 PM   Margins Other (Comment) 11/1/2017 11:17 AM   Debridement per physician None 11/1/2017 12:56 PM   Time out N/A 11/1/2017 12:56 PM   Procedural Pain 0 10/18/2017 12:59 PM   Post procedural Pain 0 10/18/2017 12:59 PM   Number of days: 307      The patients pain isPain Level: 0  . Wound is healed. Please refer to nursing measurements and assessment regarding wound   Procedure Note:none          Assessment:      Patient Active Problem List   Diagnosis    Venous insufficiency of both lower extremities    Morbid obesity ([address]    Type 2 diabetes mellitus with diabetic dermatitis ([address]    Venous ulcer of right leg (HCC)    Ulcerated, foot, right, with fat layer exposed ([address]    Obstructive sleep apnea    Other insomnia    Calcinosis    Hx of BKA, left ([address]    Essential hypertension, benign    S/P BKA (below knee amputation) unilateral, left ([address]    Acute blood loss anemia      Assessment- wound looks amazingly good, STSG looks excellent. He is starting with stump  and working towards prosthetic. Plan:          Plan for wound - Dress per physician order  Treatment:     Compression : Yes   Offloading : BKA prostetic   Dressing : Additional Therapy : moisturize     1. Discussed appropriate home care of this wound. Wound redressed. 2. Patient instructions were given. 3. Follow up: 1 year with JAYASHREE in vascular office, prn in [facility].          Electronically signed by Ирина Horner MD on 8/22/2018 at 10:07 AM

## 2018-08-27 ENCOUNTER — OFFICE VISIT (OUTPATIENT)
Dept: FAMILY MEDICINE CLINIC | Age: 63
End: 2018-08-27
Payer: MEDICARE

## 2018-08-27 VITALS
HEART RATE: 71 BPM | RESPIRATION RATE: 18 BRPM | OXYGEN SATURATION: 98 % | TEMPERATURE: 97.8 F | DIASTOLIC BLOOD PRESSURE: 78 MMHG | SYSTOLIC BLOOD PRESSURE: 118 MMHG

## 2018-08-27 DIAGNOSIS — I10 ESSENTIAL HYPERTENSION, BENIGN: Chronic | ICD-10-CM

## 2018-08-27 DIAGNOSIS — G89.29 CHRONIC MIDLINE LOW BACK PAIN WITHOUT SCIATICA: ICD-10-CM

## 2018-08-27 DIAGNOSIS — G47.33 OBSTRUCTIVE SLEEP APNEA: ICD-10-CM

## 2018-08-27 DIAGNOSIS — F51.04 CHRONIC INSOMNIA: ICD-10-CM

## 2018-08-27 DIAGNOSIS — Z89.512 HX OF BKA, LEFT (HCC): ICD-10-CM

## 2018-08-27 DIAGNOSIS — M54.50 CHRONIC MIDLINE LOW BACK PAIN WITHOUT SCIATICA: ICD-10-CM

## 2018-08-27 DIAGNOSIS — E66.01 MORBID OBESITY (HCC): ICD-10-CM

## 2018-08-27 DIAGNOSIS — E11.620 TYPE 2 DIABETES MELLITUS WITH DIABETIC DERMATITIS, WITHOUT LONG-TERM CURRENT USE OF INSULIN (HCC): Primary | Chronic | ICD-10-CM

## 2018-08-27 PROCEDURE — 1036F TOBACCO NON-USER: CPT | Performed by: FAMILY MEDICINE

## 2018-08-27 PROCEDURE — 3044F HG A1C LEVEL LT 7.0%: CPT | Performed by: FAMILY MEDICINE

## 2018-08-27 PROCEDURE — 2022F DILAT RTA XM EVC RTNOPTHY: CPT | Performed by: FAMILY MEDICINE

## 2018-08-27 PROCEDURE — G8417 CALC BMI ABV UP PARAM F/U: HCPCS | Performed by: FAMILY MEDICINE

## 2018-08-27 PROCEDURE — 3017F COLORECTAL CA SCREEN DOC REV: CPT | Performed by: FAMILY MEDICINE

## 2018-08-27 PROCEDURE — G8427 DOCREV CUR MEDS BY ELIG CLIN: HCPCS | Performed by: FAMILY MEDICINE

## 2018-08-27 PROCEDURE — 99214 OFFICE O/P EST MOD 30 MIN: CPT | Performed by: FAMILY MEDICINE

## 2018-08-27 RX ORDER — HYDROCODONE BITARTRATE AND ACETAMINOPHEN 10; 325 MG/1; MG/1
1 TABLET ORAL EVERY 6 HOURS PRN
Qty: 120 TABLET | Refills: 0 | Status: SHIPPED | OUTPATIENT
Start: 2018-08-27 | End: 2018-09-24 | Stop reason: SDUPTHER

## 2018-08-27 RX ORDER — TEMAZEPAM 30 MG/1
30 CAPSULE ORAL NIGHTLY
Qty: 30 CAPSULE | Refills: 5 | Status: SHIPPED | OUTPATIENT
Start: 2018-08-27 | End: 2018-09-26

## 2018-08-27 ASSESSMENT — PATIENT HEALTH QUESTIONNAIRE - PHQ9
SUM OF ALL RESPONSES TO PHQ QUESTIONS 1-9: 0
1. LITTLE INTEREST OR PLEASURE IN DOING THINGS: 0
SUM OF ALL RESPONSES TO PHQ QUESTIONS 1-9: 0
2. FEELING DOWN, DEPRESSED OR HOPELESS: 0
SUM OF ALL RESPONSES TO PHQ9 QUESTIONS 1 & 2: 0

## 2018-08-27 ASSESSMENT — ENCOUNTER SYMPTOMS
DIARRHEA: 0
ANAL BLEEDING: 0
BACK PAIN: 1
COUGH: 0
CONSTIPATION: 0
ABDOMINAL PAIN: 0
SHORTNESS OF BREATH: 0
NAUSEA: 0
CHEST TIGHTNESS: 0

## 2018-08-27 NOTE — PROGRESS NOTES
([facility] 6/22/15    Skin ulcer of toe of left foot with fat layer exposed ([facility] 4/4/2017    Sleep apnea in adult 06/22/2015    no cpap    Type 2 diabetes mellitus with foot ulcer (CODE) ([facility] 11/15/2017    Venous hypertension, chronic, with ulcer, left ([facility] 11/26/2017    Venous insufficiency of both lower extremities 6/22/15      Past Surgical History:   Procedure Laterality Date    ABDOMEN SURGERY  02/2018    treated in [facility] [address]      hx. of multiple; \"no blockage\"    DILATATION, ESOPHAGUS      GASTROSTOMY TUBE PLACEMENT  02/11/2018    temporary; now removed    KNEE ARTHROSCOPY      6 knee surgeries total; \"no replacements\"    [place] AMPUTATION LOW LEG THRU TIB/FIB Left 5/25/2018    LEG AMPUTATION BELOW KNEE WITH MUSCLE FLAP  AND SKIN GRAFT CLOSURE AND APPLICATION OF WOUND VAC performed by Hayes Oconnor MD at [place] [address]  02/11/2018    repair of gastric bypass    VASCULAR SURGERY  05/25/2018    [facility]. Leg amputation below knee with muscle flap and skin graft closure and application of wound vac stsg 14 cmx 15 cm. Family History   Problem Relation Age of Onset    Diabetes Mother     Heart Disease Mother     High Blood Pressure Mother     Heart Disease Father     Other Father        Social History   Substance Use Topics    Smoking status: Never Smoker    Smokeless tobacco: Never Used    Alcohol use No      Current Outpatient Prescriptions   Medication Sig Dispense Refill    HYDROcodone-acetaminophen (NORCO)  MG per tablet Take 1 tablet by mouth every 6 hours as needed for Pain for up to 30 days. . 120 tablet 0    temazepam (RESTORIL) 30 MG capsule Take 1 capsule by mouth nightly for 30 days. . 30 capsule 5    tiZANidine (ZANAFLEX) 4 MG tablet TAKE 1 TABLET BY MOUTH EVERY 6 HOURS AS NEEDED (SPASMS) 30 tablet 5    Glucose Blood (BLOOD GLUCOSE TEST STRIPS) STRP 1 strip by In Vitro route daily 30 strip 11    Lancet Device MISC 1 lancet per strip. 30 Device 11    Misc. Devices MISC Glucometer  ICD 10: E11.9 1 Device 0    lisinopril (PRINIVIL;ZESTRIL) 20 MG tablet Take 1 tablet by mouth nightly 30 tablet 3    pantoprazole (PROTONIX) 40 MG tablet Take 1 tablet by mouth every morning (before breakfast) 30 tablet 3    DAILY MULTIPLE VITAMINS TABS Take 1 tablet by mouth       No current facility-administered medications for this visit. Allergies   Allergen Reactions    Adhesive Tape      Tears skin on leg       Health Maintenance   Topic Date Due    Hepatitis C screen  1955    Diabetic retinal exam  05/07/1967    HIV screen  05/07/1972    Pneumococcal med risk (1 of 1 - PPSV23) 05/07/1976    Shingles Vaccine (1 of 2 - 2 Dose Series) 05/07/2007    Flu vaccine (1) 10/23/2018 (Originally 9/1/2018)    Colon cancer screen colonoscopy  01/01/2019 (Originally 5/7/2007)    Diabetic foot exam  04/30/2019    A1C test (Diabetic or Prediabetic)  04/30/2019    Diabetic microalbuminuria test  04/30/2019    Lipid screen  04/30/2019    Potassium monitoring  06/14/2019    Creatinine monitoring  06/14/2019    DTaP/Tdap/Td vaccine (2 - Td) 04/30/2028       Subjective:      Review of Systems   Constitutional: Negative for chills and fever. HENT: Negative for congestion. Respiratory: Negative for cough, chest tightness and shortness of breath. Cardiovascular: Negative for chest pain, palpitations and leg swelling. Gastrointestinal: Negative for abdominal pain, anal bleeding, constipation, diarrhea and nausea. Genitourinary: Negative for difficulty urinating. Musculoskeletal: Positive for back pain and myalgias. Psychiatric/Behavioral: Negative. See HPI for visit specific review of symptoms. All others negative      Objective:   /78   Pulse 71   Temp 97.8 °F (36.6 °C) (Temporal)   Resp 18   SpO2 98%   Physical Exam   Constitutional: He appears well-developed. He does not appear ill. Eyes: Pupils are equal, round, and reactive to light. Neck: Normal range of motion. Neck supple. Cardiovascular: Normal rate and regular rhythm. Exam reveals no friction rub. No murmur heard. Pulmonary/Chest: Effort normal and breath sounds normal. No respiratory distress. He has no wheezes. He has no rales. Abdominal: Soft. Bowel sounds are normal. He exhibits no distension. There is no tenderness. There is no rebound and no guarding. Musculoskeletal: He exhibits no edema. Negative straight leg raise. Normal gait. Normal plantar and dorsalflexion. Neurological: He is alert. He displays normal reflexes. He exhibits normal muscle tone. Coordination normal.   Psychiatric: He has a normal mood and affect. His behavior is normal.         No results found for this or any previous visit (from the past 672 hour(s)). Assessment & Plan: The following diagnoses and conditions are stable with no further orders unless indicated:  1. Chronic midline low back pain without sciatica  Discussed risk and benefits of taking opioids. Risks include addiction and tolerance. If develops impairment or over sedation with medication, discontinue and contact me. Do not take medication with alcohol. Advised to take sparingly. Advised to keep medication hidden and locked up as risk of theft is high with these medications as well.     - HYDROcodone-acetaminophen (NORCO)  MG per tablet; Take 1 tablet by mouth every 6 hours as needed for Pain for up to 30 days. .  Dispense: 120 tablet; Refill: 0    2. Chronic insomnia  stable  - temazepam (RESTORIL) 30 MG capsule; Take 1 capsule by mouth nightly for 30 days. .  Dispense: 30 capsule; Refill: 5    3. Type 2 diabetes mellitus with diabetic dermatitis, without long-term current use of insulin ([facility]  Lab Results   Component Value Date    LABA1C 4.7 04/30/2018     No results found for: EAG  Stable  Recheck next visit    4.  Essential hypertension, benign  BP Readings from Last 3 Encounters:   08/27/18 118/78   08/22/18 (!) 150/63   07/11/18 (!) 141/73     stable    5. Hx of BKA, left ([address]  Is to be setup    6. Morbid obesity ([address]  Discussed lifestyle changes such as diet and exercise. Recommended eliminate processed food from diet such as sugar and fried foods. He has difficulty with exercise due to his recent BKA. 7. Obstructive sleep apnea  Does not wear CPAP. Encouraged to consider another attempt. Return in about 3 months (around 11/27/2018) for AWV - 30 minute visit. Discussed use, benefit, and side effects of prescribed medications. All patient questions answered. Pt voiced understanding. Reviewed health maintenance. Instructed to continue current medications, diet and exercise. Patient agreed with treatment plan. Follow up as directed.

## 2018-09-24 DIAGNOSIS — G89.29 CHRONIC MIDLINE LOW BACK PAIN WITHOUT SCIATICA: ICD-10-CM

## 2018-09-24 DIAGNOSIS — M54.50 CHRONIC MIDLINE LOW BACK PAIN WITHOUT SCIATICA: ICD-10-CM

## 2018-09-24 RX ORDER — HYDROCODONE BITARTRATE AND ACETAMINOPHEN 10; 325 MG/1; MG/1
1 TABLET ORAL EVERY 6 HOURS PRN
Qty: 120 TABLET | Refills: 0 | Status: SHIPPED | OUTPATIENT
Start: 2018-09-24 | End: 2018-10-26 | Stop reason: SDUPTHER

## 2018-09-24 NOTE — TELEPHONE ENCOUNTER
Gil called requesting a refill of the below medication which has been pended for you:     Requested Prescriptions     Pending Prescriptions Disp Refills    HYDROcodone-acetaminophen (NORCO)  MG per tablet 120 tablet 0     Sig: Take 1 tablet by mouth every 6 hours as needed for Pain for up to 30 days. .       Last Appointment Date: 8/27/2018  Next Appointment Date: 11/29/2018    Allergies   Allergen Reactions    Adhesive Tape      Tears skin on leg         [employer] 1/23/18, Carlos Manuel lee [place].   [address]

## 2018-09-28 ENCOUNTER — HOSPITAL ENCOUNTER (EMERGENCY)
Age: 63
Discharge: HOME | End: 2018-09-28
Payer: MEDICARE

## 2018-09-28 VITALS
RESPIRATION RATE: 16 BRPM | HEART RATE: 59 BPM | HEIGHT: 72 IN | DIASTOLIC BLOOD PRESSURE: 68 MMHG | BODY MASS INDEX: 34.54 KG/M2 | WEIGHT: 255 LBS | TEMPERATURE: 98.2 F | SYSTOLIC BLOOD PRESSURE: 161 MMHG | OXYGEN SATURATION: 94 %
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
## 2018-09-28 DIAGNOSIS — S61.412A LACERATION OF LEFT HAND, FOREIGN BODY PRESENCE UNSPECIFIED, INITIAL ENCOUNTER: Primary | ICD-10-CM

## 2018-09-28 PROCEDURE — 99282 EMERGENCY DEPT VISIT SF MDM: CPT

## 2018-09-28 PROCEDURE — 99283 EMERGENCY DEPT VISIT LOW MDM: CPT | Performed by: NURSE PRACTITIONER

## 2018-09-28 PROCEDURE — 12002 RPR S/N/AX/GEN/TRNK2.6-7.5CM: CPT

## 2018-09-28 PROCEDURE — 12002 RPR S/N/AX/GEN/TRNK2.6-7.5CM: CPT | Performed by: NURSE PRACTITIONER

## 2018-09-28 RX ORDER — LIDOCAINE HYDROCHLORIDE 10 MG/ML
INJECTION, SOLUTION EPIDURAL; INFILTRATION; INTRACAUDAL; PERINEURAL
Status: DISCONTINUED
Start: 2018-09-28 | End: 2018-09-28 | Stop reason: HOSPADM

## 2018-09-28 RX ORDER — CEPHALEXIN 500 MG/1
500 CAPSULE ORAL 3 TIMES DAILY
Qty: 30 CAPSULE | Refills: 0 | Status: SHIPPED | OUTPATIENT
Start: 2018-09-28 | End: 2018-10-08

## 2018-09-28 NOTE — ED PROVIDER NOTES
[facility] DE[facility]  eMERGENCY dEPARTMENT eNCOUnter      Pt Name: Kiya Ramírez  MRN: [id number]  [place] 1955  Date of evaluation: 9/28/2018  Provider: [address]       Chief Complaint   Patient presents with    Laceration     pt presents to ED With c/o left hand lac on palm after falling. HISTORY OF PRESENT ILLNESS   (Location/Symptom, Timing/Onset, Context/Setting, Quality, Duration, Modifying Factors, Severity)  Note limiting factors. Kiya Ramírez is a 66 y.o. male who presents to the emergency department for evaluation of laceration. Pt tells me that he slipped and fell grabbing a metal sign at a restaurant parking lot injuring the palmar side of his left hand. Pt denies head injury as well as neck pain. He denies other extremity injury. He is unsure of his last tetanus immunization. HPI    Nursing Notes were reviewed. REVIEW OF SYSTEMS    (2-9 systems for level 4, 10 or more for level 5)     Review of Systems   Constitutional: Negative. Skin: Positive for wound (left hand). A complete review of systems was performed and is negative except as noted above in the HPI.        PAST MEDICAL HISTORY     Past Medical History:   Diagnosis Date    Arthritis     Asthma     Bradycardia     Broken ribs     CHF (congestive heart failure) (HCC)     Chronic ulcer of left leg, with fat layer exposed ([address]     Diabetes mellitus ([address]     no meds; improved with weight loss    Hypertension     Idiopathic chronic venous hypertension of left leg with ulcer ([address] 12/22/2015    Morbid obesity ([address] 6/22/15    Skin ulcer of toe of left foot with fat layer exposed ([address] 4/4/2017    Sleep apnea in adult 06/22/2015    no cpap    Type 2 diabetes mellitus with foot ulcer (CODE) ([address] 11/15/2017    Venous hypertension, chronic, with ulcer, left ([address] 11/26/2017    Venous insufficiency of both lower extremities 6/22/15         SURGICAL HISTORY       Past Surgical History:   Procedure Laterality Date    ABDOMEN SURGERY  02/2018    treated [address]      hx. of multiple; \"no blockage\"    DILATATION, ESOPHAGUS      GASTROSTOMY TUBE PLACEMENT  02/11/2018    temporary; now removed    KNEE ARTHROSCOPY      6 knee surgeries total; \"no replacements\"    [place] AMPUTATION LOW LEG THRU TIB/FIB Left 5/25/2018    LEG AMPUTATION BELOW KNEE WITH MUSCLE FLAP  AND SKIN GRAFT CLOSURE AND APPLICATION OF WOUND VAC performed by Brenda Betancur MD at [place] [address]  02/11/2018    repair of gastric bypass    VASCULAR SURGERY  05/25/2018    [facility]. Leg amputation below knee with muscle flap and skin graft closure and application of wound vac stsg 14 cmx 15 cm. CURRENT MEDICATIONS       Discharge Medication List as of 9/28/2018 12:34 PM      CONTINUE these medications which have NOT CHANGED    Details   HYDROcodone-acetaminophen (NORCO)  MG per tablet Take 1 tablet by mouth every 6 hours as needed for Pain for up to 30 days. ., Disp-120 tablet, R-0Normal      tiZANidine (ZANAFLEX) 4 MG tablet TAKE 1 TABLET BY MOUTH EVERY 6 HOURS AS NEEDED (SPASMS), Disp-30 tablet, R-5Please consider 90 day supplies to promote better adherenceNormal      Glucose Blood (BLOOD GLUCOSE TEST STRIPS) STRP 1 strip by In Vitro route daily, Disp-30 strip, R-11Print      Lancet Device [facility] Disp-30 Device, R-11, Print1 lancet per strip. Misc.  Devices MISC Disp-1 Device, R-0, PrintGlucometer ICD 10: E11.9      lisinopril (PRINIVIL;ZESTRIL) 20 MG tablet Take 1 tablet by mouth nightly, Disp-30 tablet, R-3Normal      pantoprazole (PROTONIX) 40 MG tablet Take 1 tablet by mouth every morning (before breakfast), Disp-30 tablet, R-3Normal      DAILY MULTIPLE VITAMINS TABS Take 1 tablet by mouthHistorical Med             ALLERGIES     Adhesive tape    FAMILY HISTORY       Family History   Problem Relation Age of Onset    Diabetes Nylon    Suture technique:  Simple interrupted    Number of sutures:  8  Approximation:     Approximation:  Close  Post-procedure details:     Dressing:  Tube gauze, non-adherent dressing and bulky dressing    Patient tolerance of procedure: Tolerated well, no immediate complications        FINAL IMPRESSION      1. Laceration of left hand, foreign body presence unspecified, initial encounter    2. Fall, initial encounter          DISPOSITION/PLAN   DISPOSITION Decision To Discharge 09/28/2018 12:52:12 PM      PATIENT REFERRED TO:  Dagoberto Oliva MD  [address] [place] [address]    Schedule an appointment as soon as possible for a visit in 3 days  For wound re-check      DISCHARGE MEDICATIONS:       Discharge Medication List as of 9/28/2018 12:34 PM           Medication List      START taking these medications    cephALEXin 500 MG capsule  Commonly known as:  KEFLEX  Take 1 capsule by mouth 3 times daily for 10 days        ASK your doctor about these medications    blood glucose test strips  1 strip by In Vitro route daily     DAILY MULTIPLE VITAMINS Tabs     HYDROcodone-acetaminophen  MG per tablet  Commonly known as:  NORCO  Take 1 tablet by mouth every 6 hours as needed for Pain for up to 30 days. Enio Alecia Device Misc  1 lancet per strip.     lisinopril 20 MG tablet  Commonly known as:  PRINIVIL;ZESTRIL  Take 1 tablet by mouth nightly     Misc.  Devices Misc  Glucometer ICD 10: E11.9     pantoprazole 40 MG tablet  Commonly known as:  PROTONIX  Take 1 tablet by mouth every morning (before breakfast)     tiZANidine 4 MG tablet  Commonly known as:  ZANAFLEX  TAKE 1 TABLET BY MOUTH EVERY 6 HOURS AS NEEDED (SPASMS)           Where to Get Your Medications      You can get these medications from any pharmacy    Bring a paper prescription for each of these medications  · cephALEXin 500 MG capsule         (Please note that portions of this note were completed with a voice

## 2018-10-02 ENCOUNTER — OFFICE VISIT (OUTPATIENT)
Dept: FAMILY MEDICINE CLINIC | Age: 63
End: 2018-10-02
Payer: MEDICARE

## 2018-10-02 VITALS
WEIGHT: 265 LBS | SYSTOLIC BLOOD PRESSURE: 128 MMHG | TEMPERATURE: 98.8 F | DIASTOLIC BLOOD PRESSURE: 70 MMHG | HEART RATE: 61 BPM | OXYGEN SATURATION: 95 % | BODY MASS INDEX: 35.94 KG/M2

## 2018-10-02 DIAGNOSIS — S61.412D LACERATION OF LEFT HAND WITHOUT FOREIGN BODY, SUBSEQUENT ENCOUNTER: Primary | ICD-10-CM

## 2018-10-02 PROCEDURE — 3017F COLORECTAL CA SCREEN DOC REV: CPT | Performed by: FAMILY MEDICINE

## 2018-10-02 PROCEDURE — 1036F TOBACCO NON-USER: CPT | Performed by: FAMILY MEDICINE

## 2018-10-02 PROCEDURE — 99213 OFFICE O/P EST LOW 20 MIN: CPT | Performed by: FAMILY MEDICINE

## 2018-10-02 PROCEDURE — G8427 DOCREV CUR MEDS BY ELIG CLIN: HCPCS | Performed by: FAMILY MEDICINE

## 2018-10-02 PROCEDURE — G8417 CALC BMI ABV UP PARAM F/U: HCPCS | Performed by: FAMILY MEDICINE

## 2018-10-02 PROCEDURE — G8484 FLU IMMUNIZE NO ADMIN: HCPCS | Performed by: FAMILY MEDICINE

## 2018-10-02 RX ORDER — TEMAZEPAM 30 MG/1
30 CAPSULE ORAL NIGHTLY PRN
COMMUNITY
End: 2019-03-01 | Stop reason: SDUPTHER

## 2018-10-02 RX ORDER — IBUPROFEN 600 MG/1
200 TABLET ORAL EVERY 8 HOURS PRN
COMMUNITY
End: 2022-01-20

## 2018-10-10 ENCOUNTER — OFFICE VISIT (OUTPATIENT)
Dept: FAMILY MEDICINE CLINIC | Age: 63
End: 2018-10-10
Payer: MEDICARE

## 2018-10-10 VITALS
DIASTOLIC BLOOD PRESSURE: 72 MMHG | SYSTOLIC BLOOD PRESSURE: 142 MMHG | TEMPERATURE: 97.7 F | HEART RATE: 53 BPM | RESPIRATION RATE: 20 BRPM | OXYGEN SATURATION: 96 %

## 2018-10-10 DIAGNOSIS — Z48.02 VISIT FOR SUTURE REMOVAL: ICD-10-CM

## 2018-10-10 DIAGNOSIS — S61.412D LACERATION OF LEFT HAND WITHOUT FOREIGN BODY, SUBSEQUENT ENCOUNTER: Primary | ICD-10-CM

## 2018-10-10 PROCEDURE — G8417 CALC BMI ABV UP PARAM F/U: HCPCS | Performed by: CLINICAL NURSE SPECIALIST

## 2018-10-10 PROCEDURE — G8484 FLU IMMUNIZE NO ADMIN: HCPCS | Performed by: CLINICAL NURSE SPECIALIST

## 2018-10-10 PROCEDURE — 3017F COLORECTAL CA SCREEN DOC REV: CPT | Performed by: CLINICAL NURSE SPECIALIST

## 2018-10-10 PROCEDURE — 99213 OFFICE O/P EST LOW 20 MIN: CPT | Performed by: CLINICAL NURSE SPECIALIST

## 2018-10-10 PROCEDURE — G8427 DOCREV CUR MEDS BY ELIG CLIN: HCPCS | Performed by: CLINICAL NURSE SPECIALIST

## 2018-10-10 PROCEDURE — 1036F TOBACCO NON-USER: CPT | Performed by: CLINICAL NURSE SPECIALIST

## 2018-10-10 RX ORDER — LISINOPRIL 20 MG/1
20 TABLET ORAL NIGHTLY
Qty: 30 TABLET | Refills: 3 | Status: SHIPPED | OUTPATIENT
Start: 2018-10-10 | End: 2019-03-01 | Stop reason: SDUPTHER

## 2018-10-10 ASSESSMENT — ENCOUNTER SYMPTOMS: COLOR CHANGE: 0

## 2018-10-10 NOTE — PROGRESS NOTES
Diabetes Mother     Heart Disease Mother     High Blood Pressure Mother     Heart Disease Father     Other Father      Social History   Substance Use Topics    Smoking status: Never Smoker    Smokeless tobacco: Never Used    Alcohol use No     Current Outpatient Prescriptions   Medication Sig Dispense Refill    lisinopril (PRINIVIL;ZESTRIL) 20 MG tablet Take 1 tablet by mouth nightly 30 tablet 3    temazepam (RESTORIL) 30 MG capsule Take 30 mg by mouth nightly as needed for Sleep. [employer] ibuprofen (ADVIL;MOTRIN) 600 MG tablet Take 200 mg by mouth every 8 hours as needed for Pain      HYDROcodone-acetaminophen (NORCO)  MG per tablet Take 1 tablet by mouth every 6 hours as needed for Pain for up to 30 days. . 120 tablet 0    tiZANidine (ZANAFLEX) 4 MG tablet TAKE 1 TABLET BY MOUTH EVERY 6 HOURS AS NEEDED (SPASMS) 30 tablet 5    Glucose Blood (BLOOD GLUCOSE TEST STRIPS) STRP 1 strip by In Vitro route daily 30 strip 11    Lancet Device MISC 1 lancet per strip. 30 Device 11    Misc. Devices MISC Glucometer  ICD 10: E11.9 1 Device 0    pantoprazole (PROTONIX) 40 MG tablet Take 1 tablet by mouth every morning (before breakfast) 30 tablet 3    DAILY MULTIPLE VITAMINS TABS Take 1 tablet by mouth       No current facility-administered medications for this visit. Allergies   Allergen Reactions    Adhesive Tape      Tears skin on leg       Review of Systems   Constitutional: Negative for chills and fever. Skin: Positive for wound. Negative for color change, pallor and rash. OBJECTIVE:  BP (!) 142/72   Pulse 53   Temp 97.7 °F (36.5 °C)   Resp 20   SpO2 96%    Physical Exam   Constitutional: He appears well-developed and well-nourished. No distress. Skin: Skin is warm and dry. Laceration (palmar surface left hand, some edges well approximated but there is still some moist tissue to deeper lacerated area, no significant drainage or bleeding, no erythema) noted. He is not diaphoretic. ASSESSMENT/PLAN:  1. Laceration of left hand without foreign body, subsequent encounter  S/p suture removal 14 sutures  CYNTHIA applied with nonstick dressing  Advised to keep clean and dry, leave open to air when able, cover with CYNTHIA when going outside of home  Call if any dehiscence or infection    2. Visit for suture removal         Return for already scheduled.

## 2018-10-16 ENCOUNTER — HOSPITAL ENCOUNTER (OUTPATIENT)
Dept: PHYSICAL THERAPY | Age: 63
Setting detail: THERAPIES SERIES
Discharge: HOME | End: 2018-10-16
Payer: MEDICARE

## 2018-10-16 PROCEDURE — G8979 MOBILITY GOAL STATUS: HCPCS

## 2018-10-16 PROCEDURE — G8978 MOBILITY CURRENT STATUS: HCPCS

## 2018-10-16 PROCEDURE — 97162 PT EVAL MOD COMPLEX 30 MIN: CPT

## 2018-10-16 NOTE — PROGRESS NOTES
Physical Therapy  Initial Assessment  Date: 10/16/2018  Patient Name: Kathalene Baumgarten  MRN: [id number]  : 1955     Treatment Diagnosis: s/p L BKA    Restrictions       Subjective   General  Chart Reviewed: Yes  Patient assessed for rehabilitation services?: Yes  Additional Pertinent Hx: 65 y/o M presents s/p L BKA. PMH includes DM, CHF, asthma, hx gastric bypass, multiple knee scopes  Response To Previous Treatment: Not applicable  Family / Caregiver Present: Yes (Wife)  Referring Practitioner: Ross De Anda MD  Referral Date : 18  Diagnosis: s/p L BKA  Follows Commands: Within Functional Limits  PT Visit Information  PT Insurance Information: Medicare  Total # of Visits Approved: 18  Total # of Visits to Date: 1  Plan of Care/Certification Expiration Date: 10/16/18  Progress Note Due Date: 11/15/18  Subjective  Subjective: Pt had L BKA 18. Transferred to 8th floor inpatient rehab for 18 days. States that by the time he left rehab, he was able to ambulate at least hallway distance with RW. He has only had prosthesis for 2-3 weeks, and has been wearing for 2-3 hours per day, at his prosthetist's recommendation. Had a fall in public, resulting in L hand laceration, approx 2 weeks ago. States he was walking up a ramp and not used to ankle of prosthesis that does not articulate. Lost balance backwards.     Pain Screening  Patient Currently in Pain: No (Does report intermittent phantom pains)  Vital Signs  Patient Currently in Pain: No (Does report intermittent phantom pains)    Vision/Hearing  Vision  Vision: Within Functional Limits  Hearing  Hearing: Within functional limits    Orientation  Orientation  Overall Orientation Status: Within Normal Limits    Social/Functional History  Social/Functional History  Lives With: Spouse  Type of Home: House  Home Layout: Two level  Home Access: Stairs to enter without rails  Entrance Stairs - Number of Steps: 2-3  Bathroom Shower/Tub: Tub/Shower unit  Bathroom Equipment: Tub transfer bench  Home Equipment: Rolling walker; [address] Help From: Family  ADL Assistance: Independent  Ambulation Assistance: Independent  Transfer Assistance: Independent  Occupation: Retired  Type of occupation: Previously worked as a . Objective     Observation/Palpation  Observation: Prosthesis removed- wound appears to be healing well, according to pt and wife. They both state he \"didn't have a normal flap\" due to skin calcification, and had multiple skin grafts instead. Independent with don/doffing suction prosthesis, which appears to fit well. Wears compression garment on RLE d/t skin calcification. AROM RLE (degrees)  RLE AROM: WNL  AROM LLE (degrees)  LLE AROM : WNL    Strength RLE  R Hip Flexion: 5/5  R Hip Extension: 5/5  R Hip ABduction: 5/5  R Hip ADduction: 5/5  R Knee Flexion: 5/5  R Knee Extension: 5/5  R Ankle Dorsiflexion: 5/5  R Ankle Plantar flexion: 5/5  Strength LLE  L Hip Flexion: 5/5  L Hip Extension: 5/5  L Hip ABduction: 5/5  L Hip ADduction: 5/5  L Knee Flexion: 5/5  L Knee Extension: 5/5     Additional Measures  Other: 8 sit to  30\" (with use of UEs)  Sensation  Overall Sensation Status: WFL (Limited in RLE but functional for ambulation.)     Transfers  Sit to Stand: Modified independent  Stand to sit: Modified independent  Comment: Uses UEs for raising/lowering  Ambulation  Ambulation?: Yes  Ambulation 1  Surface: level tile  Device: Rolling Walker  Other Apparatus: Wheelchair follow  Assistance: Stand by assistance  Quality of Gait: Varying speeds- occasionally too fast and with limited control. Occasionally catches toe of prosthesis on floor. Distance: 150'  Comments: Weight shifted to R  Balance  Posture: Fair  Sitting - Static: Good  Sitting - Dynamic: Good  Standing - Static: Good  Standing - Dynamic: Fair;+  Comments: Able to reach outside GAMALIEL (high/low) without LOB.   Exercises  Exercise 1:

## 2018-10-18 DIAGNOSIS — Z89.512 STATUS POST BELOW KNEE AMPUTATION OF LEFT LOWER EXTREMITY: Primary | ICD-10-CM

## 2018-10-19 ENCOUNTER — HOSPITAL ENCOUNTER (OUTPATIENT)
Dept: PHYSICAL THERAPY | Age: 63
Setting detail: THERAPIES SERIES
Discharge: HOME | End: 2018-10-19
Payer: MEDICARE

## 2018-10-19 PROCEDURE — 97110 THERAPEUTIC EXERCISES: CPT

## 2018-10-19 ASSESSMENT — PAIN DESCRIPTION - LOCATION: LOCATION: BACK;HIP

## 2018-10-19 ASSESSMENT — PAIN SCALES - GENERAL: PAINLEVEL_OUTOF10: 5

## 2018-10-19 ASSESSMENT — PAIN DESCRIPTION - ORIENTATION: ORIENTATION: LEFT

## 2018-10-19 ASSESSMENT — PAIN DESCRIPTION - PAIN TYPE: TYPE: CHRONIC PAIN

## 2018-10-19 NOTE — PROGRESS NOTES
Physical Therapy  Daily Treatment Note  Date: 10/19/2018  Patient Name: Chance Hall  MRN: [id number]     :   1955    Subjective:   General  Additional Pertinent Hx: 65 y/o M presents s/p L BKA. PMH includes DM, CHF, asthma, hx gastric bypass, multiple knee scopes  Referring Practitioner: Sara Hidalgo MD  PT Visit Information  PT Insurance Information: Medicare  Total # of Visits Approved: 18  Total # of Visits to Date: 2  Plan of Care/Certification Expiration Date: 10/16/18  Progress Note Due Date: 11/15/18  Subjective: Patient states his L low back and hip are hurting  Patient Currently in Pain: Yes  Pain Level: 5  Pain Type: Chronic pain  Pain Location: Back; Hip  Pain Orientation: Left       Treatment Activities:         Exercises  Exercise 1: 6MWT  185'  in 4' & 21\"  Exercise 2: Repeated sit to stand  x 10  from w/c  Exercise 3: Standing marches  x 10  LLE  Exercise 4: Standing hip abd/ext  x 10 LLE  Exercise 5: Standing lateral weight shifts in  bars  x 15  Exercise 6: Standing diagonal weight shifts in  bars  x 15  Exercise 7: Static stance in  bars w/o UE support  x 1'  Exercise 8: Ambulation in  bars with one hand  x 3 ea  Exercise 9: Standing ball toss  x 2'  in // bars  Exercise 10: Box steps cw/ccw--not today  Exercise 11: Sidestepping on line--not today  Exercise 12: Cone weaves--not today  Exercise 13: Toe taps on cone in  bars  Exercise 14: High/low reaching activities--picked up cone in // bars x 1  Exercise 15: Progress to ambulation with cane as appropriate. Assessment:   Conditions Requiring Skilled Therapeutic Intervention  Body structures, Functions, Activity limitations: Decreased functional mobility ; Decreased strength;Decreased endurance;Decreased balance;Decreased high-level IADLs  Assessment: Initiated POC per initial evaluation, patient unable to  RLE off floor even with UE support and leaning hard on hand rail on stair case but in // bars able to lift

## 2018-10-24 ENCOUNTER — HOSPITAL ENCOUNTER (OUTPATIENT)
Dept: PHYSICAL THERAPY | Age: 63
Setting detail: THERAPIES SERIES
Discharge: HOME | End: 2018-10-24
Payer: MEDICARE

## 2018-10-24 PROCEDURE — 97110 THERAPEUTIC EXERCISES: CPT

## 2018-10-24 RX ORDER — PANTOPRAZOLE SODIUM 40 MG/1
40 TABLET, DELAYED RELEASE ORAL
Qty: 30 TABLET | Refills: 3 | Status: SHIPPED | OUTPATIENT
Start: 2018-10-24 | End: 2019-03-15 | Stop reason: SDUPTHER

## 2018-10-24 ASSESSMENT — PAIN DESCRIPTION - ORIENTATION: ORIENTATION: LEFT

## 2018-10-24 ASSESSMENT — PAIN DESCRIPTION - LOCATION: LOCATION: BACK;HIP

## 2018-10-24 ASSESSMENT — PAIN DESCRIPTION - PAIN TYPE: TYPE: CHRONIC PAIN

## 2018-10-24 ASSESSMENT — PAIN SCALES - GENERAL: PAINLEVEL_OUTOF10: 3

## 2018-10-24 NOTE — PROGRESS NOTES
training, Endurance Training, Neuromuscular Re-education, Equipment Evaluation, Education, & procurement, Patient/Caregiver Education & Training, Safety Education & Training, Home Exercise Program  Timed Code Treatment Minutes: 40 Minutes     Therapy Time   Individual Concurrent Group Co-treatment   Time In 9793         Time Out 1145         Minutes 40         Timed Code Treatment Minutes: [address], PTA       Electronically signed by Sammy Reyes PTA on 10/24/2018 at 11:56 AM

## 2018-10-26 ENCOUNTER — HOSPITAL ENCOUNTER (OUTPATIENT)
Dept: PHYSICAL THERAPY | Age: 63
Setting detail: THERAPIES SERIES
Discharge: HOME | End: 2018-10-26
Payer: MEDICARE

## 2018-10-26 DIAGNOSIS — G89.29 CHRONIC MIDLINE LOW BACK PAIN WITHOUT SCIATICA: ICD-10-CM

## 2018-10-26 DIAGNOSIS — M54.50 CHRONIC MIDLINE LOW BACK PAIN WITHOUT SCIATICA: ICD-10-CM

## 2018-10-26 PROCEDURE — 97110 THERAPEUTIC EXERCISES: CPT

## 2018-10-26 RX ORDER — HYDROCODONE BITARTRATE AND ACETAMINOPHEN 10; 325 MG/1; MG/1
1 TABLET ORAL EVERY 6 HOURS PRN
Qty: 120 TABLET | Refills: 0 | Status: SHIPPED | OUTPATIENT
Start: 2018-10-26 | End: 2018-11-20 | Stop reason: SDUPTHER

## 2018-10-31 ENCOUNTER — HOSPITAL ENCOUNTER (OUTPATIENT)
Dept: PHYSICAL THERAPY | Age: 63
Setting detail: THERAPIES SERIES
Discharge: HOME | End: 2018-10-31
Payer: MEDICARE

## 2018-10-31 PROCEDURE — 97110 THERAPEUTIC EXERCISES: CPT

## 2018-10-31 ASSESSMENT — PAIN DESCRIPTION - PAIN TYPE: TYPE: CHRONIC PAIN

## 2018-10-31 ASSESSMENT — PAIN SCALES - GENERAL: PAINLEVEL_OUTOF10: 4

## 2018-10-31 ASSESSMENT — PAIN DESCRIPTION - LOCATION: LOCATION: INCISION;KNEE

## 2018-10-31 ASSESSMENT — PAIN DESCRIPTION - ORIENTATION: ORIENTATION: LEFT

## 2018-11-02 ENCOUNTER — HOSPITAL ENCOUNTER (OUTPATIENT)
Dept: PHYSICAL THERAPY | Age: 63
Setting detail: THERAPIES SERIES
Discharge: HOME | End: 2018-11-02
Payer: MEDICARE

## 2018-11-02 PROCEDURE — 97110 THERAPEUTIC EXERCISES: CPT

## 2018-11-02 ASSESSMENT — PAIN DESCRIPTION - ORIENTATION: ORIENTATION: LEFT

## 2018-11-02 ASSESSMENT — PAIN DESCRIPTION - PAIN TYPE: TYPE: CHRONIC PAIN

## 2018-11-02 ASSESSMENT — PAIN DESCRIPTION - LOCATION: LOCATION: KNEE

## 2018-11-02 ASSESSMENT — PAIN SCALES - GENERAL: PAINLEVEL_OUTOF10: 4

## 2018-11-02 NOTE — PROGRESS NOTES
Daily Treatment Note  Date: 2018  Patient Name: Celestine Wang  MRN: [id number]     :   1955    Subjective:   General  Additional Pertinent Hx: 67 y/o M presents s/p L BKA. PMH includes DM, CHF, asthma, hx gastric bypass, multiple knee scopes  Response To Previous Treatment: Patient with no complaints from previous session. Referring Practitioner: Roseline Bridges MD  PT Visit Information  PT Insurance Information: Medicare  Total # of Visits Approved: 18  Total # of Visits to Date: 6  Plan of Care/Certification Expiration Date: 19  Progress Note Due Date: 11/15/18  Subjective  Subjective: Left knee always hurts some. Pain Screening  Patient Currently in Pain: Yes  Pain Assessment  Pain Assessment: 0-10  Pain Level: 4 (post no change)  Pain Type: Chronic pain  Pain Location: Knee  Pain Orientation: Left  Vital Signs  Patient Currently in Pain: Yes       Treatment Activities:                                      Exercises  Exercise 1: 6MWT   416' total  Exercise 2: Repeated sit to stand  x 10  from w/c  Exercise 3: Standing marches  x 15 B LE  Exercise 4: Standing hip abd/ext  x 15 L LE and 15 R LE  ea  Exercise 5: Standing lateral weight shifts in // bars  x 15  Exercise 6: Standing diagonal weight shifts in // bars  x 15  Exercise 7: Static stance in// bars w/o UE support  x 1'----static stance on green foam w/o UE support x 1'----static stance in // bars w/o UE support with mod balance pertebations with only slight lateral disturbance  Exercise 8: Ambulation in // bars with one hand  x 6  reps  using RUE support  forward and backward  Exercise 9: Standing ball toss  x 2'  in // bars--NOT today-----standing without UE support while performing quick change in arm positions from knee ht to overhead and 3-6\" outside of arms length  Exercise 10: Box steps cw/ccw--not today  Exercise 11: Sidestepping on line--( in // bars  x 4)  Exercise 12: Cone weaves--not today  Exercise 13:  Toe taps on cone in

## 2018-11-07 ENCOUNTER — HOSPITAL ENCOUNTER (OUTPATIENT)
Dept: PHYSICAL THERAPY | Age: 63
Setting detail: THERAPIES SERIES
Discharge: HOME | End: 2018-11-07
Payer: MEDICARE

## 2018-11-07 PROCEDURE — 97110 THERAPEUTIC EXERCISES: CPT

## 2018-11-07 ASSESSMENT — PAIN DESCRIPTION - PAIN TYPE: TYPE: CHRONIC PAIN

## 2018-11-07 ASSESSMENT — PAIN SCALES - GENERAL: PAINLEVEL_OUTOF10: 4

## 2018-11-07 ASSESSMENT — PAIN DESCRIPTION - ORIENTATION: ORIENTATION: LEFT

## 2018-11-07 ASSESSMENT — PAIN DESCRIPTION - LOCATION: LOCATION: KNEE

## 2018-11-09 ENCOUNTER — HOSPITAL ENCOUNTER (OUTPATIENT)
Dept: PHYSICAL THERAPY | Age: 63
Setting detail: THERAPIES SERIES
Discharge: HOME | End: 2018-11-09
Payer: MEDICARE

## 2018-11-09 PROCEDURE — 97110 THERAPEUTIC EXERCISES: CPT

## 2018-11-14 ENCOUNTER — HOSPITAL ENCOUNTER (OUTPATIENT)
Dept: PHYSICAL THERAPY | Age: 63
Setting detail: THERAPIES SERIES
Discharge: HOME | End: 2018-11-14
Payer: MEDICARE

## 2018-11-14 PROCEDURE — 97116 GAIT TRAINING THERAPY: CPT | Performed by: PHYSICAL THERAPIST

## 2018-11-14 PROCEDURE — 97110 THERAPEUTIC EXERCISES: CPT | Performed by: PHYSICAL THERAPIST

## 2018-11-16 ENCOUNTER — HOSPITAL ENCOUNTER (OUTPATIENT)
Dept: PHYSICAL THERAPY | Age: 63
Setting detail: THERAPIES SERIES
Discharge: HOME | End: 2018-11-16
Payer: MEDICARE

## 2018-11-16 PROCEDURE — 97110 THERAPEUTIC EXERCISES: CPT

## 2018-11-16 PROCEDURE — G8978 MOBILITY CURRENT STATUS: HCPCS

## 2018-11-16 PROCEDURE — G8979 MOBILITY GOAL STATUS: HCPCS

## 2018-11-16 ASSESSMENT — PAIN DESCRIPTION - PAIN TYPE: TYPE: CHRONIC PAIN

## 2018-11-16 ASSESSMENT — PAIN DESCRIPTION - LOCATION: LOCATION: KNEE

## 2018-11-16 ASSESSMENT — PAIN DESCRIPTION - DESCRIPTORS: DESCRIPTORS: ACHING;SORE

## 2018-11-16 ASSESSMENT — PAIN DESCRIPTION - ORIENTATION: ORIENTATION: LEFT

## 2018-11-16 ASSESSMENT — PAIN SCALES - GENERAL: PAINLEVEL_OUTOF10: 4

## 2018-11-16 NOTE — PROGRESS NOTES
Physical Therapy  Daily Treatment Note/Re-assessment  Date: 2018  Patient Name: Troy Bee  MRN: [id number]     :   1955    Subjective:   General  Chart Reviewed: Yes  Additional Pertinent Hx: 65 y/o M presents s/p L BKA. PMH includes DM, CHF, asthma, hx gastric bypass, multiple knee scopes  Response To Previous Treatment: Patient with no complaints from previous session. Family / Caregiver Present: Yes  Referring Practitioner: Ayad Villanueva MD  PT Visit Information  PT Insurance Information: Medicare  Total # of Visits Approved: 18  Total # of Visits to Date: 6  Plan of Care/Certification Expiration Date: 19  Progress Note Due Date: 18  Subjective  Subjective: Has a new liner for his prosthesis- \"Just a regular one now. I don't have to grease it up before I put it on. \"  States he is still trying to work out the number and thickness of socks to use. Pain Screening  Patient Currently in Pain: Yes  Pain Assessment  Pain Level: 4  Pain Type: Chronic pain  Pain Location: Knee  Pain Orientation: Left  Pain Descriptors: Aching; Sore  Vital Signs  Patient Currently in Pain: Yes       Treatment Activities:                                      Exercises  Exercise 1: 6MWT   427' total  Exercise 2: Repeated sit to stand  x 10  from w/c  Exercise 3: Standing marches  x 15 B LE  Exercise 4: Standing hip abd/ext  x 15 L LE and 15 R LE  ea  Exercise 5: Standing lateral weight shifts in // bars  x 15- not today  Exercise 6: Standing diagonal weight shifts in // bars  x 15- not today  Exercise 7: Static stance in// bars on foam pads w/o UE support  x 1'----static stance on one foam pad w/narrow GAMALIEL w/o UE support x 1'----static stance in // bars w/o UE support with mod balance perturbations  Exercise 8: Ambulation in // bars with one hand  x 6  reps  using RUE support  forward and backward  Exercise 9: Standing ball toss  x 2'  in // bars---standing ball movement (moves ball up/down and turns side

## 2018-11-20 DIAGNOSIS — M54.50 CHRONIC MIDLINE LOW BACK PAIN WITHOUT SCIATICA: ICD-10-CM

## 2018-11-20 DIAGNOSIS — G89.29 CHRONIC MIDLINE LOW BACK PAIN WITHOUT SCIATICA: ICD-10-CM

## 2018-11-20 RX ORDER — HYDROCODONE BITARTRATE AND ACETAMINOPHEN 10; 325 MG/1; MG/1
1 TABLET ORAL EVERY 6 HOURS PRN
Qty: 120 TABLET | Refills: 0 | Status: SHIPPED | OUTPATIENT
Start: 2018-11-20 | End: 2018-12-28 | Stop reason: SDUPTHER

## 2018-11-21 ENCOUNTER — HOSPITAL ENCOUNTER (OUTPATIENT)
Dept: PHYSICAL THERAPY | Age: 63
Setting detail: THERAPIES SERIES
Discharge: HOME | End: 2018-11-21
Payer: MEDICARE

## 2018-11-21 DIAGNOSIS — I10 ESSENTIAL HYPERTENSION, BENIGN: Chronic | ICD-10-CM

## 2018-11-21 DIAGNOSIS — E11.620 TYPE 2 DIABETES MELLITUS WITH DIABETIC DERMATITIS, UNSPECIFIED WHETHER LONG TERM INSULIN USE (HCC): Primary | Chronic | ICD-10-CM

## 2018-11-21 DIAGNOSIS — D62 ACUTE BLOOD LOSS ANEMIA: ICD-10-CM

## 2018-11-21 LAB
ALBUMIN SERPL-MCNC: 3.5 G/DL (ref 3.5–5.2)
ALP BLD-CCNC: 62 U/L (ref 40–130)
ALT SERPL-CCNC: 10 U/L (ref 5–41)
ANION GAP SERPL CALCULATED.3IONS-SCNC: 10 MMOL/L (ref 7–19)
AST SERPL-CCNC: 19 U/L (ref 5–40)
BILIRUB SERPL-MCNC: 0.8 MG/DL (ref 0.2–1.2)
BUN BLDV-MCNC: 18 MG/DL (ref 8–23)
CALCIUM SERPL-MCNC: 8.6 MG/DL (ref 8.8–10.2)
CHLORIDE BLD-SCNC: 107 MMOL/L (ref 98–111)
CO2: 26 MMOL/L (ref 22–29)
CREAT SERPL-MCNC: 1 MG/DL (ref 0.5–1.2)
GFR NON-AFRICAN AMERICAN: >60
GLUCOSE BLD-MCNC: 93 MG/DL (ref 74–109)
HCT VFR BLD CALC: 33.7 % (ref 42–52)
HEMOGLOBIN: 10.9 G/DL (ref 14–18)
MCH RBC QN AUTO: 32 PG (ref 27–31)
MCHC RBC AUTO-ENTMCNC: 32.3 G/DL (ref 33–37)
MCV RBC AUTO: 98.8 FL (ref 80–94)
PDW BLD-RTO: 12.4 % (ref 11.5–14.5)
PLATELET # BLD: 118 K/UL (ref 130–400)
PMV BLD AUTO: 11.8 FL (ref 9.4–12.4)
POTASSIUM SERPL-SCNC: 4.2 MMOL/L (ref 3.5–5)
RBC # BLD: 3.41 M/UL (ref 4.7–6.1)
SODIUM BLD-SCNC: 143 MMOL/L (ref 136–145)
TOTAL PROTEIN: 6.9 G/DL (ref 6.6–8.7)
WBC # BLD: 6.3 K/UL (ref 4.8–10.8)

## 2018-11-21 PROCEDURE — 97110 THERAPEUTIC EXERCISES: CPT

## 2018-11-21 ASSESSMENT — PAIN SCALES - GENERAL: PAINLEVEL_OUTOF10: 2

## 2018-11-21 ASSESSMENT — PAIN DESCRIPTION - PAIN TYPE: TYPE: CHRONIC PAIN

## 2018-11-21 ASSESSMENT — PAIN DESCRIPTION - LOCATION: LOCATION: KNEE

## 2018-11-21 ASSESSMENT — PAIN DESCRIPTION - ORIENTATION: ORIENTATION: LEFT

## 2018-11-21 NOTE — PROGRESS NOTES
Exercise Program        Therapy Time   Individual Concurrent Group Co-treatment   Time In 6898         Time Out 4943 [address] [place], [place]    Electronically signed by Aleksander Allen PTA on 11/21/2018 at 11:53 AM

## 2018-11-27 ENCOUNTER — HOSPITAL ENCOUNTER (OUTPATIENT)
Dept: PHYSICAL THERAPY | Age: 63
Setting detail: THERAPIES SERIES
Discharge: HOME | End: 2018-11-27
Payer: MEDICARE

## 2018-11-27 PROCEDURE — 97110 THERAPEUTIC EXERCISES: CPT

## 2018-11-27 ASSESSMENT — PAIN DESCRIPTION - PAIN TYPE: TYPE: CHRONIC PAIN

## 2018-11-27 ASSESSMENT — PAIN SCALES - GENERAL: PAINLEVEL_OUTOF10: 2

## 2018-11-27 ASSESSMENT — PAIN DESCRIPTION - ORIENTATION: ORIENTATION: LEFT

## 2018-11-27 ASSESSMENT — PAIN DESCRIPTION - LOCATION: LOCATION: KNEE

## 2018-11-29 ENCOUNTER — HOSPITAL ENCOUNTER (OUTPATIENT)
Dept: PHYSICAL THERAPY | Age: 63
Setting detail: THERAPIES SERIES
Discharge: HOME | End: 2018-11-29
Payer: MEDICARE

## 2018-11-29 ENCOUNTER — OFFICE VISIT (OUTPATIENT)
Dept: FAMILY MEDICINE CLINIC | Age: 63
End: 2018-11-29
Payer: MEDICARE

## 2018-11-29 VITALS
HEART RATE: 56 BPM | RESPIRATION RATE: 16 BRPM | SYSTOLIC BLOOD PRESSURE: 146 MMHG | OXYGEN SATURATION: 95 % | DIASTOLIC BLOOD PRESSURE: 84 MMHG | TEMPERATURE: 98.9 F | HEIGHT: 72 IN | BODY MASS INDEX: 35.94 KG/M2

## 2018-11-29 DIAGNOSIS — G89.29 CHRONIC MIDLINE LOW BACK PAIN WITHOUT SCIATICA: ICD-10-CM

## 2018-11-29 DIAGNOSIS — I10 ESSENTIAL HYPERTENSION, BENIGN: ICD-10-CM

## 2018-11-29 DIAGNOSIS — E11.620 TYPE 2 DIABETES MELLITUS WITH DIABETIC DERMATITIS, UNSPECIFIED WHETHER LONG TERM INSULIN USE (HCC): Primary | Chronic | ICD-10-CM

## 2018-11-29 DIAGNOSIS — K21.9 GASTROESOPHAGEAL REFLUX DISEASE WITHOUT ESOPHAGITIS: ICD-10-CM

## 2018-11-29 DIAGNOSIS — M54.50 CHRONIC MIDLINE LOW BACK PAIN WITHOUT SCIATICA: ICD-10-CM

## 2018-11-29 DIAGNOSIS — Z00.00 ANNUAL PHYSICAL EXAM: ICD-10-CM

## 2018-11-29 PROCEDURE — 2022F DILAT RTA XM EVC RTNOPTHY: CPT | Performed by: FAMILY MEDICINE

## 2018-11-29 PROCEDURE — 97110 THERAPEUTIC EXERCISES: CPT

## 2018-11-29 PROCEDURE — 1036F TOBACCO NON-USER: CPT | Performed by: FAMILY MEDICINE

## 2018-11-29 PROCEDURE — 99214 OFFICE O/P EST MOD 30 MIN: CPT | Performed by: FAMILY MEDICINE

## 2018-11-29 PROCEDURE — G8427 DOCREV CUR MEDS BY ELIG CLIN: HCPCS | Performed by: FAMILY MEDICINE

## 2018-11-29 PROCEDURE — G8484 FLU IMMUNIZE NO ADMIN: HCPCS | Performed by: FAMILY MEDICINE

## 2018-11-29 PROCEDURE — 3017F COLORECTAL CA SCREEN DOC REV: CPT | Performed by: FAMILY MEDICINE

## 2018-11-29 PROCEDURE — G0439 PPPS, SUBSEQ VISIT: HCPCS | Performed by: FAMILY MEDICINE

## 2018-11-29 PROCEDURE — G8417 CALC BMI ABV UP PARAM F/U: HCPCS | Performed by: FAMILY MEDICINE

## 2018-11-29 PROCEDURE — 3044F HG A1C LEVEL LT 7.0%: CPT | Performed by: FAMILY MEDICINE

## 2018-11-29 ASSESSMENT — PATIENT HEALTH QUESTIONNAIRE - PHQ9
SUM OF ALL RESPONSES TO PHQ QUESTIONS 1-9: 0
SUM OF ALL RESPONSES TO PHQ QUESTIONS 1-9: 0

## 2018-11-29 ASSESSMENT — PAIN DESCRIPTION - PAIN TYPE: TYPE: CHRONIC PAIN

## 2018-11-29 ASSESSMENT — PAIN DESCRIPTION - ORIENTATION: ORIENTATION: LEFT

## 2018-11-29 ASSESSMENT — PAIN DESCRIPTION - DESCRIPTORS: DESCRIPTORS: ACHING;SORE

## 2018-11-29 ASSESSMENT — PAIN SCALES - GENERAL: PAINLEVEL_OUTOF10: 2

## 2018-11-29 ASSESSMENT — PAIN DESCRIPTION - LOCATION: LOCATION: KNEE

## 2018-11-29 ASSESSMENT — ANXIETY QUESTIONNAIRES: GAD7 TOTAL SCORE: 0

## 2018-11-29 ASSESSMENT — LIFESTYLE VARIABLES: HOW OFTEN DO YOU HAVE A DRINK CONTAINING ALCOHOL: 0

## 2018-11-29 NOTE — PATIENT INSTRUCTIONS
Advance Care Planning: Care Instructions  Your Care Instructions    It can be hard to live with an illness that cannot be cured. But if your health is getting worse, you may want to make decisions about end-of-life care. Planning for the end of your life does not mean that you are giving up. It is a way to make sure that your wishes are met. Clearly stating your wishes can make it easier for your loved ones. Making plans while you are still able may also ease your mind and make your final days less stressful and more meaningful. Follow-up care is a key part of your treatment and safety. Be sure to make and go to all appointments, and call your doctor if you are having problems. It's also a good idea to know your test results and keep a list of the medicines you take. What can you do to plan for the end of life? · Visit:  https://ag.[place].gov/consumer-protection/livingwills  · You can bring these issues up with your doctor. You do not need to wait until your doctor starts the conversation. You might start with \"I would not be willing to live with . Ronaldo Askew \" When you complete this sentence it helps your doctor understand your wishes. · Talk openly and honestly with your doctor. This is the best way to understand the decisions you will need to make as your health changes. Know that you can always change your mind. · Ask your doctor about commonly used life-support measures. These include tube feedings, breathing machines, and fluids given through a vein (IV). Understanding these treatments will help you decide whether you want them. · You may choose to have these life-supporting treatments for a limited time. This allows a trial period to see whether they will help you. You may also decide that you want your doctor to take only certain measures to keep you alive. It is important to spell out these conditions so that your doctor and family understand them. · Talk to your doctor about how long you are likely to live. family updated copies of the papers. Where can you learn more? Go to [email or web address] org and sign in to your [email or web address] account. Enter P184 in the [employer] box to learn more about \"Advance Care Planning: Care Instructions. \"     If you do not have an account, please click on the \"Sign Up Now\" link. Current as of: September 24, 2016  Content Version: 11.5  © 2687-7900 [employer]. Care instructions adapted under license by [facility] ([facility]). If you have questions about a medical condition or this instruction, always ask your healthcare professional. [address] any warranty or liability for your use of this information. Learning About Living Robert Ten  What is a living will? A living will is a legal form you use to write down the kind of care you want at the end of your life. It is used by the health professionals who will treat you if you aren't able to decide for yourself. If you put your wishes in writing, your loved ones and others will know what kind of care you want. They won't need to guess. This can ease your mind and be helpful to others. A living will is not the same as an estate or property will. An estate will explains what you want to happen with your money and property after you die. Is a living will a legal document? A living will is a legal document. Each state has its own laws about living fregoso. If you move to another state, make sure that your living will is legal in the state where you now live. Or you might use a universal form that has been approved by many states. This kind of form can sometimes be completed and stored online. Your electronic copy will then be available wherever you have a connection to the Internet. In most cases, doctors will respect your wishes even if you have a form from a different state. · You don't need an  to complete a living will.  But legal advice can be helpful if your state's

## 2018-11-29 NOTE — PROGRESS NOTES
unlessindicated:  1. Type 2 diabetes mellitus with diabetic dermatitis, unspecified whether long term insulin use ([facility] [address]  Lab Results   Component Value Date    LABA1C 4.7 04/30/2018     No results found for: EAG  Blood sugar is stable. We'll need to repeat an A1c next visit    2. Essential hypertension, benign  BP Readings from Last 3 Encounters:   11/29/18 (!) 146/84   10/10/18 (!) 142/72   10/02/18 128/70     Blood pressure is up today. Encouraged him to continue taking his medication. Encouraged DASH diet. His weight has slowly crept up over the past few months. Blood pressure elevated next visit we'll adjust his medication    3. Chronic midline low back pain without sciatica  Discussed risk and benefits of taking opioids. Risks include addiction and tolerance. If develops impairment or over sedation with medication, discontinue and contact me. Do not take medication with alcohol. Advised to take sparingly. Advised to keep medication hidden and locked up as risk of theft is high with these medications as well. He has been unsuccessful in weaning off his medication. 4. Gastroesophageal reflux disease without esophagitis  Stable    5. Annual physical exam  Completed annual wellness today            Return in about 3 months (around 2/28/2019) for Routine follow up - 15 minute visit. Discussed use, benefit, and side effects of prescribed medications. All patientquestions answered. Pt voiced understanding. Reviewed health maintenance. Instructedto continue current medications, diet and exercise. Patient agreed with treatmentplan. Follow up as directed.

## 2018-11-29 NOTE — PROGRESS NOTES
Medicare Annual Wellness Visit - Subsequent    Name: Chelo Huang Date: 2018   MRN: [id number] Sex: Male   Age: 66 y.o. Ethnicity: Non-/Non    : 1955 Race: Meir Faria is here for   Chief Complaint   Patient presents with   [facility]        Screenings for behavioral, psychosocial and functional/safety risks, and cognitive dysfunction are all negative except as indicated below. These results, as well as other patient data from the [address] form, are documented in Flowsheets linked to this Encounter. Allergies   Allergen Reactions    Adhesive Tape      Tears skin on leg       Prior to Visit Medications    Medication Sig Taking? Authorizing Provider   HYDROcodone-acetaminophen (NORCO)  MG per tablet Take 1 tablet by mouth every 6 hours as needed for Pain for up to 30 days. Guillermo Garza MD   pantoprazole (PROTONIX) 40 MG tablet Take 1 tablet by mouth every morning (before breakfast) Yes Ezequiel Velazquez MD   lisinopril (PRINIVIL;ZESTRIL) 20 MG tablet Take 1 tablet by mouth nightly Yes VON Pettit   temazepam (RESTORIL) 30 MG capsule Take 30 mg by mouth nightly as needed for Sleep. . Yes Historical Provider, MD   ibuprofen (ADVIL;MOTRIN) 600 MG tablet Take 200 mg by mouth every 8 hours as needed for Pain Yes Historical Provider, MD   tiZANidine (ZANAFLEX) 4 MG tablet TAKE 1 TABLET BY MOUTH EVERY 6 HOURS AS NEEDED (SPASMS) Yes Ivette Bronw MD   Glucose Blood (BLOOD GLUCOSE TEST STRIPS) STRP 1 strip by In Vitro route daily Yes Ezequiel Velazquez MD   Lancet Device MISC 1 lancet per strip. Yes Ezequiel Velazquez MD   Misc.  Devices MISC Glucometer  ICD 10: E11.9 Yes Ezequiel Velazquez MD   DAILY MULTIPLE VITAMINS TABS Take 1 tablet by mouth Yes Historical Provider, MD       Past Medical History:   Diagnosis Date    Arthritis     Asthma     Bradycardia     Broken ribs     CHF (congestive heart failure) ([facility] [address]     hearing?: No  Do you have difficulty driving, watching TV, or doing any of your daily activities because of your eyesight?: (!) Yes  Have you had an eye exam within the past year?: (!) No  Hearing/Vision Interventions:  · Recommended he get an eye exam    Safety  Do you have working smoke detectors?: Yes  Have all throw rugs been removed or fastened?: (!) No  Do you have non-slip mats in all bathtubs?: (!) No  Do all of your stairways have a railing or banister?: Yes  Are your doorways, halls and stairs free of clutter?: Yes  Do you always fasten your seatbelt when you are in a car?: Yes  Safety Interventions:  · Home safety tips provided    ADLs  In the past 7 days, did you need help from others to perform any of the following everyday activities?: None  In the past 7 days, did you need help from others to take care of any of the following?: None  ADL Interventions:  · Not indicated    Personalized Preventive Plan   Current Health Maintenance Status  Immunization History   Administered Date(s) Administered    Tdap (Boostrix, Adacel) 04/30/2018        Health Maintenance   Topic Date Due    Hepatitis C screen  1955    Diabetic retinal exam  05/07/1967    HIV screen  05/07/1972    Pneumococcal med risk (1 of 1 - PPSV23) 05/07/1976    Shingles Vaccine (1 of 2 - 2 Dose Series) 05/07/2007    Colon cancer screen colonoscopy  01/01/2019 (Originally 5/7/2007)    Flu vaccine (1) 09/02/2019 (Originally 9/1/2018)    Diabetic foot exam  04/30/2019    A1C test (Diabetic or Prediabetic)  04/30/2019    Diabetic microalbuminuria test  04/30/2019    Lipid screen  04/30/2019    Potassium monitoring  11/21/2019    Creatinine monitoring  11/21/2019    DTaP/Tdap/Td vaccine (2 - Td) 04/30/2028       Recommendations for Preventive Services Due: see orders.   Recommended screening schedule for the next 5-10 years is provided to the patient in written form: see Patient Instructions/AVS.

## 2018-11-29 NOTE — PROGRESS NOTES
Daily Treatment Note  Date: 2018  Patient Name: Rose Ramos  MRN: [id number]     :   1955    Subjective:   General  Additional Pertinent Hx: 67 y/o M presents s/p L BKA. PMH includes DM, CHF, asthma, hx gastric bypass, multiple knee scopes  Response To Previous Treatment: Patient with no complaints from previous session. Family / Caregiver Present: Yes  Referring Practitioner: Adrian Gay MD  PT Visit Information  PT Insurance Information: Medicare  Total # of Visits Approved: 18  Total # of Visits to Date: 15  Plan of Care/Certification Expiration Date: 19  Progress Note Due Date: 18  Subjective  Subjective: I have pain in that left knee. I had it before they took the leg so I don't figure it will get any better. This right hip is hurting today. Pain Screening  Patient Currently in Pain: Yes  Pain Assessment  Pain Assessment: 0-10  Pain Level: 2 (post 4/10 L knee and 7/10 R hip)  Pain Type: Chronic pain  Pain Location: Knee  Pain Orientation: Left  Pain Descriptors: Aching; Sore  Vital Signs  Patient Currently in Pain: Yes       Treatment Activities:                                      Exercises  Exercise 1: 6MWT   478' total  Exercise 2: Repeated sit to stand  x 10  from w/c  Exercise 3: Standing marches  x 15 B LE  Exercise 4: Standing hip abd/ext  x 15 L LE and 15 R LE  ea  Exercise 5: Standing lateral weight shifts in // bars  x 15- not today  Exercise 6: Standing diagonal weight shifts in // bars  x 15- not today  Exercise 7: Static stance in// bars on foam pads w/o UE support  x 1'----static stance on one foam pad w/narrow GAMALIEL w/o UE support x 1'----static stance in // bars w/o UE support with mod balance perturbations  x  1 min  Exercise 8: Ambulation in // bars with one hand  x 7  reps  using RUE support  forward and CALVIN UE backward  Exercise 9: Standing ball toss  x 2'  in // bars---standing ball movement (moves ball up/down and turns side to side following with eyes x10 ea)--standing w/o UE support while performing quick change in arm positions from knee ht to overhead and 3-6\" outside of arms length- not today  Exercise 10: Box steps cw/ccw--not today  Exercise 11: Sidestepping on line--( in // bars  x 6)  Exercise 12: Cone weaves--not today  Exercise 13: Toe taps on cone in // bars x 15 ea  Exercise 14: High/low reaching activities--picked up cone and foam pads in // bars x 1- not today  Exercise 15: Progress to ambulation with cane as appropriate. 80' with personal LBQC                                   Assessment:   Conditions Requiring Skilled Therapeutic Intervention  Body structures, Functions, Activity limitations: Decreased functional mobility ; Decreased strength;Decreased endurance;Decreased balance;Decreased high-level IADLs  Assessment: Patient increased ambulation distance by thirteen feet today. He took several steps with only one hand on walker also occasional steps were attempted without support of UE's. When performing sit to stands from wheelchair he only uses R LE and CALVIN UE, he keep L LE in air with transitional movements. When performing hip ext he increases weight on // bars through UE's. He is able to maintain balance with narrow GAMALIEL for 20 seconds before using UE. He was able to increase distance with [facility] by six feet. Treatment Diagnosis: s/p L BKA  REQUIRES PT FOLLOW UP: Yes      G-Code:     OutComes Score                                           Goals:  Short term goals  Time Frame for Short term goals: 4-6 weeks  Short term goal 1: Independent with HEP  Short term goal 2: Perform 12 sit to  30\"- progress (11/16: 9 in 30\")  Short term goal 3: Amb 750' in 6MWT with RW- progress (11/16: 12' in 6MWT with no rest breaks, no w/c follow.)  Long term goals  Time Frame for Long term goals : 6-8 weeks  Long term goal 1: Equalize WB bilaterally during static stance and ambulation. - progress (11/16: Continues with weight shifted to RLE, but

## 2018-12-04 ENCOUNTER — HOSPITAL ENCOUNTER (OUTPATIENT)
Dept: PHYSICAL THERAPY | Age: 63
Setting detail: THERAPIES SERIES
Discharge: HOME | End: 2018-12-04
Payer: MEDICARE

## 2018-12-07 ENCOUNTER — HOSPITAL ENCOUNTER (OUTPATIENT)
Dept: PHYSICAL THERAPY | Age: 63
Setting detail: THERAPIES SERIES
Discharge: HOME | End: 2018-12-07
Payer: MEDICARE

## 2018-12-07 PROCEDURE — 97110 THERAPEUTIC EXERCISES: CPT

## 2018-12-07 ASSESSMENT — PAIN SCALES - GENERAL: PAINLEVEL_OUTOF10: 0

## 2018-12-07 NOTE — PROGRESS NOTES
support with mod balance perturbations  x  1 min  Exercise 8: Ambulation in // bars with one hand  x 7  reps  using RUE support  forward and CALVIN UE backward  Exercise 9: Standing ball toss  x 2'  in // bars---standing ball movement (moves ball up/down and turns side to side following with eyes x10 ea)--standing w/o UE support while performing quick change in arm positions from knee ht to overhead and 3-6\" outside of arms length- not today  Exercise 10: Box steps cw/ccw--not today  Exercise 11: Sidestepping on line--( in // bars  x 6)  Exercise 12: Cone weaves--not today  Exercise 13: Toe taps on cone in // bars x 15 ea--not today  Exercise 14: High/low reaching activities--picked up cone and foam pads in // bars x 1- not today  Exercise 15: Progress to ambulation with cane as appropriate. 80' with personal LBQC--not today                                   Assessment:   Conditions Requiring Skilled Therapeutic Intervention  Body structures, Functions, Activity limitations: Decreased functional mobility ; Decreased strength;Decreased endurance;Decreased balance;Decreased high-level IADLs  Assessment: Mr. Sabina Amor appears to be cleared to continue therapy and he did not have problems with his session today. His surgeon and prosthetist are not concerned about the blistering that was present at his last session and Mr. Sabina Amor has reverted back to using his older liner and one less ply of sock. I have encouraged him to try using his prosthesis and practice walking at home due to the good potential he has for ambulating.     Treatment Diagnosis: s/p L BKA  REQUIRES PT FOLLOW UP: Yes  Discharge Recommendations: Continue to assess pending progress      G-Code:     OutComes Score                                           Goals:  Short term goals  Time Frame for Short term goals: 4-6 weeks  Short term goal 1: Independent with HEP  Short term goal 2: Perform 12 sit to  30\"- progress (11/16: 9 in 30\")  Short term goal

## 2018-12-11 ENCOUNTER — HOSPITAL ENCOUNTER (OUTPATIENT)
Dept: PHYSICAL THERAPY | Age: 63
Setting detail: THERAPIES SERIES
Discharge: HOME | End: 2018-12-11
Payer: MEDICARE

## 2018-12-11 PROCEDURE — 97110 THERAPEUTIC EXERCISES: CPT

## 2018-12-11 ASSESSMENT — PAIN SCALES - GENERAL: PAINLEVEL_OUTOF10: 5

## 2018-12-11 ASSESSMENT — PAIN DESCRIPTION - PAIN TYPE: TYPE: CHRONIC PAIN

## 2018-12-11 ASSESSMENT — PAIN DESCRIPTION - ORIENTATION: ORIENTATION: LEFT

## 2018-12-11 ASSESSMENT — PAIN DESCRIPTION - DESCRIPTORS: DESCRIPTORS: ACHING;SORE

## 2018-12-11 ASSESSMENT — PAIN DESCRIPTION - LOCATION: LOCATION: KNEE

## 2018-12-11 NOTE — PROGRESS NOTES
bars---standing ball movement (moves ball up/down and turns side to side following with eyes x10 ea)--standing w/o UE support while performing quick change in arm positions from knee ht to overhead and 3-6\" outside of arms length- not today  Exercise 10: Box steps cw/ccw--not today  Exercise 11: Sidestepping on line--( in // bars  x 6)  Exercise 12: Cone weaves--not today  Exercise 13: Toe taps on cone in // bars x 15 ea  Exercise 14: High/low reaching activities--picked up cone and foam pads in // bars x 1- not today  Exercise 15: Progress to ambulation with cane as appropriate. 54' and 61' LBQC and cg-stby@              Assessment:   Conditions Requiring Skilled Therapeutic Intervention  Body structures, Functions, Activity limitations: Decreased functional mobility ; Decreased strength;Decreased endurance;Decreased balance;Decreased high-level IADLs  Assessment: Pt reports blisters improved. Returns to clinic wearing prosthesis and custom liner. He was able to walk both using RW and LBQC. Does keep L UE in guarded position when using QC but doesn't experience any LOB. Pt with chronic pain L knee which limits his ability at times. Periodic sitting rests due to knee pain. Pt relies heavily on UEs for standing ex's when moving R LE. Treatment Diagnosis: s/p L BKA  REQUIRES PT FOLLOW UP: Yes  Discharge Recommendations: Continue to assess pending progress      G-Code:     OutComes Score                                           Goals:  Short term goals  Time Frame for Short term goals: 4-6 weeks  Short term goal 1: Independent with HEP  Short term goal 2: Perform 12 sit to  30\"- progress (11/16: 9 in 30\")  Short term goal 3: Amb 750' in 6MWT with RW- progress (11/16: 12' in 6MWT with no rest breaks, no w/c follow.)  Long term goals  Time Frame for Long term goals : 6-8 weeks  Long term goal 1: Equalize WB bilaterally during static stance and ambulation. - progress (11/16: Continues with weight shifted to

## 2018-12-14 ENCOUNTER — HOSPITAL ENCOUNTER (OUTPATIENT)
Dept: PHYSICAL THERAPY | Age: 63
Setting detail: THERAPIES SERIES
Discharge: HOME | End: 2018-12-14
Payer: MEDICARE

## 2018-12-14 PROCEDURE — 97110 THERAPEUTIC EXERCISES: CPT

## 2018-12-14 ASSESSMENT — PAIN SCALES - GENERAL: PAINLEVEL_OUTOF10: 4

## 2018-12-14 ASSESSMENT — PAIN DESCRIPTION - LOCATION: LOCATION: KNEE;COCCYX

## 2018-12-14 ASSESSMENT — PAIN DESCRIPTION - PAIN TYPE: TYPE: CHRONIC PAIN

## 2018-12-14 ASSESSMENT — PAIN DESCRIPTION - ORIENTATION: ORIENTATION: MID

## 2018-12-18 ENCOUNTER — HOSPITAL ENCOUNTER (OUTPATIENT)
Dept: PHYSICAL THERAPY | Age: 63
Setting detail: THERAPIES SERIES
Discharge: HOME | End: 2018-12-18
Payer: MEDICARE

## 2018-12-18 PROCEDURE — 97110 THERAPEUTIC EXERCISES: CPT

## 2018-12-18 PROCEDURE — G8978 MOBILITY CURRENT STATUS: HCPCS

## 2018-12-18 PROCEDURE — G8979 MOBILITY GOAL STATUS: HCPCS

## 2018-12-18 NOTE — PROGRESS NOTES
3: Amb 750' in 6MWT with RW- progress (12/18: 535' in 6MWT with RW. No rest breaks, no w/c follow.)  Long term goals  Time Frame for Long term goals : 6-8 weeks  Long term goal 1: Equalize WB bilaterally during static stance and ambulation. - progress (12/18: Continues with weight shifted to RLE, but improved from initial evaluation.)  Long term goal 2: Amb at least 750 in 6MWT with QC vs SPC. - progress (12/18: See above.)  Long term goal 3: Report using prosthesis and AD at home for mobility >50% of the day (as opposed to w/c.)- progress (12/18: Using prosthesis more now, but unable to estimate a percentage.)  Long term goal 4: Improve PSFS score to less than 40% impairment. - progress (12/18: 40% impairment)  Patient Goals   Patient goals : improve endurance and ambulation    Plan:    Plan  Times per week: 2x  Plan weeks: 6-8 weeks  Current Treatment Recommendations: Strengthening, Balance Training, Functional Mobility Training, Gait Training, Stair training, Endurance Training, Neuromuscular Re-education, Equipment Evaluation, Education, & procurement, Patient/Caregiver Education & Training, Safety Education & Training, Home Exercise Program  Safety Devices  Type of devices: Gait belt  Timed Code Treatment Minutes: 47 Minutes     Therapy Time   Individual Concurrent Group Co-treatment   Time In 1251         Time Out 1345         Minutes 54         Timed Code Treatment Minutes: One Apolonia Mosquera PT   Electronically signed by Darin Jose PT on 12/18/2018 at 2:18 PM

## 2018-12-21 ENCOUNTER — HOSPITAL ENCOUNTER (OUTPATIENT)
Dept: PHYSICAL THERAPY | Age: 63
Setting detail: THERAPIES SERIES
Discharge: HOME | End: 2018-12-21
Payer: MEDICARE

## 2018-12-21 PROCEDURE — 97110 THERAPEUTIC EXERCISES: CPT

## 2018-12-21 NOTE — PROGRESS NOTES
Daily Treatment Note  Date: 2018  Patient Name: Norris Richardson  MRN: [id number]     :   1955    Subjective:   General  Additional Pertinent Hx: 67 y/o M presents s/p L BKA. PMH includes DM, CHF, asthma, hx gastric bypass, multiple knee scopes  Response To Previous Treatment: Patient with no complaints from previous session. Family / Caregiver Present: Yes  Referring Practitioner: Shola Dean MD  PT Visit Information  PT Insurance Information: Medicare  Total # of Visits Approved: 20  Total # of Visits to Date: 25  Plan of Care/Certification Expiration Date: 19  Progress Note Due Date: 18  Subjective  Subjective: Somebody had to be at [facility] at 8 o'clock this morning. I took a stroll to [employer] yesterday and it took me over an hour and a half. (used wheelchair)  I am just wore out today.   Pain Screening  Patient Currently in Pain: No  Vital Signs  Patient Currently in Pain: No       Treatment Activities:                                      Exercises  Exercise 1: 6MWT---  400'  total with RW in 5 minutes he requested to stop  Exercise 2: Repeated sit to stand  x 10  from w/c   8 in 30\"  Exercise 3: Standing marches  x 15 B LE------mini squats x 15 in // bars  Exercise 4: Standing hip abd/ext  x 15 L LE and 15 R LE  ea (most of wbing through UE's during R LE motion)  Exercise 5: Standing lateral weight shifts in // bars  x 15  Exercise 6: Standing diagonal weight shifts in // bars  x 15  Exercise 7: Static stance in// bars on foam pads w/o UE support  x 1.5'---static stance on one foam pad w/narrow GAMALIEL w/o UE support x 1'----static stance in // bars w/o UE support with mod balance perturbations  x  1 min  Exercise 8: Ambulation in // bars with one hand  x 8  reps  using RUE support  forward and CALVIN UE backward-----not today  Exercise 9: Standing ball toss  x 2'  in // bars---standing ball movement (moves ball up/down and turns side to side following with eyes x10 ea)--standing w/o using prosthesis and AD at home for mobility >50% of the day (as opposed to w/c.)- progress (12/18: Using prosthesis more now, but unable to estimate a percentage.)  Long term goal 4: Improve PSFS score to less than 40% impairment. - progress (12/18: 40% impairment)  Patient Goals   Patient goals : improve endurance and ambulation    Plan:    Plan  Times per week: 2x  Plan weeks: 6-8 weeks  Current Treatment Recommendations: Strengthening, Balance Training, Functional Mobility Training, Gait Training, Stair training, Endurance Training, Neuromuscular Re-education, Equipment Evaluation, Education, & procurement, Patient/Caregiver Education & Training, Safety Education & Training, Home Exercise Program  Timed Code Treatment Minutes: 39 Minutes     Therapy Time   Individual Concurrent Group Co-treatment   Time In 9127         Time Out 1325         Minutes 39         Timed Code Treatment Minutes: [address] [id number], PTA       Electronically signed by Laurel Kurtz PTA on 12/21/2018 at 2:06 PM

## 2018-12-24 ENCOUNTER — APPOINTMENT (OUTPATIENT)
Dept: PHYSICAL THERAPY | Age: 63
End: 2018-12-24
Payer: MEDICARE

## 2018-12-27 ENCOUNTER — HOSPITAL ENCOUNTER (OUTPATIENT)
Dept: PHYSICAL THERAPY | Age: 63
Setting detail: THERAPIES SERIES
Discharge: HOME | End: 2018-12-27
Payer: MEDICARE

## 2018-12-27 PROCEDURE — 97110 THERAPEUTIC EXERCISES: CPT

## 2018-12-27 ASSESSMENT — PAIN SCALES - GENERAL: PAINLEVEL_OUTOF10: 7

## 2018-12-27 ASSESSMENT — PAIN DESCRIPTION - ORIENTATION: ORIENTATION: LOWER

## 2018-12-27 ASSESSMENT — PAIN DESCRIPTION - PAIN TYPE: TYPE: CHRONIC PAIN

## 2018-12-27 ASSESSMENT — PAIN DESCRIPTION - LOCATION: LOCATION: BACK

## 2018-12-28 DIAGNOSIS — M54.50 CHRONIC MIDLINE LOW BACK PAIN WITHOUT SCIATICA: ICD-10-CM

## 2018-12-28 DIAGNOSIS — G89.29 CHRONIC MIDLINE LOW BACK PAIN WITHOUT SCIATICA: ICD-10-CM

## 2018-12-31 RX ORDER — HYDROCODONE BITARTRATE AND ACETAMINOPHEN 10; 325 MG/1; MG/1
1 TABLET ORAL EVERY 6 HOURS PRN
Qty: 120 TABLET | Refills: 0 | Status: SHIPPED | OUTPATIENT
Start: 2018-12-31 | End: 2019-01-29 | Stop reason: SDUPTHER

## 2019-01-08 ENCOUNTER — HOSPITAL ENCOUNTER (OUTPATIENT)
Dept: PHYSICAL THERAPY | Age: 64
Setting detail: THERAPIES SERIES
Discharge: HOME | End: 2019-01-08
Payer: MEDICARE

## 2019-01-08 PROCEDURE — G8980 MOBILITY D/C STATUS: HCPCS

## 2019-01-08 PROCEDURE — G8979 MOBILITY GOAL STATUS: HCPCS

## 2019-01-08 PROCEDURE — 97110 THERAPEUTIC EXERCISES: CPT

## 2019-01-08 PROCEDURE — G8978 MOBILITY CURRENT STATUS: HCPCS

## 2019-01-08 ASSESSMENT — PAIN DESCRIPTION - PAIN TYPE: TYPE: CHRONIC PAIN

## 2019-01-08 ASSESSMENT — PAIN DESCRIPTION - LOCATION: LOCATION: BACK;KNEE

## 2019-01-08 ASSESSMENT — PAIN DESCRIPTION - DESCRIPTORS: DESCRIPTORS: ACHING;SORE

## 2019-01-22 ENCOUNTER — TELEPHONE (OUTPATIENT)
Dept: BARIATRICS/WEIGHT MGMT | Facility: CLINIC | Age: 64
End: 2019-01-22

## 2019-01-22 NOTE — TELEPHONE ENCOUNTER
S/P BA Surgery F/U Appointment Reminder      Patient stated that he had to have emergency surgery in [facility] as that his stomach became unattached and so [facility] had to reconnect part of his stomach.        patient went to [facility] whom then sent him to [facility].    Patient stated he was in the hospital in [facility] x 2 weeks.  He was told that most patients do not survive the type of surgery in which he had to have done.      Told patient that I would need to get his Op note from [facility].  He would need to stop by the office and sign a records release so that they would fax notes.     Oked per patient.     Mr Shi will check with his wife and get back with us regarding their follow up appointments.             ER notes pulled from [facility] and shown to Dr Barboza.  Also asked if he was previous patient and had to have surgery elsewhere if we would accept this patient back into our program.  He stated yes as that under our guidelines we would have to.

## 2019-01-29 ENCOUNTER — OFFICE VISIT (OUTPATIENT)
Dept: FAMILY MEDICINE CLINIC | Age: 64
End: 2019-01-29
Payer: MEDICARE

## 2019-01-29 VITALS
SYSTOLIC BLOOD PRESSURE: 148 MMHG | HEART RATE: 73 BPM | OXYGEN SATURATION: 98 % | DIASTOLIC BLOOD PRESSURE: 82 MMHG | TEMPERATURE: 98.5 F

## 2019-01-29 DIAGNOSIS — F51.04 CHRONIC INSOMNIA: Primary | ICD-10-CM

## 2019-01-29 DIAGNOSIS — Z89.512 HISTORY OF LEFT BELOW KNEE AMPUTATION (HCC): ICD-10-CM

## 2019-01-29 DIAGNOSIS — G89.29 CHRONIC MIDLINE LOW BACK PAIN WITHOUT SCIATICA: ICD-10-CM

## 2019-01-29 DIAGNOSIS — Z89.512 HX OF BKA, LEFT (HCC): ICD-10-CM

## 2019-01-29 DIAGNOSIS — M54.50 CHRONIC MIDLINE LOW BACK PAIN WITHOUT SCIATICA: ICD-10-CM

## 2019-01-29 DIAGNOSIS — I83.019 VENOUS ULCER OF RIGHT LEG (HCC): ICD-10-CM

## 2019-01-29 DIAGNOSIS — R35.0 URINARY FREQUENCY: ICD-10-CM

## 2019-01-29 DIAGNOSIS — E11.620 TYPE 2 DIABETES MELLITUS WITH DIABETIC DERMATITIS, UNSPECIFIED WHETHER LONG TERM INSULIN USE (HCC): Chronic | ICD-10-CM

## 2019-01-29 DIAGNOSIS — Z12.5 ENCOUNTER FOR PROSTATE CANCER SCREENING: ICD-10-CM

## 2019-01-29 DIAGNOSIS — L97.919 VENOUS ULCER OF RIGHT LEG (HCC): ICD-10-CM

## 2019-01-29 DIAGNOSIS — L97.512 ULCERATED, FOOT, RIGHT, WITH FAT LAYER EXPOSED (HCC): ICD-10-CM

## 2019-01-29 DIAGNOSIS — Z89.512 S/P BKA (BELOW KNEE AMPUTATION) UNILATERAL, LEFT (HCC): ICD-10-CM

## 2019-01-29 LAB
ALBUMIN SERPL-MCNC: 3.8 G/DL (ref 3.5–5.2)
ALP BLD-CCNC: 71 U/L (ref 40–130)
ALT SERPL-CCNC: 10 U/L (ref 5–41)
ANION GAP SERPL CALCULATED.3IONS-SCNC: 13 MMOL/L (ref 7–19)
AST SERPL-CCNC: 20 U/L (ref 5–40)
BACTERIA: NEGATIVE /HPF
BASOPHILS ABSOLUTE: 0 K/UL (ref 0–0.2)
BASOPHILS RELATIVE PERCENT: 0.5 % (ref 0–1)
BILIRUB SERPL-MCNC: 0.8 MG/DL (ref 0.2–1.2)
BILIRUBIN URINE: NEGATIVE
BLOOD, URINE: ABNORMAL
BUN BLDV-MCNC: 18 MG/DL (ref 8–23)
CALCIUM SERPL-MCNC: 9.2 MG/DL (ref 8.8–10.2)
CHLORIDE BLD-SCNC: 103 MMOL/L (ref 98–111)
CLARITY: CLEAR
CO2: 24 MMOL/L (ref 22–29)
COLOR: YELLOW
CREAT SERPL-MCNC: 1 MG/DL (ref 0.5–1.2)
EOSINOPHILS ABSOLUTE: 0.2 K/UL (ref 0–0.6)
EOSINOPHILS RELATIVE PERCENT: 2.4 % (ref 0–5)
EPITHELIAL CELLS, UA: 1 /HPF (ref 0–5)
GFR NON-AFRICAN AMERICAN: >60
GLUCOSE BLD-MCNC: 133 MG/DL (ref 74–109)
GLUCOSE URINE: NEGATIVE MG/DL
HBA1C MFR BLD: 4.9 % (ref 4–6)
HCT VFR BLD CALC: 36.6 % (ref 42–52)
HEMOGLOBIN: 12.1 G/DL (ref 14–18)
HYALINE CASTS: 1 /HPF (ref 0–8)
KETONES, URINE: NEGATIVE MG/DL
LEUKOCYTE ESTERASE, URINE: NEGATIVE
LYMPHOCYTES ABSOLUTE: 1.2 K/UL (ref 1.1–4.5)
LYMPHOCYTES RELATIVE PERCENT: 18.5 % (ref 20–40)
MCH RBC QN AUTO: 31.4 PG (ref 27–31)
MCHC RBC AUTO-ENTMCNC: 33.1 G/DL (ref 33–37)
MCV RBC AUTO: 95.1 FL (ref 80–94)
MONOCYTES ABSOLUTE: 0.5 K/UL (ref 0–0.9)
MONOCYTES RELATIVE PERCENT: 7.4 % (ref 0–10)
NEUTROPHILS ABSOLUTE: 4.4 K/UL (ref 1.5–7.5)
NEUTROPHILS RELATIVE PERCENT: 70.9 % (ref 50–65)
NITRITE, URINE: NEGATIVE
PDW BLD-RTO: 13 % (ref 11.5–14.5)
PH UA: 6.5
PLATELET # BLD: 156 K/UL (ref 130–400)
PMV BLD AUTO: 11.7 FL (ref 9.4–12.4)
POTASSIUM SERPL-SCNC: 4.4 MMOL/L (ref 3.5–5)
PROSTATE SPECIFIC ANTIGEN: 0.16 NG/ML (ref 0–4)
PROTEIN UA: 300 MG/DL
RBC # BLD: 3.85 M/UL (ref 4.7–6.1)
RBC UA: 23 /HPF (ref 0–4)
SODIUM BLD-SCNC: 140 MMOL/L (ref 136–145)
SPECIFIC GRAVITY UA: 1.02
TOTAL PROTEIN: 7.2 G/DL (ref 6.6–8.7)
UROBILINOGEN, URINE: 1 E.U./DL
WBC # BLD: 6.2 K/UL (ref 4.8–10.8)
WBC UA: 1 /HPF (ref 0–5)

## 2019-01-29 PROCEDURE — G8427 DOCREV CUR MEDS BY ELIG CLIN: HCPCS | Performed by: FAMILY MEDICINE

## 2019-01-29 PROCEDURE — 1036F TOBACCO NON-USER: CPT | Performed by: FAMILY MEDICINE

## 2019-01-29 PROCEDURE — 99214 OFFICE O/P EST MOD 30 MIN: CPT | Performed by: FAMILY MEDICINE

## 2019-01-29 PROCEDURE — 3046F HEMOGLOBIN A1C LEVEL >9.0%: CPT | Performed by: FAMILY MEDICINE

## 2019-01-29 PROCEDURE — 3017F COLORECTAL CA SCREEN DOC REV: CPT | Performed by: FAMILY MEDICINE

## 2019-01-29 PROCEDURE — G8484 FLU IMMUNIZE NO ADMIN: HCPCS | Performed by: FAMILY MEDICINE

## 2019-01-29 PROCEDURE — G8417 CALC BMI ABV UP PARAM F/U: HCPCS | Performed by: FAMILY MEDICINE

## 2019-01-29 PROCEDURE — 2022F DILAT RTA XM EVC RTNOPTHY: CPT | Performed by: FAMILY MEDICINE

## 2019-01-29 RX ORDER — TAMSULOSIN HYDROCHLORIDE 0.4 MG/1
0.4 CAPSULE ORAL DAILY
Qty: 30 CAPSULE | Refills: 5 | Status: SHIPPED | OUTPATIENT
Start: 2019-01-29 | End: 2019-03-01 | Stop reason: SDUPTHER

## 2019-01-29 RX ORDER — HYDROCODONE BITARTRATE AND ACETAMINOPHEN 10; 325 MG/1; MG/1
1 TABLET ORAL EVERY 6 HOURS PRN
Qty: 120 TABLET | Refills: 0 | Status: SHIPPED | OUTPATIENT
Start: 2019-01-29 | End: 2019-03-06 | Stop reason: SDUPTHER

## 2019-01-29 RX ORDER — TEMAZEPAM 15 MG/1
30 CAPSULE ORAL NIGHTLY PRN
Qty: 60 CAPSULE | Refills: 5 | Status: SHIPPED | OUTPATIENT
Start: 2019-01-29 | End: 2019-03-30

## 2019-01-29 ASSESSMENT — ENCOUNTER SYMPTOMS
NAUSEA: 0
COUGH: 0
ANAL BLEEDING: 0
ABDOMINAL PAIN: 0
CONSTIPATION: 0
DIARRHEA: 0
SHORTNESS OF BREATH: 0
CHEST TIGHTNESS: 0
BACK PAIN: 1

## 2019-03-01 ENCOUNTER — OFFICE VISIT (OUTPATIENT)
Dept: FAMILY MEDICINE CLINIC | Age: 64
End: 2019-03-01
Payer: MEDICARE

## 2019-03-01 VITALS
DIASTOLIC BLOOD PRESSURE: 72 MMHG | HEART RATE: 61 BPM | TEMPERATURE: 98.8 F | SYSTOLIC BLOOD PRESSURE: 172 MMHG | OXYGEN SATURATION: 98 %

## 2019-03-01 DIAGNOSIS — I10 ESSENTIAL (PRIMARY) HYPERTENSION: ICD-10-CM

## 2019-03-01 DIAGNOSIS — F51.04 CHRONIC INSOMNIA: ICD-10-CM

## 2019-03-01 DIAGNOSIS — N40.0 BENIGN PROSTATIC HYPERPLASIA WITHOUT LOWER URINARY TRACT SYMPTOMS: ICD-10-CM

## 2019-03-01 DIAGNOSIS — E11.42 DIABETIC PERIPHERAL NEUROPATHY (HCC): ICD-10-CM

## 2019-03-01 DIAGNOSIS — E11.620 TYPE 2 DIABETES MELLITUS WITH DIABETIC DERMATITIS, UNSPECIFIED WHETHER LONG TERM INSULIN USE (HCC): Primary | Chronic | ICD-10-CM

## 2019-03-01 PROCEDURE — 3017F COLORECTAL CA SCREEN DOC REV: CPT | Performed by: FAMILY MEDICINE

## 2019-03-01 PROCEDURE — G8427 DOCREV CUR MEDS BY ELIG CLIN: HCPCS | Performed by: FAMILY MEDICINE

## 2019-03-01 PROCEDURE — G8484 FLU IMMUNIZE NO ADMIN: HCPCS | Performed by: FAMILY MEDICINE

## 2019-03-01 PROCEDURE — 2022F DILAT RTA XM EVC RTNOPTHY: CPT | Performed by: FAMILY MEDICINE

## 2019-03-01 PROCEDURE — G8417 CALC BMI ABV UP PARAM F/U: HCPCS | Performed by: FAMILY MEDICINE

## 2019-03-01 PROCEDURE — 1036F TOBACCO NON-USER: CPT | Performed by: FAMILY MEDICINE

## 2019-03-01 PROCEDURE — 3044F HG A1C LEVEL LT 7.0%: CPT | Performed by: FAMILY MEDICINE

## 2019-03-01 PROCEDURE — 99214 OFFICE O/P EST MOD 30 MIN: CPT | Performed by: FAMILY MEDICINE

## 2019-03-01 RX ORDER — TAMSULOSIN HYDROCHLORIDE 0.4 MG/1
0.4 CAPSULE ORAL DAILY
Qty: 30 CAPSULE | Refills: 5 | Status: SHIPPED | OUTPATIENT
Start: 2019-03-01 | End: 2019-09-12 | Stop reason: SDUPTHER

## 2019-03-01 RX ORDER — TEMAZEPAM 30 MG/1
30 CAPSULE ORAL NIGHTLY PRN
Qty: 30 CAPSULE | Refills: 5 | Status: SHIPPED | OUTPATIENT
Start: 2019-03-01 | End: 2019-08-28 | Stop reason: SDUPTHER

## 2019-03-01 RX ORDER — LISINOPRIL 40 MG/1
40 TABLET ORAL NIGHTLY
Qty: 30 TABLET | Refills: 5 | Status: SHIPPED | OUTPATIENT
Start: 2019-03-01 | End: 2019-08-28 | Stop reason: SDUPTHER

## 2019-03-01 ASSESSMENT — PATIENT HEALTH QUESTIONNAIRE - PHQ9
2. FEELING DOWN, DEPRESSED OR HOPELESS: 0
SUM OF ALL RESPONSES TO PHQ QUESTIONS 1-9: 0
SUM OF ALL RESPONSES TO PHQ QUESTIONS 1-9: 0
SUM OF ALL RESPONSES TO PHQ9 QUESTIONS 1 & 2: 0
1. LITTLE INTEREST OR PLEASURE IN DOING THINGS: 0

## 2019-03-05 DIAGNOSIS — G89.29 CHRONIC MIDLINE LOW BACK PAIN WITHOUT SCIATICA: ICD-10-CM

## 2019-03-05 DIAGNOSIS — M54.50 CHRONIC MIDLINE LOW BACK PAIN WITHOUT SCIATICA: ICD-10-CM

## 2019-03-06 RX ORDER — HYDROCODONE BITARTRATE AND ACETAMINOPHEN 10; 325 MG/1; MG/1
1 TABLET ORAL EVERY 6 HOURS PRN
Qty: 120 TABLET | Refills: 0 | Status: SHIPPED | OUTPATIENT
Start: 2019-03-06 | End: 2019-04-05 | Stop reason: SDUPTHER

## 2019-03-08 PROBLEM — E11.42 DIABETIC PERIPHERAL NEUROPATHY (HCC): Status: ACTIVE | Noted: 2019-03-08

## 2019-03-08 ASSESSMENT — ENCOUNTER SYMPTOMS
NAUSEA: 0
CHEST TIGHTNESS: 0
SHORTNESS OF BREATH: 0
ANAL BLEEDING: 0
DIARRHEA: 0
CONSTIPATION: 0
BACK PAIN: 1
COUGH: 0
ABDOMINAL PAIN: 0

## 2019-03-15 RX ORDER — PANTOPRAZOLE SODIUM 40 MG/1
TABLET, DELAYED RELEASE ORAL
Qty: 30 TABLET | Refills: 3 | Status: SHIPPED | OUTPATIENT
Start: 2019-03-15 | End: 2019-07-19 | Stop reason: SDUPTHER

## 2019-04-03 DIAGNOSIS — M54.50 CHRONIC MIDLINE LOW BACK PAIN WITHOUT SCIATICA: ICD-10-CM

## 2019-04-03 DIAGNOSIS — G89.29 CHRONIC MIDLINE LOW BACK PAIN WITHOUT SCIATICA: ICD-10-CM

## 2019-04-05 RX ORDER — HYDROCODONE BITARTRATE AND ACETAMINOPHEN 10; 325 MG/1; MG/1
1 TABLET ORAL EVERY 6 HOURS PRN
Qty: 120 TABLET | Refills: 0 | Status: SHIPPED | OUTPATIENT
Start: 2019-04-05 | End: 2019-05-03 | Stop reason: SDUPTHER

## 2019-05-02 DIAGNOSIS — G89.29 CHRONIC MIDLINE LOW BACK PAIN WITHOUT SCIATICA: ICD-10-CM

## 2019-05-02 DIAGNOSIS — M54.50 CHRONIC MIDLINE LOW BACK PAIN WITHOUT SCIATICA: ICD-10-CM

## 2019-05-03 RX ORDER — HYDROCODONE BITARTRATE AND ACETAMINOPHEN 10; 325 MG/1; MG/1
1 TABLET ORAL EVERY 6 HOURS PRN
Qty: 120 TABLET | Refills: 0 | Status: SHIPPED | OUTPATIENT
Start: 2019-05-03 | End: 2019-06-04 | Stop reason: SDUPTHER

## 2019-06-04 ENCOUNTER — OFFICE VISIT (OUTPATIENT)
Dept: FAMILY MEDICINE CLINIC | Age: 64
End: 2019-06-04
Payer: MEDICARE

## 2019-06-04 VITALS
BODY MASS INDEX: 38.65 KG/M2 | DIASTOLIC BLOOD PRESSURE: 72 MMHG | TEMPERATURE: 98.2 F | WEIGHT: 285 LBS | SYSTOLIC BLOOD PRESSURE: 132 MMHG | OXYGEN SATURATION: 98 % | HEART RATE: 88 BPM

## 2019-06-04 DIAGNOSIS — G89.29 CHRONIC MIDLINE LOW BACK PAIN WITHOUT SCIATICA: ICD-10-CM

## 2019-06-04 DIAGNOSIS — M54.50 CHRONIC MIDLINE LOW BACK PAIN WITHOUT SCIATICA: ICD-10-CM

## 2019-06-04 DIAGNOSIS — E11.620 TYPE 2 DIABETES MELLITUS WITH DIABETIC DERMATITIS, WITHOUT LONG-TERM CURRENT USE OF INSULIN (HCC): ICD-10-CM

## 2019-06-04 DIAGNOSIS — F51.04 CHRONIC INSOMNIA: ICD-10-CM

## 2019-06-04 DIAGNOSIS — M51.36 DDD (DEGENERATIVE DISC DISEASE), LUMBAR: Primary | ICD-10-CM

## 2019-06-04 DIAGNOSIS — I10 ESSENTIAL (PRIMARY) HYPERTENSION: ICD-10-CM

## 2019-06-04 DIAGNOSIS — K21.9 GASTROESOPHAGEAL REFLUX DISEASE WITHOUT ESOPHAGITIS: ICD-10-CM

## 2019-06-04 DIAGNOSIS — E78.00 HYPERCHOLESTEROLEMIA: ICD-10-CM

## 2019-06-04 PROCEDURE — G8417 CALC BMI ABV UP PARAM F/U: HCPCS | Performed by: FAMILY MEDICINE

## 2019-06-04 PROCEDURE — 99214 OFFICE O/P EST MOD 30 MIN: CPT | Performed by: FAMILY MEDICINE

## 2019-06-04 PROCEDURE — G8427 DOCREV CUR MEDS BY ELIG CLIN: HCPCS | Performed by: FAMILY MEDICINE

## 2019-06-04 PROCEDURE — 3044F HG A1C LEVEL LT 7.0%: CPT | Performed by: FAMILY MEDICINE

## 2019-06-04 PROCEDURE — 2022F DILAT RTA XM EVC RTNOPTHY: CPT | Performed by: FAMILY MEDICINE

## 2019-06-04 PROCEDURE — 1036F TOBACCO NON-USER: CPT | Performed by: FAMILY MEDICINE

## 2019-06-04 PROCEDURE — 3017F COLORECTAL CA SCREEN DOC REV: CPT | Performed by: FAMILY MEDICINE

## 2019-06-04 RX ORDER — HYDROCODONE BITARTRATE AND ACETAMINOPHEN 10; 325 MG/1; MG/1
1 TABLET ORAL EVERY 6 HOURS PRN
Qty: 120 TABLET | Refills: 0 | Status: SHIPPED | OUTPATIENT
Start: 2019-06-04 | End: 2019-07-02 | Stop reason: SDUPTHER

## 2019-06-04 ASSESSMENT — ENCOUNTER SYMPTOMS
COUGH: 0
CONSTIPATION: 0
SHORTNESS OF BREATH: 0
CHEST TIGHTNESS: 0
NAUSEA: 0
BACK PAIN: 1
ANAL BLEEDING: 0
ABDOMINAL PAIN: 0
DIARRHEA: 0

## 2019-06-04 NOTE — PROGRESS NOTES
[facility] [address] [place] [address]  [address] [place] [id number]  Dept: [phone]  Dept Fax: [phone]  Loc: [phone]    Irma Shelton is a 60 y.o. male who presents today for his medical conditions/complaints as noted below. Irma Shelton is here for 3 Month Follow-Up        HPI:   CC: Here today to discuss the following:    Lower Back Pain, Chronic  Compliant with medications. No adverse effects from medication. Symptoms continue to be stable. Lower back pain is described as a dull ache and occasionally sharp and stabbing. No radiation. Relieved with his current pain medication. No numbness or weakness in lower extremities. Currently satisfied with treatment regimen as it provides some relief. Compliant with his current opioid pain reliever. He states he takes his medication as needed. His pain is typically moderate in nature but can become severe on occasion. he abstains from alcohol while taking his pain medication. he denies drowsiness and impairment on his medication. He has been unable to reduce significantly since his last visit. He does feel his pain has \"leveled off\" since his last visit. Hypertension  Compliant with medications. No adverse effects from medication. No lightheadedness, palpitations, or chest pain. Insomnia  Currently stable. Satisfied with current treatment regimen. No adverse effects from medication. Gastroesophageal Reflux Disease  Symptoms currently under control. Medication adequately controls his symptoms. No hematochezia or melena. Hyperlipidemia  Tolerating current cholesterol medication without side effects. No body aches. Attempting to reduce processed sugar and cholesterol from diet.             HPI    Past Medical History:   Diagnosis Date    Arthritis     Asthma     Bradycardia     Broken ribs     CHF (congestive heart failure) (HCC)     Chronic ulcer of left leg, with fat layer exposed ([address]     Diabetes mellitus ([address]     no meds; improved with weight loss    Diabetic peripheral neuropathy ([address] 3/8/2019    Hypertension     Idiopathic chronic venous hypertension of left leg with ulcer ([address] 12/22/2015    Morbid obesity ([address] 6/22/15    Skin ulcer of toe of left foot with fat layer exposed ([address] 4/4/2017    Sleep apnea in adult 06/22/2015    no cpap    Type 2 diabetes mellitus with foot ulcer (CODE) ([address] 11/15/2017    Venous hypertension, chronic, with ulcer, left ([address] 11/26/2017    Venous insufficiency of both lower extremities 6/22/15      Past Surgical History:   Procedure Laterality Date    ABDOMEN SURGERY  02/2018    treated in [address]      hx. of multiple; \"no blockage\"    DILATATION, ESOPHAGUS      GASTROSTOMY TUBE PLACEMENT  02/11/2018    temporary; now removed    KNEE ARTHROSCOPY      6 knee surgeries total; \"no replacements\"    UT AMPUTATION LOW LEG THRU TIB/FIB Left 5/25/2018    LEG AMPUTATION BELOW KNEE WITH MUSCLE FLAP  AND SKIN GRAFT CLOSURE AND APPLICATION OF WOUND VAC performed by Concepcion Lopez MD at [place] [id number]  02/11/2018    repair of gastric bypass    VASCULAR SURGERY  05/25/2018    [facility]. Leg amputation below knee with muscle flap and skin graft closure and application of wound vac stsg 14 cmx 15 cm. Family History   Problem Relation Age of Onset    Diabetes Mother     Heart Disease Mother     High Blood Pressure Mother     Heart Disease Father     Other Father        Social History     Tobacco Use    Smoking status: Never Smoker    Smokeless tobacco: Never Used   Substance Use Topics    Alcohol use: No     Current Outpatient Medications   Medication Sig Dispense Refill    HYDROcodone-acetaminophen (NORCO)  MG per tablet Take 1 tablet by mouth every 6 hours as needed for Pain for up to 30 days.  120 tablet 0    pantoprazole (PROTONIX) 40 MG tablet TAKE 1 TABLET BY MOUTH IN THE MORNING BEFORE BREAKFAST 30 tablet 3    lisinopril (PRINIVIL;ZESTRIL) 40 MG tablet Take 1 tablet by mouth nightly 30 tablet 5    tamsulosin (FLOMAX) 0.4 MG capsule Take 1 capsule by mouth daily 30 capsule 5    ibuprofen (ADVIL;MOTRIN) 600 MG tablet Take 200 mg by mouth every 8 hours as needed for Pain      tiZANidine (ZANAFLEX) 4 MG tablet TAKE 1 TABLET BY MOUTH EVERY 6 HOURS AS NEEDED (SPASMS) 30 tablet 5    Glucose Blood (BLOOD GLUCOSE TEST STRIPS) STRP 1 strip by In Vitro route daily 30 strip 11    Lancet Device MISC 1 lancet per strip. 30 Device 11    Misc. Devices MISC Glucometer  ICD 10: E11.9 1 Device 0    DAILY MULTIPLE VITAMINS TABS Take 1 tablet by mouth       No current facility-administered medications for this visit. Allergies   Allergen Reactions    Adhesive Tape      Tears skin on leg       Health Maintenance   Topic Date Due    Hepatitis C screen  1955    Pneumococcal 0-64 years Vaccine (1 of 1 - PPSV23) 05/07/1963    Diabetic retinal exam  05/07/1967    HIV screen  05/07/1972    Shingles Vaccine (1 of 2) 05/07/2007    Colon cancer screen colonoscopy  05/07/2007    Diabetic foot exam  04/30/2019    Diabetic microalbuminuria test  04/30/2019    Lipid screen  04/30/2019    Flu vaccine (Season Ended) 09/02/2019 (Originally 9/1/2019)    A1C test (Diabetic or Prediabetic)  01/29/2020    Potassium monitoring  01/29/2020    Creatinine monitoring  01/29/2020    DTaP/Tdap/Td vaccine (2 - Td) 04/30/2028       Subjective:      Review of Systems   Constitutional: Negative for chills and fever. HENT: Negative for congestion. Respiratory: Negative for cough, chest tightness and shortness of breath. Cardiovascular: Negative for chest pain, palpitations and leg swelling. Gastrointestinal: Negative for abdominal pain, anal bleeding, constipation, diarrhea and nausea. Genitourinary: Negative for difficulty urinating. Musculoskeletal: Positive for arthralgias, back pain, myalgias, neck pain and neck stiffness. Psychiatric/Behavioral: Negative. SeeHPI for visit specific review of symptoms. All others negative      Objective:   /72   Pulse 88   Temp 98.2 °F (36.8 °C)   Wt 285 lb (129.3 kg)   SpO2 98%   BMI 38.65 kg/m²   Physical Exam   Constitutional: He appears well-developed. He does not appear ill. Eyes: Pupils are equal, round, and reactive to light. Neck: Normal range of motion. Neck supple. Cardiovascular: Normal rate and regular rhythm. Exam reveals no friction rub. No murmur heard. Pulmonary/Chest: Effort normal and breath sounds normal. No respiratory distress. He has no wheezes. He has no rales. Abdominal: Soft. Bowel sounds are normal. He exhibits no distension. There is no tenderness. There is no rebound and no guarding. Musculoskeletal: He exhibits no edema. Neurological: He is alert. Psychiatric: He has a normal mood and affect. His behavior is normal.         No results found for this or any previous visit (from the past 672 hour(s)). Assessment & Plan: The following diagnoses and conditions are stable with no further orders unless indicated:  1. Chronic midline low back pain without sciatica  He's been unsuccessful in reducing the amount of pain medication that he takes. He does feel his pain is adequately controlled with his current medication. Discussed risk and benefits of taking opioids. Risks include addiction and tolerance. If develops impairment or over sedation with medication, discontinue and contact me. Do not take medication with alcohol. Advised to take sparingly. Advised to keep medication hidden and locked up as risk of theft is high with these medications as well.     - HYDROcodone-acetaminophen (NORCO)  MG per tablet; Take 1 tablet by mouth every 6 hours as needed for Pain for up to 30 days. Dispense: 120 tablet;  Refill: 0    2. DDD (degenerative disc disease), lumbar    - HYDROcodone-acetaminophen (NORCO)  MG per tablet; Take 1 tablet by mouth every 6 hours as needed for Pain for up to 30 days. Dispense: 120 tablet; Refill: 0    3. Type 2 diabetes mellitus with diabetic dermatitis, unspecified whether long term insulin use ([facility]  He has been primarily diet controlled with his diabetes unfortunately since his lower extremity amputation he has slowly gained weight. We'll recheck the following labs prior to next visit  - Hemoglobin A1C; Future  - Comprehensive Metabolic Panel; Future    4. Chronic insomnia  Overall the symptoms are stable. 5. Essential (primary) hypertension  BP Readings from Last 3 Encounters:   06/04/19 132/72   03/01/19 (!) 172/72   01/29/19 (!) 148/82     Stable  - CBC Auto Differential; Future    6. Gastroesophageal reflux disease without esophagitis  Stable on Protonix    7. Hypercholesterolemia  Recommend checking a lipid panel prior to next visit  - Lipid Panel; Future            Return in about 3 months (around 9/4/2019) for Routine follow up - 15 minute visit. Discussed use, benefit, and side effects of prescribed medications. All patient questions answered. Pt voiced understanding. Reviewed health maintenance. Instructedto continue current medications, diet and exercise. Patient agreed with treatmentplan. Follow up as directed.

## 2019-07-02 DIAGNOSIS — M51.36 DDD (DEGENERATIVE DISC DISEASE), LUMBAR: ICD-10-CM

## 2019-07-02 DIAGNOSIS — M54.50 CHRONIC MIDLINE LOW BACK PAIN WITHOUT SCIATICA: ICD-10-CM

## 2019-07-02 DIAGNOSIS — G89.29 CHRONIC MIDLINE LOW BACK PAIN WITHOUT SCIATICA: ICD-10-CM

## 2019-07-02 RX ORDER — HYDROCODONE BITARTRATE AND ACETAMINOPHEN 10; 325 MG/1; MG/1
1 TABLET ORAL EVERY 6 HOURS PRN
Qty: 120 TABLET | Refills: 0 | Status: SHIPPED | OUTPATIENT
Start: 2019-07-02 | End: 2019-08-09 | Stop reason: SDUPTHER

## 2019-07-19 RX ORDER — PANTOPRAZOLE SODIUM 40 MG/1
TABLET, DELAYED RELEASE ORAL
Qty: 30 TABLET | Refills: 3 | Status: SHIPPED | OUTPATIENT
Start: 2019-07-19 | End: 2019-08-28 | Stop reason: SDUPTHER

## 2019-08-02 DIAGNOSIS — I10 ESSENTIAL (PRIMARY) HYPERTENSION: ICD-10-CM

## 2019-08-02 DIAGNOSIS — E78.00 HYPERCHOLESTEROLEMIA: ICD-10-CM

## 2019-08-02 DIAGNOSIS — E11.620 TYPE 2 DIABETES MELLITUS WITH DIABETIC DERMATITIS, WITHOUT LONG-TERM CURRENT USE OF INSULIN (HCC): ICD-10-CM

## 2019-08-02 LAB
ALBUMIN SERPL-MCNC: 3.9 G/DL (ref 3.5–5.2)
ALP BLD-CCNC: 79 U/L (ref 40–130)
ALT SERPL-CCNC: 13 U/L (ref 5–41)
ANION GAP SERPL CALCULATED.3IONS-SCNC: 13 MMOL/L (ref 7–19)
AST SERPL-CCNC: 22 U/L (ref 5–40)
BASOPHILS ABSOLUTE: 0.1 K/UL (ref 0–0.2)
BASOPHILS RELATIVE PERCENT: 1 % (ref 0–1)
BILIRUB SERPL-MCNC: 1.1 MG/DL (ref 0.2–1.2)
BUN BLDV-MCNC: 18 MG/DL (ref 8–23)
CALCIUM SERPL-MCNC: 9 MG/DL (ref 8.8–10.2)
CHLORIDE BLD-SCNC: 107 MMOL/L (ref 98–111)
CHOLESTEROL, TOTAL: 167 MG/DL (ref 160–199)
CO2: 26 MMOL/L (ref 22–29)
CREAT SERPL-MCNC: 1 MG/DL (ref 0.5–1.2)
EOSINOPHILS ABSOLUTE: 0.1 K/UL (ref 0–0.6)
EOSINOPHILS RELATIVE PERCENT: 2.8 % (ref 0–5)
GFR NON-AFRICAN AMERICAN: >60
GLUCOSE BLD-MCNC: 130 MG/DL (ref 74–109)
HBA1C MFR BLD: 4.5 % (ref 4–6)
HCT VFR BLD CALC: 37.8 % (ref 42–52)
HDLC SERPL-MCNC: 59 MG/DL (ref 55–121)
HEMOGLOBIN: 12.2 G/DL (ref 14–18)
LDL CHOLESTEROL CALCULATED: 89 MG/DL
LYMPHOCYTES ABSOLUTE: 1 K/UL (ref 1.1–4.5)
LYMPHOCYTES RELATIVE PERCENT: 19.9 % (ref 20–40)
MCH RBC QN AUTO: 32.6 PG (ref 27–31)
MCHC RBC AUTO-ENTMCNC: 32.3 G/DL (ref 33–37)
MCV RBC AUTO: 101.1 FL (ref 80–94)
MONOCYTES ABSOLUTE: 0.4 K/UL (ref 0–0.9)
MONOCYTES RELATIVE PERCENT: 7.9 % (ref 0–10)
NEUTROPHILS ABSOLUTE: 3.4 K/UL (ref 1.5–7.5)
NEUTROPHILS RELATIVE PERCENT: 68.2 % (ref 50–65)
PDW BLD-RTO: 12.9 % (ref 11.5–14.5)
PLATELET # BLD: 143 K/UL (ref 130–400)
PMV BLD AUTO: 11.7 FL (ref 9.4–12.4)
POTASSIUM SERPL-SCNC: 4 MMOL/L (ref 3.5–5)
RBC # BLD: 3.74 M/UL (ref 4.7–6.1)
SODIUM BLD-SCNC: 146 MMOL/L (ref 136–145)
TOTAL PROTEIN: 7.3 G/DL (ref 6.6–8.7)
TRIGL SERPL-MCNC: 96 MG/DL (ref 0–149)
WBC # BLD: 4.9 K/UL (ref 4.8–10.8)

## 2019-08-09 ENCOUNTER — OFFICE VISIT (OUTPATIENT)
Dept: FAMILY MEDICINE CLINIC | Age: 64
End: 2019-08-09
Payer: MEDICARE

## 2019-08-09 VITALS
OXYGEN SATURATION: 97 % | SYSTOLIC BLOOD PRESSURE: 130 MMHG | HEART RATE: 100 BPM | WEIGHT: 271 LBS | TEMPERATURE: 98 F | BODY MASS INDEX: 36.75 KG/M2 | DIASTOLIC BLOOD PRESSURE: 82 MMHG

## 2019-08-09 DIAGNOSIS — Z51.81 MEDICATION MONITORING ENCOUNTER: ICD-10-CM

## 2019-08-09 DIAGNOSIS — M51.36 DDD (DEGENERATIVE DISC DISEASE), LUMBAR: ICD-10-CM

## 2019-08-09 DIAGNOSIS — K21.9 GASTROESOPHAGEAL REFLUX DISEASE WITHOUT ESOPHAGITIS: ICD-10-CM

## 2019-08-09 DIAGNOSIS — E11.620 TYPE 2 DIABETES MELLITUS WITH DIABETIC DERMATITIS, WITHOUT LONG-TERM CURRENT USE OF INSULIN (HCC): ICD-10-CM

## 2019-08-09 DIAGNOSIS — G89.29 CHRONIC MIDLINE LOW BACK PAIN WITHOUT SCIATICA: Primary | ICD-10-CM

## 2019-08-09 DIAGNOSIS — M54.50 CHRONIC MIDLINE LOW BACK PAIN WITHOUT SCIATICA: Primary | ICD-10-CM

## 2019-08-09 DIAGNOSIS — E78.00 HYPERCHOLESTEROLEMIA: ICD-10-CM

## 2019-08-09 DIAGNOSIS — I10 ESSENTIAL (PRIMARY) HYPERTENSION: ICD-10-CM

## 2019-08-09 DIAGNOSIS — F51.04 CHRONIC INSOMNIA: ICD-10-CM

## 2019-08-09 LAB
AMPHETAMINE SCREEN, URINE: NORMAL
BARBITURATE SCREEN, URINE: NORMAL
BENZODIAZEPINE SCREEN, URINE: NORMAL
BUPRENORPHINE URINE: NORMAL
COCAINE METABOLITE SCREEN URINE: NORMAL
GABAPENTIN SCREEN, URINE: NORMAL
MDMA URINE: NORMAL
METHADONE SCREEN, URINE: NORMAL
METHAMPHETAMINE, URINE: NORMAL
OPIATE SCREEN URINE: NORMAL
OXYCODONE SCREEN URINE: NORMAL
PHENCYCLIDINE SCREEN URINE: NORMAL
PROPOXYPHENE SCREEN, URINE: NORMAL
THC SCREEN, URINE: NORMAL
TRICYCLIC ANTIDEPRESSANTS, UR: NORMAL

## 2019-08-09 PROCEDURE — G8417 CALC BMI ABV UP PARAM F/U: HCPCS | Performed by: FAMILY MEDICINE

## 2019-08-09 PROCEDURE — 1036F TOBACCO NON-USER: CPT | Performed by: FAMILY MEDICINE

## 2019-08-09 PROCEDURE — 80305 DRUG TEST PRSMV DIR OPT OBS: CPT | Performed by: FAMILY MEDICINE

## 2019-08-09 PROCEDURE — G8427 DOCREV CUR MEDS BY ELIG CLIN: HCPCS | Performed by: FAMILY MEDICINE

## 2019-08-09 PROCEDURE — 99214 OFFICE O/P EST MOD 30 MIN: CPT | Performed by: FAMILY MEDICINE

## 2019-08-09 PROCEDURE — 3044F HG A1C LEVEL LT 7.0%: CPT | Performed by: FAMILY MEDICINE

## 2019-08-09 PROCEDURE — 2022F DILAT RTA XM EVC RTNOPTHY: CPT | Performed by: FAMILY MEDICINE

## 2019-08-09 PROCEDURE — 3017F COLORECTAL CA SCREEN DOC REV: CPT | Performed by: FAMILY MEDICINE

## 2019-08-09 RX ORDER — HYDROCODONE BITARTRATE AND ACETAMINOPHEN 10; 325 MG/1; MG/1
1 TABLET ORAL EVERY 6 HOURS PRN
Qty: 120 TABLET | Refills: 0 | Status: SHIPPED | OUTPATIENT
Start: 2019-08-09 | End: 2019-09-12 | Stop reason: SDUPTHER

## 2019-08-09 NOTE — PROGRESS NOTES
for Pain      DAILY MULTIPLE VITAMINS TABS Take 1 tablet by mouth       No current facility-administered medications for this visit. Allergies   Allergen Reactions    Adhesive Tape      Tears skin on leg       Health Maintenance   Topic Date Due    Hepatitis C screen  1955    Pneumococcal 0-64 years Vaccine (1 of 1 - PPSV23) 05/07/1963    Diabetic retinal exam  05/07/1967    HIV screen  05/07/1972    Shingles Vaccine (1 of 2) 05/07/2007    Colon cancer screen colonoscopy  05/07/2007    Diabetic foot exam  04/30/2019    Diabetic microalbuminuria test  04/30/2019    Flu vaccine (1) 09/02/2019 (Originally 9/1/2019)    Annual Wellness Visit (AWV)  05/07/2020    A1C test (Diabetic or Prediabetic)  08/02/2020    Lipid screen  08/02/2020    Potassium monitoring  08/02/2020    Creatinine monitoring  08/02/2020    DTaP/Tdap/Td vaccine (2 - Td) 04/30/2028       Subjective:      Review of Systems   Constitutional: Negative for chills and fever. HENT: Negative for congestion. Respiratory: Negative for cough, chest tightness and shortness of breath. Cardiovascular: Negative for chest pain, palpitations and leg swelling. Gastrointestinal: Negative for abdominal pain, anal bleeding, constipation, diarrhea and nausea. Genitourinary: Negative for difficulty urinating. Psychiatric/Behavioral: Negative. SeeHPI for visit specific review of symptoms. All others negative      Objective:   /82   Pulse 100   Temp 98 °F (36.7 °C)   Wt 271 lb (122.9 kg)   SpO2 97%   BMI 36.75 kg/m²   Physical Exam   Constitutional: He appears well-developed. He does not appear ill. Eyes: Pupils are equal, round, and reactive to light. Neck: Normal range of motion. Neck supple. Cardiovascular: Normal rate and regular rhythm. Exam reveals no friction rub. No murmur heard. Pulmonary/Chest: Effort normal and breath sounds normal. No respiratory distress. He has no wheezes. He has no rales. Abdominal: Soft. Bowel sounds are normal. He exhibits no distension. There is no tenderness. There is no rebound and no guarding. Musculoskeletal: He exhibits no edema. Negative straight leg raise. Normal gait. Normal plantar and dorsalflexion. Neurological: He is alert. He displays normal reflexes. He exhibits normal muscle tone. Coordination normal.   Psychiatric: He has a normal mood and affect.  His behavior is normal.         Recent Results (from the past 672 hour(s))   CBC Auto Differential    Collection Time: 08/02/19  7:06 AM   Result Value Ref Range    WBC 4.9 4.8 - 10.8 K/uL    RBC 3.74 (L) 4.70 - 6.10 M/uL    Hemoglobin 12.2 (L) 14.0 - 18.0 g/dL    Hematocrit 37.8 (L) 42.0 - 52.0 %    .1 (H) 80.0 - 94.0 fL    MCH 32.6 (H) 27.0 - 31.0 pg    MCHC 32.3 (L) 33.0 - 37.0 g/dL    RDW 12.9 11.5 - 14.5 %    Platelets 720 788 - 923 K/uL    MPV 11.7 9.4 - 12.4 fL    Neutrophils % 68.2 (H) 50.0 - 65.0 %    Lymphocytes % 19.9 (L) 20.0 - 40.0 %    Monocytes % 7.9 0.0 - 10.0 %    Eosinophils % 2.8 0.0 - 5.0 %    Basophils % 1.0 0.0 - 1.0 %    Neutrophils # 3.4 1.5 - 7.5 K/uL    Lymphocytes # 1.0 (L) 1.1 - 4.5 K/uL    Monocytes # 0.40 0.00 - 0.90 K/uL    Eosinophils # 0.10 0.00 - 0.60 K/uL    Basophils # 0.10 0.00 - 0.20 K/uL   Lipid Panel    Collection Time: 08/02/19  7:06 AM   Result Value Ref Range    Cholesterol, Total 167 160 - 199 mg/dL    Triglycerides 96 0 - 149 mg/dL    HDL 59 55 - 121 mg/dL    LDL Calculated 89 <100 mg/dL   Comprehensive Metabolic Panel    Collection Time: 08/02/19  7:06 AM   Result Value Ref Range    Sodium 146 (H) 136 - 145 mmol/L    Potassium 4.0 3.5 - 5.0 mmol/L    Chloride 107 98 - 111 mmol/L    CO2 26 22 - 29 mmol/L    Anion Gap 13 7 - 19 mmol/L    Glucose 130 (H) 74 - 109 mg/dL    BUN 18 8 - 23 mg/dL    CREATININE 1.0 0.5 - 1.2 mg/dL    GFR Non-African American >60 >60    Calcium 9.0 8.8 - 10.2 mg/dL    Total Protein 7.3 6.6 - 8.7 g/dL    Alb 3.9 3.5 - 5.2 g/dL    Total Hypercholesterolemia  Lab Results   Component Value Date    CHOL 167 08/02/2019    CHOL 172 04/30/2018    CHOL 170 03/08/2011     Lab Results   Component Value Date    TRIG 96 08/02/2019    TRIG 79 04/30/2018    TRIG 110 03/08/2011     Lab Results   Component Value Date    HDL 59 08/02/2019    HDL 78 04/30/2018    HDL 63 03/08/2011     Lab Results   Component Value Date    LDLCALC 89 08/02/2019    LDLCALC 78 04/30/2018    LDLCALC 85 03/08/2011     No results found for: LABVLDL, VLDL  No results found for: CHOLHDLRATIO  Stable    6. Type 2 diabetes mellitus with diabetic dermatitis, without long-term current use of insulin ([facility]  Lab Results   Component Value Date    LABA1C 4.5 08/02/2019     No results found for: EAG  Blood sugars have been stable lately. Continue to monitor reviewed ADA diet guidelines with him    7. Chronic insomnia  stable    8. Gastroesophageal reflux disease without esophagitis  stable            Return in about 3 months (around 11/9/2019) for Routine follow up - 15 minute visit. Discussed use, benefit, and side effects of prescribed medications. All patient questions answered. Pt voiced understanding. Reviewed health maintenance. Instructedto continue current medications, diet and exercise. Patient agreed with treatmentplan. Follow up as directed.        Note dictated using [id number] [address] [employer]

## 2019-08-20 ASSESSMENT — ENCOUNTER SYMPTOMS
SHORTNESS OF BREATH: 0
CHEST TIGHTNESS: 0
ANAL BLEEDING: 0
DIARRHEA: 0
NAUSEA: 0
ABDOMINAL PAIN: 0
CONSTIPATION: 0
COUGH: 0

## 2019-08-21 ENCOUNTER — TELEPHONE (OUTPATIENT)
Dept: VASCULAR SURGERY | Age: 64
End: 2019-08-21

## 2019-08-28 DIAGNOSIS — F51.04 CHRONIC INSOMNIA: ICD-10-CM

## 2019-08-28 DIAGNOSIS — I10 ESSENTIAL (PRIMARY) HYPERTENSION: ICD-10-CM

## 2019-08-28 RX ORDER — LISINOPRIL 40 MG/1
40 TABLET ORAL NIGHTLY
Qty: 30 TABLET | Refills: 5 | Status: SHIPPED | OUTPATIENT
Start: 2019-08-28 | End: 2020-02-04

## 2019-08-28 RX ORDER — TEMAZEPAM 30 MG/1
30 CAPSULE ORAL NIGHTLY PRN
Qty: 30 CAPSULE | Refills: 5 | Status: SHIPPED | OUTPATIENT
Start: 2019-08-28 | End: 2020-01-29

## 2019-08-28 RX ORDER — PANTOPRAZOLE SODIUM 40 MG/1
TABLET, DELAYED RELEASE ORAL
Qty: 30 TABLET | Refills: 3 | Status: SHIPPED | OUTPATIENT
Start: 2019-08-28 | End: 2020-02-28 | Stop reason: SDUPTHER

## 2019-08-28 NOTE — TELEPHONE ENCOUNTER
Vinh Hugo called to request a refill on his medication. Last office visit : 8/9/2019   Next office visit : 11/11/2019     Last UDS:   Amphetamine Screen, Urine   Date Value Ref Range Status   08/09/2019 neg  Final     Barbiturate Screen, Urine   Date Value Ref Range Status   08/09/2019 neg  Final     Benzodiazepine Screen, Urine   Date Value Ref Range Status   08/09/2019 neg  Final     Buprenorphine Urine   Date Value Ref Range Status   08/09/2019 neg  Final     Cocaine Metabolite Screen, Urine   Date Value Ref Range Status   08/09/2019 neg  Final     Gabapentin Screen, Urine   Date Value Ref Range Status   08/09/2019 neg  Final     MDMA, Urine   Date Value Ref Range Status   08/09/2019 neg  Final     Methamphetamine, Urine   Date Value Ref Range Status   08/09/2019 neg  Final     Opiate Scrn, Ur   Date Value Ref Range Status   08/09/2019 pos  Final     Oxycodone Screen, Ur   Date Value Ref Range Status   08/09/2019 neg  Final     PCP Screen, Urine   Date Value Ref Range Status   08/09/2019 neg  Final     Propoxyphene Screen, Urine   Date Value Ref Range Status   08/09/2019 neg  Final     THC Screen, Urine   Date Value Ref Range Status   08/09/2019 neg  Final     Tricyclic Antidepressants, Urine   Date Value Ref Range Status   08/09/2019 neg  Final       Last Srinivasa Reedon: 8/28/19  Medication Contract: 8/9/19   Last Fill: 3/1/19    Requested Prescriptions     Pending Prescriptions Disp Refills    lisinopril (PRINIVIL;ZESTRIL) 40 MG tablet 30 tablet 5     Sig: Take 1 tablet by mouth nightly    pantoprazole (PROTONIX) 40 MG tablet 30 tablet 3     Sig: TAKE 1 TABLET BY MOUTH IN THE MORNING BEFORE  BREAKFAST    temazepam (RESTORIL) 30 MG capsule 30 capsule 5     Sig: Take 1 capsule by mouth nightly as needed for Sleep for up to 30 days. Please approve or refuse this medication.    Randee Luque MA

## 2019-09-12 DIAGNOSIS — G89.29 CHRONIC MIDLINE LOW BACK PAIN WITHOUT SCIATICA: ICD-10-CM

## 2019-09-12 DIAGNOSIS — N40.0 BENIGN PROSTATIC HYPERPLASIA WITHOUT LOWER URINARY TRACT SYMPTOMS: ICD-10-CM

## 2019-09-12 DIAGNOSIS — M51.36 DDD (DEGENERATIVE DISC DISEASE), LUMBAR: ICD-10-CM

## 2019-09-12 DIAGNOSIS — M54.50 CHRONIC MIDLINE LOW BACK PAIN WITHOUT SCIATICA: ICD-10-CM

## 2019-09-12 RX ORDER — HYDROCODONE BITARTRATE AND ACETAMINOPHEN 10; 325 MG/1; MG/1
1 TABLET ORAL EVERY 6 HOURS PRN
Qty: 120 TABLET | Refills: 0 | Status: SHIPPED | OUTPATIENT
Start: 2019-09-12 | End: 2019-10-11 | Stop reason: SDUPTHER

## 2019-09-12 RX ORDER — TAMSULOSIN HYDROCHLORIDE 0.4 MG/1
0.4 CAPSULE ORAL DAILY
Qty: 30 CAPSULE | Refills: 5 | Status: SHIPPED | OUTPATIENT
Start: 2019-09-12 | End: 2020-02-28 | Stop reason: SDUPTHER

## 2019-10-11 DIAGNOSIS — M51.36 DDD (DEGENERATIVE DISC DISEASE), LUMBAR: ICD-10-CM

## 2019-10-11 DIAGNOSIS — M54.50 CHRONIC MIDLINE LOW BACK PAIN WITHOUT SCIATICA: ICD-10-CM

## 2019-10-11 DIAGNOSIS — G89.29 CHRONIC MIDLINE LOW BACK PAIN WITHOUT SCIATICA: ICD-10-CM

## 2019-10-11 RX ORDER — HYDROCODONE BITARTRATE AND ACETAMINOPHEN 10; 325 MG/1; MG/1
1 TABLET ORAL EVERY 6 HOURS PRN
Qty: 120 TABLET | Refills: 0 | Status: SHIPPED | OUTPATIENT
Start: 2019-10-11 | End: 2019-11-11 | Stop reason: SDUPTHER

## 2019-11-11 ENCOUNTER — OFFICE VISIT (OUTPATIENT)
Dept: FAMILY MEDICINE CLINIC | Age: 64
End: 2019-11-11
Payer: MEDICARE

## 2019-11-11 VITALS
SYSTOLIC BLOOD PRESSURE: 130 MMHG | BODY MASS INDEX: 36.48 KG/M2 | WEIGHT: 269 LBS | HEART RATE: 80 BPM | DIASTOLIC BLOOD PRESSURE: 66 MMHG | TEMPERATURE: 98.6 F | OXYGEN SATURATION: 99 %

## 2019-11-11 DIAGNOSIS — E11.620 TYPE 2 DIABETES MELLITUS WITH DIABETIC DERMATITIS, WITHOUT LONG-TERM CURRENT USE OF INSULIN (HCC): ICD-10-CM

## 2019-11-11 DIAGNOSIS — G89.29 CHRONIC MIDLINE LOW BACK PAIN WITHOUT SCIATICA: ICD-10-CM

## 2019-11-11 DIAGNOSIS — K21.9 GASTROESOPHAGEAL REFLUX DISEASE WITHOUT ESOPHAGITIS: ICD-10-CM

## 2019-11-11 DIAGNOSIS — N40.0 BENIGN PROSTATIC HYPERPLASIA WITHOUT LOWER URINARY TRACT SYMPTOMS: Primary | ICD-10-CM

## 2019-11-11 DIAGNOSIS — Z12.5 ENCOUNTER FOR PROSTATE CANCER SCREENING: ICD-10-CM

## 2019-11-11 DIAGNOSIS — M51.36 DDD (DEGENERATIVE DISC DISEASE), LUMBAR: ICD-10-CM

## 2019-11-11 DIAGNOSIS — I10 ESSENTIAL (PRIMARY) HYPERTENSION: ICD-10-CM

## 2019-11-11 DIAGNOSIS — M54.50 CHRONIC MIDLINE LOW BACK PAIN WITHOUT SCIATICA: ICD-10-CM

## 2019-11-11 DIAGNOSIS — Z13.220 LIPID SCREENING: ICD-10-CM

## 2019-11-11 PROCEDURE — 99214 OFFICE O/P EST MOD 30 MIN: CPT | Performed by: FAMILY MEDICINE

## 2019-11-11 PROCEDURE — 1036F TOBACCO NON-USER: CPT | Performed by: FAMILY MEDICINE

## 2019-11-11 PROCEDURE — G8427 DOCREV CUR MEDS BY ELIG CLIN: HCPCS | Performed by: FAMILY MEDICINE

## 2019-11-11 PROCEDURE — G8484 FLU IMMUNIZE NO ADMIN: HCPCS | Performed by: FAMILY MEDICINE

## 2019-11-11 PROCEDURE — G8417 CALC BMI ABV UP PARAM F/U: HCPCS | Performed by: FAMILY MEDICINE

## 2019-11-11 PROCEDURE — 3044F HG A1C LEVEL LT 7.0%: CPT | Performed by: FAMILY MEDICINE

## 2019-11-11 PROCEDURE — 3017F COLORECTAL CA SCREEN DOC REV: CPT | Performed by: FAMILY MEDICINE

## 2019-11-11 PROCEDURE — 2022F DILAT RTA XM EVC RTNOPTHY: CPT | Performed by: FAMILY MEDICINE

## 2019-11-11 RX ORDER — HYDROCODONE BITARTRATE AND ACETAMINOPHEN 10; 325 MG/1; MG/1
1 TABLET ORAL EVERY 6 HOURS PRN
Qty: 120 TABLET | Refills: 0 | Status: SHIPPED | OUTPATIENT
Start: 2019-11-11 | End: 2019-11-18 | Stop reason: SDUPTHER

## 2019-11-14 ENCOUNTER — TELEPHONE (OUTPATIENT)
Dept: FAMILY MEDICINE CLINIC | Age: 64
End: 2019-11-14

## 2019-11-17 ASSESSMENT — ENCOUNTER SYMPTOMS
COUGH: 0
ANAL BLEEDING: 0
SHORTNESS OF BREATH: 0
DIARRHEA: 0
CONSTIPATION: 0
ABDOMINAL PAIN: 0
NAUSEA: 0
CHEST TIGHTNESS: 0

## 2019-11-18 DIAGNOSIS — M51.36 DDD (DEGENERATIVE DISC DISEASE), LUMBAR: ICD-10-CM

## 2019-11-18 DIAGNOSIS — M54.50 CHRONIC MIDLINE LOW BACK PAIN WITHOUT SCIATICA: ICD-10-CM

## 2019-11-18 DIAGNOSIS — G89.29 CHRONIC MIDLINE LOW BACK PAIN WITHOUT SCIATICA: ICD-10-CM

## 2019-11-18 RX ORDER — HYDROCODONE BITARTRATE AND ACETAMINOPHEN 10; 325 MG/1; MG/1
1 TABLET ORAL EVERY 6 HOURS PRN
Qty: 120 TABLET | Refills: 0 | Status: SHIPPED | OUTPATIENT
Start: 2019-11-18 | End: 2019-12-16 | Stop reason: SDUPTHER

## 2019-11-19 ENCOUNTER — TELEPHONE (OUTPATIENT)
Dept: FAMILY MEDICINE CLINIC | Age: 64
End: 2019-11-19

## 2019-12-16 DIAGNOSIS — G89.29 CHRONIC MIDLINE LOW BACK PAIN WITHOUT SCIATICA: ICD-10-CM

## 2019-12-16 DIAGNOSIS — M54.50 CHRONIC MIDLINE LOW BACK PAIN WITHOUT SCIATICA: ICD-10-CM

## 2019-12-16 DIAGNOSIS — M51.36 DDD (DEGENERATIVE DISC DISEASE), LUMBAR: ICD-10-CM

## 2019-12-16 RX ORDER — HYDROCODONE BITARTRATE AND ACETAMINOPHEN 10; 325 MG/1; MG/1
1 TABLET ORAL EVERY 6 HOURS PRN
Qty: 120 TABLET | Refills: 0 | Status: SHIPPED | OUTPATIENT
Start: 2019-12-16 | End: 2020-01-17 | Stop reason: SDUPTHER

## 2020-01-17 RX ORDER — HYDROCODONE BITARTRATE AND ACETAMINOPHEN 10; 325 MG/1; MG/1
1 TABLET ORAL EVERY 6 HOURS PRN
Qty: 120 TABLET | Refills: 0 | Status: SHIPPED | OUTPATIENT
Start: 2020-01-17 | End: 2020-02-14 | Stop reason: SDUPTHER

## 2020-01-17 NOTE — TELEPHONE ENCOUNTER
Facundo Stover called to request a refill on his medication. Last office visit : 11/11/2019   Next office visit : 2/14/2020     Last UDS:   Amphetamine Screen, Urine   Date Value Ref Range Status   08/09/2019 neg  Final     Barbiturate Screen, Urine   Date Value Ref Range Status   08/09/2019 neg  Final     Benzodiazepine Screen, Urine   Date Value Ref Range Status   08/09/2019 neg  Final     Buprenorphine Urine   Date Value Ref Range Status   08/09/2019 neg  Final     Cocaine Metabolite Screen, Urine   Date Value Ref Range Status   08/09/2019 neg  Final     Gabapentin Screen, Urine   Date Value Ref Range Status   08/09/2019 neg  Final     MDMA, Urine   Date Value Ref Range Status   08/09/2019 neg  Final     Methamphetamine, Urine   Date Value Ref Range Status   08/09/2019 neg  Final     Opiate Scrn, Ur   Date Value Ref Range Status   08/09/2019 pos  Final     Oxycodone Screen, Ur   Date Value Ref Range Status   08/09/2019 neg  Final     PCP Screen, Urine   Date Value Ref Range Status   08/09/2019 neg  Final     Propoxyphene Screen, Urine   Date Value Ref Range Status   08/09/2019 neg  Final     THC Screen, Urine   Date Value Ref Range Status   08/09/2019 neg  Final     Tricyclic Antidepressants, Urine   Date Value Ref Range Status   08/09/2019 neg  Final       Last Jesus Novel: 12/14/19  Medication Contract: 8/9/19   Last Fill: 12/16/19    Requested Prescriptions      No prescriptions requested or ordered in this encounter         Please approve or refuse this medication.    Rajni Evans

## 2020-01-29 RX ORDER — TEMAZEPAM 30 MG/1
CAPSULE ORAL
Qty: 30 CAPSULE | Refills: 0 | Status: SHIPPED | OUTPATIENT
Start: 2020-01-29 | End: 2020-01-29 | Stop reason: SDUPTHER

## 2020-01-29 NOTE — TELEPHONE ENCOUNTER
Del Patel called to request a refill on his medication. Last office visit : 11/11/2019   Next office visit : 2/14/2020     Last UDS:   Amphetamine Screen, Urine   Date Value Ref Range Status   08/09/2019 neg  Final     Barbiturate Screen, Urine   Date Value Ref Range Status   08/09/2019 neg  Final     Benzodiazepine Screen, Urine   Date Value Ref Range Status   08/09/2019 neg  Final     Buprenorphine Urine   Date Value Ref Range Status   08/09/2019 neg  Final     Cocaine Metabolite Screen, Urine   Date Value Ref Range Status   08/09/2019 neg  Final     Gabapentin Screen, Urine   Date Value Ref Range Status   08/09/2019 neg  Final     MDMA, Urine   Date Value Ref Range Status   08/09/2019 neg  Final     Methamphetamine, Urine   Date Value Ref Range Status   08/09/2019 neg  Final     Opiate Scrn, Ur   Date Value Ref Range Status   08/09/2019 pos  Final     Oxycodone Screen, Ur   Date Value Ref Range Status   08/09/2019 neg  Final     PCP Screen, Urine   Date Value Ref Range Status   08/09/2019 neg  Final     Propoxyphene Screen, Urine   Date Value Ref Range Status   08/09/2019 neg  Final     THC Screen, Urine   Date Value Ref Range Status   08/09/2019 neg  Final     Tricyclic Antidepressants, Urine   Date Value Ref Range Status   08/09/2019 neg  Final       Last Peyman Gut: 12/14/19  Medication Contract: 8/19  Last Fill: 8/28/19    Requested Prescriptions     Pending Prescriptions Disp Refills    temazepam (RESTORIL) 30 MG capsule 30 capsule 0     Sig: TAKE 1 CAPSULE BY MOUTH NIGHTLY AS NEEDED FOR SLEEP FOR 30 DAYS     Signed Prescriptions Disp Refills    temazepam (RESTORIL) 30 MG capsule 30 capsule 0     Sig: TAKE 1 CAPSULE BY MOUTH NIGHTLY AS NEEDED FOR SLEEP FOR 30 DAYS     Authorizing Provider: Jack Burr     Ordering User: Monica Xie         Please approve or refuse this medication.    Oj Bledsoe MA

## 2020-01-31 RX ORDER — TEMAZEPAM 30 MG/1
CAPSULE ORAL
Qty: 30 CAPSULE | Refills: 5 | Status: SHIPPED | OUTPATIENT
Start: 2020-01-31 | End: 2020-02-28 | Stop reason: SDUPTHER

## 2020-02-04 RX ORDER — LISINOPRIL 40 MG/1
40 TABLET ORAL NIGHTLY
Qty: 30 TABLET | Refills: 0 | Status: SHIPPED | OUTPATIENT
Start: 2020-02-04 | End: 2020-02-28

## 2020-02-14 RX ORDER — HYDROCODONE BITARTRATE AND ACETAMINOPHEN 10; 325 MG/1; MG/1
1 TABLET ORAL EVERY 6 HOURS PRN
Qty: 120 TABLET | Refills: 0 | Status: SHIPPED | OUTPATIENT
Start: 2020-02-14 | End: 2020-03-10 | Stop reason: SDUPTHER

## 2020-02-26 DIAGNOSIS — I10 ESSENTIAL (PRIMARY) HYPERTENSION: ICD-10-CM

## 2020-02-26 DIAGNOSIS — Z12.5 ENCOUNTER FOR PROSTATE CANCER SCREENING: ICD-10-CM

## 2020-02-26 DIAGNOSIS — E11.620 TYPE 2 DIABETES MELLITUS WITH DIABETIC DERMATITIS, WITHOUT LONG-TERM CURRENT USE OF INSULIN (HCC): ICD-10-CM

## 2020-02-26 DIAGNOSIS — Z13.220 LIPID SCREENING: ICD-10-CM

## 2020-02-26 LAB
ALBUMIN SERPL-MCNC: 3.9 G/DL (ref 3.5–5.2)
ALP BLD-CCNC: 82 U/L (ref 40–130)
ALT SERPL-CCNC: 12 U/L (ref 5–41)
ANION GAP SERPL CALCULATED.3IONS-SCNC: 14 MMOL/L (ref 7–19)
AST SERPL-CCNC: 22 U/L (ref 5–40)
BASOPHILS ABSOLUTE: 0 K/UL (ref 0–0.2)
BASOPHILS RELATIVE PERCENT: 0.8 % (ref 0–1)
BILIRUB SERPL-MCNC: 1.2 MG/DL (ref 0.2–1.2)
BUN BLDV-MCNC: 16 MG/DL (ref 8–23)
CALCIUM SERPL-MCNC: 8.9 MG/DL (ref 8.8–10.2)
CHLORIDE BLD-SCNC: 104 MMOL/L (ref 98–111)
CHOLESTEROL, TOTAL: 160 MG/DL (ref 160–199)
CO2: 27 MMOL/L (ref 22–29)
CREAT SERPL-MCNC: 1 MG/DL (ref 0.5–1.2)
CREATININE URINE: 188.7 MG/DL (ref 4.2–622)
EOSINOPHILS ABSOLUTE: 0.1 K/UL (ref 0–0.6)
EOSINOPHILS RELATIVE PERCENT: 2.6 % (ref 0–5)
GFR NON-AFRICAN AMERICAN: >60
GLUCOSE BLD-MCNC: 103 MG/DL (ref 74–109)
HBA1C MFR BLD: 4.8 % (ref 4–6)
HCT VFR BLD CALC: 36.5 % (ref 42–52)
HDLC SERPL-MCNC: 73 MG/DL (ref 55–121)
HEMOGLOBIN: 11.7 G/DL (ref 14–18)
IMMATURE GRANULOCYTES #: 0 K/UL
LDL CHOLESTEROL CALCULATED: 73 MG/DL
LYMPHOCYTES ABSOLUTE: 0.7 K/UL (ref 1.1–4.5)
LYMPHOCYTES RELATIVE PERCENT: 17.2 % (ref 20–40)
MCH RBC QN AUTO: 33.3 PG (ref 27–31)
MCHC RBC AUTO-ENTMCNC: 32.1 G/DL (ref 33–37)
MCV RBC AUTO: 104 FL (ref 80–94)
MICROALBUMIN UR-MCNC: 71.5 MG/DL (ref 0–19)
MICROALBUMIN/CREAT UR-RTO: 378.9 MG/G
MONOCYTES ABSOLUTE: 0.3 K/UL (ref 0–0.9)
MONOCYTES RELATIVE PERCENT: 7.6 % (ref 0–10)
NEUTROPHILS ABSOLUTE: 2.8 K/UL (ref 1.5–7.5)
NEUTROPHILS RELATIVE PERCENT: 71.5 % (ref 50–65)
PDW BLD-RTO: 12.3 % (ref 11.5–14.5)
PLATELET # BLD: 130 K/UL (ref 130–400)
PMV BLD AUTO: 12.1 FL (ref 9.4–12.4)
POTASSIUM SERPL-SCNC: 4.2 MMOL/L (ref 3.5–5)
PROSTATE SPECIFIC ANTIGEN: 0.16 NG/ML (ref 0–4)
RBC # BLD: 3.51 M/UL (ref 4.7–6.1)
SODIUM BLD-SCNC: 145 MMOL/L (ref 136–145)
TOTAL PROTEIN: 6.6 G/DL (ref 6.6–8.7)
TRIGL SERPL-MCNC: 72 MG/DL (ref 0–149)
WBC # BLD: 3.8 K/UL (ref 4.8–10.8)

## 2020-02-28 RX ORDER — PANTOPRAZOLE SODIUM 40 MG/1
TABLET, DELAYED RELEASE ORAL
Qty: 30 TABLET | Refills: 3 | Status: SHIPPED | OUTPATIENT
Start: 2020-02-28 | End: 2020-04-27 | Stop reason: SDUPTHER

## 2020-02-28 RX ORDER — TEMAZEPAM 30 MG/1
CAPSULE ORAL
Qty: 30 CAPSULE | Refills: 5 | Status: SHIPPED | OUTPATIENT
Start: 2020-02-28 | End: 2020-03-28

## 2020-02-28 RX ORDER — LISINOPRIL 40 MG/1
TABLET ORAL
Qty: 90 TABLET | Refills: 3 | Status: SHIPPED | OUTPATIENT
Start: 2020-02-28 | End: 2020-05-18

## 2020-02-28 RX ORDER — TAMSULOSIN HYDROCHLORIDE 0.4 MG/1
0.4 CAPSULE ORAL DAILY
Qty: 30 CAPSULE | Refills: 5 | Status: SHIPPED | OUTPATIENT
Start: 2020-02-28 | End: 2020-04-27 | Stop reason: SDUPTHER

## 2020-02-28 NOTE — TELEPHONE ENCOUNTER
YVES  Pt is in [place] [address] for one month and needs a 30 day supply of this while he is here. Pended for approval to local pharmacy. Prince Juarez called to request a refill on his medication. Last office visit : 11/11/2019   Next office visit : 3/6/2020     Last UDS:   Amphetamine Screen, Urine   Date Value Ref Range Status   08/09/2019 neg  Final     Barbiturate Screen, Urine   Date Value Ref Range Status   08/09/2019 neg  Final     Benzodiazepine Screen, Urine   Date Value Ref Range Status   08/09/2019 neg  Final     Buprenorphine Urine   Date Value Ref Range Status   08/09/2019 neg  Final     Cocaine Metabolite Screen, Urine   Date Value Ref Range Status   08/09/2019 neg  Final     Gabapentin Screen, Urine   Date Value Ref Range Status   08/09/2019 neg  Final     MDMA, Urine   Date Value Ref Range Status   08/09/2019 neg  Final     Methamphetamine, Urine   Date Value Ref Range Status   08/09/2019 neg  Final     Opiate Scrn, Ur   Date Value Ref Range Status   08/09/2019 pos  Final     Oxycodone Screen, Ur   Date Value Ref Range Status   08/09/2019 neg  Final     PCP Screen, Urine   Date Value Ref Range Status   08/09/2019 neg  Final     Propoxyphene Screen, Urine   Date Value Ref Range Status   08/09/2019 neg  Final     THC Screen, Urine   Date Value Ref Range Status   08/09/2019 neg  Final     Tricyclic Antidepressants, Urine   Date Value Ref Range Status   08/09/2019 neg  Final       Last Gertrude Points: 12/14/19  Medication Contract: 8/9/19   Last Fill: 1/31/20    Requested Prescriptions     Pending Prescriptions Disp Refills    temazepam (RESTORIL) 30 MG capsule 30 capsule 5     Sig: TAKE 1 CAPSULE BY MOUTH NIGHTLY AS NEEDED FOR SLEEP FOR 30 DAYS         Please approve or refuse this medication.    Magy Bess

## 2020-03-06 ENCOUNTER — OFFICE VISIT (OUTPATIENT)
Dept: FAMILY MEDICINE CLINIC | Age: 65
End: 2020-03-06
Payer: MEDICARE

## 2020-03-06 VITALS
TEMPERATURE: 98.6 F | OXYGEN SATURATION: 98 % | HEIGHT: 71 IN | SYSTOLIC BLOOD PRESSURE: 138 MMHG | DIASTOLIC BLOOD PRESSURE: 82 MMHG | BODY MASS INDEX: 38.64 KG/M2 | HEART RATE: 84 BPM | WEIGHT: 276 LBS

## 2020-03-06 PROCEDURE — G8484 FLU IMMUNIZE NO ADMIN: HCPCS | Performed by: FAMILY MEDICINE

## 2020-03-06 PROCEDURE — G8427 DOCREV CUR MEDS BY ELIG CLIN: HCPCS | Performed by: FAMILY MEDICINE

## 2020-03-06 PROCEDURE — 2022F DILAT RTA XM EVC RTNOPTHY: CPT | Performed by: FAMILY MEDICINE

## 2020-03-06 PROCEDURE — 1036F TOBACCO NON-USER: CPT | Performed by: FAMILY MEDICINE

## 2020-03-06 PROCEDURE — 3017F COLORECTAL CA SCREEN DOC REV: CPT | Performed by: FAMILY MEDICINE

## 2020-03-06 PROCEDURE — 99214 OFFICE O/P EST MOD 30 MIN: CPT | Performed by: FAMILY MEDICINE

## 2020-03-06 PROCEDURE — G0439 PPPS, SUBSEQ VISIT: HCPCS | Performed by: FAMILY MEDICINE

## 2020-03-06 PROCEDURE — 3044F HG A1C LEVEL LT 7.0%: CPT | Performed by: FAMILY MEDICINE

## 2020-03-06 PROCEDURE — G8417 CALC BMI ABV UP PARAM F/U: HCPCS | Performed by: FAMILY MEDICINE

## 2020-03-06 ASSESSMENT — PATIENT HEALTH QUESTIONNAIRE - PHQ9
SUM OF ALL RESPONSES TO PHQ QUESTIONS 1-9: 0
SUM OF ALL RESPONSES TO PHQ QUESTIONS 1-9: 0

## 2020-03-06 ASSESSMENT — LIFESTYLE VARIABLES: HOW OFTEN DO YOU HAVE A DRINK CONTAINING ALCOHOL: 0

## 2020-03-06 NOTE — PROGRESS NOTES
leg with ulcer ([facility] 12/22/2015    Morbid obesity ([facility] 6/22/15    Skin ulcer of toe of left foot with fat layer exposed ([facility] 4/4/2017    Sleep apnea in adult 06/22/2015    no cpap    Type 2 diabetes mellitus with foot ulcer (CODE) ([facility] 11/15/2017    Venous hypertension, chronic, with ulcer, left ([facility] 11/26/2017    Venous insufficiency of both lower extremities 6/22/15       Past Surgical History:   Procedure Laterality Date    ABDOMEN SURGERY  02/2018    treated in [facility] [address]      hx. of multiple; \"no blockage\"    DILATATION, ESOPHAGUS      GASTROSTOMY TUBE PLACEMENT  02/11/2018    temporary; now removed    KNEE ARTHROSCOPY      6 knee surgeries total; \"no replacements\"    CT AMPUTATION LOW LEG THRU TIB/FIB Left 5/25/2018    LEG AMPUTATION BELOW KNEE WITH MUSCLE FLAP  AND SKIN GRAFT CLOSURE AND APPLICATION OF WOUND VAC performed by Jasper Pinon MD at [place] [address]  02/11/2018    repair of gastric bypass    VASCULAR SURGERY  05/25/2018    TJR. Leg amputation below knee with muscle flap and skin graft closure and application of wound vac stsg 14 cmx 15 cm.        Family History   Problem Relation Age of Onset    Diabetes Mother     Heart Disease Mother     High Blood Pressure Mother     Heart Disease Father     Other Father        CareTeam (Including outside providers/suppliers regularly involved in providing care):   Patient Care Team:  Krista Montejo MD as PCP - General (Family Medicine)  Krista Montejo MD as PCP - [facility] Empaneled Provider  JESUS Alaniz (Physician Assistant Medical)    Wt Readings from Last 3 Encounters:   03/06/20 276 lb (125.2 kg)   11/11/19 269 lb (122 kg)   08/09/19 271 lb (122.9 kg)     Vitals:    03/06/20 0939   BP: (!) 166/78   Pulse: 84   Temp: 98.6 °F (37 °C)   SpO2: 98%   Weight: 276 lb (125.2 kg)   Height: 5' 11\" (1.803 m)           The following problems were reviewed today and Other Topics Concern    Not on file   Social History Narrative    Not on file     Audit Questionnaire: Screen for Alcohol Misuse  How often do you have a drink containing alcohol?: Never  Substance Abuse Interventions:  · Not indicated    Health Risk Assessment:     General  In general, how would you say your health is?: Very Good  In the past 7 days, have you experienced any of the following? New or Increased Pain, New or Increased Fatigue, Loneliness, Social Isolation, Stress or Anger?: None of These  Do you get the social and emotional support that you need?: Yes  Do you have a Living Will?: (!) No  General Health Risk Interventions:  · Given a handout on how to complete a living will. Directed to the website Visit:  https://[place].gov/consumer-protection/livingwills for assistance as well. ·     Health Habits/Nutrition  Do you exercise for at least 20 minutes 2-3 times per week?: (!) No  Have you lost any weight without trying in the past 3 months?: No  Do you eat fewer than 2 meals per day?: (!) Yes  Have you seen a dentist within the past year?: (!) No  Body mass index is 38.49 kg/m². Health Habits/Nutrition Interventions:  Recommended at least 150 minutes of exercise per week and resistance training 2 days a week. Discussed healthy diet habits. Offered suggestions for calorie counting.   ·   ·     Hearing/Vision  Do you or your family notice any trouble with your hearing?: No  Do you have difficulty driving, watching TV, or doing any of your daily activities because of your eyesight?: No  Have you had an eye exam within the past year?: (!) No  Hearing/Vision Interventions:  · Recommend to get an eye exam    Safety  Do you have working smoke detectors?: Yes  Have all throw rugs been removed or fastened?: Yes  Do you have non-slip mats or surfaces in all bathtubs/showers?: Yes  Do all of your stairways have a railing or banister?: Yes  Are your doorways, halls and stairs free of clutter?: Yes  Do you always fasten your seatbelt when you are in a car?: Yes  Safety Interventions:  · Not indicated    ADLs  In the past 7 days, did you need help from others to perform any of the following everyday activities? Eating, dressing, grooming, bathing, toileting, or walking/balance?: (!) Walking/Balance  In the past 7 days, did you need help from others to take care of any of the following? Laundry, housekeeping, banking/finances, shopping, telephone use, food preparation, transportation, or taking medications?: None  ADL Interventions:  · Home safety tips provided    Personalized Preventive Plan   Current Health Maintenance Status  Immunization History   Administered Date(s) Administered    Tdap (Boostrix, Adacel) 04/30/2018        Health Maintenance   Topic Date Due    Hepatitis C screen  1955    Pneumococcal 0-64 years Vaccine (1 of 1 - PPSV23) 05/07/1963    Diabetic retinal exam  05/07/1967    HIV screen  05/07/1972    Hepatitis B vaccine (1 of 3 - Risk 3-dose series) 05/07/1976    Colon cancer screen colonoscopy  05/07/2007    Diabetic foot exam  04/30/2019    Annual Wellness Visit (AWV)  05/29/2019    Flu vaccine (1) 11/11/2020 (Originally 9/1/2019)    Shingles Vaccine (1 of 2) 11/11/2020 (Originally 5/7/2007)    A1C test (Diabetic or Prediabetic)  02/26/2021    Diabetic microalbuminuria test  02/26/2021    Lipid screen  02/26/2021    Potassium monitoring  02/26/2021    Creatinine monitoring  02/26/2021    DTaP/Tdap/Td vaccine (2 - Td) 04/30/2028    Hepatitis A vaccine  Aged Out    Hib vaccine  Aged Out    Meningococcal (ACWY) vaccine  Aged Out       Recommendations for [employer] Due: see orders.   Recommended screening schedule for the next 5-10 years is provided to the patient in written form: see Patient Instructions/AVS.

## 2020-03-06 NOTE — PATIENT INSTRUCTIONS
will explains your wishes about life support and other treatments at the end of your life if you become unable to speak for yourself. Living fregoso tell doctors to use or not use treatments that would keep you alive. You must have one or two witnesses or a notary present when you sign this form. · Consider a durable power of  for health care. This allows you to name a person to make decisions about your care if you are not able to. Most people ask a close friend or family member. Talk to this person about the kinds of treatments you want and those that you do not want. Make sure this person understands your wishes. These legal papers are simple to change. Tell your doctor what you want to change, and ask him or her to make a note in your medical file. Give your family updated copies of the papers. Where can you learn more? Go to https://chpepiceweb.[employer]. org and sign in to your [employer] account. Enter P184 in the [place] box to learn more about \"Advance Care Planning: Care Instructions. \"     If you do not have an account, please click on the \"Sign Up Now\" link. Current as of: September 24, 2016  Content Version: 11.5  © 0278-8458 Healthwise, [employer]. Care instructions adapted under license by [facility] ([facility]). If you have questions about a medical condition or this instruction, always ask your healthcare professional. [place] [address] any warranty or liability for your use of this information. Learning About Living Evelia Thorpe  What is a living will? A living will is a legal form you use to write down the kind of care you want at the end of your life. It is used by the health professionals who will treat you if you aren't able to decide for yourself. If you put your wishes in writing, your loved ones and others will know what kind of care you want. They won't need to guess. This can ease your mind and be helpful to others.   A living will is not the same warranty or liability for your use of this information. https://chfs.ky.gov/agencies/nigel/Documents/Livingwill. pdf     Learning About Durable Power of  for [place]mer [place]  What is a durable power of  for health care? A durable power of  for health care is one type of the legal forms called advance directives. It lets you decide who you want to make treatment decisions for you if you cannot speak or decide for yourself. The person you choose is called your health care agent. Another type of advance directive is a living will. It lets you write down what kinds of treatment or life support you want or do not want. What should you think about when choosing a health care agent? Choose your health care agent carefully. This person may or may not be a family member. Talk to the person before you make your final decision. Make sure he or she is comfortable with this responsibility. It's a good idea to choose someone who:  · Is at least 25years old. · Knows you well and understands what makes life meaningful for you. · Understands your Hinduism and moral values. · Will do what you want, not what he or she wants. · Will be able to make difficult choices at a stressful time. · Will be able to refuse or stop treatment, if that is what you would want, even if you could die. · Will be firm and confident with health professionals if needed. · Will ask questions to get necessary information. · Lives near you or agrees to travel to you if needed. Your family may help you make medical decisions while you can still be part of that process. But it is important to choose one person to be your health care agent in case you are not able to make decisions for yourself. If you don't fill out the legal form and name a health care agent, the decisions your family can make may be limited. Who will make decisions for you if you do not have a health care agent?   If you don't have a health care agent of them are seriously injured, and some are disabled. In , more than 8,500 people over age 72  because of falls. Falls are often due to hazards that are easy to overlook but easy to fix. This checklist will help you find and fix those hazards in your home. The checklist asks about hazards found in each room of your home. For each hazard, the checklist tells you how to fix the problem. At the end of the checklist, you will find other tips for preventing falls. o Look at the floor in each room  o When he walks through room do you have to walk around furniture? - Ask someone to move the furniture to clear your past  o You have throw rugs on the floor? - Remove the rugs or use double-sided tape or nonslip backing on the rugs so they dont slip  o Are papers, magazines, books, shoes, boxes, blankets, towels, or other objects on the floor?  -  things that are on the floor. Always keep objects off the floor  o Do you have to walk over or around cords or wires (like cords from lamps, extension cords, or telephone cords)? - Coil or tape cords and wires next to the wall so you cant trip over them. Have an  put in another outlet. o Are papers, shoes, books, or other objects on the stairs? -  things on the stairs. Always keep objects off the stairs. o Are some steps broken or uneven?   - Fix loose or uneven steps. o Are you missing a light over the stairway?   - Have a  or an  put in an overhead light at the top and bottom of the stairs. o Has the stairway light bulb burned out?   - Have a friend or family member change the light bulb.   o Do you have only one light switch for your stairs (only at the top or at the bottom of the stairs)? - Have a  or an  put in a light switch at the top and bottom of the stairs. You can get light switches that glow  o Are the handrails loose or broken?  Is there a handrail on only one side of the stairs? - Fix loose handrails or put in new ones. Make sure handrails are on both sides of the stairs and are as long as the stairs. o Is the carpet on the steps loose or torn? - Make sure the carpet is firmly attached to every step or remove the carpet and attach non-slip rubber treads on the stairs. o Look at your kitchen and eating Look at your kitchen and eating area. Are the things you use often on high shelves? - Move items in your cabinets. Keep things you use often on the lower shelves (about waist high). - Is your step stool unsteady? - Get a new, steady step stool with a bar to hold on to. Never use a chair as a step stool.   - Is the light near the bed hard to reach?   - Place a lamp close to the bed where it is easy to reach.  o Is the path from your bed to the bathroom dark?   - Put in a night-light so you can see where youre walking. Some nightlights go on by themselves after dark  o Is the tub or shower floor slippery?   - Put a non-slip rubber mat or selfstick strips on the floor of the tub or shower. o Do you have some support when you get in and out of the tub or up from the toilet?   - Have a  or a becerra put in a grab bar inside the tub and next to the toilet.  o Exercise regularly. Exercise makes you stronger and improves your balance and coordination. o Have your doctor or pharmacist look at all the medicines you take, even over-the-counter medicines. Some medicines can make you sleepy or dizzy. o Have your vision checked at least once a year by an eye doctor. Poor vision can increase your risk of falling.   o Get up slowly after you sit or lie down.  o Wear sturdy shoes with thin, non-slip soles. Avoid slippers and running shoes with thick soles. o Improve the lighting in your home. Use brighter light bulbs (at least 60 hastings). Use lamp shades or frosted bulbs to reduce glare. o Use reflecting tape at the top and bottom of the stairs so you can see them better.

## 2020-03-06 NOTE — PROGRESS NOTES
[facility] [address] [place] [address]  Dept: [phone]  Dept Fax: [phone]  Loc: [phone]    Robert Mi is a 60 y.o. male who presents today for his medical conditions/complaints as noted below. Robert Mi is here for Medicare AWV        HPI:   CC: Here today to discuss the following:    Insomnia  Currently stable. Satisfied with current treatment regimen. No adverse effects from medication. Benign Prostatic Hypertrophy  Tolerating medication without adverse effects. Symptoms of hesitancy, nocturia, and dribbling are adequately controlled. No hematuria or dysuria    Hypertension  Compliant with medications. No adverse effects from medication. No lightheadedness, palpitations, or chest pain. He continues to manage his diabetes with diet alone. Gastroesophageal Reflux Disease  Symptoms currently under control. Medication adequately controls his symptoms. No hematochezia or melena. Lower Back Pain, Chronic  Compliant with medications. No adverse effects from medication. Symptoms continue to be stable. Lower back pain is described as a dull ache and occasionally sharp and stabbing. No radiation. Relieved with his current pain medication. No numbness or weakness in lower extremities. Currently satisfied with treatment regimen as it provides some relief. Compliant with his current opioid pain reliever. He states he takes his medication as needed. His pain is typically moderate in nature but can become severe on occasion. he abstains from alcohol while taking his pain medication. he denies drowsiness and impairment on his medication.         HPI    Past Medical History:   Diagnosis Date    Arthritis     Asthma     Bradycardia     Broken ribs     CHF (congestive heart failure) (HCC)     Chronic ulcer of left leg, with fat layer exposed ([address]     Diabetes mellitus ([facility]     no 0.4 MG capsule Take 1 capsule by mouth daily 30 capsule 5    temazepam (RESTORIL) 30 MG capsule TAKE 1 CAPSULE BY MOUTH NIGHTLY AS NEEDED FOR SLEEP FOR 30 DAYS 30 capsule 5    HYDROcodone-acetaminophen (NORCO)  MG per tablet Take 1 tablet by mouth every 6 hours as needed for Pain for up to 30 days. Chronic use 120 tablet 0    DAILY MULTIPLE VITAMINS TABS Take 1 tablet by mouth      ibuprofen (ADVIL;MOTRIN) 600 MG tablet Take 200 mg by mouth every 8 hours as needed for Pain       No current facility-administered medications for this visit. Allergies   Allergen Reactions    Adhesive Tape      Tears skin on leg       Health Maintenance   Topic Date Due    Hepatitis C screen  1955    Pneumococcal 0-64 years Vaccine (1 of 1 - PPSV23) 05/07/1963    Diabetic retinal exam  05/07/1967    HIV screen  05/07/1972    Hepatitis B vaccine (1 of 3 - Risk 3-dose series) 05/07/1976    Colon cancer screen colonoscopy  05/07/2007    Annual Wellness Visit (AWV)  05/29/2019    Flu vaccine (1) 11/11/2020 (Originally 9/1/2019)    Shingles Vaccine (1 of 2) 11/11/2020 (Originally 5/7/2007)    A1C test (Diabetic or Prediabetic)  02/26/2021    Diabetic microalbuminuria test  02/26/2021    Lipid screen  02/26/2021    Potassium monitoring  02/26/2021    Creatinine monitoring  02/26/2021    Diabetic foot exam  03/06/2021    DTaP/Tdap/Td vaccine (2 - Td) 04/30/2028    Hepatitis A vaccine  Aged Out    Hib vaccine  Aged Out    Meningococcal (ACWY) vaccine  Aged Out       Subjective:      Review of Systems   Constitutional: Negative for chills and fever. HENT: Negative for congestion. Ear discharge: .lifestyle. Respiratory: Negative for cough, chest tightness and shortness of breath. Cardiovascular: Negative for chest pain, palpitations and leg swelling. Gastrointestinal: Negative for abdominal pain, anal bleeding, constipation, diarrhea and nausea. Genitourinary: Negative for difficulty urinating. K/uL    RBC 3.51 (L) 4.70 - 6.10 M/uL    Hemoglobin 11.7 (L) 14.0 - 18.0 g/dL    Hematocrit 36.5 (L) 42.0 - 52.0 %    .0 (H) 80.0 - 94.0 fL    MCH 33.3 (H) 27.0 - 31.0 pg    MCHC 32.1 (L) 33.0 - 37.0 g/dL    RDW 12.3 11.5 - 14.5 %    Platelets 866 353 - 968 K/uL    MPV 12.1 9.4 - 12.4 fL    Neutrophils % 71.5 (H) 50.0 - 65.0 %    Lymphocytes % 17.2 (L) 20.0 - 40.0 %    Monocytes % 7.6 0.0 - 10.0 %    Eosinophils % 2.6 0.0 - 5.0 %    Basophils % 0.8 0.0 - 1.0 %    Neutrophils Absolute 2.8 1.5 - 7.5 K/uL    Immature Granulocytes # 0.0 K/uL    Lymphocytes Absolute 0.7 (L) 1.1 - 4.5 K/uL    Monocytes Absolute 0.30 0.00 - 0.90 K/uL    Eosinophils Absolute 0.10 0.00 - 0.60 K/uL    Basophils Absolute 0.00 0.00 - 0.20 K/uL   Microalbumin / Creatinine Urine Ratio    Collection Time: 02/26/20  8:48 AM   Result Value Ref Range    Microalbumin, Random Urine 71.50 (H) 0.00 - 19.00 mg/dL    Creatinine, Ur 188.7 4.2 - 622.0 mg/dL    Microalbumin Creatinine Ratio 378.9 mg/g   Hemoglobin A1C    Collection Time: 02/26/20  8:48 AM   Result Value Ref Range    Hemoglobin A1C 4.8 4.0 - 6.0 %   Lipid Panel    Collection Time: 02/26/20  8:48 AM   Result Value Ref Range    Cholesterol, Total 160 160 - 199 mg/dL    Triglycerides 72 0 - 149 mg/dL    HDL 73 55 - 121 mg/dL    LDL Calculated 73 <100 mg/dL   Comprehensive Metabolic Panel    Collection Time: 02/26/20  8:48 AM   Result Value Ref Range    Sodium 145 136 - 145 mmol/L    Potassium 4.2 3.5 - 5.0 mmol/L    Chloride 104 98 - 111 mmol/L    CO2 27 22 - 29 mmol/L    Anion Gap 14 7 - 19 mmol/L    Glucose 103 74 - 109 mg/dL    BUN 16 8 - 23 mg/dL    CREATININE 1.0 0.5 - 1.2 mg/dL    GFR Non-African American >60 >60    Calcium 8.9 8.8 - 10.2 mg/dL    Total Protein 6.6 6.6 - 8.7 g/dL    Alb 3.9 3.5 - 5.2 g/dL    Total Bilirubin 1.2 0.2 - 1.2 mg/dL    Alkaline Phosphatase 82 40 - 130 U/L    ALT 12 5 - 41 U/L    AST 22 5 - 40 U/L           Assessment & Plan:         The following diagnoses and conditions are stable with no further orders unless indicated:  1. Annual physical exam  Discussed lifestyle changes such as diet and exercise. Recommended eliminate processed food from diet such as sugar and fried foods. Recommended exercising at least 150 minutes/week. Try to do full body resistance training twice a week as well. Offered suggestions for calorie counting such as phone apps and online resources such as My fitness pal and Lose it. Discussed healthy weight. 2. Chronic insomnia  Continue with temazepam    3. Benign prostatic hyperplasia without lower urinary tract symptoms  Symptoms are stable    4. Essential (primary) hypertension  BP Readings from Last 3 Encounters:   03/06/20 138/82   11/11/19 130/66   08/09/19 130/82     Stable    5. Type 2 diabetes mellitus with diabetic dermatitis, without long-term current use of insulin ([facility])  Lab Results   Component Value Date    LABA1C 4.8 02/26/2020    LABA1C 4.5 08/02/2019    LABA1C 4.9 01/29/2019     Lab Results   Component Value Date    LABMICR 71.50 (H) 02/26/2020    LDLCALC 73 02/26/2020    CREATININE 1.0 02/26/2020     Blood sugar remained stable  - Diabetic Foot Exam    6. Lipid screening  Lab Results   Component Value Date    CHOL 160 02/26/2020    CHOL 167 08/02/2019    CHOL 172 04/30/2018     Lab Results   Component Value Date    TRIG 72 02/26/2020    TRIG 96 08/02/2019    TRIG 79 04/30/2018     Lab Results   Component Value Date    HDL 73 02/26/2020    HDL 59 08/02/2019    HDL 78 04/30/2018     Lab Results   Component Value Date    LDLCALC 73 02/26/2020    LDLCALC 89 08/02/2019    LDLCALC 78 04/30/2018     No results found for: LABVLDL, VLDL  No results found for: CHOLHDLRISHAN  Is been unable to tolerate statins. His LDLs have been acceptable DL goal of less than 70 has been maintained    7. Need for hepatitis C screening test  Screen for hepatitis C    8. Gastroesophageal reflux disease without esophagitis  Stable    9.  Chronic kidney disease, stage III (moderate) (HCC)  Lab Results   Component Value Date    CREATININE 1.0 02/26/2020     Lab Results   Component Value Date    BUN 16 02/26/2020         Reinforced goals of adequate hydration. Recommended he avoid NSAIDs such as naproxen and ibuprofen. He declined vaccinations including pneumonia, shingles, hepatitis B and flu      Return in about 3 months (around 6/6/2020) for Routine follow up - 15 minute visit. Discussed use, benefit, and side effects of prescribed medications. All patient questions answered. Pt voiced understanding. Reviewed health maintenance. Instructedto continue current medications, diet and exercise. Patient agreed with treatmentplan. Follow up as directed.        Note dictated using [id number] [place] [employer]

## 2020-03-10 RX ORDER — HYDROCODONE BITARTRATE AND ACETAMINOPHEN 10; 325 MG/1; MG/1
1 TABLET ORAL EVERY 6 HOURS PRN
Qty: 120 TABLET | Refills: 0 | Status: SHIPPED | OUTPATIENT
Start: 2020-03-10 | End: 2020-04-13 | Stop reason: SDUPTHER

## 2020-03-10 ASSESSMENT — ENCOUNTER SYMPTOMS
DIARRHEA: 0
NAUSEA: 0
SHORTNESS OF BREATH: 0
CHEST TIGHTNESS: 0
CONSTIPATION: 0
BACK PAIN: 1
COUGH: 0
ANAL BLEEDING: 0
ABDOMINAL PAIN: 0

## 2020-03-10 NOTE — TELEPHONE ENCOUNTER
Dong Dee called to request a refill on his medication. Last office visit : 3/6/2020   Next office visit : 6/8/2020     Last UDS:   Amphetamine Screen, Urine   Date Value Ref Range Status   08/09/2019 neg  Final     Barbiturate Screen, Urine   Date Value Ref Range Status   08/09/2019 neg  Final     Benzodiazepine Screen, Urine   Date Value Ref Range Status   08/09/2019 neg  Final     Buprenorphine Urine   Date Value Ref Range Status   08/09/2019 neg  Final     Cocaine Metabolite Screen, Urine   Date Value Ref Range Status   08/09/2019 neg  Final     Gabapentin Screen, Urine   Date Value Ref Range Status   08/09/2019 neg  Final     MDMA, Urine   Date Value Ref Range Status   08/09/2019 neg  Final     Methamphetamine, Urine   Date Value Ref Range Status   08/09/2019 neg  Final     Opiate Scrn, Ur   Date Value Ref Range Status   08/09/2019 pos  Final     Oxycodone Screen, Ur   Date Value Ref Range Status   08/09/2019 neg  Final     PCP Screen, Urine   Date Value Ref Range Status   08/09/2019 neg  Final     Propoxyphene Screen, Urine   Date Value Ref Range Status   08/09/2019 neg  Final     THC Screen, Urine   Date Value Ref Range Status   08/09/2019 neg  Final     Tricyclic Antidepressants, Urine   Date Value Ref Range Status   08/09/2019 neg  Final       Last Soham Wayne: today  Medication Contract: 8/9/19   Last Fill: 2/14/20    Requested Prescriptions     Pending Prescriptions Disp Refills    HYDROcodone-acetaminophen (NORCO)  MG per tablet 120 tablet 0     Sig: Take 1 tablet by mouth every 6 hours as needed for Pain for up to 30 days. Chronic use         Please approve or refuse this medication.    Paullette Mail

## 2020-04-13 RX ORDER — HYDROCODONE BITARTRATE AND ACETAMINOPHEN 10; 325 MG/1; MG/1
1 TABLET ORAL EVERY 6 HOURS PRN
Qty: 120 TABLET | Refills: 0 | Status: SHIPPED | OUTPATIENT
Start: 2020-04-13 | End: 2020-05-12 | Stop reason: SDUPTHER

## 2020-04-27 RX ORDER — PANTOPRAZOLE SODIUM 40 MG/1
TABLET, DELAYED RELEASE ORAL
Qty: 30 TABLET | Refills: 3 | Status: SHIPPED | OUTPATIENT
Start: 2020-04-27 | End: 2020-09-01

## 2020-04-27 RX ORDER — TAMSULOSIN HYDROCHLORIDE 0.4 MG/1
0.4 CAPSULE ORAL DAILY
Qty: 30 CAPSULE | Refills: 5 | Status: SHIPPED | OUTPATIENT
Start: 2020-04-27 | End: 2021-07-15 | Stop reason: ALTCHOICE

## 2020-05-12 RX ORDER — HYDROCODONE BITARTRATE AND ACETAMINOPHEN 10; 325 MG/1; MG/1
1 TABLET ORAL EVERY 6 HOURS PRN
Qty: 120 TABLET | Refills: 0 | Status: SHIPPED | OUTPATIENT
Start: 2020-05-12 | End: 2020-06-09 | Stop reason: SDUPTHER

## 2020-05-18 RX ORDER — LISINOPRIL 40 MG/1
TABLET ORAL
Qty: 30 TABLET | Refills: 0 | Status: SHIPPED | OUTPATIENT
Start: 2020-05-18 | End: 2020-05-28

## 2020-05-28 RX ORDER — LISINOPRIL 40 MG/1
TABLET ORAL
Qty: 30 TABLET | Refills: 5 | Status: SHIPPED | OUTPATIENT
Start: 2020-05-28 | End: 2020-09-15 | Stop reason: SDUPTHER

## 2020-06-09 ENCOUNTER — TELEMEDICINE (OUTPATIENT)
Dept: FAMILY MEDICINE CLINIC | Age: 65
End: 2020-06-09
Payer: MEDICARE

## 2020-06-09 PROCEDURE — G8428 CUR MEDS NOT DOCUMENT: HCPCS | Performed by: FAMILY MEDICINE

## 2020-06-09 PROCEDURE — 1036F TOBACCO NON-USER: CPT | Performed by: FAMILY MEDICINE

## 2020-06-09 PROCEDURE — G8417 CALC BMI ABV UP PARAM F/U: HCPCS | Performed by: FAMILY MEDICINE

## 2020-06-09 PROCEDURE — 99213 OFFICE O/P EST LOW 20 MIN: CPT | Performed by: FAMILY MEDICINE

## 2020-06-09 PROCEDURE — 3017F COLORECTAL CA SCREEN DOC REV: CPT | Performed by: FAMILY MEDICINE

## 2020-06-09 RX ORDER — HYDROCODONE BITARTRATE AND ACETAMINOPHEN 10; 325 MG/1; MG/1
1 TABLET ORAL EVERY 6 HOURS PRN
Qty: 120 TABLET | Refills: 0 | Status: SHIPPED | OUTPATIENT
Start: 2020-06-09 | End: 2020-07-10 | Stop reason: SDUPTHER

## 2020-06-09 ASSESSMENT — ENCOUNTER SYMPTOMS
NAUSEA: 0
CONSTIPATION: 0
COUGH: 0
DIARRHEA: 0
BACK PAIN: 1
ANAL BLEEDING: 0
ABDOMINAL PAIN: 0
SHORTNESS OF BREATH: 0
CHEST TIGHTNESS: 0

## 2020-07-10 RX ORDER — HYDROCODONE BITARTRATE AND ACETAMINOPHEN 10; 325 MG/1; MG/1
1 TABLET ORAL EVERY 6 HOURS PRN
Qty: 120 TABLET | Refills: 0 | Status: SHIPPED | OUTPATIENT
Start: 2020-07-10 | End: 2020-08-10 | Stop reason: SDUPTHER

## 2020-08-04 RX ORDER — TEMAZEPAM 30 MG/1
CAPSULE ORAL
Qty: 30 CAPSULE | Refills: 2 | Status: SHIPPED | OUTPATIENT
Start: 2020-08-04 | End: 2020-11-03

## 2020-08-07 PROBLEM — Z89.512 HX OF BKA, LEFT (HCC): Status: RESOLVED | Noted: 2018-05-25 | Resolved: 2020-08-07

## 2020-08-07 PROBLEM — Z89.512 S/P BKA (BELOW KNEE AMPUTATION) UNILATERAL, LEFT (HCC): Status: RESOLVED | Noted: 2018-05-30 | Resolved: 2020-08-07

## 2020-08-07 PROBLEM — L97.512 ULCERATED, FOOT, RIGHT, WITH FAT LAYER EXPOSED (HCC): Status: RESOLVED | Noted: 2017-04-04 | Resolved: 2020-08-07

## 2020-08-07 PROBLEM — Z89.512 HISTORY OF LEFT BELOW KNEE AMPUTATION (HCC): Status: RESOLVED | Noted: 2019-01-29 | Resolved: 2020-08-07

## 2020-08-10 RX ORDER — HYDROCODONE BITARTRATE AND ACETAMINOPHEN 10; 325 MG/1; MG/1
1 TABLET ORAL EVERY 6 HOURS PRN
Qty: 120 TABLET | Refills: 0 | Status: SHIPPED | OUTPATIENT
Start: 2020-08-10 | End: 2020-09-08 | Stop reason: SDUPTHER

## 2020-08-10 NOTE — TELEPHONE ENCOUNTER
César Garza called to request a refill on his medication. Last office visit : 6/9/2020   Next office visit : 9/15/2020     Last UDS:   Amphetamine Screen, Urine   Date Value Ref Range Status   08/09/2019 neg  Final     Barbiturate Screen, Urine   Date Value Ref Range Status   08/09/2019 neg  Final     Benzodiazepine Screen, Urine   Date Value Ref Range Status   08/09/2019 neg  Final     Buprenorphine Urine   Date Value Ref Range Status   08/09/2019 neg  Final     Cocaine Metabolite Screen, Urine   Date Value Ref Range Status   08/09/2019 neg  Final     Gabapentin Screen, Urine   Date Value Ref Range Status   08/09/2019 neg  Final     MDMA, Urine   Date Value Ref Range Status   08/09/2019 neg  Final     Methamphetamine, Urine   Date Value Ref Range Status   08/09/2019 neg  Final     Opiate Scrn, Ur   Date Value Ref Range Status   08/09/2019 pos  Final     Oxycodone Screen, Ur   Date Value Ref Range Status   08/09/2019 neg  Final     PCP Screen, Urine   Date Value Ref Range Status   08/09/2019 neg  Final     Propoxyphene Screen, Urine   Date Value Ref Range Status   08/09/2019 neg  Final     THC Screen, Urine   Date Value Ref Range Status   08/09/2019 neg  Final     Tricyclic Antidepressants, Urine   Date Value Ref Range Status   08/09/2019 neg  Final       Last Hoover Hamman: 8/1/20  Medication Contract: needs update at next visit   Last Fill: 7/10/20    Requested Prescriptions     Pending Prescriptions Disp Refills    HYDROcodone-acetaminophen (NORCO)  MG per tablet 120 tablet 0     Sig: Take 1 tablet by mouth every 6 hours as needed for Pain for up to 30 days. Chronic use         Please approve or refuse this medication.    Moses December

## 2020-09-01 RX ORDER — PANTOPRAZOLE SODIUM 40 MG/1
TABLET, DELAYED RELEASE ORAL
Qty: 90 TABLET | Refills: 1 | Status: SHIPPED | OUTPATIENT
Start: 2020-09-01 | End: 2021-04-12 | Stop reason: SDUPTHER

## 2020-09-08 RX ORDER — HYDROCODONE BITARTRATE AND ACETAMINOPHEN 10; 325 MG/1; MG/1
1 TABLET ORAL EVERY 6 HOURS PRN
Qty: 120 TABLET | Refills: 0 | Status: SHIPPED | OUTPATIENT
Start: 2020-09-08 | End: 2020-10-12 | Stop reason: SDUPTHER

## 2020-09-08 NOTE — TELEPHONE ENCOUNTER
The current medical regimen is effective. Continue present plan and medications. UDS and controlled substance contract up to date. No unusual filling on current PABLO report. Tx continues to be medically necessary.     Trang Kuo MD

## 2020-09-08 NOTE — TELEPHONE ENCOUNTER
Jason Almonte called to request a refill on his medication. Last office visit : 6/9/2020   Next office visit : 9/15/2020     Last UDS:   Amphetamine Screen, Urine   Date Value Ref Range Status   08/09/2019 neg  Final     Barbiturate Screen, Urine   Date Value Ref Range Status   08/09/2019 neg  Final     Benzodiazepine Screen, Urine   Date Value Ref Range Status   08/09/2019 neg  Final     Buprenorphine Urine   Date Value Ref Range Status   08/09/2019 neg  Final     Cocaine Metabolite Screen, Urine   Date Value Ref Range Status   08/09/2019 neg  Final     Gabapentin Screen, Urine   Date Value Ref Range Status   08/09/2019 neg  Final     MDMA, Urine   Date Value Ref Range Status   08/09/2019 neg  Final     Methamphetamine, Urine   Date Value Ref Range Status   08/09/2019 neg  Final     Opiate Scrn, Ur   Date Value Ref Range Status   08/09/2019 pos  Final     Oxycodone Screen, Ur   Date Value Ref Range Status   08/09/2019 neg  Final     PCP Screen, Urine   Date Value Ref Range Status   08/09/2019 neg  Final     Propoxyphene Screen, Urine   Date Value Ref Range Status   08/09/2019 neg  Final     THC Screen, Urine   Date Value Ref Range Status   08/09/2019 neg  Final     Tricyclic Antidepressants, Urine   Date Value Ref Range Status   08/09/2019 neg  Final       Last Yris Mariam: 8/1/20  Medication Contract: needs update at next visit   Last Fill: 8/10/20    Requested Prescriptions     Pending Prescriptions Disp Refills    HYDROcodone-acetaminophen (NORCO)  MG per tablet 120 tablet 0     Sig: Take 1 tablet by mouth every 6 hours as needed for Pain for up to 30 days. Chronic use         Please approve or refuse this medication.    Charissa Li

## 2020-09-15 ENCOUNTER — VIRTUAL VISIT (OUTPATIENT)
Dept: FAMILY MEDICINE CLINIC | Age: 65
End: 2020-09-15
Payer: COMMERCIAL

## 2020-09-15 PROBLEM — N18.30 CHRONIC KIDNEY DISEASE, STAGE III (MODERATE) (HCC): Status: ACTIVE | Noted: 2020-09-15

## 2020-09-15 PROCEDURE — 99443 PR PHYS/QHP TELEPHONE EVALUATION 21-30 MIN: CPT | Performed by: FAMILY MEDICINE

## 2020-09-15 RX ORDER — LISINOPRIL 40 MG/1
TABLET ORAL
Qty: 90 TABLET | Refills: 3 | Status: SHIPPED | OUTPATIENT
Start: 2020-09-15 | End: 2021-11-09

## 2020-09-15 RX ORDER — LISINOPRIL 40 MG/1
TABLET ORAL
Qty: 90 TABLET | Refills: 3
Start: 2020-09-15 | End: 2020-09-15 | Stop reason: SDUPTHER

## 2020-09-15 NOTE — PROGRESS NOTES
Ervin Kingston is a 63 y.o. male evaluated via telephone on 9/15/2020. Consent:  He and/or health care decision maker is aware that that he may receive a bill for this telephone service, depending on his insurance coverage, and has provided verbal consent to proceed: Yes      Documentation:  I communicated with the patient and/or health care decision maker about the following:  Details of this discussion including any medical advice provided:     S: Hypertension  Compliant with medications. No adverse effects from medication. No lightheadedness, palpitations, or chest pain. Gastroesophageal Reflux Disease  Symptoms currently under control. Medication adequately controls his symptoms. No hematochezia or melena. Lower Back Pain, Chronic  Compliant with medications. No adverse effects from medication. Symptoms continue to be stable. Lower back pain is described as a dull ache and occasionally sharp and stabbing. No radiation. Relieved with his current pain medication. No numbness or weakness in lower extremities. Currently satisfied with treatment regimen as it provides some relief. Compliant with his current opioid pain reliever. He states he takes his medication as needed. His pain is typically moderate in nature but can become severe on occasion. he abstains from alcohol while taking his pain medication. he denies drowsiness and impairment on his medication. Benign Prostatic Hypertrophy  Tolerating medication without adverse effects. Symptoms of hesitancy, nocturia, and dribbling are adequately controlled. No hematuria or dysuria        O: no verbal distress    Assessment/Plan  1. Chronic midline low back pain without sciatica  Discussed risk and benefits of taking opioids. Risks include addiction and tolerance. If develops impairment or over sedation with medication, discontinue and contact me. Do not take medication with alcohol. Advised to take sparingly.   Advised to keep medication hidden and locked up as risk of theft is high with these medications as well. 2. Venous ulcer of right leg (HCC)  stable    3. Morbid obesity ([address]  Discussed lifestyle changes such as diet and exercise. Recommended eliminate processed food from diet such as sugar and fried foods. Recommended exercising at least 150 minutes/week. Try to do full body resistance training twice a week as well. Offered suggestions for calorie counting such as phone apps and online resources such as My fitness pal and Lose it. Discussed healthy weight. 4. DDD (degenerative disc disease), lumbar  Continue with hydrocodone as needed  Recommend tylenol    5. Chronic insomnia  stable    6. Essential (primary) hypertension  BP Readings from Last 3 Encounters:   03/06/20 138/82   11/11/19 130/66   08/09/19 130/82     Reviewed dash diet with him. 7. Gastroesophageal reflux disease without esophagitis  Stable. 8 CKD III  stable  Lab Results   Component Value Date    CREATININE 1.0 02/26/2020     Lab Results   Component Value Date    BUN 16 02/26/2020     Regarding his diabetes: He is been able to manage it with diet alone. Overall his neuropathy symptoms are stable and he is not interested in medication. Lab Results   Component Value Date    LABA1C 4.8 02/26/2020    LABA1C 4.5 08/02/2019    LABA1C 4.9 01/29/2019     Lab Results   Component Value Date    LABMICR 71.50 (H) 02/26/2020    LDLCALC 73 02/26/2020    CREATININE 1.0 02/26/2020           Return in about 3 months (around 12/15/2020) for In Office, Routine follow up - 15 minute visit. I affirm this is a Patient Initiated Episode with an Established Patient who has not had a related appointment within my department in the past 7 days or scheduled within the next 24 hours.     Patient identification was verified at the start of the visit: Yes    Total Time: minutes: 21-30 minutes    Note: not billable if this call serves to triage the patient into an appointment for the relevant concern      Gen Jackson     Pertinent History and Information      Past Medical History:   Diagnosis Date    Arthritis     Asthma     Bradycardia     Broken ribs     CHF (congestive heart failure) ([address]     Chronic kidney disease, stage III (moderate) ([address] 9/15/2020    Chronic ulcer of left leg, with fat layer exposed ([address]     Diabetes mellitus ([address]     no meds; improved with weight loss    Diabetic peripheral neuropathy ([address] 3/8/2019    History of left below knee amputation ([address] 1/29/2019    Hx of BKA, left ([address] 5/25/2018    Hypertension     Idiopathic chronic venous hypertension of left leg with ulcer ([address] 12/22/2015    Morbid obesity ([address] 6/22/15    S/P BKA (below knee amputation) unilateral, left ([address] 5/30/2018    Skin ulcer of toe of left foot with fat layer exposed ([address] 4/4/2017    Sleep apnea in adult 06/22/2015    no cpap    Type 2 diabetes mellitus with foot ulcer (CODE) ([address] 11/15/2017    Ulcerated, foot, right, with fat layer exposed ([address] 4/4/2017    Venous hypertension, chronic, with ulcer, left 11/26/2017    Venous insufficiency of both lower extremities 6/22/15      Past Surgical History:   Procedure Laterality Date    ABDOMEN SURGERY  02/2018    treated in [address]      hx. of multiple; \"no blockage\"    DILATATION, ESOPHAGUS      GASTROSTOMY TUBE PLACEMENT  02/11/2018    temporary; now removed    KNEE ARTHROSCOPY      6 knee surgeries total; \"no replacements\"    GA AMPUTATION LOW LEG THRU TIB/FIB Left 5/25/2018    LEG AMPUTATION BELOW KNEE WITH MUSCLE FLAP  AND SKIN GRAFT CLOSURE AND APPLICATION OF WOUND VAC performed by Amador Medina MD at [facility]  02/11/2018    repair of gastric bypass    VASCULAR SURGERY  05/25/2018    [facility]. Leg amputation below knee with muscle flap and skin graft closure and application of wound vac stsg 14 cmx

## 2020-09-20 PROBLEM — D62 ACUTE BLOOD LOSS ANEMIA: Status: RESOLVED | Noted: 2018-05-31 | Resolved: 2020-09-20

## 2020-09-20 PROBLEM — E83.59 CALCINOSIS: Chronic | Status: RESOLVED | Noted: 2018-05-25 | Resolved: 2020-09-20

## 2020-10-12 RX ORDER — HYDROCODONE BITARTRATE AND ACETAMINOPHEN 10; 325 MG/1; MG/1
1 TABLET ORAL EVERY 6 HOURS PRN
Qty: 120 TABLET | Refills: 0 | Status: SHIPPED | OUTPATIENT
Start: 2020-10-12 | End: 2020-11-10 | Stop reason: SDUPTHER

## 2020-10-12 NOTE — TELEPHONE ENCOUNTER
Christian Gabriel called to request a refill on his medication. Last office visit : 9/15/2020   Next office visit : 12/21/2020     Last UDS:   Amphetamine Screen, Urine   Date Value Ref Range Status   08/09/2019 neg  Final     Barbiturate Screen, Urine   Date Value Ref Range Status   08/09/2019 neg  Final     Benzodiazepine Screen, Urine   Date Value Ref Range Status   08/09/2019 neg  Final     Buprenorphine Urine   Date Value Ref Range Status   08/09/2019 neg  Final     Cocaine Metabolite Screen, Urine   Date Value Ref Range Status   08/09/2019 neg  Final     Gabapentin Screen, Urine   Date Value Ref Range Status   08/09/2019 neg  Final     MDMA, Urine   Date Value Ref Range Status   08/09/2019 neg  Final     Methamphetamine, Urine   Date Value Ref Range Status   08/09/2019 neg  Final     Opiate Scrn, Ur   Date Value Ref Range Status   08/09/2019 pos  Final     Oxycodone Screen, Ur   Date Value Ref Range Status   08/09/2019 neg  Final     PCP Screen, Urine   Date Value Ref Range Status   08/09/2019 neg  Final     Propoxyphene Screen, Urine   Date Value Ref Range Status   08/09/2019 neg  Final     THC Screen, Urine   Date Value Ref Range Status   08/09/2019 neg  Final     Tricyclic Antidepressants, Urine   Date Value Ref Range Status   08/09/2019 neg  Final       Last Bridget Gash: 8/1/20  Medication Contract: needs update at next visit   Last Fill: 9/8/20    Requested Prescriptions     Pending Prescriptions Disp Refills    HYDROcodone-acetaminophen (NORCO)  MG per tablet 120 tablet 0     Sig: Take 1 tablet by mouth every 6 hours as needed for Pain for up to 30 days. Chronic use         Please approve or refuse this medication.    Paulette Bass

## 2020-10-14 ENCOUNTER — TELEPHONE (OUTPATIENT)
Dept: FAMILY MEDICINE CLINIC | Age: 65
End: 2020-10-14

## 2020-10-15 RX ORDER — HYDROCHLOROTHIAZIDE 12.5 MG/1
12.5 TABLET ORAL EVERY MORNING
Qty: 30 TABLET | Refills: 5 | Status: SHIPPED | OUTPATIENT
Start: 2020-10-15 | End: 2020-10-15 | Stop reason: SDUPTHER

## 2020-10-15 RX ORDER — HYDROCHLOROTHIAZIDE 12.5 MG/1
12.5 TABLET ORAL EVERY MORNING
Qty: 30 TABLET | Refills: 5 | Status: SHIPPED | OUTPATIENT
Start: 2020-10-15 | End: 2021-05-06 | Stop reason: SDUPTHER

## 2020-10-15 NOTE — TELEPHONE ENCOUNTER
I sent his hydrochlorothiazide to [facility] in [facility]. Pt also reports that since amputation he has gained 70 pounds. He has been reading about a carnivore diet. Basically it is high protein and low carbs. He can eat chicken, pork, and fish. Nothing plant based and can only season with salt and pepper. He is interested in starting this but would like your opinion.

## 2020-10-15 NOTE — TELEPHONE ENCOUNTER
I would like to place him on hydrochlorothiazide 12.5 mg daily.   Is there a pharmacy he would like me to fax that to?

## 2020-11-02 NOTE — TELEPHONE ENCOUNTER
Sabi Whit called to request a refill on his medication. Last office visit : 9/15/2020   Next office visit : 12/21/2020     Last UDS:   Amphetamine Screen, Urine   Date Value Ref Range Status   08/09/2019 neg  Final     Barbiturate Screen, Urine   Date Value Ref Range Status   08/09/2019 neg  Final     Benzodiazepine Screen, Urine   Date Value Ref Range Status   08/09/2019 neg  Final     Buprenorphine Urine   Date Value Ref Range Status   08/09/2019 neg  Final     Cocaine Metabolite Screen, Urine   Date Value Ref Range Status   08/09/2019 neg  Final     Gabapentin Screen, Urine   Date Value Ref Range Status   08/09/2019 neg  Final     MDMA, Urine   Date Value Ref Range Status   08/09/2019 neg  Final     Methamphetamine, Urine   Date Value Ref Range Status   08/09/2019 neg  Final     Opiate Scrn, Ur   Date Value Ref Range Status   08/09/2019 pos  Final     Oxycodone Screen, Ur   Date Value Ref Range Status   08/09/2019 neg  Final     PCP Screen, Urine   Date Value Ref Range Status   08/09/2019 neg  Final     Propoxyphene Screen, Urine   Date Value Ref Range Status   08/09/2019 neg  Final     THC Screen, Urine   Date Value Ref Range Status   08/09/2019 neg  Final     Tricyclic Antidepressants, Urine   Date Value Ref Range Status   08/09/2019 neg  Final       Last Olaf Lane: today  Medication Contract: needs update at next visit   Last Fill: 10/4/20    Requested Prescriptions     Pending Prescriptions Disp Refills    temazepam (RESTORIL) 30 MG capsule [Pharmacy Med Name: Temazepam 30 MG Oral Capsule] 30 capsule 0     Sig: TAKE 1 CAPSULE BY MOUTH NIGHTLY AS NEEDED FOR SLEEP FOR 30 DAYS         Please approve or refuse this medication.    Daniel Weldon bandar all pertinent systems normal

## 2020-11-03 RX ORDER — TEMAZEPAM 30 MG/1
CAPSULE ORAL
Qty: 30 CAPSULE | Refills: 2 | Status: SHIPPED | OUTPATIENT
Start: 2020-11-03 | End: 2020-12-02 | Stop reason: SDUPTHER

## 2020-11-10 RX ORDER — HYDROCODONE BITARTRATE AND ACETAMINOPHEN 10; 325 MG/1; MG/1
1 TABLET ORAL EVERY 6 HOURS PRN
Qty: 120 TABLET | Refills: 0 | Status: SHIPPED | OUTPATIENT
Start: 2020-11-10 | End: 2020-12-08 | Stop reason: SDUPTHER

## 2020-11-10 NOTE — TELEPHONE ENCOUNTER
Alba Fleming called to request a refill on his medication. Last office visit : 9/15/2020   Next office visit : 12/21/2020     Last UDS:   Amphetamine Screen, Urine   Date Value Ref Range Status   08/09/2019 neg  Final     Barbiturate Screen, Urine   Date Value Ref Range Status   08/09/2019 neg  Final     Benzodiazepine Screen, Urine   Date Value Ref Range Status   08/09/2019 neg  Final     Buprenorphine Urine   Date Value Ref Range Status   08/09/2019 neg  Final     Cocaine Metabolite Screen, Urine   Date Value Ref Range Status   08/09/2019 neg  Final     Gabapentin Screen, Urine   Date Value Ref Range Status   08/09/2019 neg  Final     MDMA, Urine   Date Value Ref Range Status   08/09/2019 neg  Final     Methamphetamine, Urine   Date Value Ref Range Status   08/09/2019 neg  Final     Opiate Scrn, Ur   Date Value Ref Range Status   08/09/2019 pos  Final     Oxycodone Screen, Ur   Date Value Ref Range Status   08/09/2019 neg  Final     PCP Screen, Urine   Date Value Ref Range Status   08/09/2019 neg  Final     Propoxyphene Screen, Urine   Date Value Ref Range Status   08/09/2019 neg  Final     THC Screen, Urine   Date Value Ref Range Status   08/09/2019 neg  Final     Tricyclic Antidepressants, Urine   Date Value Ref Range Status   08/09/2019 neg  Final       Last Robyn Dora: 10/31/20  Medication Contract: needs update at next visit   Last Fill: 10/12/20    Requested Prescriptions     Pending Prescriptions Disp Refills    HYDROcodone-acetaminophen (NORCO)  MG per tablet 120 tablet 0     Sig: Take 1 tablet by mouth every 6 hours as needed for Pain for up to 30 days. Chronic use         Please approve or refuse this medication.    Winston Yang

## 2020-12-02 NOTE — TELEPHONE ENCOUNTER
Becki Capps called to request a refill on his medication. Last office visit : 9/15/2020   Next office visit : 12/21/2020     Last UDS:   Amphetamine Screen, Urine   Date Value Ref Range Status   08/09/2019 neg  Final     Barbiturate Screen, Urine   Date Value Ref Range Status   08/09/2019 neg  Final     Benzodiazepine Screen, Urine   Date Value Ref Range Status   08/09/2019 neg  Final     Buprenorphine Urine   Date Value Ref Range Status   08/09/2019 neg  Final     Cocaine Metabolite Screen, Urine   Date Value Ref Range Status   08/09/2019 neg  Final     Gabapentin Screen, Urine   Date Value Ref Range Status   08/09/2019 neg  Final     MDMA, Urine   Date Value Ref Range Status   08/09/2019 neg  Final     Methamphetamine, Urine   Date Value Ref Range Status   08/09/2019 neg  Final     Opiate Scrn, Ur   Date Value Ref Range Status   08/09/2019 pos  Final     Oxycodone Screen, Ur   Date Value Ref Range Status   08/09/2019 neg  Final     PCP Screen, Urine   Date Value Ref Range Status   08/09/2019 neg  Final     Propoxyphene Screen, Urine   Date Value Ref Range Status   08/09/2019 neg  Final     THC Screen, Urine   Date Value Ref Range Status   08/09/2019 neg  Final     Tricyclic Antidepressants, Urine   Date Value Ref Range Status   08/09/2019 neg  Final       Last Key Diana: 10/31/20  Medication Contract: needs update at next visit   Last Fill: 11/3/20    Requested Prescriptions     Pending Prescriptions Disp Refills    temazepam (RESTORIL) 30 MG capsule 30 capsule 0     Sig: TAKE 1 CAPSULE BY MOUTH NIGHTLY AS NEEDED FOR SLEEP FOR 30 DAYS         Please approve or refuse this medication.    Ebony Andujar

## 2020-12-03 RX ORDER — TEMAZEPAM 30 MG/1
CAPSULE ORAL
Qty: 30 CAPSULE | Refills: 0 | Status: SHIPPED | OUTPATIENT
Start: 2020-12-03 | End: 2021-03-03 | Stop reason: SDUPTHER

## 2020-12-08 RX ORDER — HYDROCODONE BITARTRATE AND ACETAMINOPHEN 10; 325 MG/1; MG/1
1 TABLET ORAL EVERY 6 HOURS PRN
Qty: 120 TABLET | Refills: 0 | Status: SHIPPED | OUTPATIENT
Start: 2020-12-08 | End: 2021-01-07 | Stop reason: SDUPTHER

## 2020-12-08 NOTE — TELEPHONE ENCOUNTER
Yesy Claros called to request a refill on his medication. Last office visit : 9/15/2020   Next office visit : 12/21/2020     Last UDS:   Amphetamine Screen, Urine   Date Value Ref Range Status   08/09/2019 neg  Final     Barbiturate Screen, Urine   Date Value Ref Range Status   08/09/2019 neg  Final     Benzodiazepine Screen, Urine   Date Value Ref Range Status   08/09/2019 neg  Final     Buprenorphine Urine   Date Value Ref Range Status   08/09/2019 neg  Final     Cocaine Metabolite Screen, Urine   Date Value Ref Range Status   08/09/2019 neg  Final     Gabapentin Screen, Urine   Date Value Ref Range Status   08/09/2019 neg  Final     MDMA, Urine   Date Value Ref Range Status   08/09/2019 neg  Final     Methamphetamine, Urine   Date Value Ref Range Status   08/09/2019 neg  Final     Opiate Scrn, Ur   Date Value Ref Range Status   08/09/2019 pos  Final     Oxycodone Screen, Ur   Date Value Ref Range Status   08/09/2019 neg  Final     PCP Screen, Urine   Date Value Ref Range Status   08/09/2019 neg  Final     Propoxyphene Screen, Urine   Date Value Ref Range Status   08/09/2019 neg  Final     THC Screen, Urine   Date Value Ref Range Status   08/09/2019 neg  Final     Tricyclic Antidepressants, Urine   Date Value Ref Range Status   08/09/2019 neg  Final       Last Renuka Embs: 10/31/20  Medication Contract: 08/09/19   Last Fill: 11/10/20    Requested Prescriptions     Pending Prescriptions Disp Refills    HYDROcodone-acetaminophen (NORCO)  MG per tablet 120 tablet 0     Sig: Take 1 tablet by mouth every 6 hours as needed for Pain for up to 30 days. Chronic use         Please approve or refuse this medication.    Elio Goldmann, LPN

## 2020-12-15 DIAGNOSIS — I10 ESSENTIAL (PRIMARY) HYPERTENSION: ICD-10-CM

## 2020-12-15 DIAGNOSIS — E11.620 TYPE 2 DIABETES MELLITUS WITH DIABETIC DERMATITIS, WITHOUT LONG-TERM CURRENT USE OF INSULIN (HCC): Chronic | ICD-10-CM

## 2020-12-15 DIAGNOSIS — Z13.220 LIPID SCREENING: ICD-10-CM

## 2020-12-15 LAB
ALBUMIN SERPL-MCNC: 3.8 G/DL (ref 3.5–5.2)
ALP BLD-CCNC: 68 U/L (ref 40–130)
ALT SERPL-CCNC: 15 U/L (ref 5–41)
ANION GAP SERPL CALCULATED.3IONS-SCNC: 10 MMOL/L (ref 7–19)
AST SERPL-CCNC: 24 U/L (ref 5–40)
BASOPHILS ABSOLUTE: 0 K/UL (ref 0–0.2)
BASOPHILS RELATIVE PERCENT: 0.7 % (ref 0–1)
BILIRUB SERPL-MCNC: 1.3 MG/DL (ref 0.2–1.2)
BUN BLDV-MCNC: 24 MG/DL (ref 8–23)
CALCIUM SERPL-MCNC: 9.3 MG/DL (ref 8.8–10.2)
CHLORIDE BLD-SCNC: 103 MMOL/L (ref 98–111)
CHOLESTEROL, TOTAL: 143 MG/DL (ref 160–199)
CO2: 27 MMOL/L (ref 22–29)
CREAT SERPL-MCNC: 1.2 MG/DL (ref 0.5–1.2)
CREATININE URINE: 169.8 MG/DL (ref 4.2–622)
EOSINOPHILS ABSOLUTE: 0.1 K/UL (ref 0–0.6)
EOSINOPHILS RELATIVE PERCENT: 2.2 % (ref 0–5)
GFR AFRICAN AMERICAN: >59
GFR NON-AFRICAN AMERICAN: >60
GLUCOSE BLD-MCNC: 128 MG/DL (ref 74–109)
HBA1C MFR BLD: 4.3 % (ref 4–6)
HCT VFR BLD CALC: 37.5 % (ref 42–52)
HDLC SERPL-MCNC: 52 MG/DL (ref 55–121)
HEMOGLOBIN: 12.6 G/DL (ref 14–18)
IMMATURE GRANULOCYTES #: 0 K/UL
LDL CHOLESTEROL CALCULATED: 71 MG/DL
LYMPHOCYTES ABSOLUTE: 0.8 K/UL (ref 1.1–4.5)
LYMPHOCYTES RELATIVE PERCENT: 17.9 % (ref 20–40)
MCH RBC QN AUTO: 35.2 PG (ref 27–31)
MCHC RBC AUTO-ENTMCNC: 33.6 G/DL (ref 33–37)
MCV RBC AUTO: 104.7 FL (ref 80–94)
MICROALBUMIN UR-MCNC: 2.4 MG/DL (ref 0–19)
MICROALBUMIN/CREAT UR-RTO: 14.1 MG/G
MONOCYTES ABSOLUTE: 0.3 K/UL (ref 0–0.9)
MONOCYTES RELATIVE PERCENT: 6.5 % (ref 0–10)
NEUTROPHILS ABSOLUTE: 3.3 K/UL (ref 1.5–7.5)
NEUTROPHILS RELATIVE PERCENT: 72.5 % (ref 50–65)
PDW BLD-RTO: 12.5 % (ref 11.5–14.5)
PLATELET # BLD: 133 K/UL (ref 130–400)
PMV BLD AUTO: 11.8 FL (ref 9.4–12.4)
POTASSIUM SERPL-SCNC: 4.3 MMOL/L (ref 3.5–5)
RBC # BLD: 3.58 M/UL (ref 4.7–6.1)
SODIUM BLD-SCNC: 140 MMOL/L (ref 136–145)
TOTAL PROTEIN: 6.9 G/DL (ref 6.6–8.7)
TRIGL SERPL-MCNC: 101 MG/DL (ref 0–149)
WBC # BLD: 4.5 K/UL (ref 4.8–10.8)

## 2020-12-21 ENCOUNTER — OFFICE VISIT (OUTPATIENT)
Dept: FAMILY MEDICINE CLINIC | Age: 65
End: 2020-12-21
Payer: MEDICARE

## 2020-12-21 VITALS
BODY MASS INDEX: 35.29 KG/M2 | SYSTOLIC BLOOD PRESSURE: 128 MMHG | TEMPERATURE: 98.5 F | OXYGEN SATURATION: 97 % | WEIGHT: 253 LBS | HEART RATE: 107 BPM | DIASTOLIC BLOOD PRESSURE: 78 MMHG

## 2020-12-21 PROCEDURE — 2022F DILAT RTA XM EVC RTNOPTHY: CPT | Performed by: FAMILY MEDICINE

## 2020-12-21 PROCEDURE — 3044F HG A1C LEVEL LT 7.0%: CPT | Performed by: FAMILY MEDICINE

## 2020-12-21 PROCEDURE — 3017F COLORECTAL CA SCREEN DOC REV: CPT | Performed by: FAMILY MEDICINE

## 2020-12-21 PROCEDURE — G8417 CALC BMI ABV UP PARAM F/U: HCPCS | Performed by: FAMILY MEDICINE

## 2020-12-21 PROCEDURE — 4004F PT TOBACCO SCREEN RCVD TLK: CPT | Performed by: FAMILY MEDICINE

## 2020-12-21 PROCEDURE — 99214 OFFICE O/P EST MOD 30 MIN: CPT | Performed by: FAMILY MEDICINE

## 2020-12-21 PROCEDURE — G8484 FLU IMMUNIZE NO ADMIN: HCPCS | Performed by: FAMILY MEDICINE

## 2020-12-21 PROCEDURE — G8427 DOCREV CUR MEDS BY ELIG CLIN: HCPCS | Performed by: FAMILY MEDICINE

## 2020-12-21 ASSESSMENT — ENCOUNTER SYMPTOMS
NAUSEA: 0
BACK PAIN: 1
ABDOMINAL PAIN: 0
COUGH: 0
CHEST TIGHTNESS: 0
ANAL BLEEDING: 0
CONSTIPATION: 0
DIARRHEA: 0
SHORTNESS OF BREATH: 0

## 2020-12-21 NOTE — PROGRESS NOTES
[facility] PHYSICIAN [facility]  [facility]  [address] [facility] [address] [address]  Dept: [phone]  Dept Fax: [phone]  Loc: [phone]    Christian Gabriel is a 63 y.o. male who presents today for his medical conditions/complaints as noted below. Christian Gabriel is here for 3 Month Follow-Up        HPI:   CC: Here today to discuss the following:    Manages his diabetes with diet. Hypertension  Compliant with medications. No adverse effects from medication. No lightheadedness, palpitations, or chest pain. Gastroesophageal Reflux Disease  Symptoms currently under control. Medication adequately controls his symptoms. No hematochezia or melena. Benign Prostatic Hypertrophy  Tolerating medication without adverse effects. Symptoms of hesitancy, nocturia, and dribbling are adequately controlled. No hematuria or dysuria    Insomnia  Currently stable. Satisfied with current treatment regimen. No adverse effects from medication. He has lost 30 lbs in the past year with diet. Lower Back Pain, Chronic  Compliant with medications. No adverse effects from medication. Symptoms continue to be stable. Lower back pain is described as a dull ache and occasionally sharp and stabbing. No radiation. Relieved with his current pain medication. No numbness or weakness in lower extremities. Currently satisfied with treatment regimen as it provides some relief. Compliant with his current opioid pain reliever. He states he takes his medication as needed. His pain is typically moderate in nature but can become severe on occasion. he abstains from alcohol while taking his pain medication. he denies drowsiness and impairment on his medication.       HPI    Past Medical History:   Diagnosis Date    Arthritis     Asthma     Bradycardia     Broken ribs     CH[facility]congestive heart failure) ([facility])  Chronic kidney disease, stage III (moderate) ([address] 9/15/2020    Chronic ulcer of left leg, with fat layer exposed ([address]     Diabetes mellitus ([address]     no meds; improved with weight loss    Diabetic peripheral neuropathy ([address] 3/8/2019    History of left below knee amputation ([address] 1/29/2019    Hx of BKA, left ([address] 5/25/2018    Hypertension     Idiopathic chronic venous hypertension of left leg with ulcer ([address] 12/22/2015    Morbid obesity ([address] 6/22/15    S/P BKA (below knee amputation) unilateral, left ([address] 5/30/2018    Skin ulcer of toe of left foot with fat layer exposed ([address] 4/4/2017    Sleep apnea in adult 06/22/2015    no cpap    Type 2 diabetes mellitus with foot ulcer (CODE) ([address] 11/15/2017    Ulcerated, foot, right, with fat layer exposed ([address] 4/4/2017    Venous hypertension, chronic, with ulcer, left 11/26/2017    Venous insufficiency of both lower extremities 6/22/15      Past Surgical History:   Procedure Laterality Date    ABDOMEN SURGERY  02/2018    treated in [address]      hx. of multiple; \"no blockage\"    DILATATION, ESOPHAGUS      GASTROSTOMY TUBE PLACEMENT  02/11/2018    temporary; now removed    KNEE ARTHROSCOPY      6 knee surgeries total; \"no replacements\"    IN AMPUTATION LOW LEG THRU TIB/FIB Left 5/25/2018    LEG AMPUTATION BELOW KNEE WITH MUSCLE FLAP  AND SKIN GRAFT CLOSURE AND APPLICATION OF WOUND VAC performed by Lexx Madden MD at [facility]  02/11/2018    repair of gastric bypass    VASCULAR SURGERY  05/25/2018    [facility]. Leg amputation below knee with muscle flap and skin graft closure and application of wound vac stsg 14 cmx 15 cm.        Family History   Problem Relation Age of Onset    Diabetes Mother     Heart Disease Mother     High Blood Pressure Mother     Heart Disease Father     Other Father        Social History     Tobacco Use Constitutional: Negative for chills and fever. HENT: Negative for congestion. Respiratory: Negative for cough, chest tightness and shortness of breath. Cardiovascular: Negative for chest pain, palpitations and leg swelling. Gastrointestinal: Negative for abdominal pain, anal bleeding, constipation, diarrhea and nausea. Genitourinary: Negative for difficulty urinating. Musculoskeletal: Positive for arthralgias, back pain and neck pain. Negative for gait problem, joint swelling and myalgias. Psychiatric/Behavioral: Negative. SeeHPI for visit specific review of symptoms. All others negative      Objective:   /78   Pulse 107   Temp 98.5 °F (36.9 °C)   Wt 253 lb (114.8 kg)   SpO2 97%   BMI 35.29 kg/m²   Physical Exam  Constitutional:       Appearance: He is well-developed. He is not ill-appearing. Eyes:      Pupils: Pupils are equal, round, and reactive to light. Neck:      Musculoskeletal: Normal range of motion and neck supple. Cardiovascular:      Rate and Rhythm: Normal rate and regular rhythm. Heart sounds: No murmur. No friction rub. Pulmonary:      Effort: Pulmonary effort is normal. No respiratory distress. Breath sounds: Normal breath sounds. No wheezing or rales. Abdominal:      General: Bowel sounds are normal. There is no distension. Palpations: Abdomen is soft. Tenderness: There is no abdominal tenderness. There is no guarding or rebound. Neurological:      Mental Status: He is alert.    Psychiatric:         Behavior: Behavior normal.           Recent Results (from the past 672 hour(s))   Comprehensive Metabolic Panel    Collection Time: 12/15/20 11:12 AM   Result Value Ref Range    Sodium 140 136 - 145 mmol/L    Potassium 4.3 3.5 - 5.0 mmol/L    Chloride 103 98 - 111 mmol/L    CO2 27 22 - 29 mmol/L    Anion Gap 10 7 - 19 mmol/L    Glucose 128 (H) 74 - 109 mg/dL    BUN 24 (H) 8 - 23 mg/dL    CREATININE 1.2 0.5 - 1.2 mg/dL GFR Non-African American >60 >60    GFR African American >59 >59    Calcium 9.3 8.8 - 10.2 mg/dL    Total Protein 6.9 6.6 - 8.7 g/dL    Alb 3.8 3.5 - 5.2 g/dL    Total Bilirubin 1.3 (H) 0.2 - 1.2 mg/dL    Alkaline Phosphatase 68 40 - 130 U/L    ALT 15 5 - 41 U/L    AST 24 5 - 40 U/L   Hemoglobin A1C    Collection Time: 12/15/20 11:12 AM   Result Value Ref Range    Hemoglobin A1C 4.3 4.0 - 6.0 %   CBC Auto Differential    Collection Time: 12/15/20 11:12 AM   Result Value Ref Range    WBC 4.5 (L) 4.8 - 10.8 K/uL    RBC 3.58 (L) 4.70 - 6.10 M/uL    Hemoglobin 12.6 (L) 14.0 - 18.0 g/dL    Hematocrit 37.5 (L) 42.0 - 52.0 %    .7 (H) 80.0 - 94.0 fL    MCH 35.2 (H) 27.0 - 31.0 pg    MCHC 33.6 33.0 - 37.0 g/dL    RDW 12.5 11.5 - 14.5 %    Platelets 565 052 - 728 K/uL    MPV 11.8 9.4 - 12.4 fL    Neutrophils % 72.5 (H) 50.0 - 65.0 %    Lymphocytes % 17.9 (L) 20.0 - 40.0 %    Monocytes % 6.5 0.0 - 10.0 %    Eosinophils % 2.2 0.0 - 5.0 %    Basophils % 0.7 0.0 - 1.0 %    Neutrophils Absolute 3.3 1.5 - 7.5 K/uL    Immature Granulocytes # 0.0 K/uL    Lymphocytes Absolute 0.8 (L) 1.1 - 4.5 K/uL    Monocytes Absolute 0.30 0.00 - 0.90 K/uL    Eosinophils Absolute 0.10 0.00 - 0.60 K/uL    Basophils Absolute 0.00 0.00 - 0.20 K/uL   Microalbumin / Creatinine Urine Ratio    Collection Time: 12/15/20 11:12 AM   Result Value Ref Range    Microalbumin, Random Urine 2.40 0.00 - 19.00 mg/dL    Creatinine, Ur 169.8 4.2 - 622.0 mg/dL    Microalbumin Creatinine Ratio 14.1 mg/g   Lipid Panel    Collection Time: 12/15/20 11:12 AM   Result Value Ref Range    Cholesterol, Total 143 (L) 160 - 199 mg/dL    Triglycerides 101 0 - 149 mg/dL    HDL 52 (L) 55 - 121 mg/dL    LDL Calculated 71 <100 mg/dL               Assessment & Plan: The following diagnoses and conditions are stable with no further orders unless indicated:  1.  Type 2 diabetes mellitus with diabetic dermatitis, without long-term current use of insulin ([facility]  Lab Results

## 2021-01-04 NOTE — TELEPHONE ENCOUNTER
Refilled medication and e-scribed to pharmacy. Confirm prescription was due. Make sure PABLO and urine drug screen is up to date.
Anxiety    Asthma  denies recent asthma exacerbation  Bladder tumor    Cancer of kidney    Essential hypertension    Gross hematuria    History of BPH    History of SIADH    History of stomach ulcers  denies recent endoscopy  HTN (Hypertension)    Hypercholesterolemia    Hyponatremia  admission x 2 last 2017  Left renal mass    Urinary tract infection with hematuria

## 2021-01-07 DIAGNOSIS — M54.50 CHRONIC MIDLINE LOW BACK PAIN WITHOUT SCIATICA: ICD-10-CM

## 2021-01-07 DIAGNOSIS — G89.29 CHRONIC MIDLINE LOW BACK PAIN WITHOUT SCIATICA: ICD-10-CM

## 2021-01-07 DIAGNOSIS — M51.36 DDD (DEGENERATIVE DISC DISEASE), LUMBAR: ICD-10-CM

## 2021-01-07 RX ORDER — HYDROCODONE BITARTRATE AND ACETAMINOPHEN 10; 325 MG/1; MG/1
1 TABLET ORAL EVERY 6 HOURS PRN
Qty: 120 TABLET | Refills: 0 | Status: SHIPPED | OUTPATIENT
Start: 2021-01-07 | End: 2021-02-09 | Stop reason: SDUPTHER

## 2021-01-07 NOTE — TELEPHONE ENCOUNTER
El Oconnor called to request a refill on his medication. Last office visit : 12/21/2020   Next office visit : 3/26/2021     Last UDS:   Amphetamine Screen, Urine   Date Value Ref Range Status   08/09/2019 neg  Final     Barbiturate Screen, Urine   Date Value Ref Range Status   08/09/2019 neg  Final     Benzodiazepine Screen, Urine   Date Value Ref Range Status   08/09/2019 neg  Final     Buprenorphine Urine   Date Value Ref Range Status   08/09/2019 neg  Final     Cocaine Metabolite Screen, Urine   Date Value Ref Range Status   08/09/2019 neg  Final     Gabapentin Screen, Urine   Date Value Ref Range Status   08/09/2019 neg  Final     MDMA, Urine   Date Value Ref Range Status   08/09/2019 neg  Final     Methamphetamine, Urine   Date Value Ref Range Status   08/09/2019 neg  Final     Opiate Scrn, Ur   Date Value Ref Range Status   08/09/2019 pos  Final     Oxycodone Screen, Ur   Date Value Ref Range Status   08/09/2019 neg  Final     PCP Screen, Urine   Date Value Ref Range Status   08/09/2019 neg  Final     Propoxyphene Screen, Urine   Date Value Ref Range Status   08/09/2019 neg  Final     THC Screen, Urine   Date Value Ref Range Status   08/09/2019 neg  Final     Tricyclic Antidepressants, Urine   Date Value Ref Range Status   08/09/2019 neg  Final       Last Malia School: 10/31/20  Medication Contract: next ov   Last Fill: 12/08/20    Requested Prescriptions     Pending Prescriptions Disp Refills    HYDROcodone-acetaminophen (NORCO)  MG per tablet 120 tablet 0     Sig: Take 1 tablet by mouth every 6 hours as needed for Pain for up to 30 days. Chronic use         Please approve or refuse this medication.    Adama Zimmer LPN

## 2021-02-09 DIAGNOSIS — M54.50 CHRONIC MIDLINE LOW BACK PAIN WITHOUT SCIATICA: ICD-10-CM

## 2021-02-09 DIAGNOSIS — G89.29 CHRONIC MIDLINE LOW BACK PAIN WITHOUT SCIATICA: ICD-10-CM

## 2021-02-09 DIAGNOSIS — M51.36 DDD (DEGENERATIVE DISC DISEASE), LUMBAR: ICD-10-CM

## 2021-02-09 RX ORDER — HYDROCODONE BITARTRATE AND ACETAMINOPHEN 10; 325 MG/1; MG/1
1 TABLET ORAL EVERY 6 HOURS PRN
Qty: 120 TABLET | Refills: 0 | Status: SHIPPED | OUTPATIENT
Start: 2021-02-09 | End: 2021-02-16 | Stop reason: SDUPTHER

## 2021-02-09 NOTE — TELEPHONE ENCOUNTER
Ivone Amando called to request a refill on his medication. Last office visit : 12/21/2020   Next office visit : 3/26/2021     Last UDS:   Amphetamine Screen, Urine   Date Value Ref Range Status   08/09/2019 neg  Final     Barbiturate Screen, Urine   Date Value Ref Range Status   08/09/2019 neg  Final     Benzodiazepine Screen, Urine   Date Value Ref Range Status   08/09/2019 neg  Final     Buprenorphine Urine   Date Value Ref Range Status   08/09/2019 neg  Final     Cocaine Metabolite Screen, Urine   Date Value Ref Range Status   08/09/2019 neg  Final     Gabapentin Screen, Urine   Date Value Ref Range Status   08/09/2019 neg  Final     MDMA, Urine   Date Value Ref Range Status   08/09/2019 neg  Final     Methamphetamine, Urine   Date Value Ref Range Status   08/09/2019 neg  Final     Opiate Scrn, Ur   Date Value Ref Range Status   08/09/2019 pos  Final     Oxycodone Screen, Ur   Date Value Ref Range Status   08/09/2019 neg  Final     PCP Screen, Urine   Date Value Ref Range Status   08/09/2019 neg  Final     Propoxyphene Screen, Urine   Date Value Ref Range Status   08/09/2019 neg  Final     THC Screen, Urine   Date Value Ref Range Status   08/09/2019 neg  Final     Tricyclic Antidepressants, Urine   Date Value Ref Range Status   08/09/2019 neg  Final       Last Phil Gondola: 2-9-21  Medication Contract: 8-9-19   Last Fill: 10-15-20    Requested Prescriptions     Pending Prescriptions Disp Refills    HYDROcodone-acetaminophen (NORCO)  MG per tablet 120 tablet 0     Sig: Take 1 tablet by mouth every 6 hours as needed for Pain for up to 30 days. Chronic use         Please approve or refuse this medication.    Annie Kaufman MA

## 2021-02-16 ENCOUNTER — TELEPHONE (OUTPATIENT)
Dept: ADMINISTRATIVE | Age: 66
End: 2021-02-16

## 2021-02-16 DIAGNOSIS — G89.29 CHRONIC MIDLINE LOW BACK PAIN WITHOUT SCIATICA: ICD-10-CM

## 2021-02-16 DIAGNOSIS — M51.36 DDD (DEGENERATIVE DISC DISEASE), LUMBAR: ICD-10-CM

## 2021-02-16 DIAGNOSIS — M54.50 CHRONIC MIDLINE LOW BACK PAIN WITHOUT SCIATICA: ICD-10-CM

## 2021-02-16 RX ORDER — HYDROCODONE BITARTRATE AND ACETAMINOPHEN 10; 325 MG/1; MG/1
1 TABLET ORAL EVERY 6 HOURS PRN
Qty: 120 TABLET | Refills: 0 | Status: CANCELLED | OUTPATIENT
Start: 2021-02-16 | End: 2021-03-18

## 2021-02-16 RX ORDER — HYDROCODONE BITARTRATE AND ACETAMINOPHEN 10; 325 MG/1; MG/1
1 TABLET ORAL EVERY 6 HOURS PRN
Qty: 120 TABLET | Refills: 0 | Status: SHIPPED | OUTPATIENT
Start: 2021-02-16 | End: 2021-03-14 | Stop reason: SDUPTHER

## 2021-03-03 DIAGNOSIS — F51.04 CHRONIC INSOMNIA: ICD-10-CM

## 2021-03-03 RX ORDER — TEMAZEPAM 30 MG/1
CAPSULE ORAL
Qty: 30 CAPSULE | Refills: 0 | OUTPATIENT
Start: 2021-03-03

## 2021-03-03 RX ORDER — TEMAZEPAM 30 MG/1
CAPSULE ORAL
Qty: 30 CAPSULE | Refills: 0 | Status: SHIPPED | OUTPATIENT
Start: 2021-03-03 | End: 2021-04-08 | Stop reason: SDUPTHER

## 2021-03-03 NOTE — TELEPHONE ENCOUNTER
Janessa Tubbs called to request a refill on his medication. Last office visit : 12/21/2020   Next office visit : 3/26/2021     Last UDS:   Amphetamine Screen, Urine   Date Value Ref Range Status   08/09/2019 neg  Final     Barbiturate Screen, Urine   Date Value Ref Range Status   08/09/2019 neg  Final     Benzodiazepine Screen, Urine   Date Value Ref Range Status   08/09/2019 neg  Final     Buprenorphine Urine   Date Value Ref Range Status   08/09/2019 neg  Final     Cocaine Metabolite Screen, Urine   Date Value Ref Range Status   08/09/2019 neg  Final     Gabapentin Screen, Urine   Date Value Ref Range Status   08/09/2019 neg  Final     MDMA, Urine   Date Value Ref Range Status   08/09/2019 neg  Final     Methamphetamine, Urine   Date Value Ref Range Status   08/09/2019 neg  Final     Opiate Scrn, Ur   Date Value Ref Range Status   08/09/2019 pos  Final     Oxycodone Screen, Ur   Date Value Ref Range Status   08/09/2019 neg  Final     PCP Screen, Urine   Date Value Ref Range Status   08/09/2019 neg  Final     Propoxyphene Screen, Urine   Date Value Ref Range Status   08/09/2019 neg  Final     THC Screen, Urine   Date Value Ref Range Status   08/09/2019 neg  Final     Tricyclic Antidepressants, Urine   Date Value Ref Range Status   08/09/2019 neg  Final       Last Oddis Anam: 2-9-21  Medication Contract: 8-9-19   Last Fill: 12/03/2020    Requested Prescriptions     Pending Prescriptions Disp Refills    temazepam (RESTORIL) 30 MG capsule 30 capsule 0     Sig: TAKE 1 CAPSULE BY MOUTH NIGHTLY AS NEEDED FOR SLEEP FOR 30 DAYS         Please approve or refuse this medication.    Abhishek Sainz MA

## 2021-03-03 NOTE — TELEPHONE ENCOUNTER
Abraham Max called to request a refill on his medication. Last office visit : 12/21/2020   Next office visit : 3/26/2021     Last UDS:   Amphetamine Screen, Urine   Date Value Ref Range Status   08/09/2019 neg  Final     Barbiturate Screen, Urine   Date Value Ref Range Status   08/09/2019 neg  Final     Benzodiazepine Screen, Urine   Date Value Ref Range Status   08/09/2019 neg  Final     Buprenorphine Urine   Date Value Ref Range Status   08/09/2019 neg  Final     Cocaine Metabolite Screen, Urine   Date Value Ref Range Status   08/09/2019 neg  Final     Gabapentin Screen, Urine   Date Value Ref Range Status   08/09/2019 neg  Final     MDMA, Urine   Date Value Ref Range Status   08/09/2019 neg  Final     Methamphetamine, Urine   Date Value Ref Range Status   08/09/2019 neg  Final     Opiate Scrn, Ur   Date Value Ref Range Status   08/09/2019 pos  Final     Oxycodone Screen, Ur   Date Value Ref Range Status   08/09/2019 neg  Final     PCP Screen, Urine   Date Value Ref Range Status   08/09/2019 neg  Final     Propoxyphene Screen, Urine   Date Value Ref Range Status   08/09/2019 neg  Final     THC Screen, Urine   Date Value Ref Range Status   08/09/2019 neg  Final     Tricyclic Antidepressants, Urine   Date Value Ref Range Status   08/09/2019 neg  Final       Last Lucio Maldonado: 2-9-21  Medication Contract: 8-9-19   Last Fill: 12-3-20    Requested Prescriptions     Pending Prescriptions Disp Refills    temazepam (RESTORIL) 30 MG capsule [Pharmacy Med Name: Temazepam 30 MG Oral Capsule] 30 capsule 0     Sig: TAKE 1 CAPSULE BY MOUTH NIGHTLY AS NEEDED FOR SLEEP         Please approve or refuse this medication.    Tish Priest MA

## 2021-03-26 ENCOUNTER — TELEMEDICINE (OUTPATIENT)
Dept: FAMILY MEDICINE CLINIC | Age: 66
End: 2021-03-26
Payer: MEDICARE

## 2021-03-26 DIAGNOSIS — E11.620 TYPE 2 DIABETES MELLITUS WITH DIABETIC DERMATITIS, WITHOUT LONG-TERM CURRENT USE OF INSULIN (HCC): ICD-10-CM

## 2021-03-26 DIAGNOSIS — I10 ESSENTIAL (PRIMARY) HYPERTENSION: ICD-10-CM

## 2021-03-26 DIAGNOSIS — F51.04 CHRONIC INSOMNIA: ICD-10-CM

## 2021-03-26 DIAGNOSIS — N40.0 BENIGN PROSTATIC HYPERPLASIA WITHOUT LOWER URINARY TRACT SYMPTOMS: ICD-10-CM

## 2021-03-26 DIAGNOSIS — Z00.00 ANNUAL PHYSICAL EXAM: Primary | ICD-10-CM

## 2021-03-26 DIAGNOSIS — E66.01 MORBID OBESITY (HCC): ICD-10-CM

## 2021-03-26 DIAGNOSIS — K21.9 GASTROESOPHAGEAL REFLUX DISEASE WITHOUT ESOPHAGITIS: ICD-10-CM

## 2021-03-26 DIAGNOSIS — Z89.512 HISTORY OF LEFT BELOW KNEE AMPUTATION (HCC): ICD-10-CM

## 2021-03-26 PROCEDURE — 4004F PT TOBACCO SCREEN RCVD TLK: CPT | Performed by: FAMILY MEDICINE

## 2021-03-26 PROCEDURE — 3046F HEMOGLOBIN A1C LEVEL >9.0%: CPT | Performed by: FAMILY MEDICINE

## 2021-03-26 PROCEDURE — G8484 FLU IMMUNIZE NO ADMIN: HCPCS | Performed by: FAMILY MEDICINE

## 2021-03-26 PROCEDURE — 3017F COLORECTAL CA SCREEN DOC REV: CPT | Performed by: FAMILY MEDICINE

## 2021-03-26 PROCEDURE — G0439 PPPS, SUBSEQ VISIT: HCPCS | Performed by: FAMILY MEDICINE

## 2021-03-26 PROCEDURE — G8417 CALC BMI ABV UP PARAM F/U: HCPCS | Performed by: FAMILY MEDICINE

## 2021-03-26 PROCEDURE — 2022F DILAT RTA XM EVC RTNOPTHY: CPT | Performed by: FAMILY MEDICINE

## 2021-03-26 PROCEDURE — 99214 OFFICE O/P EST MOD 30 MIN: CPT | Performed by: FAMILY MEDICINE

## 2021-03-26 PROCEDURE — G8427 DOCREV CUR MEDS BY ELIG CLIN: HCPCS | Performed by: FAMILY MEDICINE

## 2021-03-26 NOTE — PROGRESS NOTES
3/26/2021    TELEHEALTH EVALUATION -- Audio/Visual (During CMEVL-40 public health emergency)    HPI:    Stefany Hogan (:  1955) has requested an audio/video evaluation for the following concern(s):    Hypertension  Compliant with medications. No adverse effects from medication. No lightheadedness, palpitations, or chest pain. Lower Back Pain, Chronic  Compliant with medications. No adverse effects from medication. Symptoms continue to be stable. Lower back pain is described as a dull ache and occasionally sharp and stabbing. No radiation. Relieved with his current pain medication. No numbness or weakness in lower extremities. Currently satisfied with treatment regimen as it provides some relief. Compliant with his current opioid pain reliever. He states he takes his medication as needed. His pain is typically moderate in nature but can become severe on occasion. he abstains from alcohol while taking his pain medication. he denies drowsiness and impairment on his medication. Gastroesophageal Reflux Disease  Symptoms currently under control. Medication adequately controls his symptoms. No hematochezia or melena. Benign Prostatic Hypertrophy  Tolerating medication without adverse effects. Symptoms of hesitancy, nocturia, and dribbling are adequately controlled. No hematuria or dysuria    Insomnia  Currently stable. Satisfied with current treatment regimen. No adverse effects from medication. Review of Systems   Constitutional: Negative for chills and fever. HENT: Negative for congestion. Respiratory: Negative for cough, chest tightness and shortness of breath. Cardiovascular: Negative for chest pain, palpitations and leg swelling. Gastrointestinal: Negative for abdominal pain, anal bleeding, constipation, diarrhea and nausea. Genitourinary: Negative for difficulty urinating.    Musculoskeletal: Positive for arthralgias, back pain, gait problem and myalgias. Negative for joint swelling. Psychiatric/Behavioral: Negative. Prior to Visit Medications    Medication Sig Taking? Authorizing Provider   HYDROcodone-acetaminophen (NORCO)  MG per tablet Take 1 tablet by mouth every 6 hours as needed for Pain for up to 30 days. Chronic use Yes Josue Blake MD   temazepam (RESTORIL) 30 MG capsule TAKE 1 CAPSULE BY MOUTH NIGHTLY AS NEEDED FOR SLEEP FOR 30 DAYS Yes Josue Blake MD   hydroCHLOROthiazide (HYDRODIURIL) 12.5 MG tablet Take 1 tablet by mouth every morning Yes Josue Blake MD   lisinopril (PRINIVIL;ZESTRIL) 40 MG tablet Take 1 tablet by mouth nightly Yes Josue Blake MD   pantoprazole (PROTONIX) 40 MG tablet TAKE 1 TABLET BY MOUTH IN THE MORNING BEFORE BREAKFAST Yes Josue Blake MD   tamsulosin (FLOMAX) 0.4 MG capsule Take 1 capsule by mouth daily Yes Josue Blake MD   ibuprofen (ADVIL;MOTRIN) 600 MG tablet Take 200 mg by mouth every 8 hours as needed for Pain Yes Historical Provider, MD   DAILY MULTIPLE VITAMINS TABS Take 1 tablet by mouth Yes Historical Provider, MD       Social History     Tobacco Use    Smoking status: Never Smoker    Smokeless tobacco: Never Used   Substance Use Topics    Alcohol use: No    Drug use: No            PHYSICAL EXAMINATION:  [ INSTRUCTIONS:  \"[x]\" Indicates a positive item  \"[]\" Indicates a negative item  -- DELETE ALL ITEMS NOT EXAMINED]  Vital Signs: (As obtained by patient/caregiver or practitioner observation)    Blood pressure-  Heart rate-    Respiratory rate-    Temperature-  Pulse oximetry-     Constitutional: [x] Appears well-developed and well-nourished [x] No apparent distress      [] Abnormal-   Mental status  [x] Alert and awake  [] Oriented to person/place/time [x]Able to follow commands      Eyes:  EOM    [x]  Normal  [] Abnormal-  Sclera  [x]  Normal  [] Abnormal -         Discharge []  None visible  [] Abnormal -    HENT:   [x] Normocephalic, atraumatic.   [] Abnormal   [] Mouth/Throat: Mucous membranes are moist.     External Ears [x] Normal  [] Abnormal-     Neck: [x] No visualized mass     Pulmonary/Chest: [x] Respiratory effort normal.  [x] No visualized signs of difficulty breathing or respiratory distress        [] Abnormal-      Musculoskeletal:   [] Normal gait with no signs of ataxia         [] Normal range of motion of neck        [] Abnormal-       Neurological:        [] No Facial Asymmetry (Cranial nerve 7 motor function) (limited exam to video visit)          [] No gaze palsy        [] Abnormal-         Skin:        [x] No significant exanthematous lesions or discoloration noted on facial skin         [] Abnormal-            Psychiatric:       [x] Normal Affect [x] No Hallucinations        [] Abnormal-     Other pertinent observable physical exam findings-     No results found for this or any previous visit (from the past 672 hour(s)). ASSESSMENT/PLAN:  1. Annual physical exam  Discussed lifestyle changes such as diet and exercise. Recommended eliminate processed food from diet such as sugar and fried foods. Recommended exercising at least 150 minutes/week. Try to do full body resistance training twice a week as well. Offered suggestions for calorie counting such as phone apps and online resources such as [employer] pal and Lose it. Discussed healthy weight. 2. History of left below knee amputation ([facility]  Continues to use his prosthetic    3. Type 2 diabetes mellitus with diabetic dermatitis, without long-term current use of insulin ([facility]  Continue to manage his diabetes without medication  Lab Results   Component Value Date    LABA1C 4.3 12/15/2020    LABA1C 4.8 02/26/2020    LABA1C 4.5 08/02/2019     Lab Results   Component Value Date    LABMICR 2.40 12/15/2020    LDLCALC 71 12/15/2020    CREATININE 1.2 12/15/2020         4. Morbid obesity ([facility]  Discussed lifestyle changes such as diet and exercise.  Recommended eliminate processed food from diet such as sugar and fried foods. Recommended exercising at least 150 minutes/week. Try to do full body resistance training twice a week as well. Offered suggestions for calorie counting such as phone apps and online resources such as My [employer] pal and Lose it. Discussed healthy weight. 5. Essential (primary) hypertension  BP Readings from Last 3 Encounters:   12/21/20 128/78   03/06/20 138/82   11/11/19 130/66     Blood pressure stable at home    6. Gastroesophageal reflux disease without esophagitis  Stable    7. Benign prostatic hyperplasia without lower urinary tract symptoms  Stable    8. Chronic insomnia  Stable      Return in about 3 months (around 6/26/2021) for Routine follow up - 15 minute visit, In Office; poct a1c. Jeff Venegas is a 63 y.o. male being evaluated by a Virtual Visit (video visit) encounter to address concerns as mentioned above. A caregiver was present when appropriate. Due to this being a TeleHealth encounter (During ZOWSP-00 public health emergency), evaluation of the following organ systems was limited: Vitals/Constitutional/EENT/Resp/CV/GI//MS/Neuro/Skin/Heme-Lymph-Imm. Pursuant to the emergency declaration under the [facility], [address] [place] [address] authority and the [employer] and [employer] Act, this Virtual Visit was conducted with patient's (and/or legal guardian's) consent, to reduce the patient's risk of exposure to COVID-19 and provide necessary medical care. The patient (and/or legal guardian) has also been advised to contact this office for worsening conditions or problems, and seek emergency medical treatment and/or call 911 if deemed necessary. Patient identification was verified at the start of the visit: Yes    Total time spent on this encounter: Not billed by time    Services were provided through a video synchronous discussion virtually to substitute for in-person clinic visit. Patient and provider were located at their individual homes. --Mary Porter MD on 3/27/2021 at 12:50 PM    An electronic signature was used to authenticate this note.

## 2021-03-27 ASSESSMENT — ENCOUNTER SYMPTOMS
ANAL BLEEDING: 0
COUGH: 0
CONSTIPATION: 0
CHEST TIGHTNESS: 0
DIARRHEA: 0
NAUSEA: 0
BACK PAIN: 1
SHORTNESS OF BREATH: 0
ABDOMINAL PAIN: 0

## 2021-04-08 DIAGNOSIS — F51.04 CHRONIC INSOMNIA: ICD-10-CM

## 2021-04-08 RX ORDER — TEMAZEPAM 30 MG/1
CAPSULE ORAL
Qty: 30 CAPSULE | Refills: 2 | Status: SHIPPED | OUTPATIENT
Start: 2021-04-08 | End: 2021-07-08

## 2021-04-08 NOTE — TELEPHONE ENCOUNTER
The current medical regimen is effective. Continue present plan and medications. UDS and controlled substance contract up to date. No unusual filling on current PABOL report. Tx continues to be medically necessary.     Elmo Britt MD

## 2021-04-12 RX ORDER — PANTOPRAZOLE SODIUM 40 MG/1
40 TABLET, DELAYED RELEASE ORAL DAILY
Qty: 90 TABLET | Refills: 3 | Status: SHIPPED | OUTPATIENT
Start: 2021-04-12 | End: 2021-12-28 | Stop reason: SDUPTHER

## 2021-05-06 RX ORDER — HYDROCHLOROTHIAZIDE 12.5 MG/1
12.5 TABLET ORAL EVERY MORNING
Qty: 30 TABLET | Refills: 5 | Status: SHIPPED | OUTPATIENT
Start: 2021-05-06

## 2021-05-06 NOTE — TELEPHONE ENCOUNTER
Crespo Scarlett called to request a refill on his medication.       Last office visit : 3/26/2021   Next office visit : 6/29/2021     Requested Prescriptions     Signed Prescriptions Disp Refills    hydroCHLOROthiazide (HYDRODIURIL) 12.5 MG tablet 30 tablet 5     Sig: Take 1 tablet by mouth every morning     Authorizing Provider: Yung Ryder     Ordering User: Christian Herring

## 2021-05-20 DIAGNOSIS — M51.36 DDD (DEGENERATIVE DISC DISEASE), LUMBAR: ICD-10-CM

## 2021-05-20 DIAGNOSIS — G89.29 CHRONIC MIDLINE LOW BACK PAIN WITHOUT SCIATICA: ICD-10-CM

## 2021-05-20 DIAGNOSIS — M54.50 CHRONIC MIDLINE LOW BACK PAIN WITHOUT SCIATICA: ICD-10-CM

## 2021-05-20 NOTE — TELEPHONE ENCOUNTER
Isabel Britton called to request a refill on his medication. Last office visit : 3/26/2021   Next office visit : 6/29/2021     Last UDS:   Amphetamine Screen, Urine   Date Value Ref Range Status   08/09/2019 neg  Final     Barbiturate Screen, Urine   Date Value Ref Range Status   08/09/2019 neg  Final     Benzodiazepine Screen, Urine   Date Value Ref Range Status   08/09/2019 neg  Final     Buprenorphine Urine   Date Value Ref Range Status   08/09/2019 neg  Final     Cocaine Metabolite Screen, Urine   Date Value Ref Range Status   08/09/2019 neg  Final     Gabapentin Screen, Urine   Date Value Ref Range Status   08/09/2019 neg  Final     MDMA, Urine   Date Value Ref Range Status   08/09/2019 neg  Final     Methamphetamine, Urine   Date Value Ref Range Status   08/09/2019 neg  Final     Opiate Scrn, Ur   Date Value Ref Range Status   08/09/2019 pos  Final     Oxycodone Screen, Ur   Date Value Ref Range Status   08/09/2019 neg  Final     PCP Screen, Urine   Date Value Ref Range Status   08/09/2019 neg  Final     Propoxyphene Screen, Urine   Date Value Ref Range Status   08/09/2019 neg  Final     THC Screen, Urine   Date Value Ref Range Status   08/09/2019 neg  Final     Tricyclic Antidepressants, Urine   Date Value Ref Range Status   08/09/2019 neg  Final       Last Moraima Cameron: 5-18-21  Medication Contract:Due  Last Fill: 4-18-21    Requested Prescriptions     Pending Prescriptions Disp Refills    HYDROcodone-acetaminophen (NORCO)  MG per tablet 120 tablet 0     Sig: Take 1 tablet by mouth every 6 hours as needed for Pain for up to 30 days. Chronic use         Please approve or refuse this medication.    Payam Mcmillan MA

## 2021-05-21 RX ORDER — HYDROCODONE BITARTRATE AND ACETAMINOPHEN 10; 325 MG/1; MG/1
1 TABLET ORAL EVERY 6 HOURS PRN
Qty: 120 TABLET | Refills: 0 | Status: SHIPPED | OUTPATIENT
Start: 2021-05-21 | End: 2021-06-18 | Stop reason: SDUPTHER

## 2021-06-18 DIAGNOSIS — M54.50 CHRONIC MIDLINE LOW BACK PAIN WITHOUT SCIATICA: ICD-10-CM

## 2021-06-18 DIAGNOSIS — M51.36 DDD (DEGENERATIVE DISC DISEASE), LUMBAR: ICD-10-CM

## 2021-06-18 DIAGNOSIS — G89.29 CHRONIC MIDLINE LOW BACK PAIN WITHOUT SCIATICA: ICD-10-CM

## 2021-06-18 RX ORDER — HYDROCODONE BITARTRATE AND ACETAMINOPHEN 10; 325 MG/1; MG/1
1 TABLET ORAL EVERY 6 HOURS PRN
Qty: 120 TABLET | Refills: 0 | Status: SHIPPED | OUTPATIENT
Start: 2021-06-18 | End: 2021-07-20 | Stop reason: SDUPTHER

## 2021-06-18 NOTE — TELEPHONE ENCOUNTER
Mica Aguayo called to request a refill on his medication. Last office visit : 3/26/2021   Next office visit : 6/29/2021     Last UDS:   Amphetamine Screen, Urine   Date Value Ref Range Status   08/09/2019 neg  Final     Barbiturate Screen, Urine   Date Value Ref Range Status   08/09/2019 neg  Final     Benzodiazepine Screen, Urine   Date Value Ref Range Status   08/09/2019 neg  Final     Buprenorphine Urine   Date Value Ref Range Status   08/09/2019 neg  Final     Cocaine Metabolite Screen, Urine   Date Value Ref Range Status   08/09/2019 neg  Final     Gabapentin Screen, Urine   Date Value Ref Range Status   08/09/2019 neg  Final     MDMA, Urine   Date Value Ref Range Status   08/09/2019 neg  Final     Methamphetamine, Urine   Date Value Ref Range Status   08/09/2019 neg  Final     Opiate Scrn, Ur   Date Value Ref Range Status   08/09/2019 pos  Final     Oxycodone Screen, Ur   Date Value Ref Range Status   08/09/2019 neg  Final     PCP Screen, Urine   Date Value Ref Range Status   08/09/2019 neg  Final     Propoxyphene Screen, Urine   Date Value Ref Range Status   08/09/2019 neg  Final     THC Screen, Urine   Date Value Ref Range Status   08/09/2019 neg  Final     Tricyclic Antidepressants, Urine   Date Value Ref Range Status   08/09/2019 neg  Final       Last Bhavana Prospect: 5-18-21  Medication Contract: 8-9-19   Last Fill: 5-21-21    Requested Prescriptions     Pending Prescriptions Disp Refills    HYDROcodone-acetaminophen (NORCO)  MG per tablet 120 tablet 0     Sig: Take 1 tablet by mouth every 6 hours as needed for Pain for up to 30 days. Chronic use         Please approve or refuse this medication.    Robert Kumar MA

## 2021-06-19 NOTE — MR AVS SNAPSHOT
After Visit Summary             Kaelyn Brito   10/2/2017 8:45 AM   Office Visit    Description:  Male : 1955   Provider:  Cristobal Craig MD   Department:  [facility]              Your Follow-Up and Future Appointments         Below is a list of your follow-up and future appointments. This may not be a complete list as you may have made appointments directly with providers that we are not aware of or your providers may have made some for you. Please call your providers to confirm appointments. It is important to keep your appointments. Please bring your current insurance card, photo ID, co-pay, and all medication bottles to your appointment. If self-pay, payment is expected at the time of service. Your To-Do List     Future Appointments Provider Department Dept Phone    10/23/2017 2:45 PM Cristobal Craig MD [address] [phone]    If this is a sports or school physical please bring the physical form with you. Follow-Up    Return in about 2 weeks (around 10/16/2017) for AWV. Information from Your Visit        Department     Name Address Phone Fax    [address] [address] [address] [phone]      You Were Seen for:         Comments    Closed fracture of multiple ribs of right side with routine healing, subsequent encounter   [[id number]]         Vital Signs     Blood Pressure Pulse Temperature Respirations Height Weight    130/72 73 97.8 °F (36.6 °C) 18 6' (1.829 m) 246 lb (111.6 kg)    Oxygen Saturation Body Mass Index Smoking Status             97% 33.36 kg/m2 Never Smoker         Additional Information about your Body Mass Index (BMI)           Your BMI as listed above is considered obese (30 or more). BMI is an estimate of body fat, calculated from your height and weight.   The higher your BMI, the greater your risk of heart disease, high blood pressure, Letter by Misbah Muniz DO at      Author: Misbah Muniz DO Service: -- Author Type: --    Filed:  Encounter Date: 7/2/2019 Status: (Other)         Frank Sharp  [address]  [place] [address]             July 2, 2019         Dear Mr. Sharp,    Below are the results from your recent visit:    Resulted Orders   PSA, Diagnostic (Prostatic-Specific Antigen)   Result Value Ref Range    PSA 1.2 0.0 - 4.5 ng/mL    Narrative    Method is Abbott Prostate-Specific Antigen (PSA)  Standard-WHO 1st International (90:10)       The PSA is back to normal.  I think the high PSA may just have been a fluke and not reflective of prostate cancer.  Since it is back to normal, I am not worried about it.  I will send it to Dr. Estrella, too.     Please call with questions or contact us using [employer].    Sincerely,        Electronically signed by Misbah Muniz DO        type 2 diabetes, stroke, gallstones, arthritis, sleep apnea, and certain cancers. BMI is not perfect. It may overestimate body fat in athletes and people who are more muscular. Even a small weight loss (between 5 and 10 percent of your current weight) by decreasing your calorie intake and becoming more physically active will help lower your risk of developing or worsening diseases associated with obesity. Learn more at: [email or web address]             Medications and Orders      Your Current Medications Are              lisinopril (PRINIVIL;ZESTRIL) 20 MG tablet Take 20 mg by mouth daily    HYDROcodone-acetaminophen (NORCO)  MG per tablet Take 1 tablet by mouth every 6 hours as needed for Pain .    temazepam (RESTORIL) 30 MG capsule Take 30 mg by mouth    tiZANidine (ZANAFLEX) 4 MG tablet Take 4 mg by mouth every 8 hours as needed       Allergies              Adhesive Tape     Tears skin         Additional Information        Basic Information     Date Of Birth Sex Race Ethnicity Preferred Language Preferred Written Language    1955 Male White Non-/Non  English English      Problem List as of 10/2/2017  Date Reviewed: 9/5/2017                Skin ulcer of toe of left foot with fat layer exposed ([address] (Chronic)    Diabetic ulcer of left foot associated with type 2 diabetes mellitus (HCC) (Chronic)    Idiopathic chronic venous hypertension of left leg with ulcer (HCC) (Chronic)    Type 2 diabetes mellitus with diabetic dermatitis (HCC) (Chronic)    Venous ulcer of right leg (HCC) (Chronic)    Venous insufficiency of both lower extremities (Chronic)    Morbid obesity ([address]      Preventive Care        Date Due    Hepatitis C screening is recommended for all adults regardless of risk factors born between [facility] at least once (lifetime) who have never been tested.  1955    Diabetic Foot Exam 5/7/1967    Eye Exam By An Eye Doctor 5/7/1967 HIV screening is recommended for all people regardless of risk factors  aged 15-65 years at least once (lifetime) who have never been HIV tested. 5/7/1972    Urine Check For Kidney Problems 5/7/1975    Tetanus Combination Vaccine (1 - Tdap) 5/7/1976    Pneumococcal Vaccine - Pneumovax for adults aged 19-64 years with: chronic heart disease, chronic lung disease, diabetes mellitus, alcoholism, chronic liver disease, or cigarette smoking. (1 of 1 - PPSV23) 5/7/1976    Colonoscopy 5/7/2007    Cholesterol Screening 3/8/2012    Hemoglobin A1C (Test For Long-Term Glucose Control) 10/14/2015    Zoster Vaccine 5/7/2017    Yearly Flu Vaccine (1) 9/1/2017            MyChart Signup           [employer] allows you to send messages to your doctor, view your test results, renew your prescriptions, schedule appointments, view visit notes, and more. How Do I Sign Up? 1. In your Internet browser, go to [email or web address]  2. Click on the Sign Up Now link in the Sign In box. You will see the New Member Sign Up page. 3. Enter your [employer] Access Code exactly as it appears below. You will not need to use this code after youve completed the sign-up process. If you do not sign up before the expiration date, you must request a new code. [employer] Access Code: [email or web address]  Expires: 12/1/2017  9:31 AM    4. Enter your Social Security Number ([id number]) and Date of Birth (mm/dd/yyyy) as indicated and click Submit. You will be taken to the next sign-up page. 5. Create a [employer] ID. This will be your [employer] login ID and cannot be changed, so think of one that is secure and easy to remember. 6. Create a [employer] password. You can change your password at any time. 7. Enter your Password Reset Question and Answer. This can be used at a later time if you forget your password. 8. Enter your e-mail address. You will receive e-mail notification when new information is available in [address] 9. Click Sign Up. You can now view your medical record. Additional Information  If you have questions, please contact the physician practice where you receive care. Remember, [employer] is NOT to be used for urgent needs. For medical emergencies, dial 911. For questions regarding your [employer] account call [phone]. If you have a clinical question, please call your doctor's office.

## 2021-06-29 ENCOUNTER — OFFICE VISIT (OUTPATIENT)
Dept: FAMILY MEDICINE CLINIC | Age: 66
End: 2021-06-29

## 2021-06-29 VITALS
WEIGHT: 210 LBS | BODY MASS INDEX: 29.4 KG/M2 | OXYGEN SATURATION: 98 % | TEMPERATURE: 97.3 F | HEART RATE: 101 BPM | HEIGHT: 71 IN | SYSTOLIC BLOOD PRESSURE: 112 MMHG | DIASTOLIC BLOOD PRESSURE: 78 MMHG

## 2021-06-29 DIAGNOSIS — Z11.59 NEED FOR HEPATITIS C SCREENING TEST: ICD-10-CM

## 2021-06-29 DIAGNOSIS — R53.83 OTHER FATIGUE: ICD-10-CM

## 2021-06-29 DIAGNOSIS — E11.620 TYPE 2 DIABETES MELLITUS WITH DIABETIC DERMATITIS, WITHOUT LONG-TERM CURRENT USE OF INSULIN (HCC): ICD-10-CM

## 2021-06-29 DIAGNOSIS — E78.00 HYPERCHOLESTEROLEMIA: ICD-10-CM

## 2021-06-29 DIAGNOSIS — I10 ESSENTIAL (PRIMARY) HYPERTENSION: ICD-10-CM

## 2021-06-29 DIAGNOSIS — K21.9 GASTROESOPHAGEAL REFLUX DISEASE WITHOUT ESOPHAGITIS: ICD-10-CM

## 2021-06-29 DIAGNOSIS — E11.620 TYPE 2 DIABETES MELLITUS WITH DIABETIC DERMATITIS, WITHOUT LONG-TERM CURRENT USE OF INSULIN (HCC): Primary | ICD-10-CM

## 2021-06-29 LAB
ALBUMIN SERPL-MCNC: 4.3 G/DL (ref 3.5–5.2)
ALP BLD-CCNC: 75 U/L (ref 40–130)
ALT SERPL-CCNC: 27 U/L (ref 5–41)
ANION GAP SERPL CALCULATED.3IONS-SCNC: 12 MMOL/L (ref 7–19)
AST SERPL-CCNC: 36 U/L (ref 5–40)
BASOPHILS ABSOLUTE: 0 K/UL (ref 0–0.2)
BASOPHILS RELATIVE PERCENT: 0.7 % (ref 0–1)
BILIRUB SERPL-MCNC: 1.1 MG/DL (ref 0.2–1.2)
BUN BLDV-MCNC: 29 MG/DL (ref 8–23)
CALCIUM SERPL-MCNC: 9.6 MG/DL (ref 8.8–10.2)
CHLORIDE BLD-SCNC: 100 MMOL/L (ref 98–111)
CHOLESTEROL, TOTAL: 209 MG/DL (ref 160–199)
CO2: 28 MMOL/L (ref 22–29)
CREAT SERPL-MCNC: 1.1 MG/DL (ref 0.5–1.2)
EOSINOPHILS ABSOLUTE: 0.1 K/UL (ref 0–0.6)
EOSINOPHILS RELATIVE PERCENT: 1.8 % (ref 0–5)
GFR AFRICAN AMERICAN: >59
GFR NON-AFRICAN AMERICAN: >60
GLUCOSE BLD-MCNC: 110 MG/DL (ref 74–109)
HBA1C MFR BLD: 4 % (ref 4–6)
HCT VFR BLD CALC: 38 % (ref 42–52)
HDLC SERPL-MCNC: 73 MG/DL (ref 55–121)
HEMOGLOBIN: 12.5 G/DL (ref 14–18)
HEPATITIS C ANTIBODY INTERPRETATION: NORMAL
IMMATURE GRANULOCYTES #: 0 K/UL
LDL CHOLESTEROL CALCULATED: 119 MG/DL
LYMPHOCYTES ABSOLUTE: 1.3 K/UL (ref 1.1–4.5)
LYMPHOCYTES RELATIVE PERCENT: 20.9 % (ref 20–40)
MCH RBC QN AUTO: 34 PG (ref 27–31)
MCHC RBC AUTO-ENTMCNC: 32.9 G/DL (ref 33–37)
MCV RBC AUTO: 103.3 FL (ref 80–94)
MONOCYTES ABSOLUTE: 0.5 K/UL (ref 0–0.9)
MONOCYTES RELATIVE PERCENT: 8.3 % (ref 0–10)
NEUTROPHILS ABSOLUTE: 4.1 K/UL (ref 1.5–7.5)
NEUTROPHILS RELATIVE PERCENT: 68 % (ref 50–65)
PDW BLD-RTO: 12.7 % (ref 11.5–14.5)
PLATELET # BLD: 172 K/UL (ref 130–400)
PMV BLD AUTO: 11.8 FL (ref 9.4–12.4)
POTASSIUM SERPL-SCNC: 3.9 MMOL/L (ref 3.5–5)
RBC # BLD: 3.68 M/UL (ref 4.7–6.1)
SODIUM BLD-SCNC: 140 MMOL/L (ref 136–145)
T4 FREE: 1.23 NG/DL (ref 0.93–1.7)
TOTAL PROTEIN: 7.6 G/DL (ref 6.6–8.7)
TRIGL SERPL-MCNC: 85 MG/DL (ref 0–149)
TSH SERPL DL<=0.05 MIU/L-ACNC: 2.35 UIU/ML (ref 0.27–4.2)
WBC # BLD: 6 K/UL (ref 4.8–10.8)

## 2021-06-29 PROCEDURE — 99214 OFFICE O/P EST MOD 30 MIN: CPT | Performed by: FAMILY MEDICINE

## 2021-06-29 PROCEDURE — G8417 CALC BMI ABV UP PARAM F/U: HCPCS | Performed by: FAMILY MEDICINE

## 2021-06-29 PROCEDURE — 4004F PT TOBACCO SCREEN RCVD TLK: CPT | Performed by: FAMILY MEDICINE

## 2021-06-29 PROCEDURE — 3017F COLORECTAL CA SCREEN DOC REV: CPT | Performed by: FAMILY MEDICINE

## 2021-06-29 PROCEDURE — G8427 DOCREV CUR MEDS BY ELIG CLIN: HCPCS | Performed by: FAMILY MEDICINE

## 2021-06-29 PROCEDURE — 2022F DILAT RTA XM EVC RTNOPTHY: CPT | Performed by: FAMILY MEDICINE

## 2021-06-29 PROCEDURE — 3044F HG A1C LEVEL LT 7.0%: CPT | Performed by: FAMILY MEDICINE

## 2021-06-29 NOTE — PROGRESS NOTES
[facility], located within [facility] PHYSICIAN SERVICES  [facility]  [address] [facility] [address] [address]  Dept: [phone]  Dept Fax: [phone]  Loc: [phone]    Rica Reeves is a 61 y.o. male who presents today for his medical conditions/complaints as noted below. Rica Reeves is here for 3 Month Follow-Up        HPI:   CC: Here today to discuss the following:    Diabetes Mellitus  At this point he primarily manages his diabetes with lifestyle modification. He has lost 43 pounds since December. Hypertension  Compliant with medications. No adverse effects from medication. No lightheadedness, palpitations, or chest pain. Lower Back Pain, Chronic  Compliant with medications. No adverse effects from medication. Symptoms continue to be stable. Lower back pain is described as a dull ache and occasionally sharp and stabbing. No radiation. Relieved with his current pain medication. No numbness or weakness in lower extremities. Currently satisfied with treatment regimen as it provides some relief. Compliant with current opioid pain reliever. States takes medication as needed. Pain is typically moderate in nature but can become severe on occasion. Abstains from alcohol while taking pain medication. Denies drowsiness and impairment on medication. Gastroesophageal Reflux Disease  Symptoms currently under control. Medication adequately controls his symptoms. No hematochezia or melena.                  HPI    Past Medical History:   Diagnosis Date    Arthritis     Asthma     Bradycardia     Broken ribs     CHF (congestive heart failure) (HCC)     Chronic kidney disease, stage III (moderate) ([address] 9/15/2020    Chronic ulcer of left leg, with fat layer exposed ([address]     Diabetes mellitus ([address]     no meds; improved with weight loss    Diabetic peripheral neuropathy ([address] 3/8/2019    History of left below knee amputation ([address] 1/29/2019    Hx of BKA, left ([address] 5/25/2018    Hypertension     Idiopathic chronic venous hypertension of left leg with ulcer ([facility] 12/22/2015    Morbid obesity ([facility] 6/22/15    S/P BKA (below knee amputation) unilateral, left ([address] 5/30/2018    Skin ulcer of toe of left foot with fat layer exposed ([facility] 4/4/2017    Sleep apnea in adult 06/22/2015    no cpap    Type 2 diabetes mellitus with foot ulcer (CODE) ([facility] 11/15/2017    Ulcerated, foot, right, with fat layer exposed ([facility] 4/4/2017    Venous hypertension, chronic, with ulcer, left 11/26/2017    Venous insufficiency of both lower extremities 6/22/15      Past Surgical History:   Procedure Laterality Date    ABDOMEN SURGERY  02/2018    treated in [address]      hx. of multiple; \"no blockage\"    DILATATION, ESOPHAGUS      GASTROSTOMY TUBE PLACEMENT  02/11/2018    temporary; now removed    KNEE ARTHROSCOPY      6 knee surgeries total; \"no replacements\"    MA AMPUTATION LOW LEG THRU TIB/FIB Left 5/25/2018    LEG AMPUTATION BELOW KNEE WITH MUSCLE FLAP  AND SKIN GRAFT CLOSURE AND APPLICATION OF WOUND VAC performed by Whitney Flores MD at [place] [address]  02/11/2018    repair of gastric bypass    VASCULAR SURGERY  05/25/2018    T[facility]. Leg amputation below knee with muscle flap and skin graft closure and application of wound vac stsg 14 cmx 15 cm. Family History   Problem Relation Age of Onset    Diabetes Mother     Heart Disease Mother     High Blood Pressure Mother     Heart Disease Father     Other Father        Social History     Tobacco Use    Smoking status: Never Smoker    Smokeless tobacco: Never Used   Substance Use Topics    Alcohol use: No     Current Outpatient Medications   Medication Sig Dispense Refill    HYDROcodone-acetaminophen (NORCO)  MG per tablet Take 1 tablet by mouth every 6 hours as needed for Pain for up to 30 days.  Chronic use 120 tablet 0    hydroCHLOROthiazide (HYDRODIURIL) 12.5 MG tablet Take 1 tablet by mouth every morning 30 tablet 5    pantoprazole (PROTONIX) 40 MG tablet Take 1 tablet by mouth daily 90 tablet 3    lisinopril (PRINIVIL;ZESTRIL) 40 MG tablet Take 1 tablet by mouth nightly 90 tablet 3    ibuprofen (ADVIL;MOTRIN) 600 MG tablet Take 200 mg by mouth every 8 hours as needed for Pain      DAILY MULTIPLE VITAMINS TABS Take 1 tablet by mouth      temazepam (RESTORIL) 30 MG capsule TAKE 1 CAPSULE BY MOUTH NIGHTLY AS NEEDED FOR SLEEP 30 capsule 0    tamsulosin (FLOMAX) 0.4 MG capsule Take 1 capsule by mouth daily (Patient not taking: Reported on 6/29/2021) 30 capsule 5     No current facility-administered medications for this visit. Allergies   Allergen Reactions    Adhesive Tape      Tears skin on leg       Health Maintenance   Topic Date Due    Pneumococcal 0-64 years Vaccine (1 of 2 - PPSV23) Never done    Diabetic retinal exam  Never done    COVID-19 Vaccine (1) Never done    HIV screen  Never done    Colon cancer screen colonoscopy  Never done    Diabetic foot exam  03/06/2021    Shingles Vaccine (1 of 2) 12/21/2021 (Originally 5/7/2007)    Flu vaccine (1) 09/01/2021    Annual Wellness Visit (AWV)  03/27/2022    A1C test (Diabetic or Prediabetic)  06/29/2022    Lipid screen  06/29/2022    Potassium monitoring  06/29/2022    Creatinine monitoring  06/29/2022    DTaP/Tdap/Td vaccine (2 - Td or Tdap) 04/30/2028    Hepatitis C screen  Completed    Hepatitis A vaccine  Aged Out    Hib vaccine  Aged Out    Meningococcal (ACWY) vaccine  Aged Out       Subjective:      Review of Systems    Review of Systems   Constitutional: Negative for chills and fever. HENT: Negative for congestion. Respiratory: Negative for cough, chest tightness and shortness of breath. Cardiovascular: Negative for chest pain, palpitations and leg swelling.    Gastrointestinal: Negative for abdominal pain, anal bleeding, constipation, diarrhea and nausea. Genitourinary: Negative for difficulty urinating. Psychiatric/Behavioral: Negative. SeeHPI for visit specific review of symptoms. All others negative      Objective:   /78   Pulse 101   Temp 97.3 °F (36.3 °C)   Ht 5' 11\" (1.803 m)   Wt 210 lb (95.3 kg)   SpO2 98%   BMI 29.29 kg/m²   Physical Exam  Physical Exam   Constitutional: He appears well-developed. Does not appear ill. Neck: Normal range of motion. Neck supple. No masses. Neck Symmetric. Normal tracheal position. No thyroid enlargement  Cardiovascular: Normal rate and regular rhythm. Exam reveals no friction rub. Carotid arteries: no bruit observed. No murmur heard. Respiratory:  Effort normal and breath sounds normal. No respiratory distress. No wheezes. No rales. No use of accessory muscles or intercostal retractions. Neurological: alert. Psychiatric: normal mood and affect. His behavior is normal. Normal judgement and insight observed.       Recent Results (from the past 672 hour(s))   Hepatitis C Antibody    Collection Time: 06/29/21  2:03 PM   Result Value Ref Range    Hep C Ab Interp Non-Reactive    TSH without Reflex    Collection Time: 06/29/21  2:03 PM   Result Value Ref Range    TSH 2.350 0.270 - 4.200 uIU/mL   T4, Free    Collection Time: 06/29/21  2:03 PM   Result Value Ref Range    T4 Free 1.23 0.93 - 1.70 ng/dL   CBC Auto Differential    Collection Time: 06/29/21  2:03 PM   Result Value Ref Range    WBC 6.0 4.8 - 10.8 K/uL    RBC 3.68 (L) 4.70 - 6.10 M/uL    Hemoglobin 12.5 (L) 14.0 - 18.0 g/dL    Hematocrit 38.0 (L) 42.0 - 52.0 %    .3 (H) 80.0 - 94.0 fL    MCH 34.0 (H) 27.0 - 31.0 pg    MCHC 32.9 (L) 33.0 - 37.0 g/dL    RDW 12.7 11.5 - 14.5 %    Platelets 315 316 - 936 K/uL    MPV 11.8 9.4 - 12.4 fL    Neutrophils % 68.0 (H) 50.0 - 65.0 %    Lymphocytes % 20.9 20.0 - 40.0 %    Monocytes % 8.3 0.0 - 10.0 %    Eosinophils % 1.8 0.0 - 5.0 %    Basophils % 0.7 0.0 - 1.0 %    Neutrophils Reflex; Future  - Comprehensive Metabolic Panel; Future    3. Hypercholesterolemia  Lab Results   Component Value Date    CHOL 209 (H) 06/29/2021    CHOL 143 (L) 12/15/2020    CHOL 160 02/26/2020     Lab Results   Component Value Date    TRIG 85 06/29/2021    TRIG 101 12/15/2020    TRIG 72 02/26/2020     Lab Results   Component Value Date    HDL 73 06/29/2021    HDL 52 (L) 12/15/2020    HDL 73 02/26/2020     Lab Results   Component Value Date    LDLCALC 119 06/29/2021    LDLCALC 71 12/15/2020    LDLCALC 73 02/26/2020     No results found for: LABVLDL, VLDL  No results found for: CHOLHDLRATIO  His cholesterol has crept up. He has switched to a \"carnivore diet\" for his weight loss and attributes his elevation in cholesterol to that. We will continue to monitor  - Lipid Panel; Future  - Lipid Panel; Future    4. Gastroesophageal reflux disease without esophagitis  Stable    5. Need for hepatitis C screening test    - Hepatitis C Antibody; Future    6. Essential (primary) hypertension  BP Readings from Last 3 Encounters:   06/29/21 112/78   12/21/20 128/78   03/06/20 138/82     Continues to improve            Return in about 3 months (around 9/29/2021) for Routine follow up - 15 minute visit. Discussed use, benefit, and side effects of prescribed medications. All patient questions answered. Pt voiced understanding. Reviewed health maintenance. Instructedto continue current medications, diet and exercise. Patient agreed with treatmentplan. Follow up as directed. Note dictated using Dragon Dictation software  Sometimes this dictation software makes erroneous transcriptions.

## 2021-07-07 DIAGNOSIS — F51.04 CHRONIC INSOMNIA: ICD-10-CM

## 2021-07-08 RX ORDER — TEMAZEPAM 30 MG/1
CAPSULE ORAL
Qty: 30 CAPSULE | Refills: 0 | Status: SHIPPED | OUTPATIENT
Start: 2021-07-08 | End: 2021-08-06 | Stop reason: SDUPTHER

## 2021-07-08 NOTE — TELEPHONE ENCOUNTER
The current medical regimen is effective. Continue present plan and medications. UDS and controlled substance contract up to date. No unusual filling on current PABLO report. Tx continues to be medically necessary.     Alex Jones MD

## 2021-07-08 NOTE — TELEPHONE ENCOUNTER
Rosenda Thompson called to request a refill on his medication. Last office visit : 6/29/2021   Next office visit : 10/21/2021     Last UDS:   Amphetamine Screen, Urine   Date Value Ref Range Status   08/09/2019 neg  Final     Barbiturate Screen, Urine   Date Value Ref Range Status   08/09/2019 neg  Final     Benzodiazepine Screen, Urine   Date Value Ref Range Status   08/09/2019 neg  Final     Buprenorphine Urine   Date Value Ref Range Status   08/09/2019 neg  Final     Cocaine Metabolite Screen, Urine   Date Value Ref Range Status   08/09/2019 neg  Final     Gabapentin Screen, Urine   Date Value Ref Range Status   08/09/2019 neg  Final     MDMA, Urine   Date Value Ref Range Status   08/09/2019 neg  Final     Methamphetamine, Urine   Date Value Ref Range Status   08/09/2019 neg  Final     Opiate Scrn, Ur   Date Value Ref Range Status   08/09/2019 pos  Final     Oxycodone Screen, Ur   Date Value Ref Range Status   08/09/2019 neg  Final     PCP Screen, Urine   Date Value Ref Range Status   08/09/2019 neg  Final     Propoxyphene Screen, Urine   Date Value Ref Range Status   08/09/2019 neg  Final     THC Screen, Urine   Date Value Ref Range Status   08/09/2019 neg  Final     Tricyclic Antidepressants, Urine   Date Value Ref Range Status   08/09/2019 neg  Final       Last Sarah Blasbas: 7/8/21  Medication Contract: 8/9/19   Last Fill: 12/04/20    Requested Prescriptions     Pending Prescriptions Disp Refills    temazepam (RESTORIL) 30 MG capsule [Pharmacy Med Name: Temazepam 30 MG Oral Capsule] 30 capsule 0     Sig: TAKE 1 CAPSULE BY MOUTH NIGHTLY AS NEEDED FOR SLEEP         Please approve or refuse this medication.    Nikki Meeks MA

## 2021-07-12 ENCOUNTER — TELEPHONE (OUTPATIENT)
Dept: FAMILY MEDICINE CLINIC | Age: 66
End: 2021-07-12

## 2021-07-12 NOTE — TELEPHONE ENCOUNTER
Called wanting a new medication he didn't go in much detail he  Would rather discuss this with  so I scheduled him a VV

## 2021-07-15 ENCOUNTER — TELEMEDICINE (OUTPATIENT)
Dept: FAMILY MEDICINE CLINIC | Age: 66
End: 2021-07-15
Payer: MEDICARE

## 2021-07-15 DIAGNOSIS — N52.9 ERECTILE DYSFUNCTION, UNSPECIFIED ERECTILE DYSFUNCTION TYPE: Primary | ICD-10-CM

## 2021-07-15 PROCEDURE — 99213 OFFICE O/P EST LOW 20 MIN: CPT | Performed by: FAMILY MEDICINE

## 2021-07-15 PROCEDURE — 3017F COLORECTAL CA SCREEN DOC REV: CPT | Performed by: FAMILY MEDICINE

## 2021-07-15 PROCEDURE — G8428 CUR MEDS NOT DOCUMENT: HCPCS | Performed by: FAMILY MEDICINE

## 2021-07-15 RX ORDER — SILDENAFIL 100 MG/1
100 TABLET, FILM COATED ORAL PRN
Qty: 10 TABLET | Refills: 3 | Status: SHIPPED | OUTPATIENT
Start: 2021-07-15 | End: 2021-08-16 | Stop reason: SDUPTHER

## 2021-07-15 NOTE — PROGRESS NOTES
7/15/2021    TELEHEALTH EVALUATION -- Audio/Visual (During [facility]M-74 public health emergency)    HPI:    Paige Snyder (:  1955) has requested an audio/video evaluation for the following concern(s):    Scheduled a virtual visit to discuss erectile dysfunction. He states he is in a new relationship and has been having issues. Would like to try medication    Review of Systems    Prior to Visit Medications    Medication Sig Taking? Authorizing Provider   sildenafil (VIAGRA) 100 MG tablet Take 1 tablet by mouth as needed for Erectile Dysfunction Yes Mireille Chacon MD   temazepam (RESTORIL) 30 MG capsule TAKE 1 CAPSULE BY MOUTH NIGHTLY AS NEEDED FOR SLEEP  Mckenzie Braun MD   hydroCHLOROthiazide (HYDRODIURIL) 12.5 MG tablet Take 1 tablet by mouth every morning  Mireille Chacon MD   pantoprazole (PROTONIX) 40 MG tablet Take 1 tablet by mouth daily  Mireille Chacon MD   lisinopril (PRINIVIL;ZESTRIL) 40 MG tablet Take 1 tablet by mouth nightly  Mireille Chacon MD   ibuprofen (ADVIL;MOTRIN) 600 MG tablet Take 200 mg by mouth every 8 hours as needed for Pain  Historical Provider, MD   DAILY MULTIPLE VITAMINS TABS Take 1 tablet by mouth  Historical Provider, MD       Social History     Tobacco Use    Smoking status: Never Smoker    Smokeless tobacco: Never Used   Vaping Use    Vaping Use: Never used   Substance Use Topics    Alcohol use: No    Drug use:  No            PHYSICAL EXAMINATION:  [ INSTRUCTIONS:  \"[x]\" Indicates a positive item  \"[]\" Indicates a negative item  -- DELETE ALL ITEMS NOT EXAMINED]  Vital Signs: (As obtained by patient/caregiver or practitioner observation)    Blood pressure-  Heart rate-    Respiratory rate-    Temperature-  Pulse oximetry-     Constitutional: [x] Appears well-developed and well-nourished [x] No apparent distress      [] Abnormal-   Mental status  [x] Alert and awake  [] Oriented to person/place/time [x]Able to follow commands      Eyes:  EOM    [x]  Normal Neutrophils Absolute 4.1 1.5 - 7.5 K/uL    Immature Granulocytes # 0.0 K/uL    Lymphocytes Absolute 1.3 1.1 - 4.5 K/uL    Monocytes Absolute 0.50 0.00 - 0.90 K/uL    Eosinophils Absolute 0.10 0.00 - 0.60 K/uL    Basophils Absolute 0.00 0.00 - 0.20 K/uL   Lipid Panel    Collection Time: 06/29/21  2:03 PM   Result Value Ref Range    Cholesterol, Total 209 (H) 160 - 199 mg/dL    Triglycerides 85 0 - 149 mg/dL    HDL 73 55 - 121 mg/dL    LDL Calculated 119 <100 mg/dL   Comprehensive Metabolic Panel    Collection Time: 06/29/21  2:03 PM   Result Value Ref Range    Sodium 140 136 - 145 mmol/L    Potassium 3.9 3.5 - 5.0 mmol/L    Chloride 100 98 - 111 mmol/L    CO2 28 22 - 29 mmol/L    Anion Gap 12 7 - 19 mmol/L    Glucose 110 (H) 74 - 109 mg/dL    BUN 29 (H) 8 - 23 mg/dL    CREATININE 1.1 0.5 - 1.2 mg/dL    GFR Non-African American >60 >60    GFR African American >59 >59    Calcium 9.6 8.8 - 10.2 mg/dL    Total Protein 7.6 6.6 - 8.7 g/dL    Albumin 4.3 3.5 - 5.2 g/dL    Total Bilirubin 1.1 0.2 - 1.2 mg/dL    Alkaline Phosphatase 75 40 - 130 U/L    ALT 27 5 - 41 U/L    AST 36 5 - 40 U/L   Hemoglobin A1C    Collection Time: 06/29/21  2:03 PM   Result Value Ref Range    Hemoglobin A1C 4.0 4.0 - 6.0 %         ASSESSMENT/PLAN:  1. Erectile dysfunction, unspecified erectile dysfunction type  Viagra 50mg to 100 mg as needed  Discussed risks and benefits of this new medication. Discussed potential side effects. He voiced understanding. No follow-ups on file. Minerva Travis is a 61 y.o. male being evaluated by a Virtual Visit (video visit) encounter to address concerns as mentioned above. A caregiver was present when appropriate. Due to this being a TeleHealth encounter (During [facility] public health emergency), evaluation of the following organ systems was limited: Vitals/Constitutional/EENT/Resp/CV/GI//MS/Neuro/Skin/Heme-Lymph-Imm.   Pursuant to the emergency declaration under the [address] Emergencies Act, 1135 waiver authority and the Coronavirus Preparedness and Response Supplemental Appropriations Act, this Virtual Visit was conducted with patient's (and/or legal guardian's) consent, to reduce the patient's risk of exposure to COVID-19 and provide necessary medical care. The patient (and/or legal guardian) has also been advised to contact this office for worsening conditions or problems, and seek emergency medical treatment and/or call 911 if deemed necessary. Patient identification was verified at the start of the visit: Yes    Total time spent on this encounter: Not billed by time    Services were provided through a video synchronous discussion virtually to substitute for in-person clinic visit. Patient and provider were located at their individual homes. --Hortencia Chang MD on 7/19/2021 at 1:57 PM    An electronic signature was used to authenticate this note.

## 2021-07-20 DIAGNOSIS — M54.50 CHRONIC MIDLINE LOW BACK PAIN WITHOUT SCIATICA: ICD-10-CM

## 2021-07-20 DIAGNOSIS — G89.29 CHRONIC MIDLINE LOW BACK PAIN WITHOUT SCIATICA: ICD-10-CM

## 2021-07-20 DIAGNOSIS — M51.36 DDD (DEGENERATIVE DISC DISEASE), LUMBAR: ICD-10-CM

## 2021-07-20 RX ORDER — HYDROCODONE BITARTRATE AND ACETAMINOPHEN 10; 325 MG/1; MG/1
1 TABLET ORAL EVERY 6 HOURS PRN
Qty: 120 TABLET | Refills: 0 | Status: SHIPPED | OUTPATIENT
Start: 2021-07-20 | End: 2021-08-18 | Stop reason: SDUPTHER

## 2021-07-20 NOTE — TELEPHONE ENCOUNTER
Fiona Mansfield called to request a refill on his medication. Last office visit : 7/15/2021   Next office visit : 10/21/2021     Last UDS:   Amphetamine Screen, Urine   Date Value Ref Range Status   08/09/2019 neg  Final     Barbiturate Screen, Urine   Date Value Ref Range Status   08/09/2019 neg  Final     Benzodiazepine Screen, Urine   Date Value Ref Range Status   08/09/2019 neg  Final     Buprenorphine Urine   Date Value Ref Range Status   08/09/2019 neg  Final     Cocaine Metabolite Screen, Urine   Date Value Ref Range Status   08/09/2019 neg  Final     Gabapentin Screen, Urine   Date Value Ref Range Status   08/09/2019 neg  Final     MDMA, Urine   Date Value Ref Range Status   08/09/2019 neg  Final     Methamphetamine, Urine   Date Value Ref Range Status   08/09/2019 neg  Final     Opiate Scrn, Ur   Date Value Ref Range Status   08/09/2019 pos  Final     Oxycodone Screen, Ur   Date Value Ref Range Status   08/09/2019 neg  Final     PCP Screen, Urine   Date Value Ref Range Status   08/09/2019 neg  Final     Propoxyphene Screen, Urine   Date Value Ref Range Status   08/09/2019 neg  Final     THC Screen, Urine   Date Value Ref Range Status   08/09/2019 neg  Final     Tricyclic Antidepressants, Urine   Date Value Ref Range Status   08/09/2019 neg  Final       Last [place]ette [place]: 7/8/21  Medication Contract: 8/9/19   Last Fill: 6/18/21    Requested Prescriptions     Pending Prescriptions Disp Refills    HYDROcodone-acetaminophen (NORCO)  MG per tablet 120 tablet 0     Sig: Take 1 tablet by mouth every 6 hours as needed for Pain for up to 30 days. Chronic use         Please approve or refuse this medication.    Diana Shah MA

## 2021-08-06 DIAGNOSIS — F51.04 CHRONIC INSOMNIA: ICD-10-CM

## 2021-08-09 RX ORDER — TEMAZEPAM 30 MG/1
CAPSULE ORAL
Qty: 30 CAPSULE | Refills: 0 | Status: SHIPPED | OUTPATIENT
Start: 2021-08-09 | End: 2021-09-05

## 2021-08-09 NOTE — TELEPHONE ENCOUNTER
Rafiq Grady called to request a refill on his medication. Last office visit : 7/15/2021   Next office visit : 10/21/2021     Last UDS:   Amphetamine Screen, Urine   Date Value Ref Range Status   08/09/2019 neg  Final     Barbiturate Screen, Urine   Date Value Ref Range Status   08/09/2019 neg  Final     Benzodiazepine Screen, Urine   Date Value Ref Range Status   08/09/2019 neg  Final     Buprenorphine Urine   Date Value Ref Range Status   08/09/2019 neg  Final     Cocaine Metabolite Screen, Urine   Date Value Ref Range Status   08/09/2019 neg  Final     Gabapentin Screen, Urine   Date Value Ref Range Status   08/09/2019 neg  Final     MDMA, Urine   Date Value Ref Range Status   08/09/2019 neg  Final     Methamphetamine, Urine   Date Value Ref Range Status   08/09/2019 neg  Final     Opiate Scrn, Ur   Date Value Ref Range Status   08/09/2019 pos  Final     Oxycodone Screen, Ur   Date Value Ref Range Status   08/09/2019 neg  Final     PCP Screen, Urine   Date Value Ref Range Status   08/09/2019 neg  Final     Propoxyphene Screen, Urine   Date Value Ref Range Status   08/09/2019 neg  Final     THC Screen, Urine   Date Value Ref Range Status   08/09/2019 neg  Final     Tricyclic Antidepressants, Urine   Date Value Ref Range Status   08/09/2019 neg  Final       Last Camillia Shy: 7-8-21  Medication Contract: Due   Last Fill: 7-8-21    Requested Prescriptions     Pending Prescriptions Disp Refills    temazepam (RESTORIL) 30 MG capsule 30 capsule 0     Sig: TAKE 1 CAPSULE BY MOUTH NIGHTLY AS NEEDED FOR SLEEP         Please approve or refuse this medication.    Yumiko Mc MA

## 2021-08-16 RX ORDER — SILDENAFIL 100 MG/1
100 TABLET, FILM COATED ORAL PRN
Qty: 10 TABLET | Refills: 3 | Status: SHIPPED | OUTPATIENT
Start: 2021-08-16 | End: 2022-01-19 | Stop reason: SINTOL

## 2021-08-16 NOTE — TELEPHONE ENCOUNTER
Gil called requesting a refill of the below medication which has been pended for you:     Requested Prescriptions     Pending Prescriptions Disp Refills    sildenafil (VIAGRA) 100 MG tablet 10 tablet 3     Sig: Take 1 tablet by mouth as needed for Erectile Dysfunction       Last Appointment Date: 7/15/2021  Next Appointment Date: 10/21/2021    Allergies   Allergen Reactions    Adhesive Tape      Tears skin on leg

## 2021-09-07 DIAGNOSIS — F51.04 CHRONIC INSOMNIA: ICD-10-CM

## 2021-09-07 RX ORDER — TEMAZEPAM 30 MG/1
CAPSULE ORAL
Qty: 30 CAPSULE | Refills: 0 | Status: SHIPPED | OUTPATIENT
Start: 2021-09-07 | End: 2021-10-05 | Stop reason: SDUPTHER

## 2021-09-07 NOTE — TELEPHONE ENCOUNTER
Elayne Arenas called to request a refill on his medication. Last office visit : 7/15/2021   Next office visit : 10/21/2021     Last UDS:   Amphetamine Screen, Urine   Date Value Ref Range Status   08/09/2019 neg  Final     Barbiturate Screen, Urine   Date Value Ref Range Status   08/09/2019 neg  Final     Benzodiazepine Screen, Urine   Date Value Ref Range Status   08/09/2019 neg  Final     Buprenorphine Urine   Date Value Ref Range Status   08/09/2019 neg  Final     Cocaine Metabolite Screen, Urine   Date Value Ref Range Status   08/09/2019 neg  Final     Gabapentin Screen, Urine   Date Value Ref Range Status   08/09/2019 neg  Final     MDMA, Urine   Date Value Ref Range Status   08/09/2019 neg  Final     Methamphetamine, Urine   Date Value Ref Range Status   08/09/2019 neg  Final     Opiate Scrn, Ur   Date Value Ref Range Status   08/09/2019 pos  Final     Oxycodone Screen, Ur   Date Value Ref Range Status   08/09/2019 neg  Final     PCP Screen, Urine   Date Value Ref Range Status   08/09/2019 neg  Final     Propoxyphene Screen, Urine   Date Value Ref Range Status   08/09/2019 neg  Final     THC Screen, Urine   Date Value Ref Range Status   08/09/2019 neg  Final     Tricyclic Antidepressants, Urine   Date Value Ref Range Status   08/09/2019 neg  Final       Last Leodis Mealy: 7/8/21  Medication Contract: 8/9/19   Last Fill: 8/9/21    Requested Prescriptions     Pending Prescriptions Disp Refills    temazepam (RESTORIL) 30 MG capsule 30 capsule 0     Sig: TAKE 1 CAPSULE BY MOUTH NIGHTLY AS NEEDED FOR SLEEP         Please approve or refuse this medication.    [place] Holliday

## 2021-09-20 DIAGNOSIS — M54.50 CHRONIC MIDLINE LOW BACK PAIN WITHOUT SCIATICA: ICD-10-CM

## 2021-09-20 DIAGNOSIS — M51.36 DDD (DEGENERATIVE DISC DISEASE), LUMBAR: ICD-10-CM

## 2021-09-20 DIAGNOSIS — G89.29 CHRONIC MIDLINE LOW BACK PAIN WITHOUT SCIATICA: ICD-10-CM

## 2021-09-20 RX ORDER — HYDROCODONE BITARTRATE AND ACETAMINOPHEN 10; 325 MG/1; MG/1
1 TABLET ORAL EVERY 6 HOURS PRN
Qty: 120 TABLET | Refills: 0 | Status: SHIPPED | OUTPATIENT
Start: 2021-09-20 | End: 2021-10-21 | Stop reason: SDUPTHER

## 2021-09-20 NOTE — TELEPHONE ENCOUNTER
Fiona Mansfield called to request a refill on his medication. Last office visit : 7/15/2021   Next office visit : 10/21/2021     Last UDS:   Amphetamine Screen, Urine   Date Value Ref Range Status   08/09/2019 neg  Final     Barbiturate Screen, Urine   Date Value Ref Range Status   08/09/2019 neg  Final     Benzodiazepine Screen, Urine   Date Value Ref Range Status   08/09/2019 neg  Final     Buprenorphine Urine   Date Value Ref Range Status   08/09/2019 neg  Final     Cocaine Metabolite Screen, Urine   Date Value Ref Range Status   08/09/2019 neg  Final     Gabapentin Screen, Urine   Date Value Ref Range Status   08/09/2019 neg  Final     MDMA, Urine   Date Value Ref Range Status   08/09/2019 neg  Final     Methamphetamine, Urine   Date Value Ref Range Status   08/09/2019 neg  Final     Opiate Scrn, Ur   Date Value Ref Range Status   08/09/2019 pos  Final     Oxycodone Screen, Ur   Date Value Ref Range Status   08/09/2019 neg  Final     PCP Screen, Urine   Date Value Ref Range Status   08/09/2019 neg  Final     Propoxyphene Screen, Urine   Date Value Ref Range Status   08/09/2019 neg  Final     THC Screen, Urine   Date Value Ref Range Status   08/09/2019 neg  Final     Tricyclic Antidepressants, Urine   Date Value Ref Range Status   08/09/2019 neg  Final       Last [place]: 7/8/21  Medication Contract: 8/9/19   Last Fill: 8/19/21    Requested Prescriptions     Pending Prescriptions Disp Refills    HYDROcodone-acetaminophen (NORCO)  MG per tablet 120 tablet 0     Sig: Take 1 tablet by mouth every 6 hours as needed for Pain for up to 30 days. Chronic use         Please approve or refuse this medication.    Shaista Ryan

## 2021-10-21 ENCOUNTER — OFFICE VISIT (OUTPATIENT)
Dept: FAMILY MEDICINE CLINIC | Age: 66
End: 2021-10-21
Payer: MEDICARE

## 2021-10-21 VITALS
TEMPERATURE: 98.5 F | OXYGEN SATURATION: 98 % | BODY MASS INDEX: 28.87 KG/M2 | SYSTOLIC BLOOD PRESSURE: 128 MMHG | DIASTOLIC BLOOD PRESSURE: 86 MMHG | HEART RATE: 95 BPM | WEIGHT: 207 LBS

## 2021-10-21 DIAGNOSIS — M54.50 CHRONIC MIDLINE LOW BACK PAIN WITHOUT SCIATICA: ICD-10-CM

## 2021-10-21 DIAGNOSIS — G89.29 CHRONIC MIDLINE LOW BACK PAIN WITHOUT SCIATICA: ICD-10-CM

## 2021-10-21 DIAGNOSIS — I10 ESSENTIAL (PRIMARY) HYPERTENSION: Primary | ICD-10-CM

## 2021-10-21 DIAGNOSIS — F51.04 CHRONIC INSOMNIA: ICD-10-CM

## 2021-10-21 DIAGNOSIS — K21.9 GASTROESOPHAGEAL REFLUX DISEASE WITHOUT ESOPHAGITIS: ICD-10-CM

## 2021-10-21 DIAGNOSIS — I25.10 CORONARY ARTERY DISEASE INVOLVING NATIVE HEART WITHOUT ANGINA PECTORIS, UNSPECIFIED VESSEL OR LESION TYPE: ICD-10-CM

## 2021-10-21 DIAGNOSIS — M51.36 DDD (DEGENERATIVE DISC DISEASE), LUMBAR: ICD-10-CM

## 2021-10-21 PROCEDURE — 4004F PT TOBACCO SCREEN RCVD TLK: CPT | Performed by: FAMILY MEDICINE

## 2021-10-21 PROCEDURE — G8428 CUR MEDS NOT DOCUMENT: HCPCS | Performed by: FAMILY MEDICINE

## 2021-10-21 PROCEDURE — G8484 FLU IMMUNIZE NO ADMIN: HCPCS | Performed by: FAMILY MEDICINE

## 2021-10-21 PROCEDURE — G8417 CALC BMI ABV UP PARAM F/U: HCPCS | Performed by: FAMILY MEDICINE

## 2021-10-21 PROCEDURE — 99214 OFFICE O/P EST MOD 30 MIN: CPT | Performed by: FAMILY MEDICINE

## 2021-10-21 PROCEDURE — 3017F COLORECTAL CA SCREEN DOC REV: CPT | Performed by: FAMILY MEDICINE

## 2021-10-21 RX ORDER — HYDROCODONE BITARTRATE AND ACETAMINOPHEN 10; 325 MG/1; MG/1
1 TABLET ORAL EVERY 6 HOURS PRN
Qty: 120 TABLET | Refills: 0 | Status: SHIPPED | OUTPATIENT
Start: 2021-10-21 | End: 2021-11-19 | Stop reason: SDUPTHER

## 2021-10-21 NOTE — PROGRESS NOTES
[facility]  [facility]  [id number] [facility] [address] [address]  Dept: [phone]  Dept Fax: [phone]  Loc: [phone]    Bela Buckley is a 61 y.o. male who presents today for his medical conditions/complaints as noted below. Bela Buclkey is here for 3 Month Follow-Up         HPI:   CC: Here today to discuss the following:      Hypertension  Compliant with medications. No adverse effects from medication. No lightheadedness, palpitations, or chest pain. Gastroesophageal Reflux Disease  Symptoms currently under control. Medication adequately controls his symptoms. No hematochezia or melena. Lower Back Pain, Chronic  Compliant with medications. No adverse effects from medication. Symptoms continue to be stable. Lower back pain is described as a dull ache and occasionally sharp and stabbing. No radiation. Relieved with his current pain medication. No numbness or weakness in lower extremities. Currently satisfied with treatment regimen as it provides some relief. Compliant with current opioid pain reliever. States takes medication as needed. Pain is typically moderate in nature but can become severe on occasion. Abstains from alcohol while taking pain medication. Denies drowsiness and impairment on medication. Insomnia  Currently stable. Satisfied with current treatment regimen. No adverse effects from medication.            HPI    Past Medical History:   Diagnosis Date    Arthritis     Asthma     Bradycardia     Broken ribs     CHF (congestive heart failure) (HCC)     Chronic kidney disease, stage III (moderate) ([address] 9/15/2020    Chronic ulcer of left leg, with fat layer exposed ([address]     Diabetes mellitus ([address]     no meds; improved with weight loss    Diabetic peripheral neuropathy ([address] 3/8/2019    History of left below knee amputation ([address] 1/29/2019    Hx of BKA, left ([address] 5/25/2018    Hypertension     Idiopathic chronic venous hypertension of left leg with ulcer ([facility] 12/22/2015    Morbid obesity ([facility] 6/22/15    S/P BKA (below knee amputation) unilateral, left ([facility] 5/30/2018    Skin ulcer of toe of left foot with fat layer exposed ([facility] 4/4/2017    Sleep apnea in adult 06/22/2015    no cpap    Type 2 diabetes mellitus with foot ulcer (CODE) ([facility] 11/15/2017    Ulcerated, foot, right, with fat layer exposed ([facility] 4/4/2017    Venous hypertension, chronic, with ulcer, left 11/26/2017    Venous insufficiency of both lower extremities 6/22/15      Past Surgical History:   Procedure Laterality Date    ABDOMEN SURGERY  02/2018    treated in [facility] [address]      hx. of multiple; \"no blockage\"    DILATATION, ESOPHAGUS      GASTROSTOMY TUBE PLACEMENT  02/11/2018    temporary; now removed    KNEE ARTHROSCOPY      6 knee surgeries total; \"no replacements\"    GA AMPUTATION LOW LEG THRU TIB/FIB Left 5/25/2018    LEG AMPUTATION BELOW KNEE WITH MUSCLE FLAP  AND SKIN GRAFT CLOSURE AND APPLICATION OF WOUND VAC performed by Brendan Terrell MD at [place] [address]  02/11/2018    repair of gastric bypass    VASCULAR SURGERY  05/25/2018    [facility]. Leg amputation below knee with muscle flap and skin graft closure and application of wound vac stsg 14 cmx 15 cm. Family History   Problem Relation Age of Onset    Diabetes Mother     Heart Disease Mother     High Blood Pressure Mother     Heart Disease Father     Other Father        Social History     Tobacco Use    Smoking status: Never Smoker    Smokeless tobacco: Never Used   Substance Use Topics    Alcohol use: No     Current Outpatient Medications   Medication Sig Dispense Refill    HYDROcodone-acetaminophen (NORCO)  MG per tablet Take 1 tablet by mouth every 6 hours as needed for Pain for up to 30 days.  Chronic use 120 tablet 0    temazepam (RESTORIL) 30 MG capsule TAKE 1 CAPSULE BY MOUTH NIGHTLY AS NEEDED FOR SLEEP 30 capsule 0    sildenafil (VIAGRA) 100 MG tablet Take 1 tablet by mouth as needed for Erectile Dysfunction 10 tablet 3    hydroCHLOROthiazide (HYDRODIURIL) 12.5 MG tablet Take 1 tablet by mouth every morning 30 tablet 5    pantoprazole (PROTONIX) 40 MG tablet Take 1 tablet by mouth daily 90 tablet 3    lisinopril (PRINIVIL;ZESTRIL) 40 MG tablet Take 1 tablet by mouth nightly 90 tablet 3    ibuprofen (ADVIL;MOTRIN) 600 MG tablet Take 200 mg by mouth every 8 hours as needed for Pain      DAILY MULTIPLE VITAMINS TABS Take 1 tablet by mouth       No current facility-administered medications for this visit. Allergies   Allergen Reactions    Adhesive Tape      Tears skin on leg       Health Maintenance   Topic Date Due    Pneumococcal 0-64 years Vaccine (1 of 2 - PPSV23) Never done    Diabetic retinal exam  Never done    COVID-19 Vaccine (1) Never done    HIV screen  Never done    Colon cancer screen colonoscopy  Never done    Diabetic foot exam  03/06/2021    Flu vaccine (1) Never done    Shingles Vaccine (1 of 2) 12/21/2021 (Originally 5/7/2007)    Annual Wellness Visit (AWV)  03/27/2022    A1C test (Diabetic or Prediabetic)  06/29/2022    Lipid screen  06/29/2022    Potassium monitoring  06/29/2022    Creatinine monitoring  06/29/2022    DTaP/Tdap/Td vaccine (2 - Td or Tdap) 04/30/2028    Hepatitis C screen  Completed    Hepatitis A vaccine  Aged Out    Hib vaccine  Aged Out    Meningococcal (ACWY) vaccine  Aged Out       Subjective:      Review of Systems  Review of Systems   Constitutional: Negative for chills and fever. HENT: Negative for congestion. Respiratory: Negative for cough, chest tightness and shortness of breath. Cardiovascular: Negative for chest pain, palpitations and leg swelling. Gastrointestinal: Negative for abdominal pain, anal bleeding, constipation, diarrhea and nausea. Genitourinary: Negative for difficulty urinating. Psychiatric/Behavioral: Negative. SeeHPI for visit specific review of symptoms. All others negative      Objective:   /86   Pulse 95   Temp 98.5 °F (36.9 °C)   Wt 207 lb (93.9 kg)   SpO2 98%   BMI 28.87 kg/m²   Physical Exam  Physical Exam   Constitutional: He appears well-developed. Does not appear ill. Neck: Normal range of motion. Neck supple. No masses. Neck Symmetric. Normal tracheal position. No thyroid enlargement  Cardiovascular: Normal rate and regular rhythm. Exam reveals no friction rub. Carotid arteries: no bruit observed. No murmur heard. Respiratory:  Effort normal and breath sounds normal. No respiratory distress. No wheezes. No rales. No use of accessory muscles or intercostal retractions. Neurological: alert. Psychiatric: normal mood and affect. His behavior is normal. Normal judgement and insight observed. No results found for this or any previous visit (from the past 672 hour(s)). Assessment & Plan: The following diagnoses and conditions are stable with no further orders unless indicated:  1. Chronic midline low back pain without sciatica  Discussed risk and benefits of taking opioids. Risks include addiction and tolerance. If develops impairment or over sedation with medication, discontinue and contact me. Do not take medication with alcohol. Advised to take sparingly. Advised to keep medication hidden and locked up as risk of theft is high with these medications as well.     - HYDROcodone-acetaminophen (NORCO)  MG per tablet; Take 1 tablet by mouth every 6 hours as needed for Pain for up to 30 days. Chronic use  Dispense: 120 tablet; Refill: 0    2. DDD (degenerative disc disease), lumbar    - HYDROcodone-acetaminophen (NORCO)  MG per tablet; Take 1 tablet by mouth every 6 hours as needed for Pain for up to 30 days. Chronic use  Dispense: 120 tablet; Refill: 0    3.  Essential (primary) hypertension  BP Readings from Last 3 Encounters:   10/21/21 128/86   06/29/21 112/78   12/21/20 128/78     stable    4. Gastroesophageal reflux disease without esophagitis  stable    5. Chronic insomnia  stable    Since moving to [place], he has not established with a cardiologist.  [facility] He has a history of coronary artery disease, endocarditis, myocardial infarction and type I A-V block. I have suggested he establish with a cardiologist in [place] and try to find local care. Declines covid vaccine. Return in about 3 months (around 1/21/2022) for Routine follow up - 15 minute visit. Discussed use, benefit, and side effects of prescribed medications. All patient questions answered. Pt voiced understanding. Reviewed health maintenance. Instructedto continue current medications, diet and exercise. Patient agreed with treatmentplan. Follow up as directed. Note dictated using Dragon Dictation software  Sometimes this dictation software makes erroneous transcriptions.

## 2021-11-08 DIAGNOSIS — F51.04 CHRONIC INSOMNIA: ICD-10-CM

## 2021-11-08 RX ORDER — TEMAZEPAM 30 MG/1
CAPSULE ORAL
Qty: 30 CAPSULE | Refills: 0 | Status: SHIPPED | OUTPATIENT
Start: 2021-11-08 | End: 2021-12-06 | Stop reason: SDUPTHER

## 2021-11-08 NOTE — TELEPHONE ENCOUNTER
Dario Wetzel called to request a refill on his medication. Last office visit : 10/21/2021   Next office visit : 1/21/2022     Last UDS:   Amphetamine Screen, Urine   Date Value Ref Range Status   08/09/2019 neg  Final     Barbiturate Screen, Urine   Date Value Ref Range Status   08/09/2019 neg  Final     Benzodiazepine Screen, Urine   Date Value Ref Range Status   08/09/2019 neg  Final     Buprenorphine Urine   Date Value Ref Range Status   08/09/2019 neg  Final     Cocaine Metabolite Screen, Urine   Date Value Ref Range Status   08/09/2019 neg  Final     Gabapentin Screen, Urine   Date Value Ref Range Status   08/09/2019 neg  Final     MDMA, Urine   Date Value Ref Range Status   08/09/2019 neg  Final     Methamphetamine, Urine   Date Value Ref Range Status   08/09/2019 neg  Final     Opiate Scrn, Ur   Date Value Ref Range Status   08/09/2019 pos  Final     Oxycodone Screen, Ur   Date Value Ref Range Status   08/09/2019 neg  Final     PCP Screen, Urine   Date Value Ref Range Status   08/09/2019 neg  Final     Propoxyphene Screen, Urine   Date Value Ref Range Status   08/09/2019 neg  Final     THC Screen, Urine   Date Value Ref Range Status   08/09/2019 neg  Final     Tricyclic Antidepressants, Urine   Date Value Ref Range Status   08/09/2019 neg  Final       Last Pili Boop: 11/8/21  Medication Contract: 8/9/19   Last Fill: 10/5/21    Requested Prescriptions     Pending Prescriptions Disp Refills    temazepam (RESTORIL) 30 MG capsule 30 capsule 0     Sig: TAKE 1 CAPSULE BY MOUTH NIGHTLY AS NEEDED FOR SLEEP         Please approve or refuse this medication.    Shaista Red

## 2021-11-09 DIAGNOSIS — I10 ESSENTIAL (PRIMARY) HYPERTENSION: ICD-10-CM

## 2021-11-09 RX ORDER — LISINOPRIL 40 MG/1
TABLET ORAL
Qty: 90 TABLET | Refills: 0 | Status: SHIPPED | OUTPATIENT
Start: 2021-11-09

## 2021-12-06 DIAGNOSIS — F51.04 CHRONIC INSOMNIA: ICD-10-CM

## 2021-12-06 RX ORDER — TEMAZEPAM 30 MG/1
CAPSULE ORAL
Qty: 30 CAPSULE | Refills: 0 | Status: SHIPPED | OUTPATIENT
Start: 2021-12-06 | End: 2021-12-28 | Stop reason: SDUPTHER

## 2021-12-06 NOTE — TELEPHONE ENCOUNTER
Pierre Govea called to request a refill on his medication. Last office visit : 10/21/2021   Next office visit : 1/21/2022     Last UDS:   Amphetamine Screen, Urine   Date Value Ref Range Status   08/09/2019 neg  Final     Barbiturate Screen, Urine   Date Value Ref Range Status   08/09/2019 neg  Final     Benzodiazepine Screen, Urine   Date Value Ref Range Status   08/09/2019 neg  Final     Buprenorphine Urine   Date Value Ref Range Status   08/09/2019 neg  Final     Cocaine Metabolite Screen, Urine   Date Value Ref Range Status   08/09/2019 neg  Final     Gabapentin Screen, Urine   Date Value Ref Range Status   08/09/2019 neg  Final     MDMA, Urine   Date Value Ref Range Status   08/09/2019 neg  Final     Methamphetamine, Urine   Date Value Ref Range Status   08/09/2019 neg  Final     Opiate Scrn, Ur   Date Value Ref Range Status   08/09/2019 pos  Final     Oxycodone Screen, Ur   Date Value Ref Range Status   08/09/2019 neg  Final     PCP Screen, Urine   Date Value Ref Range Status   08/09/2019 neg  Final     Propoxyphene Screen, Urine   Date Value Ref Range Status   08/09/2019 neg  Final     THC Screen, Urine   Date Value Ref Range Status   08/09/2019 neg  Final     Tricyclic Antidepressants, Urine   Date Value Ref Range Status   08/09/2019 neg  Final       Last Lajoyce Pickup: 11/23/21  Medication Contract: 8/9/19   Last Fill: 11/8/21    Requested Prescriptions     Pending Prescriptions Disp Refills    temazepam (RESTORIL) 30 MG capsule 30 capsule 0     Sig: TAKE 1 CAPSULE BY MOUTH NIGHTLY AS NEEDED FOR SLEEP         Please approve or refuse this medication.    Alfred Chatman MA

## 2021-12-16 DIAGNOSIS — M51.36 DDD (DEGENERATIVE DISC DISEASE), LUMBAR: ICD-10-CM

## 2021-12-16 DIAGNOSIS — M54.50 CHRONIC MIDLINE LOW BACK PAIN WITHOUT SCIATICA: ICD-10-CM

## 2021-12-16 DIAGNOSIS — G89.29 CHRONIC MIDLINE LOW BACK PAIN WITHOUT SCIATICA: ICD-10-CM

## 2021-12-17 RX ORDER — HYDROCODONE BITARTRATE AND ACETAMINOPHEN 10; 325 MG/1; MG/1
1 TABLET ORAL EVERY 6 HOURS PRN
Qty: 120 TABLET | Refills: 0 | Status: SHIPPED | OUTPATIENT
Start: 2021-12-17 | End: 2022-01-11 | Stop reason: SDUPTHER

## 2021-12-17 NOTE — TELEPHONE ENCOUNTER
Ashleyjeanette Degroot called to request a refill on his medication. Last office visit : 10/21/2021   Next office visit : 1/21/2022     Last UDS:   Amphetamine Screen, Urine   Date Value Ref Range Status   08/09/2019 neg  Final     Barbiturate Screen, Urine   Date Value Ref Range Status   08/09/2019 neg  Final     Benzodiazepine Screen, Urine   Date Value Ref Range Status   08/09/2019 neg  Final     Buprenorphine Urine   Date Value Ref Range Status   08/09/2019 neg  Final     Cocaine Metabolite Screen, Urine   Date Value Ref Range Status   08/09/2019 neg  Final     Gabapentin Screen, Urine   Date Value Ref Range Status   08/09/2019 neg  Final     MDMA, Urine   Date Value Ref Range Status   08/09/2019 neg  Final     Methamphetamine, Urine   Date Value Ref Range Status   08/09/2019 neg  Final     Opiate Scrn, Ur   Date Value Ref Range Status   08/09/2019 pos  Final     Oxycodone Screen, Ur   Date Value Ref Range Status   08/09/2019 neg  Final     PCP Screen, Urine   Date Value Ref Range Status   08/09/2019 neg  Final     Propoxyphene Screen, Urine   Date Value Ref Range Status   08/09/2019 neg  Final     THC Screen, Urine   Date Value Ref Range Status   08/09/2019 neg  Final     Tricyclic Antidepressants, Urine   Date Value Ref Range Status   08/09/2019 neg  Final       Last Talib Marchi: 11/23/21  Medication Contract: 8/9/19   Last Fill: 11/19/21    Requested Prescriptions     Pending Prescriptions Disp Refills    HYDROcodone-acetaminophen (NORCO)  MG per tablet 120 tablet 0     Sig: Take 1 tablet by mouth every 6 hours as needed for Pain for up to 30 days. Chronic use         Please approve or refuse this medication.    Alton Loza

## 2021-12-27 ENCOUNTER — NURSE TRIAGE (OUTPATIENT)
Dept: OTHER | Facility: CLINIC | Age: 66
End: 2021-12-27

## 2021-12-27 NOTE — TELEPHONE ENCOUNTER
Received call from [facility] with Red Flag Complaint. Subjective: Caller states \"Having stomach pain; it's gotten to the point where drinking water hurts\"     Current Symptoms: Lower abdominal pain and pain in upper left and right chest; intermittent back pain. Lower abdominal bloat. Worsens with eating but is constant. Pain interferes with sleep    Onset: 3 weeks ago; gradual, worsening    Associated Symptoms: reduced appetite, reduced fluid intake, . Pain Severity: 4/10; aching; constant; can get up to 10/10 with eating    Temperature: Denies fever. What has been tried: Tums- does not help    LMP: NA Pregnant: NA    Recommended disposition: 2nd level triage. Care advice provided, patient verbalizes understanding; denies any other questions or concerns; instructed to call back for any new or worsening symptoms. Writer provided warm transfer to Stacy Luevano at [address] [facility] for 2nd level triage. Attention Provider: Thank you for allowing me to participate in the care of your patient. The patient was connected to triage in response to information provided to the ECC/PSC. Please do not respond through this encounter as the response is not directed to a shared pool.     Reason for Disposition   Constant abdominal pain lasting > 2 hours    Protocols used: ABDOMINAL PAIN - MALE-ADULT-[place]

## 2021-12-28 ENCOUNTER — OFFICE VISIT (OUTPATIENT)
Dept: FAMILY MEDICINE CLINIC | Age: 66
End: 2021-12-28
Payer: MEDICARE

## 2021-12-28 VITALS
TEMPERATURE: 97.4 F | DIASTOLIC BLOOD PRESSURE: 88 MMHG | OXYGEN SATURATION: 98 % | WEIGHT: 203.8 LBS | BODY MASS INDEX: 28.42 KG/M2 | SYSTOLIC BLOOD PRESSURE: 152 MMHG | HEART RATE: 104 BPM

## 2021-12-28 DIAGNOSIS — R10.13 EPIGASTRIC PAIN: ICD-10-CM

## 2021-12-28 DIAGNOSIS — R10.30 LOWER ABDOMINAL PAIN: ICD-10-CM

## 2021-12-28 DIAGNOSIS — E11.620 TYPE 2 DIABETES MELLITUS WITH DIABETIC DERMATITIS, WITHOUT LONG-TERM CURRENT USE OF INSULIN (HCC): ICD-10-CM

## 2021-12-28 DIAGNOSIS — R07.9 CHEST PAIN, UNSPECIFIED TYPE: ICD-10-CM

## 2021-12-28 DIAGNOSIS — I25.10 CORONARY ARTERY DISEASE INVOLVING NATIVE HEART WITHOUT ANGINA PECTORIS, UNSPECIFIED VESSEL OR LESION TYPE: ICD-10-CM

## 2021-12-28 DIAGNOSIS — E78.00 HYPERCHOLESTEROLEMIA: ICD-10-CM

## 2021-12-28 DIAGNOSIS — F51.04 CHRONIC INSOMNIA: ICD-10-CM

## 2021-12-28 DIAGNOSIS — Z89.512 HISTORY OF LEFT BELOW KNEE AMPUTATION (HCC): ICD-10-CM

## 2021-12-28 DIAGNOSIS — K21.9 GASTROESOPHAGEAL REFLUX DISEASE WITHOUT ESOPHAGITIS: Primary | ICD-10-CM

## 2021-12-28 DIAGNOSIS — R53.83 OTHER FATIGUE: ICD-10-CM

## 2021-12-28 DIAGNOSIS — I10 ESSENTIAL (PRIMARY) HYPERTENSION: ICD-10-CM

## 2021-12-28 DIAGNOSIS — N18.31 STAGE 3A CHRONIC KIDNEY DISEASE (HCC): ICD-10-CM

## 2021-12-28 LAB
ALBUMIN SERPL-MCNC: 3.7 G/DL (ref 3.5–5.2)
ALP BLD-CCNC: 258 U/L (ref 40–130)
ALT SERPL-CCNC: 21 U/L (ref 5–41)
AMYLASE: 47 U/L (ref 28–100)
ANION GAP SERPL CALCULATED.3IONS-SCNC: 12 MMOL/L (ref 7–19)
AST SERPL-CCNC: 35 U/L (ref 5–40)
BACTERIA: NEGATIVE /HPF
BASOPHILS ABSOLUTE: 0 K/UL (ref 0–0.2)
BASOPHILS RELATIVE PERCENT: 0.5 % (ref 0–1)
BILIRUB SERPL-MCNC: 0.8 MG/DL (ref 0.2–1.2)
BILIRUBIN URINE: ABNORMAL
BLOOD, URINE: NEGATIVE
BUN BLDV-MCNC: 15 MG/DL (ref 8–23)
CALCIUM SERPL-MCNC: 9.1 MG/DL (ref 8.8–10.2)
CHLORIDE BLD-SCNC: 103 MMOL/L (ref 98–111)
CHOLESTEROL, TOTAL: 171 MG/DL (ref 160–199)
CLARITY: CLEAR
CO2: 27 MMOL/L (ref 22–29)
COLOR: ABNORMAL
CREAT SERPL-MCNC: 0.9 MG/DL (ref 0.5–1.2)
CRYSTALS, UA: ABNORMAL /HPF
EOSINOPHILS ABSOLUTE: 0.1 K/UL (ref 0–0.6)
EOSINOPHILS RELATIVE PERCENT: 1.3 % (ref 0–5)
EPITHELIAL CELLS, UA: 4 /HPF (ref 0–5)
GFR AFRICAN AMERICAN: >59
GFR NON-AFRICAN AMERICAN: >60
GLUCOSE BLD-MCNC: 111 MG/DL (ref 74–109)
GLUCOSE URINE: 100 MG/DL
HBA1C MFR BLD: 4.6 % (ref 4–6)
HCT VFR BLD CALC: 35.6 % (ref 42–52)
HDLC SERPL-MCNC: 71 MG/DL (ref 55–121)
HEMOGLOBIN: 11.5 G/DL (ref 14–18)
HYALINE CASTS: 3 /HPF (ref 0–8)
IMMATURE GRANULOCYTES #: 0 K/UL
KETONES, URINE: NEGATIVE MG/DL
LDL CHOLESTEROL CALCULATED: 87 MG/DL
LEUKOCYTE ESTERASE, URINE: ABNORMAL
LIPASE: 29 U/L (ref 13–60)
LYMPHOCYTES ABSOLUTE: 0.8 K/UL (ref 1.1–4.5)
LYMPHOCYTES RELATIVE PERCENT: 12.4 % (ref 20–40)
MCH RBC QN AUTO: 33 PG (ref 27–31)
MCHC RBC AUTO-ENTMCNC: 32.3 G/DL (ref 33–37)
MCV RBC AUTO: 102.3 FL (ref 80–94)
MONOCYTES ABSOLUTE: 0.6 K/UL (ref 0–0.9)
MONOCYTES RELATIVE PERCENT: 8.8 % (ref 0–10)
NEUTROPHILS ABSOLUTE: 4.9 K/UL (ref 1.5–7.5)
NEUTROPHILS RELATIVE PERCENT: 76.5 % (ref 50–65)
NITRITE, URINE: NEGATIVE
PDW BLD-RTO: 12.5 % (ref 11.5–14.5)
PH UA: 5.5 (ref 5–8)
PLATELET # BLD: 164 K/UL (ref 130–400)
PMV BLD AUTO: 11.3 FL (ref 9.4–12.4)
POTASSIUM SERPL-SCNC: 3.8 MMOL/L (ref 3.5–5)
PROTEIN UA: ABNORMAL MG/DL
RBC # BLD: 3.48 M/UL (ref 4.7–6.1)
RBC UA: 9 /HPF (ref 0–4)
SODIUM BLD-SCNC: 142 MMOL/L (ref 136–145)
SPECIFIC GRAVITY UA: 1.02 (ref 1–1.03)
T4 FREE: 1.08 NG/DL (ref 0.93–1.7)
TOTAL PROTEIN: 7 G/DL (ref 6.6–8.7)
TRIGL SERPL-MCNC: 66 MG/DL (ref 0–149)
TSH SERPL DL<=0.05 MIU/L-ACNC: 2.19 UIU/ML (ref 0.27–4.2)
UROBILINOGEN, URINE: 2 E.U./DL
WBC # BLD: 6.4 K/UL (ref 4.8–10.8)
WBC UA: 2 /HPF (ref 0–5)

## 2021-12-28 PROCEDURE — 3017F COLORECTAL CA SCREEN DOC REV: CPT | Performed by: FAMILY MEDICINE

## 2021-12-28 PROCEDURE — 99214 OFFICE O/P EST MOD 30 MIN: CPT | Performed by: FAMILY MEDICINE

## 2021-12-28 PROCEDURE — 4004F PT TOBACCO SCREEN RCVD TLK: CPT | Performed by: FAMILY MEDICINE

## 2021-12-28 PROCEDURE — G8484 FLU IMMUNIZE NO ADMIN: HCPCS | Performed by: FAMILY MEDICINE

## 2021-12-28 PROCEDURE — G8417 CALC BMI ABV UP PARAM F/U: HCPCS | Performed by: FAMILY MEDICINE

## 2021-12-28 PROCEDURE — 93000 ELECTROCARDIOGRAM COMPLETE: CPT | Performed by: FAMILY MEDICINE

## 2021-12-28 PROCEDURE — 2022F DILAT RTA XM EVC RTNOPTHY: CPT | Performed by: FAMILY MEDICINE

## 2021-12-28 PROCEDURE — G8427 DOCREV CUR MEDS BY ELIG CLIN: HCPCS | Performed by: FAMILY MEDICINE

## 2021-12-28 RX ORDER — PANTOPRAZOLE SODIUM 40 MG/1
40 TABLET, DELAYED RELEASE ORAL DAILY
Qty: 90 TABLET | Refills: 3 | Status: SHIPPED | OUTPATIENT
Start: 2021-12-28 | End: 2022-01-11 | Stop reason: SDUPTHER

## 2021-12-28 RX ORDER — TEMAZEPAM 30 MG/1
CAPSULE ORAL
Qty: 30 CAPSULE | Refills: 0 | Status: SHIPPED | OUTPATIENT
Start: 2022-01-06 | End: 2022-01-06 | Stop reason: SDUPTHER

## 2021-12-28 NOTE — PROGRESS NOTES
[facility] PHYSICIAN SERVICES  [facility] FAMILY MEDICINE  [address] [facility] [address] [address]  Dept: [phone]  Dept Fax: [phone]  Loc: [phone]    Maxx Bourne is a 61 y.o. male who presents today for his medical conditions/complaints as noted below. Maxx Bourne is here for 3 Month Follow-Up and Abdominal Pain        HPI:   CC: Here today to discuss the followin-year-old here for 3-month follow-up. He has been experiencing some increased nausea and epigastric discomfort. Increased belching as well. Symptoms worse when eating. No vomiting. No hematochezia or melena. No hematemesis. No fever chills. He does not take NSAIDs on a regular basis. Avoids alcohol for the most part does not smoke. Symptoms of been present for the past month or so. Hypertension  Compliant with medications. No adverse effects from medication. No lightheadedness, palpitations, or chest pain. Insomnia  Currently stable. Satisfied with current treatment regimen. No adverse effects from medication. Coronary Artery Disease  Currently no active angina symptoms. No chest pain with exertion. No orthopnea. He has a history of coronary artery disease, endocarditis, myocardial infarction, and bradycardia. He was previously receiving his cardiac care at [facility]. He was seeing Dr. Archie Valencia. His last 2D echocardiogram was in 2016 which showed an ejection fraction of 55% and left ventricular diastolic dysfunction. He had mild aortic valve stenosis and mild pulmonary hypertension. He tried to establish with a cardiologist in [place] and states he had issues. He would like to see a cardiologist here as he travels back and forth frequently.       HPI    Past Medical History:   Diagnosis Date    Arthritis     Asthma     Bradycardia     Broken ribs     CHF (congestive heart failure) (HCC)     Chronic kidney disease, stage III (moderate) ([facility] 9/15/2020  Chronic ulcer of left leg, with fat layer exposed ([address]     Diabetes mellitus ([address]     no meds; improved with weight loss    Diabetic peripheral neuropathy ([address] 3/8/2019    History of left below knee amputation ([address] 1/29/2019    Hx of BKA, left ([address] 5/25/2018    Hypertension     Idiopathic chronic venous hypertension of left leg with ulcer ([address] 12/22/2015    Morbid obesity ([address] 6/22/15    S/P BKA (below knee amputation) unilateral, left ([address] 5/30/2018    Skin ulcer of toe of left foot with fat layer exposed ([address] 4/4/2017    Sleep apnea in adult 06/22/2015    no cpap    Type 2 diabetes mellitus with foot ulcer (CODE) ([address] 11/15/2017    Ulcerated, foot, right, with fat layer exposed ([address] 4/4/2017    Venous hypertension, chronic, with ulcer, left 11/26/2017    Venous insufficiency of both lower extremities 6/22/15      Past Surgical History:   Procedure Laterality Date    ABDOMEN SURGERY  02/2018    treated in [address]      hx. of multiple; \"no blockage\"    DILATATION, ESOPHAGUS      GASTROSTOMY TUBE PLACEMENT  02/11/2018    temporary; now removed    KNEE ARTHROSCOPY      6 knee surgeries total; \"no replacements\"    NV AMPUTATION LOW LEG THRU TIB/FIB Left 5/25/2018    LEG AMPUTATION BELOW KNEE WITH MUSCLE FLAP  AND SKIN GRAFT CLOSURE AND APPLICATION OF WOUND VAC performed by Analilia Borrego MD at [place] [address]  02/11/2018    repair of gastric bypass    VASCULAR SURGERY  05/25/2018    TJR. Leg amputation below knee with muscle flap and skin graft closure and application of wound vac stsg 14 cmx 15 cm.        Family History   Problem Relation Age of Onset    Diabetes Mother     Heart Disease Mother     High Blood Pressure Mother     Heart Disease Father     Other Father        Social History     Tobacco Use    Smoking status: Never Smoker    Smokeless tobacco: Never Used   Substance Use Topics    Alcohol use: No     Current Outpatient Medications   Medication Sig Dispense Refill    [START ON 1/6/2022] temazepam (RESTORIL) 30 MG capsule TAKE 1 CAPSULE BY MOUTH NIGHTLY AS NEEDED FOR SLEEP 30 capsule 0    pantoprazole (PROTONIX) 40 MG tablet Take 1 tablet by mouth daily 90 tablet 3    HYDROcodone-acetaminophen (NORCO)  MG per tablet Take 1 tablet by mouth every 6 hours as needed for Pain for up to 30 days. Chronic use 120 tablet 0    lisinopril (PRINIVIL;ZESTRIL) 40 MG tablet Take 1 tablet by mouth nightly 90 tablet 0    sildenafil (VIAGRA) 100 MG tablet Take 1 tablet by mouth as needed for Erectile Dysfunction 10 tablet 3    hydroCHLOROthiazide (HYDRODIURIL) 12.5 MG tablet Take 1 tablet by mouth every morning 30 tablet 5    ibuprofen (ADVIL;MOTRIN) 600 MG tablet Take 200 mg by mouth every 8 hours as needed for Pain      DAILY MULTIPLE VITAMINS TABS Take 1 tablet by mouth       No current facility-administered medications for this visit. Allergies   Allergen Reactions    Adhesive Tape      Tears skin on leg       Health Maintenance   Topic Date Due    COVID-19 Vaccine (1) Never done    Pneumococcal 0-64 years Vaccine (1 of 2 - PPSV23) Never done    HIV screen  Never done    Diabetic retinal exam  Never done    Colon cancer screen colonoscopy  Never done    Shingles Vaccine (1 of 2) Never done    Diabetic foot exam  03/06/2021    Flu vaccine (1) Never done    Depression Screen  03/26/2022    Annual Wellness Visit (AWV)  03/27/2022    A1C test (Diabetic or Prediabetic)  12/28/2022    Lipid screen  12/28/2022    Potassium monitoring  12/28/2022    Creatinine monitoring  12/28/2022    DTaP/Tdap/Td vaccine (2 - Td or Tdap) 04/30/2028    Hepatitis C screen  Completed    Hepatitis A vaccine  Aged Out    Hib vaccine  Aged Out    Meningococcal (ACWY) vaccine  Aged Out       Subjective:      Review of Systems   Constitutional: Negative for chills and fever.    HENT: Negative for congestion. Respiratory: Negative for cough, chest tightness and shortness of breath. Cardiovascular: Positive for chest pain. Negative for palpitations and leg swelling. Gastrointestinal: Negative for abdominal pain, anal bleeding, constipation, diarrhea and nausea. Genitourinary: Negative for difficulty urinating. Psychiatric/Behavioral: Negative. SeeHPI for visit specific review of symptoms. All others negative      Objective:   BP (!) 152/88   Pulse 104   Temp 97.4 °F (36.3 °C)   Wt 203 lb 12.8 oz (92.4 kg)   SpO2 98%   BMI 28.42 kg/m²   Physical Exam  Physical Exam   Constitutional: He appears well-developed. Does not appear ill. Neck: Normal range of motion. Neck supple. No masses. Neck Symmetric. Normal tracheal position. No thyroid enlargement  Cardiovascular: Normal rate and regular rhythm. Exam reveals no friction rub. Carotid arteries: no bruit observed. No murmur heard. Respiratory:  Effort normal and breath sounds normal. No respiratory distress. No wheezes. No rales. No use of accessory muscles or intercostal retractions. Neurological: alert. Psychiatric: normal mood and affect. His behavior is normal. Normal judgement and insight observed.       Recent Results (from the past 672 hour(s))   Urinalysis with Microscopic    Collection Time: 12/28/21 12:03 PM   Result Value Ref Range    Color, UA DARK YELLOW (A) Straw/Yellow    Clarity, UA Clear Clear    Glucose, Ur 100 (A) Negative mg/dL    Bilirubin Urine SMALL (A) Negative    Ketones, Urine Negative Negative mg/dL    Specific Gravity, UA 1.023 1.005 - 1.030    Blood, Urine Negative Negative    pH, UA 5.5 5.0 - 8.0    Protein, UA TRACE (A) Negative mg/dL    Urobilinogen, Urine 2.0 (A) <2.0 E.U./dL    Nitrite, Urine Negative Negative    Leukocyte Esterase, Urine TRACE (A) Negative    Bacteria, UA NEGATIVE (A) None Seen /HPF    Crystals, UA NEG (A) None Seen /HPF    Hyaline Casts, UA 3 0 - 8 /HPF    WBC, UA 2 0 - 5 /HPF    RBC, UA 9 (H) 0 - 4 /HPF    Epithelial Cells, UA 4 0 - 5 /HPF   T4, Free    Collection Time: 12/28/21 12:04 PM   Result Value Ref Range    T4 Free 1.08 0.93 - 1.70 ng/dL   TSH without Reflex    Collection Time: 12/28/21 12:04 PM   Result Value Ref Range    TSH 2.190 0.270 - 4.200 uIU/mL   CBC Auto Differential    Collection Time: 12/28/21 12:04 PM   Result Value Ref Range    WBC 6.4 4.8 - 10.8 K/uL    RBC 3.48 (L) 4.70 - 6.10 M/uL    Hemoglobin 11.5 (L) 14.0 - 18.0 g/dL    Hematocrit 35.6 (L) 42.0 - 52.0 %    .3 (H) 80.0 - 94.0 fL    MCH 33.0 (H) 27.0 - 31.0 pg    MCHC 32.3 (L) 33.0 - 37.0 g/dL    RDW 12.5 11.5 - 14.5 %    Platelets 711 671 - 825 K/uL    MPV 11.3 9.4 - 12.4 fL    Neutrophils % 76.5 (H) 50.0 - 65.0 %    Lymphocytes % 12.4 (L) 20.0 - 40.0 %    Monocytes % 8.8 0.0 - 10.0 %    Eosinophils % 1.3 0.0 - 5.0 %    Basophils % 0.5 0.0 - 1.0 %    Neutrophils Absolute 4.9 1.5 - 7.5 K/uL    Immature Granulocytes # 0.0 K/uL    Lymphocytes Absolute 0.8 (L) 1.1 - 4.5 K/uL    Monocytes Absolute 0.60 0.00 - 0.90 K/uL    Eosinophils Absolute 0.10 0.00 - 0.60 K/uL    Basophils Absolute 0.00 0.00 - 0.20 K/uL   Comprehensive Metabolic Panel    Collection Time: 12/28/21 12:04 PM   Result Value Ref Range    Sodium 142 136 - 145 mmol/L    Potassium 3.8 3.5 - 5.0 mmol/L    Chloride 103 98 - 111 mmol/L    CO2 27 22 - 29 mmol/L    Anion Gap 12 7 - 19 mmol/L    Glucose 111 (H) 74 - 109 mg/dL    BUN 15 8 - 23 mg/dL    CREATININE 0.9 0.5 - 1.2 mg/dL    GFR Non-African American >60 >60    GFR African American >59 >59    Calcium 9.1 8.8 - 10.2 mg/dL    Total Protein 7.0 6.6 - 8.7 g/dL    Albumin 3.7 3.5 - 5.2 g/dL    Total Bilirubin 0.8 0.2 - 1.2 mg/dL    Alkaline Phosphatase 258 (H) 40 - 130 U/L    ALT 21 5 - 41 U/L    AST 35 5 - 40 U/L   Hemoglobin A1C    Collection Time: 12/28/21 12:04 PM   Result Value Ref Range    Hemoglobin A1C 4.6 4.0 - 6.0 %   Lipid Panel    Collection Time: 12/28/21 12:04 PM   Result Value Ref Range    Cholesterol, Total 171 160 - 199 mg/dL    Triglycerides 66 0 - 149 mg/dL    HDL 71 55 - 121 mg/dL    LDL Calculated 87 <100 mg/dL   Amylase    Collection Time: 12/28/21 12:04 PM   Result Value Ref Range    Amylase 47 28 - 100 U/L   Lipase    Collection Time: 12/28/21 12:04 PM   Result Value Ref Range    Lipase 29 13 - 60 U/L       EKG normal sinus rhythm no ST or T wave changes        Assessment & Plan: The following diagnoses and conditions are stable with no further orders unless indicated:  1. Coronary artery disease involving native heart without angina pectoris, unspecified vessel or lesion type  We discussed his current symptoms  He feels this is more gastrointestinal and cardiovascular pain he is experiencing. He states he would like to discuss further testing with a cardiologist.  Jacqueline Parnell to go to emergency department if he develops any substernal chest discomfort  - Yazmin Craig MD, Cardiology, [place]    2. Epigastric pain  He is having gastrointestinal complaints  Symptoms appear to be related to gastroesophageal reflux disease. We will start him on Protonix 40 mg daily  - Comprehensive Metabolic Panel; Future  - Amylase; Future  - Lipase; Future    3. Lower abdominal pain    - Urinalysis with Microscopic; Future  - CBC Auto Differential; Future    4. Chest pain, unspecified type    - EKG 12 Lead    5. Chronic insomnia  Refilled his temazepam  - temazepam (RESTORIL) 30 MG capsule; TAKE 1 CAPSULE BY MOUTH NIGHTLY AS NEEDED FOR SLEEP  Dispense: 30 capsule; Refill: 0    6. Stage 3a chronic kidney disease ([facility]  Lab Results   Component Value Date    CREATININE 0.9 12/28/2021     Lab Results   Component Value Date    BUN 15 12/28/2021         Reinforced goals of adequate hydration. Recommended he avoid NSAIDs such as naproxen and ibuprofen. 7. Gastroesophageal reflux disease without esophagitis  Suggested he be evaluated by gastroenterology  Refilled his Protonix.   He has been on this chronically  - Francis Canas MD, Gastroenterology, [place]  - pantoprazole (PROTONIX) 40 MG tablet; Take 1 tablet by mouth daily  Dispense: 90 tablet; Refill: 3    8. Other fatigue    - T4, Free; Future  - TSH without Reflex; Future    9. Essential (primary) hypertension  BP Readings from Last 3 Encounters:   12/28/21 (!) 152/88   10/21/21 128/86   06/29/21 112/78     Blood pressure is elevated today as well. 10. Type 2 diabetes mellitus with diabetic dermatitis, without long-term current use of insulin ([facility]  Lab Results   Component Value Date    LABA1C 4.6 12/28/2021    LABA1C 4.0 06/29/2021    LABA1C 4.3 12/15/2020     Lab Results   Component Value Date    LABMICR 2.40 12/15/2020    LDLCALC 87 12/28/2021    CREATININE 0.9 12/28/2021     Blood sugar stable  - Hemoglobin A1C; Future    11. Hypercholesterolemia  Lab Results   Component Value Date    CHOL 171 12/28/2021    CHOL 209 (H) 06/29/2021    CHOL 143 (L) 12/15/2020     Lab Results   Component Value Date    TRIG 66 12/28/2021    TRIG 85 06/29/2021    TRIG 101 12/15/2020     Lab Results   Component Value Date    HDL 71 12/28/2021    HDL 73 06/29/2021    HDL 52 (L) 12/15/2020     Lab Results   Component Value Date    LDLCALC 87 12/28/2021    LDLCALC 119 06/29/2021    LDLCALC 71 12/15/2020     No results found for: LABVLDL, VLDL  No results found for: CHOLHDLRATIO  Considerable improvement in his cholesterol. He has reduced his red meat  - Lipid Panel; Future    He has a history of left below-knee amputation: Continues to use prosthesis. _______________________________________________________________    Anemia:  Check B12, TIBC, iron, CBC, ferritin, folate,  Alkaline phosphatase is elevated as well. Will differentiate alkaline phosphatase and consider liver ultrasound      Return in about 3 months (around 3/28/2022) for Routine follow up - 15 minute visit. Discussed use, benefit, and side effects of prescribed medications.   All patient questions answered. Pt voiced understanding. Reviewed health maintenance. Instructedto continue current medications, diet and exercise. Patient agreed with treatmentplan. Follow up as directed. Note dictated using Dragon Dictation software  Sometimes this dictation software makes erroneous transcriptions.

## 2021-12-29 ENCOUNTER — TELEPHONE (OUTPATIENT)
Dept: CARDIOLOGY CLINIC | Age: 66
End: 2021-12-29

## 2021-12-29 DIAGNOSIS — D64.9 ANEMIA, UNSPECIFIED TYPE: ICD-10-CM

## 2021-12-29 DIAGNOSIS — R74.8 ELEVATED ALKALINE PHOSPHATASE LEVEL: Primary | ICD-10-CM

## 2021-12-30 ASSESSMENT — ENCOUNTER SYMPTOMS
ANAL BLEEDING: 0
CONSTIPATION: 0
COUGH: 0
ABDOMINAL PAIN: 0
SHORTNESS OF BREATH: 0
NAUSEA: 0
CHEST TIGHTNESS: 0
DIARRHEA: 0

## 2022-01-07 ENCOUNTER — OFFICE VISIT (OUTPATIENT)
Dept: CARDIOLOGY CLINIC | Age: 67
End: 2022-01-07
Payer: MEDICARE

## 2022-01-07 ENCOUNTER — TELEPHONE (OUTPATIENT)
Dept: FAMILY MEDICINE CLINIC | Age: 67
End: 2022-01-07

## 2022-01-07 VITALS
DIASTOLIC BLOOD PRESSURE: 88 MMHG | SYSTOLIC BLOOD PRESSURE: 124 MMHG | WEIGHT: 203 LBS | HEART RATE: 92 BPM | HEIGHT: 72 IN | BODY MASS INDEX: 27.5 KG/M2

## 2022-01-07 DIAGNOSIS — D64.9 ANEMIA, UNSPECIFIED TYPE: ICD-10-CM

## 2022-01-07 DIAGNOSIS — R06.02 SOB (SHORTNESS OF BREATH): ICD-10-CM

## 2022-01-07 DIAGNOSIS — I10 ESSENTIAL HYPERTENSION, BENIGN: Primary | ICD-10-CM

## 2022-01-07 DIAGNOSIS — R74.8 ELEVATED ALKALINE PHOSPHATASE LEVEL: ICD-10-CM

## 2022-01-07 LAB
BASOPHILS ABSOLUTE: 0 K/UL (ref 0–0.2)
BASOPHILS RELATIVE PERCENT: 0.5 % (ref 0–1)
EOSINOPHILS ABSOLUTE: 0.1 K/UL (ref 0–0.6)
EOSINOPHILS RELATIVE PERCENT: 1.5 % (ref 0–5)
FERRITIN: 224.9 NG/ML (ref 30–400)
FOLATE: 15.3 NG/ML (ref 4.5–32.2)
HCT VFR BLD CALC: 34.3 % (ref 42–52)
HEMOGLOBIN: 11.1 G/DL (ref 14–18)
IMMATURE GRANULOCYTES #: 0 K/UL
IRON: 36 UG/DL (ref 59–158)
LYMPHOCYTES ABSOLUTE: 0.8 K/UL (ref 1.1–4.5)
LYMPHOCYTES RELATIVE PERCENT: 10.4 % (ref 20–40)
MCH RBC QN AUTO: 33.5 PG (ref 27–31)
MCHC RBC AUTO-ENTMCNC: 32.4 G/DL (ref 33–37)
MCV RBC AUTO: 103.6 FL (ref 80–94)
MONOCYTES ABSOLUTE: 0.8 K/UL (ref 0–0.9)
MONOCYTES RELATIVE PERCENT: 9.9 % (ref 0–10)
NEUTROPHILS ABSOLUTE: 6.1 K/UL (ref 1.5–7.5)
NEUTROPHILS RELATIVE PERCENT: 77.2 % (ref 50–65)
PDW BLD-RTO: 12.6 % (ref 11.5–14.5)
PLATELET # BLD: 201 K/UL (ref 130–400)
PMV BLD AUTO: 11.4 FL (ref 9.4–12.4)
RBC # BLD: 3.31 M/UL (ref 4.7–6.1)
TOTAL IRON BINDING CAPACITY: 209 UG/DL (ref 250–400)
VITAMIN B-12: 1947 PG/ML (ref 211–946)
WBC # BLD: 7.9 K/UL (ref 4.8–10.8)

## 2022-01-07 PROCEDURE — 1036F TOBACCO NON-USER: CPT | Performed by: INTERNAL MEDICINE

## 2022-01-07 PROCEDURE — 3017F COLORECTAL CA SCREEN DOC REV: CPT | Performed by: INTERNAL MEDICINE

## 2022-01-07 PROCEDURE — 99204 OFFICE O/P NEW MOD 45 MIN: CPT | Performed by: INTERNAL MEDICINE

## 2022-01-07 PROCEDURE — G8484 FLU IMMUNIZE NO ADMIN: HCPCS | Performed by: INTERNAL MEDICINE

## 2022-01-07 PROCEDURE — G8427 DOCREV CUR MEDS BY ELIG CLIN: HCPCS | Performed by: INTERNAL MEDICINE

## 2022-01-07 PROCEDURE — 93000 ELECTROCARDIOGRAM COMPLETE: CPT | Performed by: INTERNAL MEDICINE

## 2022-01-07 PROCEDURE — G8417 CALC BMI ABV UP PARAM F/U: HCPCS | Performed by: INTERNAL MEDICINE

## 2022-01-07 ASSESSMENT — ENCOUNTER SYMPTOMS
SHORTNESS OF BREATH: 1
ABDOMINAL DISTENTION: 0
SORE THROAT: 0
WHEEZING: 0
DIARRHEA: 0
FACIAL SWELLING: 0
CHEST TIGHTNESS: 0
BACK PAIN: 1
COUGH: 0
ABDOMINAL PAIN: 0
EYE PAIN: 0
NAUSEA: 0
EYE DISCHARGE: 0
EYE REDNESS: 0
CONSTIPATION: 0
VOMITING: 0
APNEA: 0
BLOOD IN STOOL: 0

## 2022-01-07 NOTE — TELEPHONE ENCOUNTER
Spoke to patient and he says you had told him in office that you wanted him to take the Protonix twice a day and a new script would be sent in. Last note says once daily please confirm dose?

## 2022-01-07 NOTE — TELEPHONE ENCOUNTER
----- Message from Sintia Stevenson sent at 1/6/2022  3:30 PM CST -----  Subject: Message to Provider    QUESTIONS  Information for Provider? pt is calling in regarding the pantoprazole 40   mg 1 tab po qd medication. pt states doctor told him to take the   medication twice a day. the script that was sent in is for once a day pt   will run out of medication before the script is out. script was just   filled 12/28/2021.  ---------------------------------------------------------------------------  --------------  CALL BACK INFO  What is the best way for the office to contact you? OK to leave message on   voicemail  Preferred Call Back Phone Number? [phone]  ---------------------------------------------------------------------------  --------------  SCRIPT ANSWERS  Relationship to Patient?  Self

## 2022-01-10 RX ORDER — PANTOPRAZOLE SODIUM 40 MG/1
40 TABLET, DELAYED RELEASE ORAL 2 TIMES DAILY
Qty: 60 TABLET | Refills: 5 | Status: SHIPPED | OUTPATIENT
Start: 2022-01-10

## 2022-01-10 NOTE — TELEPHONE ENCOUNTER
Yes I need to increase it to twice a day. Which pharmacy does he want to use?   He usually uses one in [place]

## 2022-01-11 DIAGNOSIS — K21.9 GASTROESOPHAGEAL REFLUX DISEASE WITHOUT ESOPHAGITIS: ICD-10-CM

## 2022-01-11 DIAGNOSIS — M51.36 DDD (DEGENERATIVE DISC DISEASE), LUMBAR: ICD-10-CM

## 2022-01-11 DIAGNOSIS — M54.50 CHRONIC MIDLINE LOW BACK PAIN WITHOUT SCIATICA: ICD-10-CM

## 2022-01-11 DIAGNOSIS — G89.29 CHRONIC MIDLINE LOW BACK PAIN WITHOUT SCIATICA: ICD-10-CM

## 2022-01-11 LAB
ALK PHOS OTHER CALC: 0 U/L
ALK PHOSPHATASE: 309 U/L (ref 40–120)
ALKALINE PHOSPHATASE BONE FRACTION: 53 U/L (ref 0–55)
ALKALINE PHOSPHATASE LIVER FRACTION: 256 U/L (ref 0–94)

## 2022-01-11 RX ORDER — HYDROCODONE BITARTRATE AND ACETAMINOPHEN 10; 325 MG/1; MG/1
1 TABLET ORAL EVERY 6 HOURS PRN
Qty: 120 TABLET | Refills: 0 | Status: SHIPPED | OUTPATIENT
Start: 2022-01-11 | End: 2022-02-10

## 2022-01-11 RX ORDER — PANTOPRAZOLE SODIUM 40 MG/1
TABLET, DELAYED RELEASE ORAL
Qty: 180 TABLET | Refills: 1 | Status: SHIPPED | OUTPATIENT
Start: 2022-01-11 | End: 2022-01-20

## 2022-01-11 NOTE — TELEPHONE ENCOUNTER
Jemmancmaribell Connor called to request a refill on his medication. Last office visit : 12/28/2021   Next office visit : Visit date not found     Last UDS:   Amphetamine Screen, Urine   Date Value Ref Range Status   08/09/2019 neg  Final     Barbiturate Screen, Urine   Date Value Ref Range Status   08/09/2019 neg  Final     Benzodiazepine Screen, Urine   Date Value Ref Range Status   08/09/2019 neg  Final     Buprenorphine Urine   Date Value Ref Range Status   08/09/2019 neg  Final     Cocaine Metabolite Screen, Urine   Date Value Ref Range Status   08/09/2019 neg  Final     Gabapentin Screen, Urine   Date Value Ref Range Status   08/09/2019 neg  Final     MDMA, Urine   Date Value Ref Range Status   08/09/2019 neg  Final     Methamphetamine, Urine   Date Value Ref Range Status   08/09/2019 neg  Final     Opiate Scrn, Ur   Date Value Ref Range Status   08/09/2019 pos  Final     Oxycodone Screen, Ur   Date Value Ref Range Status   08/09/2019 neg  Final     PCP Screen, Urine   Date Value Ref Range Status   08/09/2019 neg  Final     Propoxyphene Screen, Urine   Date Value Ref Range Status   08/09/2019 neg  Final     THC Screen, Urine   Date Value Ref Range Status   08/09/2019 neg  Final     Tricyclic Antidepressants, Urine   Date Value Ref Range Status   08/09/2019 neg  Final       Last Layla Farooq: 11/23/21  Medication Contract: 8/9/19   Last Fill: 12/17/21    Requested Prescriptions     Pending Prescriptions Disp Refills    HYDROcodone-acetaminophen (NORCO)  MG per tablet 120 tablet 0     Sig: Take 1 tablet by mouth every 6 hours as needed for Pain for up to 30 days. Chronic use         Please approve or refuse this medication.    Sina Ramachandran

## 2022-01-11 NOTE — TELEPHONE ENCOUNTER
Brett Perez called to request a refill on his medication.       Last office visit : 12/28/2021   Next office visit : Visit date not found     Requested Prescriptions     Signed Prescriptions Disp Refills    pantoprazole (PROTONIX) 40 MG tablet 180 tablet 1     Sig: Takes two times a day     Authorizing Provider: Stanislav Cartwright     Ordering User: Tevin Jolly

## 2022-01-12 DIAGNOSIS — R74.8 ELEVATED ALKALINE PHOSPHATASE LEVEL: Primary | ICD-10-CM

## 2022-01-12 DIAGNOSIS — D50.9 IRON DEFICIENCY ANEMIA, UNSPECIFIED IRON DEFICIENCY ANEMIA TYPE: ICD-10-CM

## 2022-01-12 RX ORDER — FERROUS SULFATE 325(65) MG
325 TABLET ORAL 2 TIMES DAILY
Qty: 60 TABLET | Refills: 5 | Status: CANCELLED | OUTPATIENT
Start: 2022-01-12

## 2022-01-13 RX ORDER — FERROUS SULFATE 325(65) MG
325 TABLET ORAL 2 TIMES DAILY
Qty: 60 TABLET | Refills: 5 | Status: SHIPPED | OUTPATIENT
Start: 2022-01-13

## 2022-01-14 ENCOUNTER — HOSPITAL ENCOUNTER (OUTPATIENT)
Dept: ULTRASOUND IMAGING | Age: 67
Discharge: HOME | End: 2022-01-14
Payer: MEDICARE

## 2022-01-14 ENCOUNTER — PATIENT MESSAGE (OUTPATIENT)
Dept: FAMILY MEDICINE CLINIC | Age: 67
End: 2022-01-14

## 2022-01-14 DIAGNOSIS — K80.20 GALLSTONES: Primary | ICD-10-CM

## 2022-01-14 DIAGNOSIS — R74.8 ELEVATED ALKALINE PHOSPHATASE LEVEL: ICD-10-CM

## 2022-01-14 PROCEDURE — 76705 ECHO EXAM OF ABDOMEN: CPT

## 2022-01-17 NOTE — TELEPHONE ENCOUNTER
From: Justa Duncan  To: Dr. Allison Valerio: 1/14/2022 6:59 PM CST  Subject: Question regarding US Liver    I don't know any surgeons so please go ahead and set it up for me

## 2022-01-18 ENCOUNTER — HOSPITAL ENCOUNTER (OUTPATIENT)
Dept: NON INVASIVE DIAGNOSTICS | Age: 67
Discharge: HOME | End: 2022-01-18
Payer: MEDICARE

## 2022-01-18 ENCOUNTER — HOSPITAL ENCOUNTER (OUTPATIENT)
Dept: NUCLEAR MEDICINE | Age: 67
Discharge: HOME | End: 2022-01-20
Payer: MEDICARE

## 2022-01-18 ENCOUNTER — TELEPHONE (OUTPATIENT)
Dept: CARDIOLOGY CLINIC | Age: 67
End: 2022-01-18

## 2022-01-18 DIAGNOSIS — R06.02 SOB (SHORTNESS OF BREATH): ICD-10-CM

## 2022-01-18 DIAGNOSIS — I10 ESSENTIAL HYPERTENSION, BENIGN: ICD-10-CM

## 2022-01-18 LAB
LV EF: 45 %
LV EF: 53 %
LVEF MODALITY: NORMAL
LVEF MODALITY: NORMAL

## 2022-01-18 PROCEDURE — 93017 CV STRESS TEST TRACING ONLY: CPT

## 2022-01-18 PROCEDURE — 6360000002 HC RX W HCPCS: Performed by: INTERNAL MEDICINE

## 2022-01-18 PROCEDURE — 6360000004 HC RX CONTRAST MEDICATION: Performed by: INTERNAL MEDICINE

## 2022-01-18 PROCEDURE — C8929 TTE W OR WO FOL WCON,DOPPLER: HCPCS

## 2022-01-18 PROCEDURE — 3430000000 HC RX DIAGNOSTIC RADIOPHARMACEUTICAL: Performed by: INTERNAL MEDICINE

## 2022-01-18 PROCEDURE — A9500 TC99M SESTAMIBI: HCPCS | Performed by: INTERNAL MEDICINE

## 2022-01-18 RX ADMIN — PERFLUTREN 1.5 ML: 6.52 INJECTION, SUSPENSION INTRAVENOUS at 13:30

## 2022-01-18 RX ADMIN — TETRAKIS(2-METHOXYISOBUTYLISOCYANIDE)COPPER(I) TETRAFLUOROBORATE 30 MILLICURIE: 1 INJECTION, POWDER, LYOPHILIZED, FOR SOLUTION INTRAVENOUS at 15:05

## 2022-01-18 RX ADMIN — REGADENOSON 0.4 MG: 0.08 INJECTION, SOLUTION INTRAVENOUS at 11:00

## 2022-01-18 RX ADMIN — TETRAKIS(2-METHOXYISOBUTYLISOCYANIDE)COPPER(I) TETRAFLUOROBORATE 10 MILLICURIE: 1 INJECTION, POWDER, LYOPHILIZED, FOR SOLUTION INTRAVENOUS at 15:04

## 2022-01-18 NOTE — RESULT ENCOUNTER NOTE
Overall normal heart squeeze function (ejection fraction). There is suggestion for possible abnormal motion of the anterior (front aspect) of the heart. The nuclear medicine stress test which is pending will give us more information in this regard. The right side of the heart is described as being mildly enlarged. Further recommendations will follow which may include further evaluation of lung function.

## 2022-01-19 ENCOUNTER — TELEPHONE (OUTPATIENT)
Dept: CARDIOLOGY CLINIC | Age: 67
End: 2022-01-19

## 2022-01-19 RX ORDER — ISOSORBIDE MONONITRATE 30 MG/1
30 TABLET, EXTENDED RELEASE ORAL NIGHTLY
Qty: 30 TABLET | Refills: 3 | Status: SHIPPED | OUTPATIENT
Start: 2022-01-19 | End: 2022-01-21 | Stop reason: SDUPTHER

## 2022-01-19 RX ORDER — ATENOLOL 25 MG/1
25 TABLET ORAL EVERY MORNING
Qty: 30 TABLET | Refills: 3 | Status: SHIPPED | OUTPATIENT
Start: 2022-01-19 | End: 2022-01-21 | Stop reason: SDUPTHER

## 2022-01-19 NOTE — TELEPHONE ENCOUNTER
Called the patient to give results the patient voiced understanding and has a follow up appt  3-7-22 at 10     MB

## 2022-01-20 ENCOUNTER — TELEPHONE (OUTPATIENT)
Dept: INTERNAL MEDICINE | Age: 67
End: 2022-01-20

## 2022-01-20 ENCOUNTER — HOSPITAL ENCOUNTER (OUTPATIENT)
Dept: GENERAL RADIOLOGY | Age: 67
Discharge: HOME | End: 2022-01-20
Payer: MEDICARE

## 2022-01-20 ENCOUNTER — TELEPHONE (OUTPATIENT)
Dept: FAMILY MEDICINE CLINIC | Age: 67
End: 2022-01-20

## 2022-01-20 ENCOUNTER — OFFICE VISIT (OUTPATIENT)
Dept: SURGERY | Age: 67
End: 2022-01-20
Payer: MEDICARE

## 2022-01-20 ENCOUNTER — OFFICE VISIT (OUTPATIENT)
Dept: FAMILY MEDICINE CLINIC | Age: 67
End: 2022-01-20
Payer: MEDICARE

## 2022-01-20 VITALS
TEMPERATURE: 99 F | SYSTOLIC BLOOD PRESSURE: 124 MMHG | OXYGEN SATURATION: 98 % | HEIGHT: 72 IN | BODY MASS INDEX: 27.5 KG/M2 | WEIGHT: 203 LBS | DIASTOLIC BLOOD PRESSURE: 82 MMHG | HEART RATE: 110 BPM

## 2022-01-20 VITALS
DIASTOLIC BLOOD PRESSURE: 68 MMHG | TEMPERATURE: 98.6 F | BODY MASS INDEX: 27.5 KG/M2 | SYSTOLIC BLOOD PRESSURE: 98 MMHG | WEIGHT: 203 LBS | HEIGHT: 72 IN
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
## 2022-01-20 DIAGNOSIS — M25.512 LEFT SHOULDER PAIN, UNSPECIFIED CHRONICITY: ICD-10-CM

## 2022-01-20 DIAGNOSIS — R07.81 RIB PAIN: ICD-10-CM
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
## 2022-01-20 DIAGNOSIS — S22.32XA CLOSED FRACTURE OF ONE RIB OF LEFT SIDE, INITIAL ENCOUNTER: ICD-10-CM

## 2022-01-20 DIAGNOSIS — I87.2 VENOUS INSUFFICIENCY OF BOTH LOWER EXTREMITIES: Chronic | ICD-10-CM

## 2022-01-20 DIAGNOSIS — K74.69 OTHER CIRRHOSIS OF LIVER (HCC): ICD-10-CM

## 2022-01-20 DIAGNOSIS — K80.10 CALCULUS OF GALLBLADDER WITH CHRONIC CHOLECYSTITIS WITHOUT OBSTRUCTION: Primary | ICD-10-CM

## 2022-01-20 PROCEDURE — 1036F TOBACCO NON-USER: CPT | Performed by: SURGERY

## 2022-01-20 PROCEDURE — 73030 X-RAY EXAM OF SHOULDER: CPT

## 2022-01-20 PROCEDURE — 99204 OFFICE O/P NEW MOD 45 MIN: CPT | Performed by: SURGERY

## 2022-01-20 PROCEDURE — G8417 CALC BMI ABV UP PARAM F/U: HCPCS | Performed by: FAMILY MEDICINE

## 2022-01-20 PROCEDURE — 3017F COLORECTAL CA SCREEN DOC REV: CPT | Performed by: SURGERY

## 2022-01-20 PROCEDURE — G8484 FLU IMMUNIZE NO ADMIN: HCPCS | Performed by: FAMILY MEDICINE

## 2022-01-20 PROCEDURE — G8428 CUR MEDS NOT DOCUMENT: HCPCS | Performed by: SURGERY

## 2022-01-20 PROCEDURE — G8484 FLU IMMUNIZE NO ADMIN: HCPCS | Performed by: SURGERY

## 2022-01-20 PROCEDURE — G8427 DOCREV CUR MEDS BY ELIG CLIN: HCPCS | Performed by: FAMILY MEDICINE

## 2022-01-20 PROCEDURE — 71100 X-RAY EXAM RIBS UNI 2 VIEWS: CPT

## 2022-01-20 PROCEDURE — 1036F TOBACCO NON-USER: CPT | Performed by: FAMILY MEDICINE

## 2022-01-20 PROCEDURE — 99214 OFFICE O/P EST MOD 30 MIN: CPT | Performed by: FAMILY MEDICINE

## 2022-01-20 PROCEDURE — G8417 CALC BMI ABV UP PARAM F/U: HCPCS | Performed by: SURGERY

## 2022-01-20 PROCEDURE — 3017F COLORECTAL CA SCREEN DOC REV: CPT | Performed by: FAMILY MEDICINE

## 2022-01-20 RX ORDER — LIDOCAINE 4 G/G
1 PATCH TOPICAL DAILY
Qty: 30 PATCH | Refills: 0 | Status: SHIPPED | OUTPATIENT
Start: 2022-01-20 | End: 2022-02-19

## 2022-01-20 ASSESSMENT — ENCOUNTER SYMPTOMS
VOMITING: 0
DIARRHEA: 0
BACK PAIN: 0
COLOR CHANGE: 0
SORE THROAT: 0
APNEA: 0
NAUSEA: 0
EYE ITCHING: 0
COLOR CHANGE: 0
ABDOMINAL PAIN: 1
CONSTIPATION: 0
SHORTNESS OF BREATH: 1
COUGH: 0
CHEST TIGHTNESS: 0
VOMITING: 0
BACK PAIN: 0
ABDOMINAL DISTENTION: 0
NAUSEA: 0
FACIAL SWELLING: 0
EYE DISCHARGE: 0
EYE PAIN: 0
STRIDOR: 0
EYE REDNESS: 0

## 2022-01-20 NOTE — TELEPHONE ENCOUNTER
Collin Lackey with radiology called and states that pt is refusing to have his R ribs x-rayed. She asked if she could change the order to only the left side. Advised that if he was refusing that it was ok. She states understanding.

## 2022-01-20 NOTE — PROGRESS NOTES
[facility] PHYSICIAN SERVICES  [facility] FAM[facility] MEDICINE  [address] [facility] [address] [address]  Dept: [phone]  Dept Fax: [phone]  Loc: [phone]    Subjective: Bishop Gonzalez is a 61 y.o. male who presents today for his medical conditions/complaints as noted below. Bishop Gonzalez is c/o of Fall (Pt [facility] we was able to get patient up and in a wheelchair but he was complaining of left shoulder pain he was on the way to see Dr Caden gUarte and came to the wrong building.)        HPI:   Patient of Sharene Brunner, MD presents with fall. He had an appointment scheduled this afternoon with Dr. Caden Ugarte for evaluation of gallbladder disease, and states that he came here instead and fell in the parking lot just near the entrance. He lost his balance and fell on his left shoulder. He states that he does fall somewhat frequently, and knows to \"tuck in elbows to protect myself. \"  He is status post BKA due to poor circulation. He is having left shoulder pain, as well as left-sided rib pain, both of which are new. Regarding his gallbladder disease, states that Dr. Caden Ugarte told him he would need to go to [facility] to have the surgery performed.     Past Medical History:   Diagnosis Date    Arthritis     Asthma     Bradycardia     Broken ribs     CHF (congestive heart failure) (HCC)     Chronic kidney disease, stage III (moderate) ([address] 9/15/2020    Chronic ulcer of left leg, with fat layer exposed ([address]     Diabetes mellitus ([address]     no meds; improved with weight loss    Diabetic peripheral neuropathy ([address] 3/8/2019    History of left below knee amputation ([address] 1/29/2019    Hx of BKA, left ([address] 5/25/2018    Hypertension     Idiopathic chronic venous hypertension of left leg with ulcer ([address] 12/22/2015    Morbid obesity ([address] 6/22/15    Other cirrhosis of liver ([address] 1/20/2022    S/P BKA (below knee amputation) unilateral, left ([address] 5/30/2018    Skin ulcer of toe of left foot with fat layer exposed ([facility] 4/4/2017    Sleep apnea in adult 06/22/2015    no cpap    Type 2 diabetes mellitus with foot ulcer (CODE) ([facility] 11/15/2017    Ulcerated, foot, right, with fat layer exposed ([facility] 4/4/2017    Venous hypertension, chronic, with ulcer, left 11/26/2017    Venous insufficiency of both lower extremities 6/22/15     Past Surgical History:   Procedure Laterality Date    ABDOMEN SURGERY  02/2018    treated in [address]      hx. of multiple; \"no blockage\"    DILATATION, ESOPHAGUS      GASTROSTOMY TUBE PLACEMENT  02/11/2018    temporary; now removed    KNEE ARTHROSCOPY      6 knee surgeries total; \"no replacements\"    ID AMPUTATION LOW LEG THRU TIB/FIB Left 5/25/2018    LEG AMPUTATION BELOW KNEE WITH MUSCLE FLAP  AND SKIN GRAFT CLOSURE AND APPLICATION OF WOUND VAC performed by Velia Brush MD at [place] [address]  02/11/2018    repair of gastric bypass    VASCULAR SURGERY  05/25/2018    [facility]. Leg amputation below knee with muscle flap and skin graft closure and application of wound vac stsg 14 cmx 15 cm.      Family History   Problem Relation Age of Onset    Diabetes Mother     Heart Disease Mother     High Blood Pressure Mother     Heart Disease Father     Other Father      Social History     Tobacco Use    Smoking status: Never Smoker    Smokeless tobacco: Never Used   Substance Use Topics    Alcohol use: No      Current Outpatient Medications   Medication Sig Dispense Refill    lidocaine 4 % external patch Place 1 patch onto the skin daily 30 patch 0    isosorbide mononitrate (IMDUR) 30 MG extended release tablet Take 1 tablet by mouth at bedtime 30 tablet 3    ferrous sulfate (IRON 325) 325 (65 Fe) MG tablet Take 1 tablet by mouth 2 times daily 60 tablet 5    HYDROcodone-acetaminophen (NORCO)  MG per tablet Take 1 tablet by mouth every 6 hours as needed for Pain for up to 30 days. Chronic use 120 tablet 0    pantoprazole (PROTONIX) 40 MG tablet Take 1 tablet by mouth 2 times daily 60 tablet 5    temazepam (RESTORIL) 30 MG capsule TAKE 1 CAPSULE BY MOUTH NIGHTLY AS NEEDED FOR SLEEP 30 capsule 0    lisinopril (PRINIVIL;ZESTRIL) 40 MG tablet Take 1 tablet by mouth nightly 90 tablet 0    atenolol (TENORMIN) 25 MG tablet Take 1 tablet by mouth every morning (Patient not taking: Reported on 1/20/2022) 30 tablet 3    hydroCHLOROthiazide (HYDRODIURIL) 12.5 MG tablet Take 1 tablet by mouth every morning (Patient not taking: Reported on 1/20/2022) 30 tablet 5     No current facility-administered medications for this visit. Allergies   Allergen Reactions    Adhesive Tape      Tears skin on leg       Review of Systems   Constitutional: Negative for chills and fever. HENT: Negative for facial swelling and mouth sores. Eyes: Negative for discharge and itching. Respiratory: Negative for apnea and stridor. Cardiovascular: Negative for chest pain and palpitations. Gastrointestinal: Negative for nausea and vomiting. Endocrine: Negative for cold intolerance and heat intolerance. Genitourinary: Negative for frequency and urgency. Musculoskeletal: Positive for arthralgias. Negative for back pain. Skin: Negative for color change and rash. See HPI for visit specific review of symptoms. All others negative      Objective:   /82   Pulse 110   Temp 99 °F (37.2 °C)   Ht 6' (1.829 m)   Wt 203 lb (92.1 kg)   SpO2 98%   BMI 27.53 kg/m²   Physical Exam  Constitutional:       Appearance: He is well-developed. HENT:      Head: Normocephalic and atraumatic. Right Ear: External ear normal.      Left Ear: External ear normal.   Eyes:      Conjunctiva/sclera: Conjunctivae normal.      Pupils: Pupils are equal, round, and reactive to light. Cardiovascular:      Rate and Rhythm: Normal rate and regular rhythm.       Heart sounds: Normal heart sounds. Pulmonary:      Effort: Pulmonary effort is normal. No respiratory distress. Breath sounds: Normal breath sounds. Chest:       Abdominal:      General: Bowel sounds are normal. There is no distension. Palpations: Abdomen is soft. Tenderness: There is no abdominal tenderness. Musculoskeletal:         General: Normal range of motion. Left shoulder: Tenderness and bony tenderness (anterior shoulder) present. Cervical back: Normal range of motion and neck supple. Skin:     General: Skin is warm. Capillary Refill: Capillary refill takes less than 2 seconds. Findings: No rash. Neurological:      Mental Status: He is alert and oriented to person, place, and time. Cranial Nerves: No cranial nerve deficit. Psychiatric:         Thought Content: Thought content normal.           No results found for this visit on 01/20/22. Impression   1. Possible acute nondisplaced fracture of the anterior left seventh   rib. 2. Chronic appearing deformities of the left third through sixth ribs. 3. No evidence of acute left shoulder fracture. There are degenerative   changes of the left shoulder. Signed by Dr Kathrin Terry: The following diagnoses and conditions are stable with no further orders unless indicated: Gil was seen today for fall. Diagnoses and all orders for this visit:    Fall, initial encounter  -     Cancel: XR SHOULDER LEFT (MIN 2 VIEWS); Future  -     Cancel: XR RIBS BILATERAL (3 VIEWS); Future  -     lidocaine 4 % external patch; Place 1 patch onto the skin daily    Closed fracture of one rib of left side, initial encounter  -     lidocaine 4 % external patch; Place 1 patch onto the skin daily    Recommended x-rays for further evaluation, patient will do these immediately after his appointment. I personally reviewed his images, will prescribe lidocaine patches.  NSAIDs contraindicated due to CAD.      Return in about 3 months (around 4/20/2022) for Dr Johnny Charles. Discussed use, benefit, and side effects of prescribed medications. All patient questions answered. Pt voiced understanding. Reviewed health maintenance. Instructed to continue current medications, diet and exercise. Patient agreedwith treatment plan. Follow up as directed. Old records reviewed, where available.     Flores Montgomery MD    Dictated using [address]

## 2022-01-20 NOTE — PROGRESS NOTES
SUBJECTIVE:  Mr. Dilan James is a 61 y.o. male who presents today with upper abdominal pain and pain in the right upper abdomen. He has some pain upon palpation. He states if he eats or drinks anything, water or coffee or any food it causes the pain. He states his belly is laura now than it was and it is hard like a watermelon. He states his skin is thin and peels off and bleeds very easily.        Past Medical History:   Diagnosis Date    Arthritis     Asthma     Bradycardia     Broken ribs     CHF (congestive heart failure) (HCC)     Chronic kidney disease, stage III (moderate) ([address] 9/15/2020    Chronic ulcer of left leg, with fat layer exposed ([address]     Diabetes mellitus ([address]     no meds; improved with weight loss    Diabetic peripheral neuropathy ([address] 3/8/2019    History of left below knee amputation ([address] 1/29/2019    Hx of BKA, left ([address] 5/25/2018    Hypertension     Idiopathic chronic venous hypertension of left leg with ulcer ([address] 12/22/2015    Morbid obesity ([address] 6/22/15    Other cirrhosis of liver ([address] 1/20/2022    S/P BKA (below knee amputation) unilateral, left ([address] 5/30/2018    Skin ulcer of toe of left foot with fat layer exposed ([address] 4/4/2017    Sleep apnea in adult 06/22/2015    no cpap    Type 2 diabetes mellitus with foot ulcer (CODE) ([address] 11/15/2017    Ulcerated, foot, right, with fat layer exposed ([address] 4/4/2017    Venous hypertension, chronic, with ulcer, left 11/26/2017    Venous insufficiency of both lower extremities 6/22/15     Past Surgical History:   Procedure Laterality Date    ABDOMEN SURGERY  02/2018    treated [address]      hx. of multiple; \"no blockage\"    DILATATION, ESOPHAGUS      GASTROSTOMY TUBE PLACEMENT  02/11/2018    temporary; now removed    KNEE ARTHROSCOPY      6 knee surgeries total; \"no replacements\"    [place] AMPUTATION LOW LEG THRU TIB/FIB Left 5/25/2018    LEG AMPUTATION BELOW KNEE WITH MUSCLE FLAP  AND SKIN GRAFT CLOSURE AND APPLICATION OF WOUND VAC performed by Tuna Frances MD at [facility]  02/11/2018    repair of gastric bypass    VASCULAR SURGERY  05/25/2018    [facility]. Leg amputation below knee with muscle flap and skin graft closure and application of wound vac stsg 14 cmx 15 cm. Current Outpatient Medications   Medication Sig Dispense Refill    ferrous sulfate (IRON 325) 325 (65 Fe) MG tablet Take 1 tablet by mouth 2 times daily 60 tablet 5    HYDROcodone-acetaminophen (NORCO)  MG per tablet Take 1 tablet by mouth every 6 hours as needed for Pain for up to 30 days. Chronic use 120 tablet 0    pantoprazole (PROTONIX) 40 MG tablet Take 1 tablet by mouth 2 times daily 60 tablet 5    temazepam (RESTORIL) 30 MG capsule TAKE 1 CAPSULE BY MOUTH NIGHTLY AS NEEDED FOR SLEEP 30 capsule 0    lisinopril (PRINIVIL;ZESTRIL) 40 MG tablet Take 1 tablet by mouth nightly 90 tablet 0    isosorbide mononitrate (IMDUR) 30 MG extended release tablet Take 1 tablet by mouth at bedtime 30 tablet 3    atenolol (TENORMIN) 25 MG tablet Take 1 tablet by mouth every morning (Patient not taking: Reported on 1/20/2022) 30 tablet 3    hydroCHLOROthiazide (HYDRODIURIL) 12.5 MG tablet Take 1 tablet by mouth every morning (Patient not taking: Reported on 1/20/2022) 30 tablet 5     No current facility-administered medications for this visit. Allergies: Adhesive tape    Family History   Problem Relation Age of Onset    Diabetes Mother     Heart Disease Mother     High Blood Pressure Mother     Heart Disease Father     Other Father        Social History     Tobacco Use    Smoking status: Never Smoker    Smokeless tobacco: Never Used   Substance Use Topics    Alcohol use: No       Review of Systems   Constitutional: Positive for activity change, appetite change and fatigue. Negative for fever and unexpected weight change.    HENT: Negative for hearing loss, nosebleeds and sore throat. Eyes: Negative for pain, redness and visual disturbance. Respiratory: Positive for shortness of breath. Negative for cough and chest tightness. Cardiovascular: Negative for chest pain, palpitations and leg swelling. Gastrointestinal: Positive for abdominal pain. Negative for abdominal distention, constipation, diarrhea, nausea and vomiting. Endocrine: Negative for cold intolerance, heat intolerance and polydipsia. Genitourinary: Negative for difficulty urinating, frequency and urgency. Musculoskeletal: Positive for gait problem. Negative for back pain, joint swelling and neck pain. Skin: Positive for wound. Negative for color change and rash. Allergic/Immunologic: Negative for environmental allergies and food allergies. Neurological: Negative for seizures, light-headedness and headaches. Hematological: Negative for adenopathy. Does not bruise/bleed easily. Psychiatric/Behavioral: Negative for confusion, sleep disturbance and suicidal ideas. OBJECTIVE:  BP 98/68 (Site: Right Upper Arm, Position: Sitting, Cuff Size: Medium Adult)   Temp 98.6 °F (37 °C) (Temporal)   Ht 6' (1.829 m)   Wt 203 lb (92.1 kg)   BMI 27.53 kg/m²   Physical Exam  Vitals reviewed. Constitutional:       Appearance: He is well-developed. He is not ill-appearing or toxic-appearing. HENT:      Head: Normocephalic and atraumatic. Eyes:      Pupils: Pupils are equal, round, and reactive to light. Cardiovascular:      Rate and Rhythm: Normal rate and regular rhythm. Heart sounds: Normal heart sounds. Pulmonary:      Effort: Pulmonary effort is normal.      Breath sounds: Normal breath sounds. No wheezing or rales. Abdominal:      General: Abdomen is protuberant. A surgical scar is present. There is no distension. Palpations: Abdomen is soft. There is no mass. Tenderness:  There is abdominal tenderness in the right upper quadrant, epigastric area, periumbilical area and left upper quadrant. There is no guarding or rebound. Hernia: A hernia is present. Comments: Firm nodule/mass/hernia at umbilicus at base of incision. Tender with palpation. Musculoskeletal:         General: Normal range of motion. Cervical back: Normal range of motion and neck supple. Lymphadenopathy:      Cervical: No cervical adenopathy. Skin:     General: Skin is warm and dry. Neurological:      Mental Status: He is alert and oriented to person, place, and time. Psychiatric:         Behavior: Behavior normal.         Thought Content: Thought content normal.         Judgment: Judgment normal.         Labs  AlkPHos     Alk Phosphatase 40 - 120 U/L 309 High     Alk Phos Bone Fract 0 - 55 U/L 53    Alk Phos Liver Fract 0 - 94 U/L 256 High     Comment: INTERPRETIVE INFORMATION: Alk-Phosphatase Liver Calc   Bone Specific Alkaline Phosphatase (6698184) and 5'-nucleotidase   (1202821)   may be useful in identifying disorders of bone and liver, respectively. Alk Phos Other Calc U/L 0    Comment: Performed By: Kenneth Eaton [address]   [place], [address] [facility]   : [facility]. Jonah Woodward MD    Resulting Agency  [facility]         CMP   Component Ref Range & Units 12/28/21 1204 6/29/21 1403 12/15/20 1112 2/26/20 0848 8/2/19 0706 1/29/19 1604 11/21/18 1345   Sodium 136 - 145 mmol/L 142  140  140  145  146 High   140  143    Potassium 3.5 - 5.0 mmol/L 3.8  3.9  4.3  4.2  4.0  4.4  4.2    Chloride 98 - 111 mmol/L 103  100  103  104  107  103  107    CO2 22 - 29 mmol/L 27  28  27  27  26  24  26    Anion Gap 7 - 19 mmol/L 12  12  10  14  13  13  10    Glucose 74 - 109 mg/dL 111 High   110 High   128 High   103  130 High   133 High   93    BUN 8 - 23 mg/dL 15  29 High   24 High   16  18  18  18    CREATININE 0.5 - 1.2 mg/dL 0.9  1.1  1.2  1.0  1.0  1.0  1.0    GFR Non-African American >60 >60  >60 CM  >60 CM  >60 CM  >60 CM  >60 CM  >60 CM    Comment:  This calculation may be inaccurate for patients under the age of 25 years. For ages 25 and older, a GFR >60 mL/min/1.73m2 (not corrected for weight) is   valid for stable renal function. GFR  >59 >59  >59 CM  >59 CM        Comment: Chronic Kidney Disease: less than 60 ml/min/1.73 sq. m.         Kidney Failure: less than 15 ml/min/1.73 sq.m. Results valid for patients 18 years and older. Calcium 8.8 - 10.2 mg/dL 9.1  9.6  9.3  8.9  9.0  9.2  8.6 Low     Total Protein 6.6 - 8.7 g/dL 7.0  7.6  6.9  6.6  7.3  7.2  6.9    Albumin 3.5 - 5.2 g/dL 3.7  4.3  3.8  3.9  3.9  3.8  3.5    Total Bilirubin 0.2 - 1.2 mg/dL 0.8  1.1  1.3 High   1.2  1.1  0.8  0.8    Alkaline Phosphatase 40 - 130 U/L 258 High   75  68  82  79  71  62    ALT 5 - 41 U/L 21  27  15  12  13  10  10    AST 5 - 40 U/L 35  36  24  22  22  20  19    Resulting Agency  [address] [facility] [address] [facility]       Stress test 1/18/2022  [facility] Nuclear Stress Test Report   Procedure date: 1/18/2022   Indications: shortness of breath   Procedure: Stress was performed with injection of 0.4 mg Lexiscan. Vital signs and EKG were monitored. Technetium-99 sestamibi was   injected in divided doses, 10.1 mCi and 31.98 mCi respectively for   rest and stress imaging. The patient was discharged in stable   condition. Results: Patient had symptoms of shortness of breath, chest pressure   during infusion that resolved in recovery. Baseline EKG showed normal   sinus rhythm with first degree A-V block. During stress there were no   significant EKG changes or rhythm changes. Baseline and peak blood   pressures were 126/73, and 129/66 respectively.  Baseline and peak   heart rates were 92 and  104 respectively.    Review of rest and stress images obtained utilizing a gated SPECT   acquisition protocol along with review of the polar plot revealed:   1. Ejection fraction 45%   2. Wall motion unremarkable   3. Myocardial perfusion imaging demonstrated apical defect the sum   stress score was 12 the sum difference score was 12 no other   abnormalities identified. Summary impressions:   Abnormal study with mildly reduced ejection fraction 45% apical scar   and/or ischemia noted correlate clinically and/or angiographically if   clinically appropriate   Signed by Dr Abner Castro         1/14/2022 US      Impression   Innumerable small stones are seen throughout the   gallbladder, with no wall thickening or evidence of acute   cholecystitis. The common hepatic duct is measured at 6.2 mm, upper   limits of normal. The liver has coarse echotexture with a lobular   surface, correlate clinically for cirrhosis. Signed by Dr Eros Prieto. [place]         2018 CT abd/pelvis       Impression   1. Large volume intraperitoneal free air reflecting viscus   perforation. No obvious bowel ischemia. Small foci of air are seen   studding the mesentery in the upper abdomen. This would favor viscus   perforation in the upper abdomen. 2. Prior gastric bypass although the bypass anastomoses appear   unremarkable. The bowel is nondistended. 3. There appears to be liver cirrhosis with suspicion for portal   hypertension. 4. Cholelithiasis. The gallbladder is distended. The results of this exam were discussed with Dr. Maryam Monteiro on 2/11/2018 at   0835 hours   Signed by Dr Jaime Rainey on 2/11/2018 8:35 AM             ASSESSMENT:   Diagnosis Orders   1. Calculus of gallbladder with chronic cholecystitis without obstruction     2. Other cirrhosis of liver (HCC)     3. Venous insufficiency of both lower extremities         PLAN:  No orders of the defined types were placed in this encounter. No orders of the defined types were placed in this encounter.     Discussed with Nany Louise Ruano that he may very well benefit from cholecystectomy, but given his extensive medical and surgical history in the setting of cirrhosis with possible portal hypertension, he would benefit from a higher level of care. He notes understanding of this and states he may have some transportation issues, but he will try to work something out. I educated him that there are hospital and insurance resources available as well for assistance if need be. Will refer to [facility]. Will contact patient with appointment information. Return for PRN.     Rebeka Oklahoma 3/89/3146 2:24 PM

## 2022-01-20 NOTE — TELEPHONE ENCOUNTER
[facility] we was able to get patient up and in a wheelchair but he was complaining of left shoulder pain he was on the way to see Dr Wai Garcia and came to the wrong building.   I was not sure if you would want to see him or get a xray ordered for him to get one while at the hospital
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
## 2022-01-21 DIAGNOSIS — S22.32XA CLOSED FRACTURE OF ONE RIB OF LEFT SIDE, INITIAL ENCOUNTER: ICD-10-CM

## 2022-01-21 RX ORDER — ATENOLOL 25 MG/1
25 TABLET ORAL EVERY MORNING
Qty: 30 TABLET | Refills: 3 | Status: SHIPPED | OUTPATIENT
Start: 2022-01-21 | End: 2022-02-20

## 2022-01-21 RX ORDER — ISOSORBIDE MONONITRATE 30 MG/1
30 TABLET, EXTENDED RELEASE ORAL NIGHTLY
Qty: 30 TABLET | Refills: 3 | Status: SHIPPED | OUTPATIENT
Start: 2022-01-21 | End: 2022-02-20

## 2022-01-30 ENCOUNTER — APPOINTMENT (OUTPATIENT)
Dept: CT IMAGING | Age: 67
End: 2022-01-30
Payer: MEDICARE

## 2022-01-30 ENCOUNTER — APPOINTMENT (OUTPATIENT)
Dept: GENERAL RADIOLOGY | Age: 67
End: 2022-01-30
Payer: MEDICARE

## 2022-01-30 ENCOUNTER — HOSPITAL ENCOUNTER (EMERGENCY)
Age: 67
Discharge: ANOTHER HOSPITAL | End: 2022-02-01
Attending: EMERGENCY MEDICINE
Payer: MEDICARE

## 2022-01-30 DIAGNOSIS — K81.9 CHOLECYSTITIS: Primary | ICD-10-CM

## 2022-01-30 DIAGNOSIS — A41.9 SEPTICEMIA (HCC): ICD-10-CM

## 2022-01-30 DIAGNOSIS — R18.8 OTHER ASCITES: ICD-10-CM

## 2022-01-30 DIAGNOSIS — J18.9 PNEUMONIA OF BOTH LOWER LOBES DUE TO INFECTIOUS ORGANISM: ICD-10-CM

## 2022-01-30 DIAGNOSIS — R78.81 GRAM-NEGATIVE BACTEREMIA: ICD-10-CM

## 2022-01-30 LAB
ACINETOBACTER CALCOAC BAUMANNII COMPLEX BY PCR: NOT DETECTED
ALBUMIN SERPL-MCNC: 2.4 G/DL (ref 3.5–5.2)
ALP BLD-CCNC: 955 U/L (ref 40–130)
ALT SERPL-CCNC: 61 U/L (ref 5–41)
ANION GAP SERPL CALCULATED.3IONS-SCNC: 10 MMOL/L (ref 7–19)
ANION GAP SERPL CALCULATED.3IONS-SCNC: 15 MMOL/L (ref 7–19)
AST SERPL-CCNC: 129 U/L (ref 5–40)
BACTEROIDES FRAGILIS BY PCR: NOT DETECTED
BASOPHILS ABSOLUTE: 0.1 K/UL (ref 0–0.2)
BASOPHILS RELATIVE PERCENT: 0.2 % (ref 0–1)
BILIRUB SERPL-MCNC: 4.5 MG/DL (ref 0.2–1.2)
BUN BLDV-MCNC: 42 MG/DL (ref 8–23)
BUN BLDV-MCNC: 47 MG/DL (ref 8–23)
CALCIUM SERPL-MCNC: 8.5 MG/DL (ref 8.8–10.2)
CALCIUM SERPL-MCNC: 8.8 MG/DL (ref 8.8–10.2)
CANDIDA ALBICANS BY PCR: NOT DETECTED
CANDIDA AURIS BY PCR: NOT DETECTED
CANDIDA GLABRATA BY PCR: NOT DETECTED
CANDIDA KRUSEI BY PCR: NOT DETECTED
CANDIDA PARAPSILOSIS BY PCR: NOT DETECTED
CANDIDA TROPICALIS BY PCR: NOT DETECTED
CARBAPENEM RESISTANCE IMP GENE BY PCR: NOT DETECTED
CARBAPENEM RESISTANCE KPC BY PCR: NOT DETECTED
CARBAPENEM RESISTANCE NDM GENE BY PCR: NOT DETECTED
CARBAPENEM RESISTANCE OXA-48 GENE BY PCR: NOT DETECTED
CARBAPENEM RESISTANCE VIM GENE BY PCR: NOT DETECTED
CEPHALOSPORIN RESISTANCE CTX-M GENE BY PCR: NOT DETECTED
CHLORIDE BLD-SCNC: 95 MMOL/L (ref 98–111)
CHLORIDE BLD-SCNC: 96 MMOL/L (ref 98–111)
CHP ED QC CHECK: YES
CO2: 21 MMOL/L (ref 22–29)
CO2: 23 MMOL/L (ref 22–29)
COLISTIN RESISTANCE MCR-1 GENE BY PCR: NOT DETECTED
CREAT SERPL-MCNC: 0.9 MG/DL (ref 0.5–1.2)
CREAT SERPL-MCNC: 1 MG/DL (ref 0.5–1.2)
CRYPTOCOCCUS NEOFORMANS/GATTII BY PCR: NOT DETECTED
ENTEROBACTER CLOACAE COMPLEX BY PCR: NOT DETECTED
ENTEROBACTERALES BY PCR: DETECTED
ENTEROCOCCUS FAECALIS BY PCR: NOT DETECTED
ENTEROCOCCUS FAECIUM BY PCR: NOT DETECTED
EOSINOPHILS ABSOLUTE: 0 K/UL (ref 0–0.6)
EOSINOPHILS RELATIVE PERCENT: 0 % (ref 0–5)
ESCHERICHIA COLI BY PCR: DETECTED
GFR AFRICAN AMERICAN: >59
GFR AFRICAN AMERICAN: >59
GFR NON-AFRICAN AMERICAN: >60
GFR NON-AFRICAN AMERICAN: >60
GLUCOSE BLD-MCNC: 223 MG/DL (ref 74–109)
GLUCOSE BLD-MCNC: 226 MG/DL (ref 74–109)
GLUCOSE BLD-MCNC: 235 MG/DL
HAEMOPHILUS INFLUENZAE BY PCR: NOT DETECTED
HCT VFR BLD CALC: 40.1 % (ref 42–52)
HEMOGLOBIN: 12.6 G/DL (ref 14–18)
IMMATURE GRANULOCYTES #: 0.5 K/UL
KLEBSIELLA AEROGENES BY PCR: NOT DETECTED
KLEBSIELLA OXYTOCA BY PCR: NOT DETECTED
KLEBSIELLA PNEUMONIAE GROUP BY PCR: NOT DETECTED
LACTIC ACID: 2.7 MMOL/L (ref 0.5–1.9)
LACTIC ACID: 3.1 MMOL/L (ref 0.5–1.9)
LIPASE: 10 U/L (ref 13–60)
LISTERIA MONOCYTOGENES BY PCR: NOT DETECTED
LYMPHOCYTES ABSOLUTE: 0.4 K/UL (ref 1.1–4.5)
LYMPHOCYTES RELATIVE PERCENT: 1.1 % (ref 20–40)
MCH RBC QN AUTO: 32.3 PG (ref 27–31)
MCHC RBC AUTO-ENTMCNC: 31.4 G/DL (ref 33–37)
MCV RBC AUTO: 102.8 FL (ref 80–94)
MONOCYTES ABSOLUTE: 1.1 K/UL (ref 0–0.9)
MONOCYTES RELATIVE PERCENT: 3.1 % (ref 0–10)
NEISSERIA MENINGITIDIS BY PCR: NOT DETECTED
NEUTROPHILS ABSOLUTE: 34 K/UL (ref 1.5–7.5)
NEUTROPHILS RELATIVE PERCENT: 94.3 % (ref 50–65)
PDW BLD-RTO: 13.1 % (ref 11.5–14.5)
PLATELET # BLD: 338 K/UL (ref 130–400)
PMV BLD AUTO: 10.9 FL (ref 9.4–12.4)
POTASSIUM REFLEX MAGNESIUM: 5.5 MMOL/L (ref 3.5–5)
POTASSIUM REFLEX MAGNESIUM: 5.9 MMOL/L (ref 3.5–5)
PRO-BNP: 1558 PG/ML (ref 0–900)
PROTEUS SPECIES BY PCR: NOT DETECTED
PSEUDOMONAS AERUGINOSA BY PCR: NOT DETECTED
RBC # BLD: 3.9 M/UL (ref 4.7–6.1)
SALMONELLA SPECIES BY PCR: NOT DETECTED
SARS-COV-2, NAAT: NOT DETECTED
SERRATIA MARCESCENS BY PCR: NOT DETECTED
SODIUM BLD-SCNC: 128 MMOL/L (ref 136–145)
SODIUM BLD-SCNC: 132 MMOL/L (ref 136–145)
STAPHYLOCOCCUS AUREUS BY PCR: NOT DETECTED
STAPHYLOCOCCUS EPIDERMIDIS BY PCR: NOT DETECTED
STAPHYLOCOCCUS LUGDUNENSIS BY PCR: NOT DETECTED
STAPHYLOCOCCUS SPECIES BY PCR: NOT DETECTED
STENOTROPHOMONAS MALTOPHILIA BY PCR: NOT DETECTED
STREPTOCOCCUS AGALACTIAE BY PCR: NOT DETECTED
STREPTOCOCCUS PNEUMONIAE BY PCR: NOT DETECTED
STREPTOCOCCUS PYOGENES  BY PCR: NOT DETECTED
STREPTOCOCCUS SPECIES BY PCR: NOT DETECTED
TOTAL PROTEIN: 6.9 G/DL (ref 6.6–8.7)
TROPONIN: <0.01 NG/ML (ref 0–0.03)
WBC # BLD: 36.1 K/UL (ref 4.8–10.8)

## 2022-01-30 PROCEDURE — 87635 SARS-COV-2 COVID-19 AMP PRB: CPT

## 2022-01-30 PROCEDURE — 83880 ASSAY OF NATRIURETIC PEPTIDE: CPT

## 2022-01-30 PROCEDURE — 6370000000 HC RX 637 (ALT 250 FOR IP): Performed by: PHYSICIAN ASSISTANT

## 2022-01-30 PROCEDURE — 80053 COMPREHEN METABOLIC PANEL: CPT

## 2022-01-30 PROCEDURE — 2580000003 HC RX 258: Performed by: EMERGENCY MEDICINE

## 2022-01-30 PROCEDURE — 87040 BLOOD CULTURE FOR BACTERIA: CPT

## 2022-01-30 PROCEDURE — 96367 TX/PROPH/DG ADDL SEQ IV INF: CPT

## 2022-01-30 PROCEDURE — 96368 THER/DIAG CONCURRENT INF: CPT

## 2022-01-30 PROCEDURE — 2500000003 HC RX 250 WO HCPCS: Performed by: EMERGENCY MEDICINE

## 2022-01-30 PROCEDURE — 93005 ELECTROCARDIOGRAM TRACING: CPT | Performed by: PHYSICIAN ASSISTANT

## 2022-01-30 PROCEDURE — 99285 EMERGENCY DEPT VISIT HI MDM: CPT

## 2022-01-30 PROCEDURE — 96375 TX/PRO/DX INJ NEW DRUG ADDON: CPT

## 2022-01-30 PROCEDURE — 2500000003 HC RX 250 WO HCPCS: Performed by: PHYSICIAN ASSISTANT

## 2022-01-30 PROCEDURE — 87150 DNA/RNA AMPLIFIED PROBE: CPT

## 2022-01-30 PROCEDURE — 36415 COLL VENOUS BLD VENIPUNCTURE: CPT

## 2022-01-30 PROCEDURE — 6360000002 HC RX W HCPCS: Performed by: PHYSICIAN ASSISTANT

## 2022-01-30 PROCEDURE — 71045 X-RAY EXAM CHEST 1 VIEW: CPT

## 2022-01-30 PROCEDURE — 2580000003 HC RX 258: Performed by: PHYSICIAN ASSISTANT

## 2022-01-30 PROCEDURE — 83690 ASSAY OF LIPASE: CPT

## 2022-01-30 PROCEDURE — 96365 THER/PROPH/DIAG IV INF INIT: CPT

## 2022-01-30 PROCEDURE — 84484 ASSAY OF TROPONIN QUANT: CPT

## 2022-01-30 PROCEDURE — 87186 SC STD MICRODIL/AGAR DIL: CPT

## 2022-01-30 PROCEDURE — 74177 CT ABD & PELVIS W/CONTRAST: CPT

## 2022-01-30 PROCEDURE — 6360000004 HC RX CONTRAST MEDICATION: Performed by: PHYSICIAN ASSISTANT

## 2022-01-30 PROCEDURE — 85025 COMPLETE CBC W/AUTO DIFF WBC: CPT

## 2022-01-30 PROCEDURE — 96366 THER/PROPH/DIAG IV INF ADDON: CPT

## 2022-01-30 PROCEDURE — 83605 ASSAY OF LACTIC ACID: CPT

## 2022-01-30 RX ORDER — TEMAZEPAM 15 MG/1
30 CAPSULE ORAL NIGHTLY PRN
COMMUNITY

## 2022-01-30 RX ORDER — DEXTROSE, SODIUM CHLORIDE, AND POTASSIUM CHLORIDE 5; .9; .15 G/100ML; G/100ML; G/100ML
INJECTION INTRAVENOUS CONTINUOUS
Status: DISCONTINUED | OUTPATIENT
Start: 2022-01-30 | End: 2022-01-30

## 2022-01-30 RX ORDER — ONDANSETRON 2 MG/ML
4 INJECTION INTRAMUSCULAR; INTRAVENOUS ONCE
Status: COMPLETED | OUTPATIENT
Start: 2022-01-30 | End: 2022-01-30

## 2022-01-30 RX ORDER — MORPHINE SULFATE 4 MG/ML
4 INJECTION, SOLUTION INTRAMUSCULAR; INTRAVENOUS ONCE
Status: COMPLETED | OUTPATIENT
Start: 2022-01-30 | End: 2022-01-30

## 2022-01-30 RX ORDER — DEXTROSE MONOHYDRATE 25 G/50ML
25 INJECTION, SOLUTION INTRAVENOUS ONCE
Status: COMPLETED | OUTPATIENT
Start: 2022-01-30 | End: 2022-01-30

## 2022-01-30 RX ORDER — SODIUM CHLORIDE 9 MG/ML
1000 INJECTION, SOLUTION INTRAVENOUS CONTINUOUS
Status: DISCONTINUED | OUTPATIENT
Start: 2022-01-30 | End: 2022-02-01 | Stop reason: HOSPADM

## 2022-01-30 RX ADMIN — DEXTROSE MONOHYDRATE 25 G: 25 INJECTION, SOLUTION INTRAVENOUS at 20:15

## 2022-01-30 RX ADMIN — MORPHINE SULFATE 4 MG: 4 INJECTION INTRAVENOUS at 20:13

## 2022-01-30 RX ADMIN — INSULIN HUMAN 5 UNITS: 100 INJECTION, SOLUTION PARENTERAL at 20:15

## 2022-01-30 RX ADMIN — IOPAMIDOL 80 ML: 755 INJECTION, SOLUTION INTRAVENOUS at 12:21

## 2022-01-30 RX ADMIN — SODIUM CHLORIDE, PRESERVATIVE FREE 2000 MG: 5 INJECTION INTRAVENOUS at 19:22

## 2022-01-30 RX ADMIN — MORPHINE SULFATE 4 MG: 4 INJECTION INTRAVENOUS at 09:55

## 2022-01-30 RX ADMIN — ONDANSETRON 4 MG: 2 INJECTION INTRAMUSCULAR; INTRAVENOUS at 09:54

## 2022-01-30 RX ADMIN — Medication 1000 MG: at 19:30

## 2022-01-30 RX ADMIN — AZITHROMYCIN 500 MG: 500 INJECTION, POWDER, LYOPHILIZED, FOR SOLUTION INTRAVENOUS at 12:58

## 2022-01-30 RX ADMIN — WATER 1000 MG: 1 INJECTION INTRAMUSCULAR; INTRAVENOUS; SUBCUTANEOUS at 12:54

## 2022-01-30 RX ADMIN — SODIUM BICARBONATE: 84 INJECTION, SOLUTION INTRAVENOUS at 20:23

## 2022-01-30 RX ADMIN — SODIUM CHLORIDE 1000 ML: 9 INJECTION, SOLUTION INTRAVENOUS at 14:23

## 2022-01-30 ASSESSMENT — ENCOUNTER SYMPTOMS
COUGH: 0
VOMITING: 0
NAUSEA: 1
ABDOMINAL DISTENTION: 1
ABDOMINAL PAIN: 1
SHORTNESS OF BREATH: 0
DIARRHEA: 0

## 2022-01-30 ASSESSMENT — PAIN SCALES - GENERAL
PAINLEVEL_OUTOF10: 7
PAINLEVEL_OUTOF10: 5

## 2022-01-30 ASSESSMENT — PAIN DESCRIPTION - LOCATION: LOCATION: ABDOMEN

## 2022-01-30 ASSESSMENT — PAIN DESCRIPTION - PAIN TYPE: TYPE: ACUTE PAIN

## 2022-01-30 NOTE — ED NOTES
Carteret back from [facility] Refused, they're at capacity. Trying [facility] next.      Mayra Molina  01/30/22 [id number]

## 2022-01-30 NOTE — ED NOTES
After giving Morphine, pt O2 went from 90% to 86% on room air. Pt placed on 2L/min of O2.       Teofilo Valdivia RN  01/30/22 1007

## 2022-01-30 NOTE — ED NOTES
Pt here with weakness x1 month, scheduled for appt at [facility] for gallbladder evaluation, pt notes he has cirrhosis of liver and is sent to [facility] removal      Eliecer Garza RN  01/30/22 [id number]

## 2022-01-30 NOTE — ED NOTES
[facility] declined due to capacity. Calling our transfer center to let them try.      Korey Thorpe  01/30/22 3753

## 2022-01-30 NOTE — ED PROVIDER NOTES
[facility] [place] [facility] EMERGENCY DEPT  eMERGENCY dEPARTMENT eNCOUnter      Pt Name: Arlet Yoo  MRN: [id number]  [place] 1955  Date of evaluation: 1/30/2022  Provider: Volodymyr Ramirez, [facility]       Chief Complaint   Patient presents with    Fatigue     generalized weakness and fatigue for the past month scheduled to go to [facility] for GB consultation          HISTORY OF PRESENT ILLNESS   (Location/Symptom, Timing/Onset,Context/Setting, Quality, Duration, Modifying Factors, Severity)  Note limiting factors. Arlet Yoo is a 61 y.o. male with history of venous insufficiency, type 2 diabetes, hypertension, obstructive sleep apnea, and cirrhosis of the liver who presents to the emergency department with complaint of fatigue. The patient states over the last 1-1/2 months, he has had continuing worsening weakness and fatigue. He states that his cirrhosis has been worsening and he feels very distended and swollen in his abdomen. He denies any significant chest pain or shortness of breath. He denies any known sick contacts. He states that he is waiting to go to [facility] for a cholecystectomy which is complicated by his cirrhosis. He otherwise denies any known sick contacts. HPI    NursingNotes were reviewed. REVIEW OF SYSTEMS    (2-9 systems for level 4, 10 or more for level 5)     Review of Systems   Constitutional: Positive for fatigue. Negative for chills and fever. HENT: Negative for congestion. Respiratory: Negative for cough and shortness of breath. Cardiovascular: Negative for chest pain. Gastrointestinal: Positive for abdominal distention, abdominal pain and nausea. Negative for diarrhea and vomiting. Genitourinary: Negative for dysuria. Musculoskeletal: Positive for myalgias. Neurological: Negative for dizziness and headaches. All other systems reviewed and are negative.            PAST MEDICALHISTORY     Past Medical History:   Diagnosis Date    Arthritis     Asthma  Bradycardia     Broken ribs     CHF (congestive heart failure) (HCC)     Chronic kidney disease, stage III (moderate) ([address] 9/15/2020    Chronic ulcer of left leg, with fat layer exposed ([address]     Diabetes mellitus ([address]     no meds; improved with weight loss    Diabetic peripheral neuropathy ([address] 3/8/2019    History of left below knee amputation ([address] 1/29/2019    Hx of BKA, left ([address] 5/25/2018    Hypertension     Idiopathic chronic venous hypertension of left leg with ulcer ([address] 12/22/2015    Morbid obesity ([address] 6/22/15    Other cirrhosis of liver ([address] 1/20/2022    S/P BKA (below knee amputation) unilateral, left ([address] 5/30/2018    Skin ulcer of toe of left foot with fat layer exposed ([address] 4/4/2017    Sleep apnea in adult 06/22/2015    no cpap    Type 2 diabetes mellitus with foot ulcer (CODE) ([address] 11/15/2017    Ulcerated, foot, right, with fat layer exposed ([address] 4/4/2017    Venous hypertension, chronic, with ulcer, left 11/26/2017    Venous insufficiency of both lower extremities 6/22/15         SURGICAL HISTORY       Past Surgical History:   Procedure Laterality Date    ABDOMEN SURGERY  02/2018    treated in [address]      hx. of multiple; \"no blockage\"    DILATATION, ESOPHAGUS      GASTROSTOMY TUBE PLACEMENT  02/11/2018    temporary; now removed    KNEE ARTHROSCOPY      6 knee surgeries total; \"no replacements\"    AK AMPUTATION LOW LEG THRU TIB/FIB Left 5/25/2018    LEG AMPUTATION BELOW KNEE WITH MUSCLE FLAP  AND SKIN GRAFT CLOSURE AND APPLICATION OF WOUND VAC performed by Velia Brush MD at [place] [address]  02/11/2018    repair of gastric bypass    VASCULAR SURGERY  05/25/2018    TJR. Leg amputation below knee with muscle flap and skin graft closure and application of wound vac stsg 14 cmx 15 cm.          CURRENT MEDICATIONS     Previous Medications    ATENOLOL (TENORMIN) 25 MG TABLET    Take 1 tablet by mouth every morning    FERROUS SULFATE (IRON 325) 325 (65 FE) MG TABLET    Take 1 tablet by mouth 2 times daily    HYDROCHLOROTHIAZIDE (HYDRODIURIL) 12.5 MG TABLET    Take 1 tablet by mouth every morning    HYDROCODONE-ACETAMINOPHEN (NORCO)  MG PER TABLET    Take 1 tablet by mouth every 6 hours as needed for Pain for up to 30 days. Chronic use    ISOSORBIDE MONONITRATE (IMDUR) 30 MG EXTENDED RELEASE TABLET    Take 1 tablet by mouth at bedtime    LIDOCAINE 4 % EXTERNAL PATCH    Place 1 patch onto the skin daily    LISINOPRIL (PRINIVIL;ZESTRIL) 40 MG TABLET    Take 1 tablet by mouth nightly    PANTOPRAZOLE (PROTONIX) 40 MG TABLET    Take 1 tablet by mouth 2 times daily       ALLERGIES     Adhesive tape    FAMILY HISTORY       Family History   Problem Relation Age of Onset    Diabetes Mother     Heart Disease Mother     High Blood Pressure Mother     Heart Disease Father     Other Father           SOCIAL HISTORY       Social History     Socioeconomic History    Marital status:      Spouse name: Cesar Willoughby Number of children: None    Years of education: 15    Highest education level: None   Occupational History    Occupation: Disabled - Back injury   Tobacco Use    Smoking status: Never Smoker    Smokeless tobacco: Never Used   Vaping Use    Vaping Use: Never used   Substance and Sexual Activity    Alcohol use: No    Drug use: No    Sexual activity: Yes     Partners: Female   Other Topics Concern    None   Social History Narrative    None     Social Determinants of Health     Financial Resource Strain:     Difficulty of Paying Living Expenses: Not on file   Food Insecurity:     Worried About Running Out of Food in the Last Year: Not on file    Felicitas of Food in the Last Year: Not on file   Transportation Needs:     Lack of Transportation (Medical): Not on file    Lack of Transportation (Non-Medical):  Not on file   Physical Activity:     Days of Exercise per Week: Not on file   Loosecubes of Exercise per Session: Not on file   Stress:     Feeling of Stress : Not on file   Social Connections:     Frequency of Communication with Friends and Family: Not on file    Frequency of Social Gatherings with Friends and Family: Not on file    Attends Jain Services: Not on file    Active Member of [address] [place] [address] or Organizations: Not on file    Attends Club or Organization Meetings: Not on file    Marital Status: Not on file   Intimate Partner Violence:     Fear of Current or Ex-Partner: Not on file    Emotionally Abused: Not on file    Physically Abused: Not on file    Sexually Abused: Not on file   Housing Stability:     Unable to Pay for Housing in the Last Year: Not on file    Number of [place] in the Last Year: Not on file    Unstable Housing in the Last Year: Not on file       SCREENINGS             PHYSICAL EXAM    (up to 7 for level 4, 8 or more for level 5)     ED Triage Vitals [01/30/22 0902]   BP Temp Temp Source Pulse Resp SpO2 Height Weight   119/85 98.4 °F (36.9 °C) Oral 110 18 91 % -- 203 lb (92.1 kg)       Physical Exam  Vitals and nursing note reviewed. Constitutional:       General: He is not in acute distress. Appearance: He is ill-appearing. He is not toxic-appearing. Cardiovascular:      Rate and Rhythm: Regular rhythm. Tachycardia present. Pulses: Normal pulses. Heart sounds: Normal heart sounds. Pulmonary:      Effort: Pulmonary effort is normal. No respiratory distress. Comments: Decreased breath sounds bilaterally and lower lobe  Chest:      Chest wall: No tenderness. Abdominal:      General: There is distension (Moderate). Tenderness: There is abdominal tenderness. There is no right CVA tenderness or left CVA tenderness. Musculoskeletal:      Cervical back: Normal range of motion and neck supple. No rigidity or tenderness.       Comments: S/p above the knee amputation on the left side, severe venous statis skin changes of the right leg   Lymphadenopathy:      Cervical: No cervical adenopathy. Neurological:      General: No focal deficit present. Mental Status: He is alert and oriented to person, place, and time. DIAGNOSTIC RESULTS     EKG: All EKG's areinterpreted by the Emergency Department Physician who either signs or Co-signs this chart in the absence of a cardiologist.    EKG interpreted by attending, sinus tachycardia at a rate of 109, no STEMI or acute ischemia    RADIOLOGY:  Non-plain film images such as CT, Ultrasound and MRI are read by the radiologist. Plain radiographic images are visualized and preliminarily interpreted bythe emergency physician with the below findings:      CT ABDOMEN PELVIS W IV CONTRAST Additional Contrast? None   Final Result   1. Moderate bilateral pleural effusions with bibasilar compressive   atelectasis. 2. Subcapsular crescentic hypodensities along the lateral and   posterior margin of the liver suggesting subcapsular fluid. Difficult   to ascertain whether this represents sequela of previous injury to the   liver or perhaps purulent fluid. Does not contain any air. Multiple   hypodensities are noted within the liver. I suspect there is some   communication with the more central lesion with hypodensities higher   within the dome of the liver. Differential would include multifocal   hepatoma/metastatic disease or potential hepatic abscesses. 3. Marked distention of the gallbladder. There is extensive   inflammation within the right upper quadrant with multiple gallstones. There is air within the gallbladder with differential considerations   as above. No evidence of air within the biliary tree. 4. Extensive fluid is noted within the abdomen and pelvis. Several   portions of this fluid appears more loculated in appearance with   associated discernible wall with some enhancement.  Although none of   this fluid contains air I am concerned that this may represent   infected fluid. It is noted in both the abdomen and pelvis. There is   associated induration of the small bowel mesentery and peritoneal fat. 5. The patient is status post gastrojejunal bypass. Nonspecific small   bowel distention is noted within the right midabdomen and pelvis. It   is difficult to ascertain whether this represents a localized ileus   versus mild obstruction. There is gas and fecal material throughout   the colon. There is no evidence of pneumoperitoneum. 6. Small fat-containing periumbilical hernia. . 7. I spoke with Rachel Angeles in the emergency room at 1:00 PM concerning the findings on   the CT exam.   Signed by Dr Ethel Mitchell   Final Result   1.. Bilateral lower lobe pneumonia. A small left effusion is present.    Signed by Dr Joshua Amaro:  Blinda Proper - Abnormal; Notable for the following components:       Result Value    WBC 36.1 (*)     RBC 3.90 (*)     Hemoglobin 12.6 (*)     Hematocrit 40.1 (*)     .8 (*)     MCH 32.3 (*)     MCHC 31.4 (*)     Neutrophils % 94.3 (*)     Lymphocytes % 1.1 (*)     Neutrophils Absolute 34.0 (*)     Lymphocytes Absolute 0.4 (*)     Monocytes Absolute 1.10 (*)     All other components within normal limits   COMPREHENSIVE METABOLIC PANEL W/ REFLEX TO MG FOR LOW K - Abnormal; Notable for the following components:    Sodium 132 (*)     Potassium reflex Magnesium 5.9 (*)     Chloride 96 (*)     CO2 21 (*)     Glucose 223 (*)     BUN 42 (*)     Albumin 2.4 (*)     Total Bilirubin 4.5 (*)     Alkaline Phosphatase 955 (*)     ALT 61 (*)      (*)     All other components within normal limits   LIPASE - Abnormal; Notable for the following components:    Lipase 10 (*)     All other components within normal limits   BRAIN NATRIURETIC PEPTIDE - Abnormal; Notable for the following components:    Pro-BNP 1,558 (*)     All other components within normal limits   LACTIC ACID, PLASMA - Abnormal; Notable for the following components:    Lactic Acid 3.1 (*)     All other components within normal limits    Narrative:     CALL  [place]  KLED tel. ,  Chemistry results called to and read back by Darius Wolfe rn er, 01/30/2022 13:27,  by ADRIANNE NUNES-19, RAPID   CULTURE, BLOOD 1   CULTURE, BLOOD 2   TROPONIN   PROTIME-INR   APTT       All other labs were within normal range or not returned as of this dictation. EMERGENCY DEPARTMENT COURSE and DIFFERENTIAL DIAGNOSIS/MDM:   Vitals:    Vitals:    01/30/22 1200 01/30/22 1500 01/30/22 1830 01/30/22 1900   BP: 115/77 124/76 112/73 112/73   Pulse: 110 106 108 115   Resp: 18 23     Temp:       TempSrc:       SpO2: 92% 90% 92% 91%   Weight:           MDM  Patient is a 81-year-old male presents with complaint of increased fatigue. He has significant distention and tenderness on physical exam.  EKG showed sinus tachycardia. Chest x-ray does show bilateral pneumonia. Due to the patient's concern for sepsis on arrival, he was given Rocephin and azithromycin to help cover for his pneumonia. His CBC shows significant leukocytosis at 36. His CMP is also concerning for multiple electrolyte disturbances. His lactic acid is elevated at 3.1. Blood cultures have been obtained and are pending. His troponin has been negative in the ER. He does have an elevation of his BNP which I suspect is related to his fluid overload in general.  See the abdomen and pelvis is concerning for multiple abnormalities including cholecystitis with air in the gallbladder. Radiologist was also concerned about loculated fluid in the abdomen. Radiologist also mentions concern of multiple hepatic abscesses. He does not see any obvious obstructions or perforations noted. I spoke with Dr Gena Stone who does not feel we have the capabilities to keep the patient at [facility]. He recommends transfer to a tertiary center with hepatobiliary resources. [facility] declined patient due to capacity. [facility] declined patient due to capacity. [address] [facility] declined patient due to capacity. [facility] declined patient due to capacity, spoke with Dr Trevor Zimmer from [facility] at [phone], [facility] declined, Jeremy spears asked for further recommendations of places to call  [facility] with D[facility], will speak with and get in touch with providers  Dr Jess Newton, Transplant team at [phone], states liver to accept patient and they will be consulted. She also told Dolly that she spoke with surgeon who was agreeale  Dr Ian Chairez [phone], accepted patient. Recommends brining K down prior to transport. Also recommends Vanc and cefepine. 1920: Patient was started on abx as well as insulin, D50 with bicarb and D5 to help reduce K. The patient is agreeable to transfer to [facility]. All of his questions were answered. We are still awaiting bed information and transfer set up from [address] [place] [address]: . Alvin 47 spoke with [facility]. The patient does not have bed placement yet. However we were made aware that liver patients are moved up higher on the list. The patient will likely be flown as soon as bed placement is made. [facility] transfer center states hoping to get the patient transferred tonight. 1950: Patient's care will be passed to Dr Sangita Barrientos due to shift change. CONSULTS:  Dr Adair Query surgery  Dr Jess Newton, Transplant team at [facility]  Dr Ian Chairez Accepting physician at [address] [place] [address]      1. Cholecystitis    2. Pneumonia of both lower lobes due to infectious organism    3.  Other ascites          DISPOSITION/PLAN   DISPOSITION Decision To Transfer 01/30/2022 03:38:09 PM      (Please note that portions of this note were completed with a voice recognition program.  Efforts were made to edit thedictations but occasionally words are mis-transcribed.)    JESUS Martinez (electronically signed)        Sienna Lopez, [address]  01/30/22 1950

## 2022-01-30 NOTE — ED NOTES
Talk to Transfer Center again. I recommended they call :  [address] [place] [address] in [address] [place] [address],  [facility] [phone],  [facility] in [address] [place] [address].     They said they would try those places and also look for other hospitals offering tertiary care for hepatobiliary     [facility]  01/30/22 1448

## 2022-01-31 LAB
ALBUMIN SERPL-MCNC: 1.9 G/DL (ref 3.5–5.2)
ALBUMIN SERPL-MCNC: 1.9 G/DL (ref 3.5–5.2)
ALP BLD-CCNC: 775 U/L (ref 40–130)
ALP BLD-CCNC: 783 U/L (ref 40–130)
ALT SERPL-CCNC: 58 U/L (ref 5–41)
ALT SERPL-CCNC: 73 U/L (ref 5–41)
ANION GAP SERPL CALCULATED.3IONS-SCNC: 11 MMOL/L (ref 7–19)
ANION GAP SERPL CALCULATED.3IONS-SCNC: 13 MMOL/L (ref 7–19)
APTT: 38.6 SEC (ref 26–36.2)
AST SERPL-CCNC: 141 U/L (ref 5–40)
AST SERPL-CCNC: 204 U/L (ref 5–40)
BASOPHILS ABSOLUTE: 0 K/UL (ref 0–0.2)
BASOPHILS RELATIVE PERCENT: 0.1 % (ref 0–1)
BILIRUB SERPL-MCNC: 3.6 MG/DL (ref 0.2–1.2)
BILIRUB SERPL-MCNC: 3.7 MG/DL (ref 0.2–1.2)
BUN BLDV-MCNC: 46 MG/DL (ref 8–23)
BUN BLDV-MCNC: 50 MG/DL (ref 8–23)
CALCIUM SERPL-MCNC: 8.5 MG/DL (ref 8.8–10.2)
CALCIUM SERPL-MCNC: 8.5 MG/DL (ref 8.8–10.2)
CHLORIDE BLD-SCNC: 95 MMOL/L (ref 98–111)
CHLORIDE BLD-SCNC: 96 MMOL/L (ref 98–111)
CO2: 23 MMOL/L (ref 22–29)
CO2: 25 MMOL/L (ref 22–29)
CREAT SERPL-MCNC: 0.9 MG/DL (ref 0.5–1.2)
CREAT SERPL-MCNC: 1 MG/DL (ref 0.5–1.2)
CULTURE, BLOOD 2: ABNORMAL
CULTURE, BLOOD 2: ABNORMAL
EKG P AXIS: 21 DEGREES
EKG P-R INTERVAL: 182 MS
EKG Q-T INTERVAL: 326 MS
EKG QRS DURATION: 92 MS
EKG QTC CALCULATION (BAZETT): 411 MS
EKG T AXIS: -19 DEGREES
EOSINOPHILS ABSOLUTE: 0 K/UL (ref 0–0.6)
EOSINOPHILS RELATIVE PERCENT: 0 % (ref 0–5)
GFR AFRICAN AMERICAN: >59
GFR AFRICAN AMERICAN: >59
GFR NON-AFRICAN AMERICAN: >60
GFR NON-AFRICAN AMERICAN: >60
GLUCOSE BLD-MCNC: 175 MG/DL (ref 74–109)
GLUCOSE BLD-MCNC: 275 MG/DL (ref 74–109)
HCT VFR BLD CALC: 35.5 % (ref 42–52)
HEMOGLOBIN: 10.7 G/DL (ref 14–18)
IMMATURE GRANULOCYTES #: 0.4 K/UL
INR BLD: 1.31 (ref 0.88–1.18)
LACTIC ACID: 1.8 MMOL/L (ref 0.5–1.9)
LIPASE: 19 U/L (ref 13–60)
LYMPHOCYTES ABSOLUTE: 0.3 K/UL (ref 1.1–4.5)
LYMPHOCYTES RELATIVE PERCENT: 1.1 % (ref 20–40)
MCH RBC QN AUTO: 31.7 PG (ref 27–31)
MCHC RBC AUTO-ENTMCNC: 30.1 G/DL (ref 33–37)
MCV RBC AUTO: 105 FL (ref 80–94)
MONOCYTES ABSOLUTE: 1.1 K/UL (ref 0–0.9)
MONOCYTES RELATIVE PERCENT: 4.1 % (ref 0–10)
NEUTROPHILS ABSOLUTE: 25.6 K/UL (ref 1.5–7.5)
NEUTROPHILS RELATIVE PERCENT: 93.4 % (ref 50–65)
ORGANISM: ABNORMAL
PDW BLD-RTO: 13.7 % (ref 11.5–14.5)
PLATELET # BLD: 195 K/UL (ref 130–400)
PLATELET SLIDE REVIEW: ADEQUATE
PMV BLD AUTO: 11.5 FL (ref 9.4–12.4)
POTASSIUM SERPL-SCNC: 4.9 MMOL/L (ref 3.5–5)
POTASSIUM SERPL-SCNC: 5.1 MMOL/L (ref 3.5–5)
PROTHROMBIN TIME: 16.4 SEC (ref 12–14.6)
RBC # BLD: 3.38 M/UL (ref 4.7–6.1)
REASON FOR REJECTION: NORMAL
REASON FOR REJECTION: NORMAL
REJECTED TEST: NORMAL
REJECTED TEST: NORMAL
SODIUM BLD-SCNC: 131 MMOL/L (ref 136–145)
SODIUM BLD-SCNC: 132 MMOL/L (ref 136–145)
TOTAL PROTEIN: 5.9 G/DL (ref 6.6–8.7)
TOTAL PROTEIN: 6 G/DL (ref 6.6–8.7)
WBC # BLD: 27.4 K/UL (ref 4.8–10.8)

## 2022-01-31 PROCEDURE — 96375 TX/PRO/DX INJ NEW DRUG ADDON: CPT

## 2022-01-31 PROCEDURE — 2580000003 HC RX 258: Performed by: PHYSICIAN ASSISTANT

## 2022-01-31 PROCEDURE — 83690 ASSAY OF LIPASE: CPT

## 2022-01-31 PROCEDURE — 96365 THER/PROPH/DIAG IV INF INIT: CPT

## 2022-01-31 PROCEDURE — A4216 STERILE WATER/SALINE, 10 ML: HCPCS | Performed by: PHYSICIAN ASSISTANT

## 2022-01-31 PROCEDURE — 6360000002 HC RX W HCPCS: Performed by: EMERGENCY MEDICINE

## 2022-01-31 PROCEDURE — 2500000003 HC RX 250 WO HCPCS: Performed by: EMERGENCY MEDICINE

## 2022-01-31 PROCEDURE — 2580000003 HC RX 258: Performed by: EMERGENCY MEDICINE

## 2022-01-31 PROCEDURE — 80053 COMPREHEN METABOLIC PANEL: CPT

## 2022-01-31 PROCEDURE — 36415 COLL VENOUS BLD VENIPUNCTURE: CPT

## 2022-01-31 PROCEDURE — 85025 COMPLETE CBC W/AUTO DIFF WBC: CPT

## 2022-01-31 PROCEDURE — 6360000002 HC RX W HCPCS

## 2022-01-31 PROCEDURE — 85730 THROMBOPLASTIN TIME PARTIAL: CPT

## 2022-01-31 PROCEDURE — 85610 PROTHROMBIN TIME: CPT

## 2022-01-31 PROCEDURE — 96366 THER/PROPH/DIAG IV INF ADDON: CPT

## 2022-01-31 PROCEDURE — 83605 ASSAY OF LACTIC ACID: CPT

## 2022-01-31 PROCEDURE — 6360000002 HC RX W HCPCS: Performed by: PHYSICIAN ASSISTANT

## 2022-01-31 PROCEDURE — 96376 TX/PRO/DX INJ SAME DRUG ADON: CPT

## 2022-01-31 RX ORDER — ONDANSETRON 2 MG/ML
INJECTION INTRAMUSCULAR; INTRAVENOUS
Status: COMPLETED
Start: 2022-01-31 | End: 2022-01-31

## 2022-01-31 RX ORDER — MORPHINE SULFATE 4 MG/ML
4 INJECTION, SOLUTION INTRAMUSCULAR; INTRAVENOUS ONCE
Status: COMPLETED | OUTPATIENT
Start: 2022-01-31 | End: 2022-01-31

## 2022-01-31 RX ORDER — HYDROMORPHONE HYDROCHLORIDE 1 MG/ML
1 INJECTION, SOLUTION INTRAMUSCULAR; INTRAVENOUS; SUBCUTANEOUS ONCE
Status: COMPLETED | OUTPATIENT
Start: 2022-01-31 | End: 2022-01-31

## 2022-01-31 RX ORDER — MEROPENEM AND SODIUM CHLORIDE 1 G/50ML
1000 INJECTION, SOLUTION INTRAVENOUS EVERY 8 HOURS
Status: DISCONTINUED | OUTPATIENT
Start: 2022-01-31 | End: 2022-02-01 | Stop reason: HOSPADM

## 2022-01-31 RX ORDER — SODIUM CHLORIDE 9 MG/ML
INJECTION, SOLUTION INTRAVENOUS CONTINUOUS
Status: DISCONTINUED | OUTPATIENT
Start: 2022-01-31 | End: 2022-02-01 | Stop reason: HOSPADM

## 2022-01-31 RX ORDER — 0.9 % SODIUM CHLORIDE 0.9 %
500 INTRAVENOUS SOLUTION INTRAVENOUS ONCE
Status: COMPLETED | OUTPATIENT
Start: 2022-01-31 | End: 2022-01-31

## 2022-01-31 RX ORDER — HYDROMORPHONE HYDROCHLORIDE 1 MG/ML
1 INJECTION, SOLUTION INTRAMUSCULAR; INTRAVENOUS; SUBCUTANEOUS
Status: DISCONTINUED | OUTPATIENT
Start: 2022-01-31 | End: 2022-02-01 | Stop reason: HOSPADM

## 2022-01-31 RX ADMIN — HYDROMORPHONE HYDROCHLORIDE 1 MG: 1 INJECTION, SOLUTION INTRAMUSCULAR; INTRAVENOUS; SUBCUTANEOUS at 22:05

## 2022-01-31 RX ADMIN — ONDANSETRON 4 MG: 2 INJECTION INTRAMUSCULAR; INTRAVENOUS at 20:16

## 2022-01-31 RX ADMIN — MORPHINE SULFATE 4 MG: 4 INJECTION INTRAVENOUS at 20:13

## 2022-01-31 RX ADMIN — MORPHINE SULFATE 4 MG: 4 INJECTION INTRAVENOUS at 04:38

## 2022-01-31 RX ADMIN — SODIUM CHLORIDE: 9 INJECTION, SOLUTION INTRAVENOUS at 22:05

## 2022-01-31 RX ADMIN — SODIUM CHLORIDE, PRESERVATIVE FREE 2000 MG: 5 INJECTION INTRAVENOUS at 10:49

## 2022-01-31 RX ADMIN — SODIUM CHLORIDE 500 ML: 9 INJECTION, SOLUTION INTRAVENOUS at 11:11

## 2022-01-31 RX ADMIN — MEROPENEM AND SODIUM CHLORIDE 1000 MG: 1 INJECTION, SOLUTION INTRAVENOUS at 20:14

## 2022-01-31 RX ADMIN — SODIUM BICARBONATE: 84 INJECTION, SOLUTION INTRAVENOUS at 21:27

## 2022-01-31 RX ADMIN — SODIUM CHLORIDE, PRESERVATIVE FREE 2000 MG: 5 INJECTION INTRAVENOUS at 20:13

## 2022-01-31 RX ADMIN — MEROPENEM AND SODIUM CHLORIDE 1000 MG: 1 INJECTION, SOLUTION INTRAVENOUS at 12:25

## 2022-01-31 ASSESSMENT — PAIN SCALES - GENERAL
PAINLEVEL_OUTOF10: 7
PAINLEVEL_OUTOF10: 6
PAINLEVEL_OUTOF10: 10
PAINLEVEL_OUTOF10: 5

## 2022-01-31 ASSESSMENT — PAIN DESCRIPTION - LOCATION: LOCATION: ABDOMEN

## 2022-01-31 ASSESSMENT — PAIN DESCRIPTION - PAIN TYPE: TYPE: ACUTE PAIN

## 2022-01-31 NOTE — ED NOTES
Spoke with lab. Per lab all 4 blood culture bottles were positive for gram negative rods that show e choli. Dr. Eleni Leger notified.       Gissel York RN  01/30/22 [address], RN  01/30/22 9312

## 2022-01-31 NOTE — ED NOTES
Called [facility] for update at 6018. Talked to Jamin Hernandez and we are still waiting on a bed to open.      Corby Frank  01/31/22 7388

## 2022-01-31 NOTE — ED NOTES
Mauricio  transfer center at [address] [place] [address] at [phone] to get an update on patient transfer. They are still waiting on a bed. Hopefully will have one tonight.   Will call and get update later      Jolly Tabor  01/30/22 6434

## 2022-01-31 NOTE — ED NOTES
Patient was accepted at [facility] care today on day shift. Accepted by Dr. Adalid Piper.   Call the Located [facility] transfer center at [address] [facility]  01/30/22 1930

## 2022-01-31 NOTE — ED NOTES
Patient moved onto hospital bed at this time. Patient states that he feels much more comfortable in this bed. No other needsx at this time.       Vreonica Laws RN  01/31/22 7176

## 2022-02-01 VITALS
TEMPERATURE: 97.9 F | SYSTOLIC BLOOD PRESSURE: 84 MMHG | RESPIRATION RATE: 18 BRPM | OXYGEN SATURATION: 93 % | HEART RATE: 109 BPM | WEIGHT: 203 LBS | BODY MASS INDEX: 27.53 KG/M2 | DIASTOLIC BLOOD PRESSURE: 59 MMHG

## 2022-02-01 LAB
BLOOD CULTURE, ROUTINE: ABNORMAL
BLOOD CULTURE, ROUTINE: ABNORMAL
ORGANISM: ABNORMAL

## 2022-02-01 PROCEDURE — 2580000003 HC RX 258: Performed by: EMERGENCY MEDICINE

## 2022-02-01 RX ORDER — 0.9 % SODIUM CHLORIDE 0.9 %
1000 INTRAVENOUS SOLUTION INTRAVENOUS ONCE
Status: COMPLETED | OUTPATIENT
Start: 2022-02-01 | End: 2022-02-01

## 2022-02-01 RX ADMIN — SODIUM CHLORIDE 1000 ML: 9 INJECTION, SOLUTION INTRAVENOUS at 04:28

## 2022-02-01 NOTE — ED NOTES
Called [facility] at [phone] to see if they could transport patient to [facility]. [facility] accepted flight and will be here in about 18 minutes.   Security, [employer], and PBX notified     Naomy Jimenez  02/01/22 4622

## 2022-02-01 NOTE — ED NOTES
Patient called RN to bedside complaining that no one is taking care of his pain, and that he has no idea what is going on with his care. RN educated patient that he is waiting for a bed at the facility he is accepted at. She apologized that he feels his pain in not controlled, informs patient she will talk with a MD to get an order for pain mediation. MD Boudreaux informed, and verbal order for morphine given.  Per MD boudreaux, night doctor Jose Cox will assess him when he comes in at [address] [place] [address]  01/31/22 3269

## 2022-02-01 NOTE — ED NOTES
Called Dr. Viviane Coffman answering service at 0745, transferred call to Dr. Kezia Loyd at [phone].       Topher Roth  01/31/22 6862

## 2022-02-01 NOTE — ED PROVIDER NOTES
Patient currently being held here in the emergency department awaiting transfer to a tertiary care center at this time. Briefly, patient presented to the ED initially with complaints of upper abdominal pain for approximately the past 1 month. He was determined to have evidence of hyperkalemia, GISSELL, elevated WBC count of 36,000. His CT scan demonstrated evidence of cholecystitis along with multiple lesions noted in the liver. He has a prior history of cirrhosis secondary to an unknown etiology. He was initially reviewed with general surgery and was recommended for transfer to a tertiary facility. Multiple facilities had been [facility] however no beds were available. At this time patient has been holding in our emergency department for approximately 36 hours. He remained tachycardic and borderline hypotensive with a blood pressure in the 90s. He has been receiving IV cefepime every 12 hours. His blood culture is positive for gram-negative bacteremia with E. coli. Patient's laboratory studies from 1/31/22 @ 2021 which demonstrated improvement in his serum potassium, currently 4.9. WBC count is decreased to 27k. His total bilirubin, alk phos and AST and ALT remain elevated and appear about unchanged from previous. I have again discussed patient with our general surgical team, Dr. Naldo Onofre. We reviewed patient's initial presentation, CT scan findings and laboratory values. Felt the patient was a high risk surgical candidate and should be transferred to a tertiary care center for definitive management of his acute cholecystitis with his history of underlying cirrhosis. Case was discussed with ANILA VOGTMERY [facility] transfer center. Case was reviewed with [place]  and patient was declined for acceptance due to critical capacity and no beds available at [facility].     Case was discussed with [facility] trauma center in [place], [place]. We reviewed patient's case and the critical nature of his illness with need for transfer to a tertiary care center. Patient was declined for acceptance due to critical opacity and no beds available at this time. Case was discussed with [facility] @ 3418. We reviewed patient's worsening clinical condition along with relevant laboratory studies. Discussed that patient with denied an ICU bed at this point given that he is hypotensive and septic. They stated they would discuss with the accepting surgical team he is declining clinical condition however at this time there is still no beds available for transfer this gentleman. Review of patient's IV fluid status. It appears he has received about 2000 mL over the last 24 hours. Blood pressure currently running in the 80s. We will plan to give him a 1 L normal saline bolus. Received a call back from [address] [place] [address]. Have discussed case with Dr. Marcelo Florentino who is agreeable to accept patient in transfer. We reviewed his case and clinical condition up to this time. Patient will be transferred via aeromedical transportation due to the critical nature of his illness. CRITICAL CARE TIME   Total Critical Care time was 55 minutes, excluding separately reportable procedures. There was a high probability of clinically significant/life threatening deterioration in the patient's condition which required my urgent intervention. Patient required my immediate presence at the bedside. Multiple reexaminations were undertaken throughout ED course of care. Time was spent reviewing plan of care with ED nursing staff. Time is inclusive of  and clinical documentation. FINAL IMPRESSION      1. Acute cholecystitis  2. Gram-negative bacteremia  3. Septicemia  4.   History of cirrhosis    DISPOSITION/PLAN   Transferred to MD Lorena  02/01/22 8149

## 2022-02-01 NOTE — ED NOTES
Talked to Nely Thibodeaux at 2000 from [facility] and we are still waiting on a bed to open     Remi [facility]  01/31/22 2000

## 2022-02-01 NOTE — ED NOTES
RN spoke with MD Bernie Wolfe about the patients concerns of pain medication, getting a bed, and not being able to eat or drink. MD Rice informed. Orders for new labs to be drawn. Patient continues to request an MD at bedside. MD Rice aware.       Sari Nieves RN  01/31/22 6113

## 2022-02-01 NOTE — ED NOTES
Called the transfer center at [address] [facility] to let them know that the patient will not be coming there now. That we got accepted with a ready bed else where.      Yvrose Reyna  02/01/22 3304

## 2022-03-02 ENCOUNTER — TELEPHONE (OUTPATIENT)
Dept: INTERNAL MEDICINE CLINIC | Age: 67
End: 2022-03-02

## 2022-03-02 NOTE — TELEPHONE ENCOUNTER
[facility] has a referral for this pt      Will Dr Eloisa Farr sign off on initial Hospice orders ?

## 2022-03-04 ENCOUNTER — TELEPHONE (OUTPATIENT)
Dept: CARDIOLOGY CLINIC | Age: 67
End: 2022-03-04

## 2024-10-11 NOTE — TELEPHONE ENCOUNTER
Refilled medication and e-scribed to pharmacy. Confirm prescription was due. Make sure PABLO and urine drug screen is up to date. critical

## (undated) DEVICE — THREE QUARTER SHEET: Brand: CONVERTORS

## (undated) DEVICE — CONVERTORS STOCKINETTE: Brand: CONVERTORS

## (undated) DEVICE — SOLUTION IV IRRIG POUR BRL 0.9% SODIUM CHL 2F7124

## (undated) DEVICE — E-Z CLEAN, NON-STICK, PTFE COATED, ELECTROSURGICAL BLADE ELECTRODE, MODIFIED EXTENDED INSULATION, 2.5 INCH (6.35 CM): Brand: MEGADYNE

## (undated) DEVICE — SUTURE PROL SZ 3-0 L36IN NONABSORBABLE BLU L26MM SH 1/2 CIR 8522H

## (undated) DEVICE — ZIMMER® STERILE DISPOSABLE TOURNIQUET CUFF WITH PLC, DUAL PORT, SINGLE BLADDER, 34 IN. (86 CM)

## (undated) DEVICE — PACK,UNIVERSAL,NO GOWNS: Brand: MEDLINE

## (undated) DEVICE — SUTURE PERMAHAND SZ 0 L30IN NONABSORBABLE BLK SILK BRAID A306H

## (undated) DEVICE — SUTURE VCRL CTRL REL 2-0 CTX 18IN ABSRB BRAID UD J723D

## (undated) DEVICE — SUTURE PROL SZ 3-0 L48IN NONABSORBABLE BLU SH L26MM 1/2 CIR 8534H

## (undated) DEVICE — KIT DSG 26X15X1.6CM PD 1 PERF POLYUR FOAM THN DISP

## (undated) DEVICE — CURITY NON-ADHERENT STRIPS: Brand: CURITY

## (undated) DEVICE — TOWEL,OR,DSP,ST,BLUE,DLX,4/PK,20PK/CS: Brand: MEDLINE

## (undated) DEVICE — BANDAGE COMPR W6XL12FT SGL LAYERED NO CLSR EXSANGUATION

## (undated) DEVICE — SUTURE VCRL SZ 2-0 L18IN ABSRB UD POLYGLACTIN 910 BRAID TIE J111T

## (undated) DEVICE — SUTURE PERMAHAND SZ 2-0 L30IN NONABSORBABLE BLK SILK W/O A305H

## (undated) DEVICE — Z INACTIVE USE 2535480 CLIP LIG M BLU TI HRT SHP WIRE HORZ 180 PER BX

## (undated) DEVICE — MINOR CDS: Brand: MEDLINE INDUSTRIES, INC.

## (undated) DEVICE — SPONGE LAP W18XL18IN WHT COT 4 PLY FLD STRUNG RADPQ DISP ST

## (undated) DEVICE — MASTISOL ADHESIVE LIQ 2/3ML

## (undated) DEVICE — Z INACTIVE USE 2641838 CLIP INT L ORNG TI TRNSVRS GRV CHEVRON SHP W/ PRECIS TIP TO

## (undated) DEVICE — 3M™ TEGADERM™ TRANSPARENT FILM DRESSING FRAME STYLE, 1627, 4 IN X 10 IN (10 CM X 25 CM), 20/CT 4CT/CASE: Brand: 3M™ TEGADERM™

## (undated) DEVICE — ASTOUND STANDARD SURGICAL GOWN, XXL: Brand: CONVERTORS

## (undated) DEVICE — 1.5 TO 1 DERMACARRIER: Brand: MESHGRAFTTM  II TISSUE EXPANSION SYSTEM

## (undated) DEVICE — DERMATOME BLADES: Brand: DERMATOME

## (undated) DEVICE — OSCILLATOR BLADE, ME-92 COATED, 32 X 64 X 0.8 MM (.031"): Brand: ME-92

## (undated) DEVICE — SUTURE PERMAHAND SZ 3-0 L30IN NONABSORBABLE BLK SILK BRAID A304H

## (undated) DEVICE — 3M™ TEGADERM™ TRANSPARENT FILM DRESSING FRAME STYLE, 1628, 6 IN X 8 IN (15 CM X 20 CM), 10/CT 8CT/CASE: Brand: 3M™ TEGADERM™

## (undated) DEVICE — SUTURE ETHLN SZ 4-0 L18IN NONABSORBABLE BLK L19MM PS-2 3/8 1667H

## (undated) DEVICE — CLIP INT SM WIDE RED TI TRNSVRS GRV CHEVRON SHP W/ PRECIS

## (undated) DEVICE — DRESSING WND CONTACT LAYR FLX 5X4 IN FORMULATION RESTORE

## (undated) DEVICE — Device

## (undated) DEVICE — CANISTER VAC 500ML TBNG RESVR FOR INFOVAC W/O GEL CLMP CONN

## (undated) DEVICE — BANDAGE COBAN 6 IN WND 6INX5YD FOAM

## (undated) DEVICE — PREP RAZOR: Brand: DEROYAL

## (undated) DEVICE — SUTURE VCRL SZ 3-0 L18IN ABSRB UD L26MM SH 1/2 CIR J864D